# Patient Record
Sex: FEMALE | Employment: UNEMPLOYED | URBAN - METROPOLITAN AREA
[De-identification: names, ages, dates, MRNs, and addresses within clinical notes are randomized per-mention and may not be internally consistent; named-entity substitution may affect disease eponyms.]

---

## 2017-08-20 ENCOUNTER — HOSPITAL ENCOUNTER (EMERGENCY)
Facility: CLINIC | Age: 47
Discharge: HOME | End: 2017-08-20
Attending: EMERGENCY MEDICINE
Payer: MEDICARE

## 2017-08-20 VITALS
HEIGHT: 62 IN | RESPIRATION RATE: 15 BRPM | OXYGEN SATURATION: 99 % | WEIGHT: 110 LBS | SYSTOLIC BLOOD PRESSURE: 118 MMHG | BODY MASS INDEX: 20.24 KG/M2 | DIASTOLIC BLOOD PRESSURE: 77 MMHG | TEMPERATURE: 98.3 F | HEART RATE: 82 BPM

## 2017-08-20 DIAGNOSIS — G43.909 MIGRAINE WITHOUT STATUS MIGRAINOSUS, NOT INTRACTABLE, UNSPECIFIED MIGRAINE TYPE: Primary | ICD-10-CM

## 2017-08-20 LAB — GLUCOSE BLD-MCNC: 225 MG/DL (ref 65–105)

## 2017-08-20 PROCEDURE — 99283 EMERGENCY DEPT VISIT LOW MDM: CPT

## 2017-08-20 PROCEDURE — 96361 HYDRATE IV INFUSION ADD-ON: CPT

## 2017-08-20 PROCEDURE — 2580000003 HC RX 258: Performed by: EMERGENCY MEDICINE

## 2017-08-20 PROCEDURE — 6360000002 HC RX W HCPCS: Performed by: EMERGENCY MEDICINE

## 2017-08-20 PROCEDURE — 96374 THER/PROPH/DIAG INJ IV PUSH: CPT

## 2017-08-20 PROCEDURE — 82947 ASSAY GLUCOSE BLOOD QUANT: CPT

## 2017-08-20 PROCEDURE — 96375 TX/PRO/DX INJ NEW DRUG ADDON: CPT

## 2017-08-20 RX ORDER — DEXAMETHASONE SODIUM PHOSPHATE 10 MG/ML
10 INJECTION INTRAMUSCULAR; INTRAVENOUS ONCE
Status: COMPLETED | OUTPATIENT
Start: 2017-08-20 | End: 2017-08-20

## 2017-08-20 RX ORDER — 0.9 % SODIUM CHLORIDE 0.9 %
1000 INTRAVENOUS SOLUTION INTRAVENOUS ONCE
Status: COMPLETED | OUTPATIENT
Start: 2017-08-20 | End: 2017-08-20

## 2017-08-20 RX ORDER — KETOROLAC TROMETHAMINE 30 MG/ML
30 INJECTION, SOLUTION INTRAMUSCULAR; INTRAVENOUS ONCE
Status: COMPLETED | OUTPATIENT
Start: 2017-08-20 | End: 2017-08-20

## 2017-08-20 RX ORDER — DIPHENHYDRAMINE HYDROCHLORIDE 50 MG/ML
25 INJECTION INTRAMUSCULAR; INTRAVENOUS ONCE
Status: COMPLETED | OUTPATIENT
Start: 2017-08-20 | End: 2017-08-20

## 2017-08-20 RX ORDER — METOCLOPRAMIDE HYDROCHLORIDE 5 MG/ML
10 INJECTION INTRAMUSCULAR; INTRAVENOUS ONCE
Status: COMPLETED | OUTPATIENT
Start: 2017-08-20 | End: 2017-08-20

## 2017-08-20 RX ADMIN — KETOROLAC TROMETHAMINE 30 MG: 30 INJECTION, SOLUTION INTRAMUSCULAR at 03:21

## 2017-08-20 RX ADMIN — SODIUM CHLORIDE 1000 ML: 9 INJECTION, SOLUTION INTRAVENOUS at 03:18

## 2017-08-20 RX ADMIN — DEXAMETHASONE SODIUM PHOSPHATE 10 MG: 10 INJECTION INTRAMUSCULAR; INTRAVENOUS at 03:25

## 2017-08-20 RX ADMIN — METOCLOPRAMIDE 10 MG: 5 INJECTION, SOLUTION INTRAMUSCULAR; INTRAVENOUS at 03:22

## 2017-08-20 RX ADMIN — DIPHENHYDRAMINE HYDROCHLORIDE 25 MG: 50 INJECTION, SOLUTION INTRAMUSCULAR; INTRAVENOUS at 03:20

## 2017-08-20 ASSESSMENT — PAIN DESCRIPTION - LOCATION
LOCATION: HEAD

## 2017-08-20 ASSESSMENT — PAIN DESCRIPTION - DESCRIPTORS
DESCRIPTORS: ACHING
DESCRIPTORS: ACHING
DESCRIPTORS: THROBBING

## 2017-08-20 ASSESSMENT — PAIN SCALES - GENERAL
PAINLEVEL_OUTOF10: 3
PAINLEVEL_OUTOF10: 10
PAINLEVEL_OUTOF10: 9

## 2017-08-20 ASSESSMENT — ENCOUNTER SYMPTOMS
EYES NEGATIVE: 1
GASTROINTESTINAL NEGATIVE: 1
RESPIRATORY NEGATIVE: 1

## 2017-08-20 ASSESSMENT — PAIN DESCRIPTION - ONSET
ONSET: ON-GOING

## 2017-08-20 ASSESSMENT — PAIN DESCRIPTION - FREQUENCY
FREQUENCY: CONTINUOUS

## 2017-08-20 ASSESSMENT — PAIN DESCRIPTION - PAIN TYPE
TYPE: ACUTE PAIN

## 2017-08-20 ASSESSMENT — PAIN DESCRIPTION - ORIENTATION: ORIENTATION: OTHER (COMMENT)

## 2017-08-20 ASSESSMENT — PAIN DESCRIPTION - PROGRESSION: CLINICAL_PROGRESSION: RAPIDLY WORSENING

## 2017-08-24 ENCOUNTER — HOSPITAL ENCOUNTER (EMERGENCY)
Facility: CLINIC | Age: 47
Discharge: HOME | End: 2017-08-24
Attending: EMERGENCY MEDICINE
Payer: MEDICARE

## 2017-08-24 ENCOUNTER — APPOINTMENT (OUTPATIENT)
Dept: GENERAL RADIOLOGY | Facility: CLINIC | Age: 47
End: 2017-08-24
Payer: MEDICARE

## 2017-08-24 VITALS
OXYGEN SATURATION: 100 % | HEART RATE: 83 BPM | RESPIRATION RATE: 14 BRPM | DIASTOLIC BLOOD PRESSURE: 99 MMHG | WEIGHT: 113 LBS | SYSTOLIC BLOOD PRESSURE: 157 MMHG | HEIGHT: 62 IN | BODY MASS INDEX: 20.8 KG/M2 | TEMPERATURE: 98.2 F

## 2017-08-24 DIAGNOSIS — S63.502A LEFT WRIST SPRAIN, INITIAL ENCOUNTER: Primary | ICD-10-CM

## 2017-08-24 PROCEDURE — 73110 X-RAY EXAM OF WRIST: CPT

## 2017-08-24 PROCEDURE — 99284 EMERGENCY DEPT VISIT MOD MDM: CPT

## 2017-08-24 PROCEDURE — 29125 APPL SHORT ARM SPLINT STATIC: CPT

## 2017-08-24 RX ORDER — IBUPROFEN 800 MG/1
800 TABLET ORAL EVERY 8 HOURS PRN
Qty: 30 TABLET | Refills: 0 | Status: ON HOLD | OUTPATIENT
Start: 2017-08-24 | End: 2018-11-15

## 2017-08-24 ASSESSMENT — PAIN SCALES - GENERAL: PAINLEVEL_OUTOF10: 7

## 2017-08-24 ASSESSMENT — PAIN DESCRIPTION - LOCATION: LOCATION: WRIST

## 2017-08-24 ASSESSMENT — PAIN DESCRIPTION - PAIN TYPE: TYPE: ACUTE PAIN

## 2017-08-24 ASSESSMENT — PAIN DESCRIPTION - DESCRIPTORS: DESCRIPTORS: SHARP;THROBBING

## 2017-08-24 ASSESSMENT — PAIN DESCRIPTION - ORIENTATION: ORIENTATION: LEFT

## 2017-08-24 ASSESSMENT — PAIN DESCRIPTION - FREQUENCY: FREQUENCY: CONTINUOUS

## 2018-05-22 ENCOUNTER — HOSPITAL ENCOUNTER (EMERGENCY)
Facility: CLINIC | Age: 48
Discharge: HOME | End: 2018-05-23
Attending: EMERGENCY MEDICINE
Payer: MEDICARE

## 2018-05-22 VITALS
WEIGHT: 114 LBS | OXYGEN SATURATION: 100 % | HEIGHT: 62 IN | DIASTOLIC BLOOD PRESSURE: 88 MMHG | HEART RATE: 87 BPM | TEMPERATURE: 97.9 F | RESPIRATION RATE: 16 BRPM | BODY MASS INDEX: 20.98 KG/M2 | SYSTOLIC BLOOD PRESSURE: 142 MMHG

## 2018-05-22 DIAGNOSIS — R73.9 HYPERGLYCEMIA: Primary | ICD-10-CM

## 2018-05-22 LAB
-: ABNORMAL
ABSOLUTE EOS #: 0.2 K/UL (ref 0–0.4)
ABSOLUTE IMMATURE GRANULOCYTE: NORMAL K/UL (ref 0–0.3)
ABSOLUTE LYMPH #: 2.5 K/UL (ref 1–4.8)
ABSOLUTE MONO #: 0.6 K/UL (ref 0.1–1.2)
AMORPHOUS: ABNORMAL
ANION GAP SERPL CALCULATED.3IONS-SCNC: 16 MMOL/L (ref 9–17)
BACTERIA: ABNORMAL
BASOPHILS # BLD: 1 % (ref 0–2)
BASOPHILS ABSOLUTE: 0.1 K/UL (ref 0–0.2)
BETA-HYDROXYBUTYRATE: 0.27 MMOL/L (ref 0.02–0.27)
BILIRUBIN URINE: ABNORMAL
BUN BLDV-MCNC: 17 MG/DL (ref 6–20)
BUN/CREAT BLD: ABNORMAL (ref 9–20)
CALCIUM SERPL-MCNC: 9.9 MG/DL (ref 8.6–10.4)
CASTS UA: ABNORMAL /LPF (ref 0–2)
CHLORIDE BLD-SCNC: 104 MMOL/L (ref 98–107)
CO2: 23 MMOL/L (ref 20–31)
COLOR: YELLOW
COMMENT UA: ABNORMAL
CREAT SERPL-MCNC: 0.7 MG/DL (ref 0.5–0.9)
CRYSTALS, UA: ABNORMAL /HPF
DIFFERENTIAL TYPE: NORMAL
EOSINOPHILS RELATIVE PERCENT: 2 % (ref 1–4)
EPITHELIAL CELLS UA: ABNORMAL /HPF (ref 0–5)
GFR AFRICAN AMERICAN: >60 ML/MIN
GFR NON-AFRICAN AMERICAN: >60 ML/MIN
GFR SERPL CREATININE-BSD FRML MDRD: ABNORMAL ML/MIN/{1.73_M2}
GFR SERPL CREATININE-BSD FRML MDRD: ABNORMAL ML/MIN/{1.73_M2}
GLUCOSE BLD-MCNC: 241 MG/DL (ref 65–105)
GLUCOSE BLD-MCNC: 252 MG/DL (ref 70–99)
GLUCOSE URINE: ABNORMAL
HCG(URINE) PREGNANCY TEST: NEGATIVE
HCT VFR BLD CALC: 38.9 % (ref 36–46)
HEMOGLOBIN: 12.7 G/DL (ref 12–16)
IMMATURE GRANULOCYTES: NORMAL %
KETONES, URINE: ABNORMAL
LEUKOCYTE ESTERASE, URINE: NEGATIVE
LYMPHOCYTES # BLD: 27 % (ref 24–44)
MCH RBC QN AUTO: 29.5 PG (ref 26–34)
MCHC RBC AUTO-ENTMCNC: 32.6 G/DL (ref 31–37)
MCV RBC AUTO: 90.6 FL (ref 80–100)
MONOCYTES # BLD: 6 % (ref 2–11)
MUCUS: ABNORMAL
NITRITE, URINE: NEGATIVE
NRBC AUTOMATED: NORMAL PER 100 WBC
OTHER OBSERVATIONS UA: ABNORMAL
PDW BLD-RTO: 12.8 % (ref 12.5–15.4)
PH UA: 5.5 (ref 5–8)
PLATELET # BLD: 327 K/UL (ref 140–450)
PLATELET ESTIMATE: NORMAL
PMV BLD AUTO: 9.9 FL (ref 6–12)
POTASSIUM SERPL-SCNC: 4.1 MMOL/L (ref 3.7–5.3)
PROTEIN UA: ABNORMAL
RBC # BLD: 4.3 M/UL (ref 4–5.2)
RBC # BLD: NORMAL 10*6/UL
RBC UA: ABNORMAL /HPF (ref 0–2)
RENAL EPITHELIAL, UA: ABNORMAL /HPF
SEG NEUTROPHILS: 64 % (ref 36–66)
SEGMENTED NEUTROPHILS ABSOLUTE COUNT: 5.8 K/UL (ref 1.8–7.7)
SODIUM BLD-SCNC: 143 MMOL/L (ref 135–144)
SPECIFIC GRAVITY UA: 1.04 (ref 1–1.03)
TRICHOMONAS: ABNORMAL
TURBIDITY: CLEAR
URINE HGB: NEGATIVE
UROBILINOGEN, URINE: NORMAL
WBC # BLD: 9.2 K/UL (ref 3.5–11)
WBC # BLD: NORMAL 10*3/UL
WBC UA: ABNORMAL /HPF (ref 0–5)
YEAST: ABNORMAL

## 2018-05-22 PROCEDURE — 96375 TX/PRO/DX INJ NEW DRUG ADDON: CPT

## 2018-05-22 PROCEDURE — 82010 KETONE BODYS QUAN: CPT

## 2018-05-22 PROCEDURE — 81001 URINALYSIS AUTO W/SCOPE: CPT

## 2018-05-22 PROCEDURE — 6360000002 HC RX W HCPCS: Performed by: EMERGENCY MEDICINE

## 2018-05-22 PROCEDURE — 84703 CHORIONIC GONADOTROPIN ASSAY: CPT

## 2018-05-22 PROCEDURE — 99284 EMERGENCY DEPT VISIT MOD MDM: CPT

## 2018-05-22 PROCEDURE — 2580000003 HC RX 258: Performed by: EMERGENCY MEDICINE

## 2018-05-22 PROCEDURE — 82947 ASSAY GLUCOSE BLOOD QUANT: CPT

## 2018-05-22 PROCEDURE — 85025 COMPLETE CBC W/AUTO DIFF WBC: CPT

## 2018-05-22 PROCEDURE — 96374 THER/PROPH/DIAG INJ IV PUSH: CPT

## 2018-05-22 PROCEDURE — 80048 BASIC METABOLIC PNL TOTAL CA: CPT

## 2018-05-22 PROCEDURE — 36415 COLL VENOUS BLD VENIPUNCTURE: CPT

## 2018-05-22 PROCEDURE — 96361 HYDRATE IV INFUSION ADD-ON: CPT

## 2018-05-22 RX ORDER — 0.9 % SODIUM CHLORIDE 0.9 %
1000 INTRAVENOUS SOLUTION INTRAVENOUS ONCE
Status: COMPLETED | OUTPATIENT
Start: 2018-05-22 | End: 2018-05-22

## 2018-05-22 RX ORDER — KETOROLAC TROMETHAMINE 30 MG/ML
30 INJECTION, SOLUTION INTRAMUSCULAR; INTRAVENOUS ONCE
Status: COMPLETED | OUTPATIENT
Start: 2018-05-22 | End: 2018-05-22

## 2018-05-22 RX ORDER — ONDANSETRON 2 MG/ML
4 INJECTION INTRAMUSCULAR; INTRAVENOUS ONCE
Status: COMPLETED | OUTPATIENT
Start: 2018-05-22 | End: 2018-05-22

## 2018-05-22 RX ADMIN — ONDANSETRON 4 MG: 2 INJECTION INTRAMUSCULAR; INTRAVENOUS at 21:39

## 2018-05-22 RX ADMIN — SODIUM CHLORIDE 1000 ML: 9 INJECTION, SOLUTION INTRAVENOUS at 21:38

## 2018-05-22 RX ADMIN — KETOROLAC TROMETHAMINE 30 MG: 30 INJECTION, SOLUTION INTRAMUSCULAR at 21:39

## 2018-05-22 ASSESSMENT — PAIN SCALES - GENERAL
PAINLEVEL_OUTOF10: 8
PAINLEVEL_OUTOF10: 7
PAINLEVEL_OUTOF10: 8

## 2018-05-22 ASSESSMENT — PAIN DESCRIPTION - FREQUENCY: FREQUENCY: CONTINUOUS

## 2018-05-22 ASSESSMENT — PAIN DESCRIPTION - DESCRIPTORS
DESCRIPTORS: CONSTANT
DESCRIPTORS: CRAMPING

## 2018-05-22 ASSESSMENT — PAIN DESCRIPTION - PAIN TYPE: TYPE: ACUTE PAIN

## 2018-05-22 ASSESSMENT — PAIN DESCRIPTION - LOCATION: LOCATION: LEG

## 2018-05-22 ASSESSMENT — PAIN DESCRIPTION - ORIENTATION: ORIENTATION: RIGHT;LEFT

## 2018-11-14 ENCOUNTER — APPOINTMENT (OUTPATIENT)
Dept: ULTRASOUND IMAGING | Facility: CLINIC | Age: 48
DRG: 420 | End: 2018-11-14
Payer: MEDICARE

## 2018-11-14 ENCOUNTER — HOSPITAL ENCOUNTER (INPATIENT)
Age: 48
LOS: 1 days | Discharge: HOME | DRG: 420 | End: 2018-11-15
Attending: EMERGENCY MEDICINE | Admitting: INTERNAL MEDICINE
Payer: MEDICARE

## 2018-11-14 DIAGNOSIS — Z79.4 TYPE 2 DIABETES MELLITUS WITH HYPERGLYCEMIA, WITH LONG-TERM CURRENT USE OF INSULIN (HCC): ICD-10-CM

## 2018-11-14 DIAGNOSIS — E11.65 TYPE 2 DIABETES MELLITUS WITH HYPERGLYCEMIA, WITH LONG-TERM CURRENT USE OF INSULIN (HCC): ICD-10-CM

## 2018-11-14 DIAGNOSIS — E10.10 TYPE 1 DIABETES MELLITUS WITH KETOACIDOSIS, UNCONTROLLED (HCC): Primary | ICD-10-CM

## 2018-11-14 LAB
-: ABNORMAL
ABSOLUTE EOS #: 0 K/UL (ref 0–0.4)
ABSOLUTE IMMATURE GRANULOCYTE: ABNORMAL K/UL (ref 0–0.3)
ABSOLUTE LYMPH #: 0.9 K/UL (ref 1–4.8)
ABSOLUTE MONO #: 0.2 K/UL (ref 0.1–1.2)
AMORPHOUS: ABNORMAL
ANION GAP SERPL CALCULATED.3IONS-SCNC: 10 MMOL/L (ref 9–17)
ANION GAP SERPL CALCULATED.3IONS-SCNC: 13 MMOL/L (ref 9–17)
ANION GAP SERPL CALCULATED.3IONS-SCNC: 18 MMOL/L (ref 9–17)
BACTERIA: ABNORMAL
BASOPHILS # BLD: 1 % (ref 0–2)
BASOPHILS ABSOLUTE: 0 K/UL (ref 0–0.2)
BETA-HYDROXYBUTYRATE: 0.65 MMOL/L (ref 0.02–0.27)
BILIRUBIN URINE: NEGATIVE
BUN BLDV-MCNC: 12 MG/DL (ref 6–20)
BUN BLDV-MCNC: 14 MG/DL (ref 6–20)
BUN BLDV-MCNC: 16 MG/DL (ref 6–20)
BUN/CREAT BLD: 21 (ref 9–20)
BUN/CREAT BLD: ABNORMAL (ref 9–20)
BUN/CREAT BLD: ABNORMAL (ref 9–20)
CALCIUM SERPL-MCNC: 8.8 MG/DL (ref 8.6–10.4)
CALCIUM SERPL-MCNC: 9.3 MG/DL (ref 8.6–10.4)
CALCIUM SERPL-MCNC: 9.9 MG/DL (ref 8.6–10.4)
CASTS UA: ABNORMAL /LPF (ref 0–2)
CHLORIDE BLD-SCNC: 101 MMOL/L (ref 98–107)
CHLORIDE BLD-SCNC: 95 MMOL/L (ref 98–107)
CHLORIDE BLD-SCNC: 99 MMOL/L (ref 98–107)
CO2: 23 MMOL/L (ref 20–31)
CO2: 23 MMOL/L (ref 20–31)
CO2: 27 MMOL/L (ref 20–31)
COLOR: YELLOW
COMMENT UA: ABNORMAL
CREAT SERPL-MCNC: 0.57 MG/DL (ref 0.5–0.9)
CREAT SERPL-MCNC: 0.6 MG/DL (ref 0.5–0.9)
CREAT SERPL-MCNC: 0.8 MG/DL (ref 0.5–0.9)
CRYSTALS, UA: ABNORMAL /HPF
DIFFERENTIAL TYPE: ABNORMAL
EOSINOPHILS RELATIVE PERCENT: 1 % (ref 1–4)
EPITHELIAL CELLS UA: ABNORMAL /HPF (ref 0–5)
GFR AFRICAN AMERICAN: >60 ML/MIN
GFR NON-AFRICAN AMERICAN: >60 ML/MIN
GFR SERPL CREATININE-BSD FRML MDRD: ABNORMAL ML/MIN/{1.73_M2}
GLUCOSE BLD-MCNC: 115 MG/DL (ref 65–105)
GLUCOSE BLD-MCNC: 135 MG/DL (ref 70–99)
GLUCOSE BLD-MCNC: 188 MG/DL (ref 65–105)
GLUCOSE BLD-MCNC: 281 MG/DL (ref 70–99)
GLUCOSE BLD-MCNC: 399 MG/DL (ref 65–105)
GLUCOSE BLD-MCNC: 483 MG/DL (ref 70–99)
GLUCOSE URINE: ABNORMAL
HCT VFR BLD CALC: 38.7 % (ref 36–46)
HEMOGLOBIN: 12.7 G/DL (ref 12–16)
IMMATURE GRANULOCYTES: ABNORMAL %
KETONES, URINE: ABNORMAL
LACTIC ACID, WHOLE BLOOD: NORMAL MMOL/L (ref 0.7–2.1)
LACTIC ACID: 1.5 MMOL/L (ref 0.5–2.2)
LACTIC ACID: 3.7 MMOL/L (ref 0.5–2.2)
LEUKOCYTE ESTERASE, URINE: NEGATIVE
LYMPHOCYTES # BLD: 18 % (ref 24–44)
MCH RBC QN AUTO: 30.5 PG (ref 26–34)
MCHC RBC AUTO-ENTMCNC: 32.7 G/DL (ref 31–37)
MCV RBC AUTO: 93.3 FL (ref 80–100)
MONOCYTES # BLD: 3 % (ref 2–11)
MUCUS: ABNORMAL
NITRITE, URINE: NEGATIVE
NRBC AUTOMATED: ABNORMAL PER 100 WBC
OTHER OBSERVATIONS UA: ABNORMAL
PDW BLD-RTO: 12.7 % (ref 12.5–15.4)
PH UA: 5.5 (ref 5–8)
PLATELET # BLD: 293 K/UL (ref 140–450)
PLATELET ESTIMATE: ABNORMAL
PMV BLD AUTO: 10.2 FL (ref 6–12)
POTASSIUM SERPL-SCNC: 3.7 MMOL/L (ref 3.7–5.3)
POTASSIUM SERPL-SCNC: 4.1 MMOL/L (ref 3.7–5.3)
POTASSIUM SERPL-SCNC: 4.1 MMOL/L (ref 3.7–5.3)
PROTEIN UA: ABNORMAL
RBC # BLD: 4.15 M/UL (ref 4–5.2)
RBC # BLD: ABNORMAL 10*6/UL
RBC UA: ABNORMAL /HPF (ref 0–2)
RENAL EPITHELIAL, UA: ABNORMAL /HPF
SEG NEUTROPHILS: 77 % (ref 36–66)
SEGMENTED NEUTROPHILS ABSOLUTE COUNT: 4.1 K/UL (ref 1.8–7.7)
SODIUM BLD-SCNC: 136 MMOL/L (ref 135–144)
SODIUM BLD-SCNC: 136 MMOL/L (ref 135–144)
SODIUM BLD-SCNC: 137 MMOL/L (ref 135–144)
SPECIFIC GRAVITY UA: 1.01 (ref 1–1.03)
TRICHOMONAS: ABNORMAL
TURBIDITY: CLEAR
URINE HGB: NEGATIVE
UROBILINOGEN, URINE: NORMAL
WBC # BLD: 5.3 K/UL (ref 3.5–11)
WBC # BLD: ABNORMAL 10*3/UL
WBC UA: ABNORMAL /HPF (ref 0–5)
YEAST: ABNORMAL

## 2018-11-14 PROCEDURE — 6370000000 HC RX 637 (ALT 250 FOR IP): Performed by: FAMILY MEDICINE

## 2018-11-14 PROCEDURE — 2060000000 HC ICU INTERMEDIATE R&B

## 2018-11-14 PROCEDURE — 96375 TX/PRO/DX INJ NEW DRUG ADDON: CPT

## 2018-11-14 PROCEDURE — 85025 COMPLETE CBC W/AUTO DIFF WBC: CPT

## 2018-11-14 PROCEDURE — 82947 ASSAY GLUCOSE BLOOD QUANT: CPT

## 2018-11-14 PROCEDURE — 6360000002 HC RX W HCPCS: Performed by: EMERGENCY MEDICINE

## 2018-11-14 PROCEDURE — 82010 KETONE BODYS QUAN: CPT

## 2018-11-14 PROCEDURE — 6370000000 HC RX 637 (ALT 250 FOR IP): Performed by: CLINICAL NURSE SPECIALIST

## 2018-11-14 PROCEDURE — 93971 EXTREMITY STUDY: CPT

## 2018-11-14 PROCEDURE — 96366 THER/PROPH/DIAG IV INF ADDON: CPT

## 2018-11-14 PROCEDURE — 80048 BASIC METABOLIC PNL TOTAL CA: CPT

## 2018-11-14 PROCEDURE — 96361 HYDRATE IV INFUSION ADD-ON: CPT

## 2018-11-14 PROCEDURE — 82803 BLOOD GASES ANY COMBINATION: CPT

## 2018-11-14 PROCEDURE — 36415 COLL VENOUS BLD VENIPUNCTURE: CPT

## 2018-11-14 PROCEDURE — 2580000003 HC RX 258: Performed by: CLINICAL NURSE SPECIALIST

## 2018-11-14 PROCEDURE — 6370000000 HC RX 637 (ALT 250 FOR IP): Performed by: EMERGENCY MEDICINE

## 2018-11-14 PROCEDURE — 81001 URINALYSIS AUTO W/SCOPE: CPT

## 2018-11-14 PROCEDURE — 99222 1ST HOSP IP/OBS MODERATE 55: CPT | Performed by: NURSE PRACTITIONER

## 2018-11-14 PROCEDURE — 99285 EMERGENCY DEPT VISIT HI MDM: CPT

## 2018-11-14 PROCEDURE — 6360000002 HC RX W HCPCS: Performed by: CLINICAL NURSE SPECIALIST

## 2018-11-14 PROCEDURE — 2580000003 HC RX 258: Performed by: EMERGENCY MEDICINE

## 2018-11-14 PROCEDURE — 83036 HEMOGLOBIN GLYCOSYLATED A1C: CPT

## 2018-11-14 PROCEDURE — 83605 ASSAY OF LACTIC ACID: CPT

## 2018-11-14 PROCEDURE — 96365 THER/PROPH/DIAG IV INF INIT: CPT

## 2018-11-14 RX ORDER — INSULIN GLARGINE 100 [IU]/ML
40 INJECTION, SOLUTION SUBCUTANEOUS DAILY
Status: DISCONTINUED | OUTPATIENT
Start: 2018-11-14 | End: 2018-11-15 | Stop reason: HOSPADM

## 2018-11-14 RX ORDER — ACETAMINOPHEN 325 MG/1
650 TABLET ORAL EVERY 4 HOURS PRN
Status: DISCONTINUED | OUTPATIENT
Start: 2018-11-14 | End: 2018-11-15 | Stop reason: HOSPADM

## 2018-11-14 RX ORDER — SODIUM CHLORIDE 0.9 % (FLUSH) 0.9 %
10 SYRINGE (ML) INJECTION PRN
Status: DISCONTINUED | OUTPATIENT
Start: 2018-11-14 | End: 2018-11-15 | Stop reason: HOSPADM

## 2018-11-14 RX ORDER — METOPROLOL TARTRATE 5 MG/5ML
5 INJECTION INTRAVENOUS EVERY 6 HOURS PRN
Status: DISCONTINUED | OUTPATIENT
Start: 2018-11-14 | End: 2018-11-15 | Stop reason: HOSPADM

## 2018-11-14 RX ORDER — IBUPROFEN 800 MG/1
800 TABLET ORAL EVERY 8 HOURS PRN
Status: DISCONTINUED | OUTPATIENT
Start: 2018-11-14 | End: 2018-11-14 | Stop reason: SDUPTHER

## 2018-11-14 RX ORDER — ATORVASTATIN CALCIUM 40 MG/1
40 TABLET, FILM COATED ORAL DAILY
Status: DISCONTINUED | OUTPATIENT
Start: 2018-11-14 | End: 2018-11-15 | Stop reason: HOSPADM

## 2018-11-14 RX ORDER — SODIUM CHLORIDE 9 MG/ML
INJECTION, SOLUTION INTRAVENOUS CONTINUOUS
Status: DISCONTINUED | OUTPATIENT
Start: 2018-11-14 | End: 2018-11-15 | Stop reason: HOSPADM

## 2018-11-14 RX ORDER — RANITIDINE 150 MG/1
150 TABLET ORAL 2 TIMES DAILY PRN
COMMUNITY
End: 2019-06-06

## 2018-11-14 RX ORDER — POTASSIUM CHLORIDE 7.45 MG/ML
10 INJECTION INTRAVENOUS PRN
Status: DISCONTINUED | OUTPATIENT
Start: 2018-11-14 | End: 2018-11-15 | Stop reason: HOSPADM

## 2018-11-14 RX ORDER — MAGNESIUM SULFATE 1 G/100ML
1 INJECTION INTRAVENOUS PRN
Status: DISCONTINUED | OUTPATIENT
Start: 2018-11-14 | End: 2018-11-15 | Stop reason: HOSPADM

## 2018-11-14 RX ORDER — NICOTINE POLACRILEX 4 MG
15 LOZENGE BUCCAL PRN
Status: DISCONTINUED | OUTPATIENT
Start: 2018-11-14 | End: 2018-11-15 | Stop reason: HOSPADM

## 2018-11-14 RX ORDER — TRAMADOL HYDROCHLORIDE 50 MG/1
50 TABLET ORAL EVERY 6 HOURS PRN
Status: DISCONTINUED | OUTPATIENT
Start: 2018-11-14 | End: 2018-11-14

## 2018-11-14 RX ORDER — TRAMADOL HYDROCHLORIDE 50 MG/1
50 TABLET ORAL EVERY 6 HOURS PRN
Status: DISCONTINUED | OUTPATIENT
Start: 2018-11-14 | End: 2018-11-15 | Stop reason: HOSPADM

## 2018-11-14 RX ORDER — IBUPROFEN 800 MG/1
800 TABLET ORAL EVERY 6 HOURS PRN
Status: DISCONTINUED | OUTPATIENT
Start: 2018-11-14 | End: 2018-11-15

## 2018-11-14 RX ORDER — POTASSIUM CHLORIDE 20MEQ/15ML
40 LIQUID (ML) ORAL PRN
Status: DISCONTINUED | OUTPATIENT
Start: 2018-11-14 | End: 2018-11-15 | Stop reason: HOSPADM

## 2018-11-14 RX ORDER — LISINOPRIL 10 MG/1
10 TABLET ORAL DAILY
Status: DISCONTINUED | OUTPATIENT
Start: 2018-11-14 | End: 2018-11-15 | Stop reason: HOSPADM

## 2018-11-14 RX ORDER — M-VIT,TX,IRON,MINS/CALC/FOLIC 27MG-0.4MG
1 TABLET ORAL DAILY
Status: DISCONTINUED | OUTPATIENT
Start: 2018-11-14 | End: 2018-11-15 | Stop reason: HOSPADM

## 2018-11-14 RX ORDER — GABAPENTIN 600 MG/1
600 TABLET ORAL NIGHTLY
Status: ON HOLD | COMMUNITY
End: 2018-11-15

## 2018-11-14 RX ORDER — LORAZEPAM 2 MG/ML
1 INJECTION INTRAMUSCULAR ONCE
Status: DISCONTINUED | OUTPATIENT
Start: 2018-11-14 | End: 2018-11-15 | Stop reason: HOSPADM

## 2018-11-14 RX ORDER — SODIUM CHLORIDE 0.9 % (FLUSH) 0.9 %
10 SYRINGE (ML) INJECTION EVERY 12 HOURS SCHEDULED
Status: DISCONTINUED | OUTPATIENT
Start: 2018-11-14 | End: 2018-11-15 | Stop reason: HOSPADM

## 2018-11-14 RX ORDER — ONDANSETRON 4 MG/1
4 TABLET, ORALLY DISINTEGRATING ORAL EVERY 6 HOURS PRN
Status: DISCONTINUED | OUTPATIENT
Start: 2018-11-14 | End: 2018-11-15 | Stop reason: HOSPADM

## 2018-11-14 RX ORDER — LEVOTHYROXINE SODIUM 0.05 MG/1
50 TABLET ORAL DAILY
Status: DISCONTINUED | OUTPATIENT
Start: 2018-11-15 | End: 2018-11-15 | Stop reason: HOSPADM

## 2018-11-14 RX ORDER — ONDANSETRON 2 MG/ML
4 INJECTION INTRAMUSCULAR; INTRAVENOUS EVERY 6 HOURS PRN
Status: DISCONTINUED | OUTPATIENT
Start: 2018-11-14 | End: 2018-11-14 | Stop reason: CLARIF

## 2018-11-14 RX ORDER — DEXTROSE MONOHYDRATE 25 G/50ML
12.5 INJECTION, SOLUTION INTRAVENOUS PRN
Status: DISCONTINUED | OUTPATIENT
Start: 2018-11-14 | End: 2018-11-15 | Stop reason: HOSPADM

## 2018-11-14 RX ORDER — POTASSIUM CHLORIDE 20 MEQ/1
40 TABLET, EXTENDED RELEASE ORAL PRN
Status: DISCONTINUED | OUTPATIENT
Start: 2018-11-14 | End: 2018-11-15 | Stop reason: HOSPADM

## 2018-11-14 RX ORDER — ELECTROLYTES/DEXTROSE
1 SOLUTION, ORAL ORAL DAILY
COMMUNITY
End: 2020-01-02 | Stop reason: SDUPTHER

## 2018-11-14 RX ORDER — SIMVASTATIN 10 MG
10 TABLET ORAL NIGHTLY
Status: DISCONTINUED | OUTPATIENT
Start: 2018-11-14 | End: 2018-11-14

## 2018-11-14 RX ORDER — BISACODYL 10 MG
10 SUPPOSITORY, RECTAL RECTAL DAILY PRN
Status: DISCONTINUED | OUTPATIENT
Start: 2018-11-14 | End: 2018-11-15 | Stop reason: HOSPADM

## 2018-11-14 RX ORDER — INSULIN GLARGINE 100 [IU]/ML
24 INJECTION, SOLUTION SUBCUTANEOUS 2 TIMES DAILY
Status: DISCONTINUED | OUTPATIENT
Start: 2018-11-14 | End: 2018-11-14

## 2018-11-14 RX ORDER — NICOTINE 21 MG/24HR
1 PATCH, TRANSDERMAL 24 HOURS TRANSDERMAL DAILY
Status: DISCONTINUED | OUTPATIENT
Start: 2018-11-14 | End: 2018-11-15 | Stop reason: HOSPADM

## 2018-11-14 RX ORDER — ATORVASTATIN CALCIUM 40 MG/1
40 TABLET, FILM COATED ORAL DAILY
Status: ON HOLD | COMMUNITY
End: 2018-11-15

## 2018-11-14 RX ORDER — GABAPENTIN 300 MG/1
600 CAPSULE ORAL NIGHTLY
Status: DISCONTINUED | OUTPATIENT
Start: 2018-11-14 | End: 2018-11-15 | Stop reason: HOSPADM

## 2018-11-14 RX ORDER — METOPROLOL TARTRATE 5 MG/5ML
5 INJECTION INTRAVENOUS EVERY 4 HOURS PRN
Status: DISCONTINUED | OUTPATIENT
Start: 2018-11-14 | End: 2018-11-14

## 2018-11-14 RX ORDER — DEXTROSE MONOHYDRATE 50 MG/ML
100 INJECTION, SOLUTION INTRAVENOUS PRN
Status: DISCONTINUED | OUTPATIENT
Start: 2018-11-14 | End: 2018-11-15 | Stop reason: HOSPADM

## 2018-11-14 RX ORDER — ASPIRIN 81 MG/1
81 TABLET ORAL DAILY
Status: DISCONTINUED | OUTPATIENT
Start: 2018-11-14 | End: 2018-11-15 | Stop reason: HOSPADM

## 2018-11-14 RX ORDER — 0.9 % SODIUM CHLORIDE 0.9 %
1000 INTRAVENOUS SOLUTION INTRAVENOUS ONCE
Status: COMPLETED | OUTPATIENT
Start: 2018-11-14 | End: 2018-11-14

## 2018-11-14 RX ORDER — ONDANSETRON 2 MG/ML
4 INJECTION INTRAMUSCULAR; INTRAVENOUS EVERY 6 HOURS PRN
Status: DISCONTINUED | OUTPATIENT
Start: 2018-11-14 | End: 2018-11-15 | Stop reason: HOSPADM

## 2018-11-14 RX ORDER — ONDANSETRON 2 MG/ML
4 INJECTION INTRAMUSCULAR; INTRAVENOUS ONCE
Status: COMPLETED | OUTPATIENT
Start: 2018-11-14 | End: 2018-11-14

## 2018-11-14 RX ADMIN — ENOXAPARIN SODIUM 40 MG: 40 INJECTION SUBCUTANEOUS at 18:29

## 2018-11-14 RX ADMIN — TRAMADOL HYDROCHLORIDE 50 MG: 50 TABLET, FILM COATED ORAL at 20:21

## 2018-11-14 RX ADMIN — INSULIN GLARGINE 40 UNITS: 100 INJECTION, SOLUTION SUBCUTANEOUS at 20:43

## 2018-11-14 RX ADMIN — ONDANSETRON 4 MG: 2 INJECTION, SOLUTION INTRAMUSCULAR; INTRAVENOUS at 11:59

## 2018-11-14 RX ADMIN — GABAPENTIN 600 MG: 300 CAPSULE ORAL at 20:43

## 2018-11-14 RX ADMIN — INSULIN LISPRO 1 UNITS: 100 INJECTION, SOLUTION INTRAVENOUS; SUBCUTANEOUS at 20:44

## 2018-11-14 RX ADMIN — SODIUM CHLORIDE 1000 ML: 9 INJECTION, SOLUTION INTRAVENOUS at 11:59

## 2018-11-14 RX ADMIN — ACETAMINOPHEN 650 MG: 325 TABLET ORAL at 23:55

## 2018-11-14 RX ADMIN — LISINOPRIL 10 MG: 10 TABLET ORAL at 18:29

## 2018-11-14 RX ADMIN — MULTIPLE VITAMINS W/ MINERALS TAB 1 TABLET: TAB at 18:32

## 2018-11-14 RX ADMIN — ASPIRIN 81 MG: 81 TABLET, COATED ORAL at 18:29

## 2018-11-14 RX ADMIN — SODIUM CHLORIDE: 9 INJECTION, SOLUTION INTRAVENOUS at 18:30

## 2018-11-14 RX ADMIN — METFORMIN HYDROCHLORIDE 500 MG: 500 TABLET ORAL at 18:29

## 2018-11-14 RX ADMIN — ATORVASTATIN CALCIUM 40 MG: 40 TABLET, FILM COATED ORAL at 20:43

## 2018-11-14 RX ADMIN — SODIUM CHLORIDE 0.5 UNITS/HR: 9 INJECTION, SOLUTION INTRAVENOUS at 13:01

## 2018-11-14 ASSESSMENT — PAIN SCALES - GENERAL
PAINLEVEL_OUTOF10: 7
PAINLEVEL_OUTOF10: 8
PAINLEVEL_OUTOF10: 7
PAINLEVEL_OUTOF10: 9

## 2018-11-14 ASSESSMENT — PAIN DESCRIPTION - ORIENTATION
ORIENTATION: LEFT

## 2018-11-14 ASSESSMENT — PAIN DESCRIPTION - LOCATION
LOCATION: LEG

## 2018-11-14 ASSESSMENT — PAIN DESCRIPTION - FREQUENCY: FREQUENCY: CONTINUOUS

## 2018-11-14 ASSESSMENT — PAIN DESCRIPTION - PAIN TYPE
TYPE: CHRONIC PAIN

## 2018-11-14 ASSESSMENT — PAIN DESCRIPTION - DESCRIPTORS: DESCRIPTORS: CONSTANT

## 2018-11-14 NOTE — ED PROVIDER NOTES
[facility]  eMERGENCY dEPARTMENT eNCOUnter      Pt Name: Thomas Bernal  MRN: [id number]  [place] 1970  Date of evaluation: 11/14/2018      CHIEF COMPLAINT       Chief Complaint   Patient presents with    Hyperglycemia     Out Lantus          HISTORY OF PRESENT ILLNESS      The patient presents to the emergency department with high blood sugar, vomiting, and diarrhea. The patient has been out of her insulin for a month. She says she has been unable to get into the doctor and they won't refill her prescription until she is seen. The patient said the pharmacy wouldn't refill her prescription either. She has been having GI symptoms and feels pain in her left calf. She has a history of DVT but is not on blood thinners currently. The patient denies abdominal discomfort per se. She has not had a fever. She did take a little bit of insulin prior to coming in. Nothing makes her symptoms better or worse otherwise. REVIEW OF SYSTEMS       All systems reviewed and negative unless noted in HPI. The patient denies fever or constitutional symptoms. Denies vision change. Denies any sore throat or rhinorrhea. Denies any neck pain or stiffness. Denies chest pain or shortness of breath. Nausea and diarrhea. Denies any dysuria. Denies urinary frequency or hematuria. Denies musculoskeletal injury or pain. Denies any weakness, numbness or focal neurologic deficit. Denies any skin rash or edema. No recent psychiatric issues. No easy bruising or bleeding. High blood sugar; out of insulin. PAST MEDICAL HISTORY    has a past medical history of Depression; Diabetes mellitus ([address] DVT (deep vein thrombosis) in pregnancy ([facility] [address] Hyperlipidemia; Hypertension; and Thyroid disease. SURGICAL HISTORY      has a past surgical history that includes Tubal ligation.     CURRENT MEDICATIONS       Previous Medications    ASPIRIN 81 MG TABLET    Take 81 mg by mouth daily

## 2018-11-14 NOTE — PROGRESS NOTES
Transitions of Care Pharmacy Service   Medication Review    The patient's list of current home medications has been reviewed. Source(s) of information: patient, pt's family, [address]    Based on information provided by the above source(s), I have updated the patient's home med list as described below. Please review the ACTION REQUESTED BY PHYSICIAN section of this note below for any discrepancies on current hospital orders. I changed or updated the following medications on the patient's home medication list:  Discontinued · Aspirin 81mg (pt choice)  · Duplicate entry for Ibuprofen 800mg  · Tramadol 50mg (old prescription)  · Lovastatin 20mg (gets Atorvastatin 40mg instead)     Added · Metformin 500mg daily (pt states still taking, but hasn't refilled since March 2018)  · Gabapentin 600mg nightly  · Atorvastatin 40mg nightly  · Ranitidine 150mg BID prn  · OTC multivitamin tab daily  · OTC N[facility] [employer] supplement tab daily     Adjusted    Lantus insulin adjusted to Basaglar 40 units daily   Levothyroxine (no dose/directions) adjusted to 25mcg daily (note: pt states she is still taking at home, but she hasn't refilled since June 2016 per [address] [place] [address])     Other Notes · Patient states she gets all of her meds at [facility], but her refill dates indicate poor compliance.    · Lisinopril: pt states still taking, but she hasn't refilled since June 2017         [address] REQUESTED  Discrepancies on current hospital orders that need to be addressed by a physician:    Medication Action Requested   Aspirin,  Tramadol   Reordered to continue but not current home meds--please d/c orders as appropriate   Insulin glargine   Home regimen was 40 units daily--please adjust order as appropriate     Levothyroxine   Home dose was 25mcg daily per [facility] pharmacy--please adjust order as appropriate     Simvastatin   Takes Atorvastatin 40mg instead--please adjust order to match home med

## 2018-11-15 VITALS
RESPIRATION RATE: 16 BRPM | HEIGHT: 65 IN | OXYGEN SATURATION: 100 % | HEART RATE: 69 BPM | DIASTOLIC BLOOD PRESSURE: 78 MMHG | SYSTOLIC BLOOD PRESSURE: 125 MMHG | BODY MASS INDEX: 18.01 KG/M2 | WEIGHT: 108.1 LBS | TEMPERATURE: 98.6 F

## 2018-11-15 PROBLEM — F17.200 TOBACCO DEPENDENCY: Chronic | Status: ACTIVE | Noted: 2018-11-15

## 2018-11-15 PROBLEM — E83.42 HYPOMAGNESEMIA: Status: ACTIVE | Noted: 2018-11-15

## 2018-11-15 PROBLEM — E87.6 HYPOKALEMIA DUE TO LOSS OF POTASSIUM: Status: ACTIVE | Noted: 2018-11-15

## 2018-11-15 LAB
ALBUMIN SERPL-MCNC: 3.9 G/DL (ref 3.5–5.2)
ALBUMIN/GLOBULIN RATIO: ABNORMAL (ref 1–2.5)
ALP BLD-CCNC: 73 U/L (ref 35–104)
ALT SERPL-CCNC: 12 U/L (ref 5–33)
ANION GAP SERPL CALCULATED.3IONS-SCNC: 12 MMOL/L (ref 9–17)
AST SERPL-CCNC: 14 U/L
BETA-HYDROXYBUTYRATE: 0.21 MMOL/L (ref 0.02–0.27)
BILIRUB SERPL-MCNC: 0.21 MG/DL (ref 0.3–1.2)
BUN BLDV-MCNC: 14 MG/DL (ref 6–20)
BUN/CREAT BLD: 26 (ref 9–20)
CALCIUM SERPL-MCNC: 8.6 MG/DL (ref 8.6–10.4)
CHLORIDE BLD-SCNC: 103 MMOL/L (ref 98–107)
CO2: 24 MMOL/L (ref 20–31)
CREAT SERPL-MCNC: 0.54 MG/DL (ref 0.5–0.9)
ESTIMATED AVERAGE GLUCOSE: 252 MG/DL
FIO2: NORMAL
GFR AFRICAN AMERICAN: >60 ML/MIN
GFR NON-AFRICAN AMERICAN: >60 ML/MIN
GFR SERPL CREATININE-BSD FRML MDRD: ABNORMAL ML/MIN/{1.73_M2}
GFR SERPL CREATININE-BSD FRML MDRD: ABNORMAL ML/MIN/{1.73_M2}
GLUCOSE BLD-MCNC: 232 MG/DL (ref 65–105)
GLUCOSE BLD-MCNC: 72 MG/DL (ref 70–99)
GLUCOSE BLD-MCNC: 75 MG/DL (ref 65–105)
GLUCOSE BLD-MCNC: 95 MG/DL (ref 65–105)
HBA1C MFR BLD: 10.4 % (ref 4–6)
HCO3 VENOUS: 24.1 MMOL/L (ref 24–30)
HCT VFR BLD CALC: 31.8 % (ref 36–46)
HEMOGLOBIN: 10.7 G/DL (ref 12–16)
INR BLD: 1
MAGNESIUM: 1.5 MG/DL (ref 1.6–2.6)
MCH RBC QN AUTO: 31.3 PG (ref 26–34)
MCHC RBC AUTO-ENTMCNC: 33.7 G/DL (ref 31–37)
MCV RBC AUTO: 92.8 FL (ref 80–100)
NEGATIVE BASE EXCESS, VEN: 1 (ref 0–2)
NRBC AUTOMATED: ABNORMAL PER 100 WBC
O2 DEVICE/FLOW/%: NORMAL
O2 SAT, VEN: 61 %
PATIENT TEMP: NORMAL
PCO2, VEN: 41 MM HG (ref 39–55)
PDW BLD-RTO: 13.2 % (ref 11.5–14.5)
PH VENOUS: 7.38 (ref 7.32–7.42)
PLATELET # BLD: 249 K/UL (ref 130–400)
PMV BLD AUTO: 9.6 FL (ref 6–12)
PO2, VEN: 32 MM HG (ref 30–50)
POC PCO2 TEMP: NORMAL MM HG
POC PH TEMP: NORMAL
POC PO2 TEMP: NORMAL MM HG
POSITIVE BASE EXCESS, VEN: NORMAL (ref 0–2)
POTASSIUM SERPL-SCNC: 3.3 MMOL/L (ref 3.7–5.3)
PROTHROMBIN TIME: 10.8 SEC (ref 9.7–11.6)
RBC # BLD: 3.42 M/UL (ref 4–5.2)
SODIUM BLD-SCNC: 139 MMOL/L (ref 135–144)
TOTAL CO2, VENOUS: 25 MMOL/L (ref 25–31)
TOTAL PROTEIN: 6.3 G/DL (ref 6.4–8.3)
TSH SERPL DL<=0.05 MIU/L-ACNC: 2.32 MIU/L (ref 0.3–5)
WBC # BLD: 6.8 K/UL (ref 3.5–11)

## 2018-11-15 PROCEDURE — 83735 ASSAY OF MAGNESIUM: CPT

## 2018-11-15 PROCEDURE — 85610 PROTHROMBIN TIME: CPT

## 2018-11-15 PROCEDURE — 36415 COLL VENOUS BLD VENIPUNCTURE: CPT

## 2018-11-15 PROCEDURE — 80053 COMPREHEN METABOLIC PANEL: CPT

## 2018-11-15 PROCEDURE — 82947 ASSAY GLUCOSE BLOOD QUANT: CPT

## 2018-11-15 PROCEDURE — 2580000003 HC RX 258: Performed by: NURSE PRACTITIONER

## 2018-11-15 PROCEDURE — 85027 COMPLETE CBC AUTOMATED: CPT

## 2018-11-15 PROCEDURE — 99239 HOSP IP/OBS DSCHRG MGMT >30: CPT | Performed by: FAMILY MEDICINE

## 2018-11-15 PROCEDURE — 6360000002 HC RX W HCPCS: Performed by: CLINICAL NURSE SPECIALIST

## 2018-11-15 PROCEDURE — 6370000000 HC RX 637 (ALT 250 FOR IP): Performed by: CLINICAL NURSE SPECIALIST

## 2018-11-15 PROCEDURE — 6370000000 HC RX 637 (ALT 250 FOR IP): Performed by: FAMILY MEDICINE

## 2018-11-15 PROCEDURE — 6370000000 HC RX 637 (ALT 250 FOR IP): Performed by: NURSE PRACTITIONER

## 2018-11-15 PROCEDURE — 82010 KETONE BODYS QUAN: CPT

## 2018-11-15 PROCEDURE — 84443 ASSAY THYROID STIM HORMONE: CPT

## 2018-11-15 RX ORDER — IBUPROFEN 800 MG/1
400 TABLET ORAL EVERY 8 HOURS PRN
Qty: 30 TABLET | Refills: 0 | Status: SHIPPED | OUTPATIENT
Start: 2018-11-15 | End: 2019-06-06

## 2018-11-15 RX ORDER — GABAPENTIN 600 MG/1
600 TABLET ORAL 2 TIMES DAILY
Qty: 60 TABLET | Refills: 3 | Status: SHIPPED | OUTPATIENT
Start: 2018-11-15 | End: 2019-06-06

## 2018-11-15 RX ORDER — LEVOTHYROXINE SODIUM 0.03 MG/1
25 TABLET ORAL DAILY
Qty: 30 TABLET | Refills: 0 | Status: SHIPPED | OUTPATIENT
Start: 2018-11-15 | End: 2018-11-20 | Stop reason: SDUPTHER

## 2018-11-15 RX ORDER — IBUPROFEN 800 MG/1
800 TABLET ORAL EVERY 6 HOURS PRN
Status: DISCONTINUED | OUTPATIENT
Start: 2018-11-15 | End: 2018-11-15 | Stop reason: HOSPADM

## 2018-11-15 RX ORDER — ATORVASTATIN CALCIUM 40 MG/1
40 TABLET, FILM COATED ORAL DAILY
Qty: 30 TABLET | Refills: 3 | Status: SHIPPED | OUTPATIENT
Start: 2018-11-15 | End: 2019-06-06

## 2018-11-15 RX ORDER — LISINOPRIL 20 MG/1
20 TABLET ORAL DAILY
Qty: 30 TABLET | Refills: 1 | Status: SHIPPED | OUTPATIENT
Start: 2018-11-15 | End: 2019-06-06

## 2018-11-15 RX ORDER — ASPIRIN 81 MG/1
81 TABLET ORAL DAILY
Qty: 30 TABLET | Refills: 3 | Status: SHIPPED | OUTPATIENT
Start: 2018-11-16 | End: 2019-06-06

## 2018-11-15 RX ADMIN — LEVOTHYROXINE SODIUM 50 MCG: 50 TABLET ORAL at 07:51

## 2018-11-15 RX ADMIN — ENOXAPARIN SODIUM 40 MG: 40 INJECTION SUBCUTANEOUS at 08:59

## 2018-11-15 RX ADMIN — ASPIRIN 81 MG: 81 TABLET, COATED ORAL at 08:58

## 2018-11-15 RX ADMIN — TRAMADOL HYDROCHLORIDE 50 MG: 50 TABLET, FILM COATED ORAL at 11:38

## 2018-11-15 RX ADMIN — SODIUM CHLORIDE: 9 INJECTION, SOLUTION INTRAVENOUS at 07:53

## 2018-11-15 RX ADMIN — LISINOPRIL 10 MG: 10 TABLET ORAL at 08:58

## 2018-11-15 RX ADMIN — MULTIPLE VITAMINS W/ MINERALS TAB 1 TABLET: TAB at 09:11

## 2018-11-15 RX ADMIN — MAGNESIUM SULFATE HEPTAHYDRATE 1 G: 1 INJECTION, SOLUTION INTRAVENOUS at 11:04

## 2018-11-15 RX ADMIN — MAGNESIUM SULFATE HEPTAHYDRATE 1 G: 1 INJECTION, SOLUTION INTRAVENOUS at 08:50

## 2018-11-15 RX ADMIN — METFORMIN HYDROCHLORIDE 500 MG: 500 TABLET ORAL at 08:59

## 2018-11-15 RX ADMIN — INSULIN LISPRO 4 UNITS: 100 INJECTION, SOLUTION INTRAVENOUS; SUBCUTANEOUS at 13:01

## 2018-11-15 RX ADMIN — POTASSIUM CHLORIDE 40 MEQ: 20 TABLET, EXTENDED RELEASE ORAL at 08:58

## 2018-11-15 ASSESSMENT — PAIN SCALES - GENERAL
PAINLEVEL_OUTOF10: 7
PAINLEVEL_OUTOF10: 5

## 2018-11-15 NOTE — DISCHARGE SUMMARY
Discharge Medications:      Medication List      START taking these medications    aspirin 81 MG EC tablet  Take 1 tablet by mouth daily  Start taking on:  11/16/2018  Replaces:  aspirin 81 MG tablet        CHANGE how you take these medications    gabapentin 600 MG tablet  Commonly known as:  NEURONTIN  Take 1 tablet by mouth 2 times daily for 30 days. .  What changed:  when to take this     ibuprofen 800 MG tablet  Commonly known as:  ADVIL;MOTRIN  Take 0.5 tablets by mouth every 8 hours as needed for Pain  What changed:  how much to take     insulin aspart 100 UNIT/ML injection vial  Commonly known as:  NOVOLOG  Inject 5 Units into the skin 3 times daily (before meals) Per carb ratio/sliding scale  What changed:  how much to take     insulin glargine 100 UNIT/ML injection pen  Commonly known as:  BASAGLAR KWIKPEN  Inject 40 Units into the skin nightly  What changed:  · when to take this  · Another medication with the same name was removed. Continue taking this medication, and follow the directions you see here. lisinopril 20 MG tablet  Commonly known as:  PRINIVIL;ZESTRIL  Take 1 tablet by mouth daily  What changed:  · medication strength  · how much to take        CONTINUE taking these medications    atorvastatin 40 MG tablet  Commonly known as:  LIPITOR  Take 1 tablet by mouth daily     levothyroxine 25 MCG tablet  Commonly known as:  SYNTHROID  Take 1 tablet by mouth daily     metFORMIN 500 MG tablet  Commonly known as:  GLUCOPHAGE  Take 1 tablet by mouth daily     MULTIVITAMIN ADULT Tabs     ZANTAC 150 MG tablet  Generic drug:  ranitidine        STOP taking these medications    aspirin 81 MG tablet  Replaced by:  aspirin 81 MG EC tablet     lovastatin 20 MG tablet  Commonly known as:  MEVACOR     NONFORMULARY           Where to Get Your Medications      [facility] [address] [facility] - P [phone] - F [phone]  [address] [facility]

## 2018-11-15 NOTE — CARE COORDINATION
Case Management Initial Discharge Plan  Alfred Samuelari,         Readmission Risk              Risk of Unplanned Readmission:        8             Met with:patient to discuss discharge plans. Information verified: address, contacts, phone number, , insurance Yes  PCP: No primary care provider on file. Date of last visit: na    Insurance Provider: [employer]     Discharge Planning  Current Residence:  Private home   Living Arrangements:  Family Members (with daughter and son)       Home has 1  stories/4 stairs to climb  Support Systems:  Children, son , daughter and her fiance      Current Services PTA:  None  Agency: none      Patient able to perform ADL's:Independent  DME in home:  Glucometer and cane   DME used to aid ambulation prior to admission: none  DME used during admission:  None     Potential Assistance Needed:  Home Care (interested in home pt/ot)    Pharmacy: [facility]    Potential Assistance Purchasing Medications:  No  Does patient want to participate in local refill/ meds to beds program?  No    Patient agreeable to home care: No  [facility] of choice provided:  n/a      Type of Home Care Services:  None  Patient expects to be discharged to:  Home    Prior SNF/Rehab Placement and Facility: none   Agreeable to SNF/Rehab: No  [facility] of choice provided: no   Evaluation: n/a    Expected Discharge date:  18  Follow Up Appointment: Best Day/ Time:  afternoon    Transportation provider: per daughter   Transportation arrangements needed for discharge: No    Discharge Plan:   Patient lives at home with her 25 year dgt and her fiance along with her son. She does not drive but children do. She has no PCP currently. She did have an endocrinologist thru [facility] but no longer sees. She was given the PCP list and would like to go to [facility] family physicians in [place].  Call to them at [phone] and new appt set up with DR MORENO [facility] for  at 320 pm and placed in her

## 2018-11-15 NOTE — PROGRESS NOTES
lisinopril  10 mg Oral Daily    sodium chloride flush  10 mL Intravenous 2 times per day    insulin lispro  0-12 Units Subcutaneous TID WC    insulin lispro  0-6 Units Subcutaneous Nightly    enoxaparin  40 mg Subcutaneous Daily    nicotine  1 patch Transdermal Daily    atorvastatin  40 mg Oral Daily    gabapentin  600 mg Oral Nightly    insulin glargine  40 Units Subcutaneous Daily    metFORMIN  500 mg Oral Daily    therapeutic multivitamin-minerals  1 tablet Oral Daily    LORazepam  1 mg Intravenous Once     Continuous Infusions:    dextrose      sodium chloride 100 mL/hr at 11/15/18 0753     PRN Meds: ibuprofen, sodium chloride flush, potassium chloride **OR** potassium chloride **OR** potassium chloride, magnesium sulfate, magnesium hydroxide, bisacodyl, acetaminophen, glucose, dextrose, glucagon (rDNA), dextrose, ondansetron **OR** ondansetron, metoprolol, traMADol    Data:     Past Medical History:   has a past medical history of Anxiety; Depression; Diabetes mellitus ([facility] [address] DVT (deep vein thrombosis) in pregnancy ([facility] [address] Hyperlipidemia; Hypertension; and Thyroid disease. Social History:   reports that she has been smoking Cigarettes. She has been smoking about 0.00 packs per day. She has never used smokeless tobacco. She reports that she does not drink alcohol or use drugs. Family History: History reviewed. No pertinent family history. Vitals:  /78   Pulse 69   Temp 98.6 °F (37 °C) (Oral)   Resp 16   Ht 5' 5\" (1.651 m)   Wt 108 lb 1.6 oz (49 kg)   SpO2 100%   BMI 17.99 kg/m²   Temp (24hrs), Av °F (36.7 °C), Min:97.1 °F (36.2 °C), Max:99 °F (37.2 °C)    Recent Labs      18   1815  18   2031  11/15/18   0754  11/15/18   1236   POCGLU  115*  188*  95  232*       I/O (24Hr):     Intake/Output Summary (Last 24 hours) at 11/15/18 1307  Last data filed at 11/15/18 0753   Gross per 24 hour   Intake             2096 ml   Output                0 ml   Net 2096 ml       Labs:    Hematology:  Recent Labs      11/14/18   1140  11/15/18   0534   WBC  5.3  6.8   RBC  4.15  3.42*   HGB  12.7  10.7*   HCT  38.7  31.8*   MCV  93.3  92.8   MCH  30.5  31.3   MCHC  32.7  33.7   RDW  12.7  13.2   PLT  293  249   MPV  10.2  9.6   INR   --   1.0     Chemistry:  Recent Labs      11/14/18   1408  11/14/18   1801  11/15/18   0534   NA  137  136  139   K  4.1  3.7  3.3*   CL  101  99  103   CO2  23  27  24   GLUCOSE  281*  135*  72   BUN  14  12  14   CREATININE  0.60  0.57  0.54   MG   --    --   1.5*   ANIONGAP  13  10  12   LABGLOM  >60  >60  >60   GFRAA  >60  >60  >60   CALCIUM  9.3  8.8  8.6   LACTACIDWB   --   NOT REPORTED   --      Recent Labs      11/14/18   1302  11/14/18   1801  11/14/18   1815  11/14/18   2031  11/15/18   0534  11/15/18   0754  11/15/18   1236   PROT   --    --    --    --   6.3*   --    --    LABALBU   --    --    --    --   3.9   --    --    LABA1C   --   10.4*   --    --    --    --    --    TSH   --    --    --    --   2.32   --    --    AST   --    --    --    --   14   --    --    ALT   --    --    --    --   12   --    --    ALKPHOS   --    --    --    --   73   --    --    BILITOT   --    --    --    --   0.21*   --    --    POCGLU  399*   --   115*  188*   --   95  232*         Lab Results   Component Value Date/Time    SPECIAL NOT REPORTED 04/01/2012 10:30 AM    SPECIAL NOT REPORTED 04/01/2012 10:30 AM    SPECIAL NOT REPORTED 04/01/2012 10:30 AM     Lab Results   Component Value Date/Time    CULTURE NEGATIVE FOR SHIGATOXIN (A) 04/01/2012 10:30 AM    CULTURE NO ESCHERICHIA COLI O157:H7 ISOLATED 04/01/2012 10:30 AM    CULTURE (A) 04/01/2012 10:30 AM     NO SALMONELLA, SHIGELLA, YERSINIA OR CAMPYLOBACTER ISOLATED    CULTURE  04/01/2012 10:30 AM     [employer] [address] [facility] [phone]       Lab Results   Component Value Date    FIO2 21.0 03/29/2012       Radiology:        Physical Examination:        General appearance:

## 2018-11-15 NOTE — H&P
hot or cold intolerance  MUSCULOSKELETAL:  Chronic left lower leg pain  NEUROLOGICAL:  negative for headaches, dizziness, lightheadedness, numbness, pain, tingling extremities  BEHAVIOR/PSYCH:  negative for depression, anxiety    Physical Exam:   /84   Pulse 83   Temp 99 °F (37.2 °C) (Oral)   Resp 20   Ht 5' 5\" (1.651 m)   Wt 108 lb 1.6 oz (49 kg)   SpO2 100%   BMI 17.99 kg/m²   Temp (24hrs), Av.2 °F (36.8 °C), Min:97.7 °F (36.5 °C), Max:99 °F (37.2 °C)    Recent Labs      18   1302  18   1815  18   2031   POCGLU  399*  115*  188*       Intake/Output Summary (Last 24 hours) at 11/15/18 0317  Last data filed at 11/15/18 0308   Gross per 24 hour   Intake             1630 ml   Output                0 ml   Net             1630 ml       General Appearance:  alert, well appearing, and in no acute distress. She is tearful about her current situation. Mental status: oriented to person, place, and time with normal affect  Head:  normocephalic, atraumatic. Eye: no icterus, redness, pupils equal and reactive, extraocular eye movements intact, conjunctiva clear  Ear: normal external ear, no discharge, hearing intact  Nose:  no drainage noted  Mouth: mucous membranes moist  Lungs: Bilateral equal air entry, clear to auscultation, no wheezing, rales or rhonchi, normal effort  Cardiovascular: normal rate, regular rhythm, no murmur, gallop, rub.   Abdomen: Soft, nontender, nondistended, normal bowel sounds, no hepatomegaly or splenomegaly  Neurologic: There are no new focal motor or sensory deficits, normal muscle tone and bulk, no abnormal sensation, normal speech, cranial nerves II through XII grossly intact  Skin: No gross lesions, rashes, bruising or bleeding on exposed skin area  Extremities:  peripheral pulses palpable, no pedal edema or calf pain with palpation  Psych: normal affect     Investigations:      Laboratory Testing:  Recent Results (from the past 24 hour(s))   CBC Auto

## 2018-11-20 ENCOUNTER — OFFICE VISIT (OUTPATIENT)
Dept: FAMILY MEDICINE CLINIC | Age: 48
End: 2018-11-20
Payer: MEDICARE

## 2018-11-20 VITALS
WEIGHT: 120.6 LBS | HEART RATE: 82 BPM | BODY MASS INDEX: 20.09 KG/M2 | HEIGHT: 65 IN | TEMPERATURE: 97.8 F | SYSTOLIC BLOOD PRESSURE: 105 MMHG | DIASTOLIC BLOOD PRESSURE: 62 MMHG | RESPIRATION RATE: 16 BRPM

## 2018-11-20 DIAGNOSIS — E03.9 ACQUIRED HYPOTHYROIDISM: ICD-10-CM

## 2018-11-20 DIAGNOSIS — Z79.4 TYPE 2 DIABETES MELLITUS WITH HYPERGLYCEMIA, WITH LONG-TERM CURRENT USE OF INSULIN (HCC): Primary | ICD-10-CM

## 2018-11-20 DIAGNOSIS — Z11.4 SCREENING FOR HIV WITHOUT PRESENCE OF RISK FACTORS: ICD-10-CM

## 2018-11-20 DIAGNOSIS — E11.65 TYPE 2 DIABETES MELLITUS WITH HYPERGLYCEMIA, WITH LONG-TERM CURRENT USE OF INSULIN (HCC): Primary | ICD-10-CM

## 2018-11-20 DIAGNOSIS — I10 ESSENTIAL HYPERTENSION: ICD-10-CM

## 2018-11-20 DIAGNOSIS — Z23 NEED FOR INFLUENZA VACCINATION: ICD-10-CM

## 2018-11-20 DIAGNOSIS — Z23 NEED FOR PROPHYLACTIC VACCINATION AGAINST STREPTOCOCCUS PNEUMONIAE (PNEUMOCOCCUS): ICD-10-CM

## 2018-11-20 DIAGNOSIS — E78.1 PURE HYPERGLYCERIDEMIA: ICD-10-CM

## 2018-11-20 PROBLEM — E83.42 HYPOMAGNESEMIA: Status: RESOLVED | Noted: 2018-11-15 | Resolved: 2018-11-20

## 2018-11-20 PROBLEM — E87.6 HYPOKALEMIA DUE TO LOSS OF POTASSIUM: Status: RESOLVED | Noted: 2018-11-15 | Resolved: 2018-11-20

## 2018-11-20 PROCEDURE — 3046F HEMOGLOBIN A1C LEVEL >9.0%: CPT | Performed by: INTERNAL MEDICINE

## 2018-11-20 PROCEDURE — 96160 PT-FOCUSED HLTH RISK ASSMT: CPT | Performed by: INTERNAL MEDICINE

## 2018-11-20 PROCEDURE — G8420 CALC BMI NORM PARAMETERS: HCPCS | Performed by: INTERNAL MEDICINE

## 2018-11-20 PROCEDURE — G8427 DOCREV CUR MEDS BY ELIG CLIN: HCPCS | Performed by: INTERNAL MEDICINE

## 2018-11-20 PROCEDURE — G8484 FLU IMMUNIZE NO ADMIN: HCPCS | Performed by: INTERNAL MEDICINE

## 2018-11-20 PROCEDURE — 99204 OFFICE O/P NEW MOD 45 MIN: CPT | Performed by: INTERNAL MEDICINE

## 2018-11-20 PROCEDURE — 2022F DILAT RTA XM EVC RTNOPTHY: CPT | Performed by: INTERNAL MEDICINE

## 2018-11-20 PROCEDURE — 4004F PT TOBACCO SCREEN RCVD TLK: CPT | Performed by: INTERNAL MEDICINE

## 2018-11-20 PROCEDURE — 1111F DSCHRG MED/CURRENT MED MERGE: CPT | Performed by: INTERNAL MEDICINE

## 2018-11-20 RX ORDER — LEVOTHYROXINE SODIUM 0.03 MG/1
25 TABLET ORAL DAILY
Qty: 30 TABLET | Refills: 2 | Status: SHIPPED | OUTPATIENT
Start: 2018-11-20 | End: 2019-06-06

## 2018-11-20 ASSESSMENT — PATIENT HEALTH QUESTIONNAIRE - PHQ9
5. POOR APPETITE OR OVEREATING: 2
9. THOUGHTS THAT YOU WOULD BE BETTER OFF DEAD, OR OF HURTING YOURSELF: 0
SUM OF ALL RESPONSES TO PHQ QUESTIONS 1-9: 12
4. FEELING TIRED OR HAVING LITTLE ENERGY: 1
1. LITTLE INTEREST OR PLEASURE IN DOING THINGS: 2
7. TROUBLE CONCENTRATING ON THINGS, SUCH AS READING THE NEWSPAPER OR WATCHING TELEVISION: 0
SUM OF ALL RESPONSES TO PHQ9 QUESTIONS 1 & 2: 4
SUM OF ALL RESPONSES TO PHQ QUESTIONS 1-9: 12
2. FEELING DOWN, DEPRESSED OR HOPELESS: 2
10. IF YOU CHECKED OFF ANY PROBLEMS, HOW DIFFICULT HAVE THESE PROBLEMS MADE IT FOR YOU TO DO YOUR WORK, TAKE CARE OF THINGS AT HOME, OR GET ALONG WITH OTHER PEOPLE: 0
6. FEELING BAD ABOUT YOURSELF - OR THAT YOU ARE A FAILURE OR HAVE LET YOURSELF OR YOUR FAMILY DOWN: 3
8. MOVING OR SPEAKING SO SLOWLY THAT OTHER PEOPLE COULD HAVE NOTICED. OR THE OPPOSITE, BEING SO FIGETY OR RESTLESS THAT YOU HAVE BEEN MOVING AROUND A LOT MORE THAN USUAL: 0
3. TROUBLE FALLING OR STAYING ASLEEP: 2

## 2018-11-20 NOTE — PROGRESS NOTES
Prescriptions   Medication Sig Dispense Refill    levothyroxine (SYNTHROID) 25 MCG tablet Take 1 tablet by mouth daily 30 tablet 2    aspirin 81 MG EC tablet Take 1 tablet by mouth daily 30 tablet 3    atorvastatin (LIPITOR) 40 MG tablet Take 1 tablet by mouth daily 30 tablet 3    gabapentin (NEURONTIN) 600 MG tablet Take 1 tablet by mouth 2 times daily for 30 days. . 60 tablet 3    ibuprofen (ADVIL;MOTRIN) 800 MG tablet Take 0.5 tablets by mouth every 8 hours as needed for Pain 30 tablet 0    insulin aspart (NOVOLOG) 100 UNIT/ML injection vial Inject 5 Units into the skin 3 times daily (before meals) Per carb ratio/sliding scale 1 vial 3    insulin glargine (BASAGLAR KWIKPEN) 100 UNIT/ML injection pen Inject 40 Units into the skin nightly 5 pen 3    lisinopril (PRINIVIL;ZESTRIL) 20 MG tablet Take 1 tablet by mouth daily 30 tablet 1    metFORMIN (GLUCOPHAGE) 500 MG tablet Take 1 tablet by mouth daily 60 tablet 1    ranitidine (ZANTAC) 150 MG tablet Take 150 mg by mouth 2 times daily as needed for Heartburn      Multiple Vitamins-Minerals (MULTIVITAMIN ADULT) TABS Take 1 tablet by mouth daily       No current facility-administered medications for this visit. Social History   Substance Use Topics    Smoking status: Current Some Day Smoker     Packs/day: 0.00     Types: Cigarettes    Smokeless tobacco: Never Used      Comment: pt states 2 cigarettes per day    Alcohol use No       No family history on file. REVIEW OF SYSTEMS:  Review of Systems   Constitutional: Negative for fatigue and fever. HENT: Negative for congestion and sore throat. Eyes: Negative for photophobia, pain, discharge and visual disturbance. Respiratory: Negative for cough, shortness of breath and wheezing. Cardiovascular: Negative for chest pain, palpitations and leg swelling. Gastrointestinal: Negative for abdominal pain, blood in stool, constipation, diarrhea, nausea and vomiting.    Genitourinary: Negative for dysuria, frequency and urgency. Musculoskeletal: Negative for arthralgias and myalgias. Skin: Negative for color change and rash. Neurological: Negative for dizziness, tremors, seizures, syncope, weakness, numbness and headaches. Psychiatric/Behavioral: Negative for agitation, behavioral problems, confusion, sleep disturbance and suicidal ideas. PHYSICAL EXAM:  Vitals:    11/20/18 1528   BP: 105/62   Site: Left Upper Arm   Position: Sitting   Cuff Size: Small Adult   Pulse: 82   Resp: 16   Temp: 97.8 °F (36.6 °C)   TempSrc: Oral   Weight: 120 lb 9.6 oz (54.7 kg)   Height: 5' 5\" (1.651 m)     BP Readings from Last 3 Encounters:   11/20/18 105/62   11/15/18 125/78   05/22/18 (!) 142/88        Physical Exam   Constitutional: She is oriented to person, place, and time. No distress. HENT:   Head: Normocephalic and atraumatic. Right Ear: External ear normal.   Left Ear: External ear normal.   Nose: Nose normal.   Mouth/Throat: No oropharyngeal exudate. Eyes: Pupils are equal, round, and reactive to light. EOM are normal.   Neck: Normal range of motion. Neck supple. No thyromegaly present. Cardiovascular: Normal rate, regular rhythm, normal heart sounds and intact distal pulses. Pulmonary/Chest: Effort normal and breath sounds normal. No respiratory distress. She has no wheezes. Abdominal: Soft. Bowel sounds are normal. She exhibits no distension and no mass. There is no tenderness. Musculoskeletal: Normal range of motion. She exhibits no edema or tenderness. Neurological: She is alert and oriented to person, place, and time. No cranial nerve deficit. Skin: Skin is warm. No rash noted. She is not diaphoretic. No erythema.    Psychiatric: Judgment normal.         DIAGNOSTIC FINDINGS:  CBC:  Lab Results   Component Value Date    WBC 6.8 11/15/2018    HGB 10.7 11/15/2018     11/15/2018     04/04/2012       BMP:    Lab Results   Component Value Date     11/15/2018    K 3.3 voiced understanding. · Patient given educational materials - see patient instructions    Hoang Hook MD, [place], [address] [place] [address] Internal Medicine Associate  11/20/2018, 4:23 PM    This note is created with the assistance of a speech-recognition program. While intending to generate a document that actually reflects the content of thevisit, the document can still have some mistakes which may not have been identified and corrected by editing.

## 2018-11-21 ASSESSMENT — ENCOUNTER SYMPTOMS
PHOTOPHOBIA: 0
EYE DISCHARGE: 0
SORE THROAT: 0
EYE PAIN: 0
SHORTNESS OF BREATH: 0
COUGH: 0
COLOR CHANGE: 0
CONSTIPATION: 0
NAUSEA: 0
DIARRHEA: 0
WHEEZING: 0
ABDOMINAL PAIN: 0
VOMITING: 0
BLOOD IN STOOL: 0

## 2018-11-30 ENCOUNTER — TELEPHONE (OUTPATIENT)
Dept: FAMILY MEDICINE CLINIC | Age: 48
End: 2018-11-30

## 2019-04-04 ENCOUNTER — HOSPITAL ENCOUNTER (EMERGENCY)
Facility: CLINIC | Age: 49
Discharge: HOME | End: 2019-04-04
Attending: EMERGENCY MEDICINE
Payer: MEDICARE

## 2019-04-04 VITALS
HEIGHT: 62 IN | OXYGEN SATURATION: 100 % | DIASTOLIC BLOOD PRESSURE: 95 MMHG | HEART RATE: 62 BPM | RESPIRATION RATE: 15 BRPM | BODY MASS INDEX: 22.06 KG/M2 | TEMPERATURE: 98.1 F | SYSTOLIC BLOOD PRESSURE: 147 MMHG

## 2019-04-04 DIAGNOSIS — L03.032 CELLULITIS OF TOE OF LEFT FOOT: Primary | ICD-10-CM

## 2019-04-04 PROCEDURE — 99283 EMERGENCY DEPT VISIT LOW MDM: CPT

## 2019-04-04 RX ORDER — SULFAMETHOXAZOLE AND TRIMETHOPRIM 800; 160 MG/1; MG/1
1 TABLET ORAL 2 TIMES DAILY
Qty: 20 TABLET | Refills: 0 | Status: SHIPPED | OUTPATIENT
Start: 2019-04-04 | End: 2019-04-14

## 2019-04-04 RX ORDER — CEPHALEXIN 500 MG/1
500 CAPSULE ORAL 4 TIMES DAILY
Qty: 40 CAPSULE | Refills: 0 | Status: SHIPPED | OUTPATIENT
Start: 2019-04-04 | End: 2019-04-14

## 2019-04-04 ASSESSMENT — PAIN DESCRIPTION - PAIN TYPE: TYPE: ACUTE PAIN

## 2019-04-04 ASSESSMENT — PAIN DESCRIPTION - LOCATION: LOCATION: TOE (COMMENT WHICH ONE)

## 2019-04-04 ASSESSMENT — PAIN SCALES - GENERAL: PAINLEVEL_OUTOF10: 9

## 2019-04-04 ASSESSMENT — PAIN DESCRIPTION - ORIENTATION: ORIENTATION: LEFT

## 2019-04-04 NOTE — ED PROVIDER NOTES
[facility]  [address] [address]  Phone: [phone]      Pt Name: Carly Ortiz  PUS:[id number]  [place] 1970  Date of evaluation: 4/4/2019      CHIEF COMPLAINT       Chief Complaint   Patient presents with    Foot Pain       HISTORY OF PRESENT ILLNESS    51-year-old female presents to the emergency department today complaining of left great toe pain. She wore new boots yesterday while at work in which to come off she noticed that her toe was red. She reports still some bloody clear discharge from this morning when she pressed it. Pain on skills are tends an 9. It is worse with palpation and better without. No constitutional symptoms. No other injury or trauma. No other evaluation or management. REVIEWOF SYSTEMS     Review of Systems   All other systems reviewed and are negative. PAST MEDICAL HISTORY    has a past medical history of Anxiety, Depression, Diabetes mellitus ([address] DVT (deep vein thrombosis) in pregnancy ([facility] [address] Hyperlipidemia, Hypertension, and Thyroid disease. SURGICAL HISTORY      has a past surgical history that includes Tubal ligation. CURRENTMEDICATIONS       Previous Medications    ASPIRIN 81 MG EC TABLET    Take 1 tablet by mouth daily    ATORVASTATIN (LIPITOR) 40 MG TABLET    Take 1 tablet by mouth daily    GABAPENTIN (NEURONTIN) 600 MG TABLET    Take 1 tablet by mouth 2 times daily for 30 days. .    IBUPROFEN (ADVIL;MOTRIN) 800 MG TABLET    Take 0.5 tablets by mouth every 8 hours as needed for Pain    INSULIN ASPART (NOVOLOG) 100 UNIT/ML INJECTION VIAL    Inject 10 Units into the skin 3 times daily (before meals) Per carb ratio/sliding scale    INSULIN GLARGINE (BASAGLAR KWIKPEN) 100 UNIT/ML INJECTION PEN    Inject 40 Units into the skin nightly    LEVOTHYROXINE (SYNTHROID) 25 MCG TABLET    Take 1 tablet by mouth daily    LISINOPRIL (PRINIVIL;ZESTRIL) 20 MG TABLET    Take 1 tablet by mouth daily    METFORMIN (GLUCOPHAGE) 500 MG TABLET Take 1 tablet by mouth daily    MULTIPLE VITAMINS-MINERALS (MULTIVITAMIN ADULT) TABS    Take 1 tablet by mouth daily    RANITIDINE (ZANTAC) 150 MG TABLET    Take 150 mg by mouth 2 times daily as needed for Heartburn       ALLERGIES     has No Known Allergies. FAMILY HISTORY     has no family status information on file. family history is not on file. SOCIAL HISTORY      reports that she has quit smoking. Her smoking use included cigarettes. She smoked 0.00 packs per day. She has never used smokeless tobacco. She reports that she does not drink alcohol or use drugs. PHYSICAL EXAM     INITIAL VITALS:  height is 5' 2\" (1.575 m). Her oral temperature is 98.1 °F (36.7 °C). Her blood pressure is 147/95 (abnormal) and her pulse is 62. Her respiration is 15 and oxygen saturation is 100%. Physical Exam   Constitutional: She is oriented to person, place, and time. She appears well-developed and well-nourished. No distress. HENT:   Head: Normocephalic and atraumatic. Mouth/Throat: Oropharynx is clear and moist.   Eyes: Pupils are equal, round, and reactive to light. Conjunctivae and EOM are normal.   Neck: Normal range of motion. Neck supple. No tracheal deviation present. Cardiovascular: Normal rate, regular rhythm and intact distal pulses. Pulmonary/Chest: Breath sounds normal. No respiratory distress. Musculoskeletal: Normal range of motion. She exhibits no edema or tenderness. Neurological: She is alert and oriented to person, place, and time. Skin: Skin is warm and dry. Left great toe is erythematous. Nail is thickened consistent with fungal infection. It is slightly loose. Psychiatric: She has a normal mood and affect. Her behavior is normal. Judgment and thought content normal.   Vitals reviewed. MDM:   There is no fluctuance to the area in question. It does appear to be cellulitic. Most likely it is draining as this patient is describing it. I will treated with antibiotics. She has been placed in an open toed fracture shoe as well. The patient was given the following medications:  Orders Placed This Encounter   Medications    cephALEXin (KEFLEX) 500 MG capsule     Sig: Take 1 capsule by mouth 4 times daily for 10 days     Dispense:  40 capsule     Refill:  0    sulfamethoxazole-trimethoprim (BACTRIM DS) 800-160 MG per tablet     Sig: Take 1 tablet by mouth 2 times daily for 10 days     Dispense:  20 tablet     Refill:  0          FINAL IMPRESSION      1.  Cellulitis of toe of left foot          DISPOSITION/PLAN   DISPOSITION Decision To Discharge 04/04/2019 01:52:52 PM      Condition on Disposition  Good    PATIENT REFERRED TO:  Hoang Diaz MD  [address] [place] [id number] [phone]    Schedule an appointment as soon as possible for a visit in 2 days  For wound re-check      DISCHARGE MEDICATIONS:  New Prescriptions    CEPHALEXIN (KEFLEX) 500 MG CAPSULE    Take 1 capsule by mouth 4 times daily for 10 days    SULFAMETHOXAZOLE-TRIMETHOPRIM (BACTRIM DS) 800-160 MG PER TABLET    Take 1 tablet by mouth 2 times daily for 10 days       (Please note that portions of this note werecompleted with a voice recognition program.  Efforts were made to edit the dictations but occasionally words are mis-transcribed.)    Zainab Forrest MD, F.A.C.E.P, F.A.A.E.M  Emergency Physician Attending          Zainab Forrest MD  04/04/19 3797

## 2019-04-26 DIAGNOSIS — E11.9 DM (DIABETES MELLITUS) (HCC): ICD-10-CM

## 2019-06-06 ENCOUNTER — OFFICE VISIT (OUTPATIENT)
Dept: FAMILY MEDICINE CLINIC | Age: 49
End: 2019-06-06
Payer: MEDICARE

## 2019-06-06 VITALS
TEMPERATURE: 97.1 F | WEIGHT: 106.2 LBS | BODY MASS INDEX: 19.54 KG/M2 | SYSTOLIC BLOOD PRESSURE: 103 MMHG | HEIGHT: 62 IN | OXYGEN SATURATION: 97 % | DIASTOLIC BLOOD PRESSURE: 68 MMHG | HEART RATE: 87 BPM

## 2019-06-06 DIAGNOSIS — I10 ESSENTIAL HYPERTENSION: ICD-10-CM

## 2019-06-06 DIAGNOSIS — E11.42 DIABETIC POLYNEUROPATHY ASSOCIATED WITH TYPE 2 DIABETES MELLITUS (HCC): ICD-10-CM

## 2019-06-06 DIAGNOSIS — E11.65 TYPE 2 DIABETES MELLITUS WITH HYPERGLYCEMIA, WITH LONG-TERM CURRENT USE OF INSULIN (HCC): Primary | ICD-10-CM

## 2019-06-06 DIAGNOSIS — E03.9 ACQUIRED HYPOTHYROIDISM: ICD-10-CM

## 2019-06-06 DIAGNOSIS — E78.1 PURE HYPERGLYCERIDEMIA: ICD-10-CM

## 2019-06-06 DIAGNOSIS — Z79.4 TYPE 2 DIABETES MELLITUS WITH HYPERGLYCEMIA, WITH LONG-TERM CURRENT USE OF INSULIN (HCC): Primary | ICD-10-CM

## 2019-06-06 DIAGNOSIS — F33.1 MODERATE EPISODE OF RECURRENT MAJOR DEPRESSIVE DISORDER (HCC): ICD-10-CM

## 2019-06-06 LAB — HBA1C MFR BLD: 11.2 %

## 2019-06-06 PROCEDURE — G8420 CALC BMI NORM PARAMETERS: HCPCS | Performed by: INTERNAL MEDICINE

## 2019-06-06 PROCEDURE — G8428 CUR MEDS NOT DOCUMENT: HCPCS | Performed by: INTERNAL MEDICINE

## 2019-06-06 PROCEDURE — 83036 HEMOGLOBIN GLYCOSYLATED A1C: CPT | Performed by: INTERNAL MEDICINE

## 2019-06-06 PROCEDURE — 3046F HEMOGLOBIN A1C LEVEL >9.0%: CPT | Performed by: INTERNAL MEDICINE

## 2019-06-06 PROCEDURE — 2022F DILAT RTA XM EVC RTNOPTHY: CPT | Performed by: INTERNAL MEDICINE

## 2019-06-06 PROCEDURE — 99214 OFFICE O/P EST MOD 30 MIN: CPT | Performed by: INTERNAL MEDICINE

## 2019-06-06 PROCEDURE — 1036F TOBACCO NON-USER: CPT | Performed by: INTERNAL MEDICINE

## 2019-06-06 PROCEDURE — 96160 PT-FOCUSED HLTH RISK ASSMT: CPT | Performed by: INTERNAL MEDICINE

## 2019-06-06 RX ORDER — ATORVASTATIN CALCIUM 40 MG/1
40 TABLET, FILM COATED ORAL DAILY
Qty: 30 TABLET | Refills: 5 | Status: SHIPPED | OUTPATIENT
Start: 2019-06-06 | End: 2020-01-02 | Stop reason: SDUPTHER

## 2019-06-06 RX ORDER — GLIPIZIDE 10 MG/1
10 TABLET, FILM COATED, EXTENDED RELEASE ORAL DAILY
Qty: 30 TABLET | Refills: 5 | Status: SHIPPED | OUTPATIENT
Start: 2019-06-06 | End: 2020-01-02 | Stop reason: SDUPTHER

## 2019-06-06 RX ORDER — METFORMIN HYDROCHLORIDE 500 MG/1
500 TABLET, EXTENDED RELEASE ORAL
Qty: 30 TABLET | Refills: 5 | Status: SHIPPED | OUTPATIENT
Start: 2019-06-06 | End: 2020-01-02 | Stop reason: SDUPTHER

## 2019-06-06 RX ORDER — LEVOTHYROXINE SODIUM 0.03 MG/1
25 TABLET ORAL DAILY
Qty: 30 TABLET | Refills: 5 | Status: SHIPPED | OUTPATIENT
Start: 2019-06-06 | End: 2020-01-02 | Stop reason: SDUPTHER

## 2019-06-06 RX ORDER — LISINOPRIL 20 MG/1
20 TABLET ORAL DAILY
Qty: 30 TABLET | Refills: 5 | Status: SHIPPED | OUTPATIENT
Start: 2019-06-06 | End: 2020-01-02 | Stop reason: SDUPTHER

## 2019-06-06 RX ORDER — ASPIRIN 81 MG/1
81 TABLET ORAL DAILY
Qty: 30 TABLET | Refills: 5 | Status: SHIPPED | OUTPATIENT
Start: 2019-06-06 | End: 2020-01-02 | Stop reason: SDUPTHER

## 2019-06-06 RX ORDER — MIRTAZAPINE 15 MG/1
15 TABLET, FILM COATED ORAL NIGHTLY
Qty: 30 TABLET | Refills: 3 | Status: SHIPPED | OUTPATIENT
Start: 2019-06-06 | End: 2020-01-02 | Stop reason: SDUPTHER

## 2019-06-06 RX ORDER — GABAPENTIN 600 MG/1
600 TABLET ORAL DAILY
Qty: 30 TABLET | Refills: 5 | Status: SHIPPED | OUTPATIENT
Start: 2019-06-06 | End: 2020-01-02 | Stop reason: SDUPTHER

## 2019-06-06 ASSESSMENT — ENCOUNTER SYMPTOMS
CONSTIPATION: 0
SHORTNESS OF BREATH: 0
DIARRHEA: 0
BLOOD IN STOOL: 0
COLOR CHANGE: 0
ABDOMINAL PAIN: 0
EYE DISCHARGE: 0
SORE THROAT: 0
PHOTOPHOBIA: 0
COUGH: 0
NAUSEA: 0
EYE PAIN: 0
WHEEZING: 0
VOMITING: 0

## 2019-06-06 ASSESSMENT — PATIENT HEALTH QUESTIONNAIRE - PHQ9
SUM OF ALL RESPONSES TO PHQ QUESTIONS 1-9: 16
4. FEELING TIRED OR HAVING LITTLE ENERGY: 2
8. MOVING OR SPEAKING SO SLOWLY THAT OTHER PEOPLE COULD HAVE NOTICED. OR THE OPPOSITE, BEING SO FIGETY OR RESTLESS THAT YOU HAVE BEEN MOVING AROUND A LOT MORE THAN USUAL: 0
10. IF YOU CHECKED OFF ANY PROBLEMS, HOW DIFFICULT HAVE THESE PROBLEMS MADE IT FOR YOU TO DO YOUR WORK, TAKE CARE OF THINGS AT HOME, OR GET ALONG WITH OTHER PEOPLE: 1
7. TROUBLE CONCENTRATING ON THINGS, SUCH AS READING THE NEWSPAPER OR WATCHING TELEVISION: 0
6. FEELING BAD ABOUT YOURSELF - OR THAT YOU ARE A FAILURE OR HAVE LET YOURSELF OR YOUR FAMILY DOWN: 3
5. POOR APPETITE OR OVEREATING: 2
9. THOUGHTS THAT YOU WOULD BE BETTER OFF DEAD, OR OF HURTING YOURSELF: 1
SUM OF ALL RESPONSES TO PHQ QUESTIONS 1-9: 16
SUM OF ALL RESPONSES TO PHQ9 QUESTIONS 1 & 2: 6
3. TROUBLE FALLING OR STAYING ASLEEP: 2
1. LITTLE INTEREST OR PLEASURE IN DOING THINGS: 3
2. FEELING DOWN, DEPRESSED OR HOPELESS: 3

## 2019-06-06 NOTE — PROGRESS NOTES
[address] [place] [address]  [address] [facility] [place] [address]  Dept: [phone]  Dept Fax: [phone]    Office Progress/Follow Up Note  Date ofpatient's visit: 6/6/2019  Patient's Name:  Tricia Blevins YOB: 1970            Patient Care Team:  Hoang Lynn MD as PCP - General (Internal Medicine)  Hoang Lynn MD as PCP - [facility]  ================================================================    REASON FOR VISIT/CHIEF COMPLAINT:  Diabetes and Depression    HISTORY OF PRESENTING ILLNESS:  History was obtained from: patient. Irais puentes 50 y.o. is here for a follow-up visit for reevaluation of depression and other chronic medical conditions. Patient reported that her depression has gotten worse. She doesn't have any suicidal ideations but reported that she has isolated herself from family and easily get irritated. She used to be on antidepressant medication couple of years ago but cannot remember the name. She also report that her depression is causing her to lose weight because of lack of appetite. Other issues include type 2 diabetes. Patient had A1c of 10 6 months ago. Today A1c is 11. Patient was on insulin but did not take it on a daily basis. She doesn't like needles. Is on metformin which she takes about once a day. She would like to stay with oral hypoglycemics right and insulin. She also has history of hypothyroidism which is controlled with Synthroid 25 µg daily. She is due for medication refills.   Problem list, medications and blood work reviewed       Patient Active Problem List   Diagnosis    Essential hypertension    Pure hyperglyceridemia    Type 2 diabetes mellitus with hyperglycemia, with long-term current use of insulin ([address]    Anxiety    DVT (deep vein thrombosis) in pregnancy ([address]    Tobacco dependency    Acquired hypothyroidism       Health Maintenance Due   Topic Date Due    Diabetic retinal exam  09/25/1980    HIV screen  09/25/1985    Hepatitis B Vaccine (1 of 3 - Risk 3-dose series) 09/25/1989    Cervical cancer screen  09/25/1991    Diabetic microalbuminuria test  03/31/2013    Lipid screen  03/31/2013    A1C test (Diabetic or Prediabetic)  02/14/2019       No Known Allergies      Current Outpatient Medications   Medication Sig Dispense Refill    lisinopril (PRINIVIL;ZESTRIL) 20 MG tablet Take 1 tablet by mouth daily 30 tablet 5    atorvastatin (LIPITOR) 40 MG tablet Take 1 tablet by mouth daily 30 tablet 5    levothyroxine (SYNTHROID) 25 MCG tablet Take 1 tablet by mouth daily 30 tablet 5    aspirin 81 MG EC tablet Take 1 tablet by mouth daily 30 tablet 5    gabapentin (NEURONTIN) 600 MG tablet Take 1 tablet by mouth daily for 30 days. 30 tablet 5    metFORMIN (GLUCOPHAGE-XR) 500 MG extended release tablet Take 1 tablet by mouth daily (with breakfast) 30 tablet 5    glipiZIDE (GLUCOTROL XL) 10 MG extended release tablet Take 1 tablet by mouth daily 30 tablet 5    SITagliptin (JANUVIA) 50 MG tablet Take 1 tablet by mouth daily 30 tablet 5    mirtazapine (REMERON) 15 MG tablet Take 1 tablet by mouth nightly 30 tablet 3    Multiple Vitamins-Minerals (MULTIVITAMIN ADULT) TABS Take 1 tablet by mouth daily       No current facility-administered medications for this visit. Social History     Tobacco Use    Smoking status: Former Smoker     Packs/day: 0.00     Types: Cigarettes    Smokeless tobacco: Never Used    Tobacco comment: pt states 2 cigarettes per day   Substance Use Topics    Alcohol use: No    Drug use: No       No family history on file. REVIEW OF SYSTEMS:  Review of Systems   Constitutional: Negative for fatigue and fever. HENT: Negative for congestion and sore throat. Eyes: Negative for photophobia, pain, discharge and visual disturbance. Respiratory: Negative for cough, shortness of breath and wheezing.     Cardiovascular: Negative for chest pain, palpitations and leg swelling. Gastrointestinal: Negative for abdominal pain, blood in stool, constipation, diarrhea, nausea and vomiting. Genitourinary: Negative for dysuria, frequency and urgency. Musculoskeletal: Negative for arthralgias and myalgias. Skin: Negative for color change and rash. Neurological: Negative for dizziness, tremors, seizures, syncope, weakness, numbness and headaches. Psychiatric/Behavioral: Negative for agitation, behavioral problems, confusion, sleep disturbance and suicidal ideas. PHYSICAL EXAM:  Vitals:    06/06/19 0958   BP: 103/68   Pulse: 87   Temp: 97.1 °F (36.2 °C)   TempSrc: Oral   SpO2: 97%   Weight: 106 lb 3.2 oz (48.2 kg)   Height: 5' 2\" (1.575 m)     BP Readings from Last 3 Encounters:   06/06/19 103/68   04/04/19 (!) 147/95   11/20/18 105/62        Physical Exam   Constitutional: She is oriented to person, place, and time. No distress. HENT:   Head: Normocephalic and atraumatic. Right Ear: External ear normal.   Left Ear: External ear normal.   Nose: Nose normal.   Mouth/Throat: No oropharyngeal exudate. Eyes: Pupils are equal, round, and reactive to light. EOM are normal.   Neck: Normal range of motion. Neck supple. No thyromegaly present. Cardiovascular: Normal rate, regular rhythm, normal heart sounds and intact distal pulses. Pulmonary/Chest: Effort normal and breath sounds normal. No respiratory distress. She has no wheezes. Abdominal: Soft. Bowel sounds are normal. She exhibits no distension and no mass. There is no tenderness. Musculoskeletal: Normal range of motion. She exhibits no edema or tenderness. Neurological: She is alert and oriented to person, place, and time. No cranial nerve deficit. Skin: Skin is warm. No rash noted. She is not diaphoretic. No erythema.    Psychiatric: Judgment normal.         DIAGNOSTIC FINDINGS:  CBC:  Lab Results   Component Value Date    WBC 6.8 11/15/2018    HGB 10.7 11/15/2018     11/15/2018     04/04/2012       BMP:    Lab Results   Component Value Date     11/15/2018    K 3.3 11/15/2018     11/15/2018    CO2 24 11/15/2018    BUN 14 11/15/2018    CREATININE 0.54 11/15/2018    GLUCOSE 72 11/15/2018    GLUCOSE 127 04/04/2012       HEMOGLOBIN A1C:   Lab Results   Component Value Date    LABA1C 11.2 06/06/2019       FASTING LIPID PANEL:  Lab Results   Component Value Date    CHOL 147 03/31/2012    HDL 46 03/31/2012    TRIG 92 03/31/2012       ASSESSMENT AND PLAN:  Lis Maddox was seen today for diabetes and depression. Diagnoses and all orders for this visit:    Type 2 diabetes mellitus with hyperglycemia, with long-term current use of insulin ([facility])  -     POCT glycosylated hemoglobin (Hb A1C)  -     metFORMIN (GLUCOPHAGE-XR) 500 MG extended release tablet; Take 1 tablet by mouth daily (with breakfast)  -     glipiZIDE (GLUCOTROL XL) 10 MG extended release tablet; Take 1 tablet by mouth daily  -     SITagliptin (JANUVIA) 50 MG tablet; Take 1 tablet by mouth daily  -     Microalbumin, Ur; Future  -     C-Peptide; Future  -     NICOLE-65 Autoantibody; Future  -     Insulin Antibody; Future  -     Insulin, Total; Future    Acquired hypothyroidism  -     levothyroxine (SYNTHROID) 25 MCG tablet; Take 1 tablet by mouth daily    Essential hypertension  -     lisinopril (PRINIVIL;ZESTRIL) 20 MG tablet; Take 1 tablet by mouth daily  -     aspirin 81 MG EC tablet; Take 1 tablet by mouth daily  -     Basic Metabolic Panel; Future    Pure hyperglyceridemia  -     atorvastatin (LIPITOR) 40 MG tablet; Take 1 tablet by mouth daily  -     Lipid Panel; Future    Diabetic polyneuropathy associated with type 2 diabetes mellitus (HCC)  -     gabapentin (NEURONTIN) 600 MG tablet; Take 1 tablet by mouth daily for 30 days. Moderate episode of recurrent major depressive disorder (HCC)  -     mirtazapine (REMERON) 15 MG tablet;  Take 1 tablet by mouth nightly      FOLLOW UP AND INSTRUCTIONS:  · Return in about 3 months (around 9/6/2019) for HTN, DM-2. · Kimber Daughters received counseling on the following healthy behaviors: nutrition and exercise    · Discussed use, benefit, and side effects of prescribed medications. Barriers to medication compliance addressed. All patient questions answered. Pt voiced understanding. · Patient given educational materials - see patient instructions    Hoang Fung M.D., F.A.C.P. Internal Medicine  6/6/2019, 12:49 PM    This note is created with the assistance of a speech-recognition program. While intending to generate a document that actually reflects the content of thevisit, the document can still have some mistakes which may not have been identified and corrected by editing.

## 2019-11-04 ENCOUNTER — HOSPITAL ENCOUNTER (INPATIENT)
Age: 49
LOS: 3 days | Discharge: HOME | DRG: 420 | End: 2019-11-07
Attending: EMERGENCY MEDICINE | Admitting: INTERNAL MEDICINE
Payer: MEDICAID

## 2019-11-04 ENCOUNTER — APPOINTMENT (OUTPATIENT)
Dept: GENERAL RADIOLOGY | Age: 49
DRG: 420 | End: 2019-11-04
Payer: MEDICAID

## 2019-11-04 DIAGNOSIS — R73.9 HYPERGLYCEMIA: Primary | ICD-10-CM

## 2019-11-04 LAB
ABSOLUTE EOS #: 0.18 K/UL (ref 0–0.44)
ABSOLUTE IMMATURE GRANULOCYTE: <0.03 K/UL (ref 0–0.3)
ABSOLUTE LYMPH #: 2.55 K/UL (ref 1.1–3.7)
ABSOLUTE MONO #: 0.44 K/UL (ref 0.1–1.2)
ALBUMIN SERPL-MCNC: 4.6 G/DL (ref 3.5–5.2)
ALBUMIN/GLOBULIN RATIO: 1.3 (ref 1–2.5)
ALLEN TEST: ABNORMAL
ALP BLD-CCNC: 105 U/L (ref 35–104)
ALT SERPL-CCNC: <5 U/L (ref 5–33)
ANION GAP SERPL CALCULATED.3IONS-SCNC: 16 MMOL/L (ref 9–17)
ANION GAP: 9 MMOL/L (ref 7–16)
AST SERPL-CCNC: 18 U/L
BASOPHILS # BLD: 1 % (ref 0–2)
BASOPHILS ABSOLUTE: 0.06 K/UL (ref 0–0.2)
BETA-HYDROXYBUTYRATE: 1.43 MMOL/L (ref 0.02–0.27)
BILIRUB SERPL-MCNC: 0.41 MG/DL (ref 0.3–1.2)
BUN BLDV-MCNC: 15 MG/DL (ref 6–20)
BUN/CREAT BLD: ABNORMAL (ref 9–20)
CALCIUM SERPL-MCNC: 10.5 MG/DL (ref 8.6–10.4)
CHLORIDE BLD-SCNC: 100 MMOL/L (ref 98–107)
CHP ED QC CHECK: NORMAL
CHP ED QC CHECK: NORMAL
CO2: 19 MMOL/L (ref 20–31)
CREAT SERPL-MCNC: 0.89 MG/DL (ref 0.5–0.9)
DIFFERENTIAL TYPE: NORMAL
EOSINOPHILS RELATIVE PERCENT: 3 % (ref 1–4)
FIO2: ABNORMAL
GFR AFRICAN AMERICAN: >60 ML/MIN
GFR NON-AFRICAN AMERICAN: >60 ML/MIN
GFR NON-AFRICAN AMERICAN: >60 ML/MIN
GFR SERPL CREATININE-BSD FRML MDRD: >60 ML/MIN
GFR SERPL CREATININE-BSD FRML MDRD: ABNORMAL ML/MIN/{1.73_M2}
GFR SERPL CREATININE-BSD FRML MDRD: ABNORMAL ML/MIN/{1.73_M2}
GFR SERPL CREATININE-BSD FRML MDRD: NORMAL ML/MIN/{1.73_M2}
GLUCOSE BLD-MCNC: 256 MG/DL
GLUCOSE BLD-MCNC: 256 MG/DL (ref 65–105)
GLUCOSE BLD-MCNC: 371 MG/DL (ref 74–100)
GLUCOSE BLD-MCNC: 377 MG/DL
GLUCOSE BLD-MCNC: 377 MG/DL (ref 65–105)
GLUCOSE BLD-MCNC: 394 MG/DL (ref 70–99)
HCG QUALITATIVE: NEGATIVE
HCO3 VENOUS: 20.4 MMOL/L (ref 22–29)
HCT VFR BLD CALC: 41.1 % (ref 36.3–47.1)
HEMOGLOBIN: 13.6 G/DL (ref 11.9–15.1)
IMMATURE GRANULOCYTES: 0 %
LIPASE: 10 U/L (ref 13–60)
LYMPHOCYTES # BLD: 40 % (ref 24–43)
MCH RBC QN AUTO: 30.2 PG (ref 25.2–33.5)
MCHC RBC AUTO-ENTMCNC: 33.1 G/DL (ref 28.4–34.8)
MCV RBC AUTO: 91.3 FL (ref 82.6–102.9)
MODE: ABNORMAL
MONOCYTES # BLD: 7 % (ref 3–12)
NEGATIVE BASE EXCESS, VEN: 6 (ref 0–2)
NRBC AUTOMATED: 0 PER 100 WBC
O2 DEVICE/FLOW/%: ABNORMAL
O2 SAT, VEN: 54 % (ref 60–85)
PATIENT TEMP: ABNORMAL
PCO2, VEN: 41.7 MM HG (ref 41–51)
PDW BLD-RTO: 12.6 % (ref 11.8–14.4)
PH VENOUS: 7.3 (ref 7.32–7.43)
PLATELET # BLD: 295 K/UL (ref 138–453)
PLATELET ESTIMATE: NORMAL
PMV BLD AUTO: 11.9 FL (ref 8.1–13.5)
PO2, VEN: 31.6 MM HG (ref 30–50)
POC CHLORIDE: 110 MMOL/L (ref 98–107)
POC CREATININE: 0.76 MG/DL (ref 0.51–1.19)
POC HEMATOCRIT: 40 % (ref 36–46)
POC HEMOGLOBIN: 13.6 G/DL (ref 12–16)
POC IONIZED CALCIUM: 1.3 MMOL/L (ref 1.15–1.33)
POC LACTIC ACID: 1.51 MMOL/L (ref 0.56–1.39)
POC PCO2 TEMP: ABNORMAL MM HG
POC PH TEMP: ABNORMAL
POC PO2 TEMP: ABNORMAL MM HG
POC POTASSIUM: 3.8 MMOL/L (ref 3.5–4.5)
POC SODIUM: 139 MMOL/L (ref 138–146)
POSITIVE BASE EXCESS, VEN: ABNORMAL (ref 0–3)
POTASSIUM SERPL-SCNC: 4.8 MMOL/L (ref 3.7–5.3)
RBC # BLD: 4.5 M/UL (ref 3.95–5.11)
RBC # BLD: NORMAL 10*6/UL
SAMPLE SITE: ABNORMAL
SEG NEUTROPHILS: 49 % (ref 36–65)
SEGMENTED NEUTROPHILS ABSOLUTE COUNT: 3.14 K/UL (ref 1.5–8.1)
SODIUM BLD-SCNC: 135 MMOL/L (ref 135–144)
TOTAL CO2, VENOUS: 22 MMOL/L (ref 23–30)
TOTAL PROTEIN: 8.2 G/DL (ref 6.4–8.3)
TROPONIN INTERP: NORMAL
TROPONIN T: NORMAL NG/ML
TROPONIN, HIGH SENSITIVITY: <6 NG/L (ref 0–14)
WBC # BLD: 6.4 K/UL (ref 3.5–11.3)
WBC # BLD: NORMAL 10*3/UL

## 2019-11-04 PROCEDURE — 83605 ASSAY OF LACTIC ACID: CPT

## 2019-11-04 PROCEDURE — 82010 KETONE BODYS QUAN: CPT

## 2019-11-04 PROCEDURE — 85014 HEMATOCRIT: CPT

## 2019-11-04 PROCEDURE — 80053 COMPREHEN METABOLIC PANEL: CPT

## 2019-11-04 PROCEDURE — 96376 TX/PRO/DX INJ SAME DRUG ADON: CPT

## 2019-11-04 PROCEDURE — 93005 ELECTROCARDIOGRAM TRACING: CPT | Performed by: PHYSICIAN ASSISTANT

## 2019-11-04 PROCEDURE — 82330 ASSAY OF CALCIUM: CPT

## 2019-11-04 PROCEDURE — 6360000002 HC RX W HCPCS: Performed by: PHYSICIAN ASSISTANT

## 2019-11-04 PROCEDURE — 2060000000 HC ICU INTERMEDIATE R&B

## 2019-11-04 PROCEDURE — 84484 ASSAY OF TROPONIN QUANT: CPT

## 2019-11-04 PROCEDURE — 96374 THER/PROPH/DIAG INJ IV PUSH: CPT

## 2019-11-04 PROCEDURE — 71046 X-RAY EXAM CHEST 2 VIEWS: CPT

## 2019-11-04 PROCEDURE — 2580000003 HC RX 258: Performed by: PHYSICIAN ASSISTANT

## 2019-11-04 PROCEDURE — 82435 ASSAY OF BLOOD CHLORIDE: CPT

## 2019-11-04 PROCEDURE — 83690 ASSAY OF LIPASE: CPT

## 2019-11-04 PROCEDURE — 99285 EMERGENCY DEPT VISIT HI MDM: CPT

## 2019-11-04 PROCEDURE — 82803 BLOOD GASES ANY COMBINATION: CPT

## 2019-11-04 PROCEDURE — 87086 URINE CULTURE/COLONY COUNT: CPT

## 2019-11-04 PROCEDURE — 85025 COMPLETE CBC W/AUTO DIFF WBC: CPT

## 2019-11-04 PROCEDURE — 84132 ASSAY OF SERUM POTASSIUM: CPT

## 2019-11-04 PROCEDURE — 82947 ASSAY GLUCOSE BLOOD QUANT: CPT

## 2019-11-04 PROCEDURE — 81001 URINALYSIS AUTO W/SCOPE: CPT

## 2019-11-04 PROCEDURE — 84703 CHORIONIC GONADOTROPIN ASSAY: CPT

## 2019-11-04 PROCEDURE — 82565 ASSAY OF CREATININE: CPT

## 2019-11-04 PROCEDURE — 84295 ASSAY OF SERUM SODIUM: CPT

## 2019-11-04 RX ORDER — GLIPIZIDE 10 MG/1
10 TABLET ORAL
Status: DISCONTINUED | OUTPATIENT
Start: 2019-11-05 | End: 2019-11-07 | Stop reason: HOSPADM

## 2019-11-04 RX ORDER — SODIUM CHLORIDE 9 MG/ML
INJECTION, SOLUTION INTRAVENOUS CONTINUOUS
Status: DISCONTINUED | OUTPATIENT
Start: 2019-11-05 | End: 2019-11-07 | Stop reason: HOSPADM

## 2019-11-04 RX ORDER — DEXTROSE MONOHYDRATE 50 MG/ML
100 INJECTION, SOLUTION INTRAVENOUS PRN
Status: DISCONTINUED | OUTPATIENT
Start: 2019-11-04 | End: 2019-11-07 | Stop reason: HOSPADM

## 2019-11-04 RX ORDER — POTASSIUM CHLORIDE 7.45 MG/ML
10 INJECTION INTRAVENOUS PRN
Status: DISCONTINUED | OUTPATIENT
Start: 2019-11-04 | End: 2019-11-07 | Stop reason: HOSPADM

## 2019-11-04 RX ORDER — 0.9 % SODIUM CHLORIDE 0.9 %
1000 INTRAVENOUS SOLUTION INTRAVENOUS ONCE
Status: COMPLETED | OUTPATIENT
Start: 2019-11-04 | End: 2019-11-04

## 2019-11-04 RX ORDER — MORPHINE SULFATE 4 MG/ML
4 INJECTION, SOLUTION INTRAMUSCULAR; INTRAVENOUS ONCE
Status: COMPLETED | OUTPATIENT
Start: 2019-11-04 | End: 2019-11-04

## 2019-11-04 RX ORDER — SODIUM CHLORIDE 0.9 % (FLUSH) 0.9 %
10 SYRINGE (ML) INJECTION EVERY 12 HOURS SCHEDULED
Status: DISCONTINUED | OUTPATIENT
Start: 2019-11-05 | End: 2019-11-07 | Stop reason: HOSPADM

## 2019-11-04 RX ORDER — NICOTINE POLACRILEX 4 MG
15 LOZENGE BUCCAL PRN
Status: DISCONTINUED | OUTPATIENT
Start: 2019-11-04 | End: 2019-11-07 | Stop reason: HOSPADM

## 2019-11-04 RX ORDER — ATORVASTATIN CALCIUM 40 MG/1
40 TABLET, FILM COATED ORAL DAILY
Status: DISCONTINUED | OUTPATIENT
Start: 2019-11-05 | End: 2019-11-07 | Stop reason: HOSPADM

## 2019-11-04 RX ORDER — ACETAMINOPHEN 325 MG/1
650 TABLET ORAL EVERY 4 HOURS PRN
Status: DISCONTINUED | OUTPATIENT
Start: 2019-11-04 | End: 2019-11-07 | Stop reason: HOSPADM

## 2019-11-04 RX ORDER — LEVOTHYROXINE SODIUM 0.03 MG/1
25 TABLET ORAL DAILY
Status: DISCONTINUED | OUTPATIENT
Start: 2019-11-05 | End: 2019-11-07 | Stop reason: HOSPADM

## 2019-11-04 RX ORDER — M-VIT,TX,IRON,MINS/CALC/FOLIC 27MG-0.4MG
1 TABLET ORAL DAILY
Status: DISCONTINUED | OUTPATIENT
Start: 2019-11-05 | End: 2019-11-07 | Stop reason: HOSPADM

## 2019-11-04 RX ORDER — METFORMIN HYDROCHLORIDE 500 MG/1
500 TABLET, EXTENDED RELEASE ORAL
Status: DISCONTINUED | OUTPATIENT
Start: 2019-11-05 | End: 2019-11-05

## 2019-11-04 RX ORDER — MAGNESIUM SULFATE 1 G/100ML
1 INJECTION INTRAVENOUS PRN
Status: DISCONTINUED | OUTPATIENT
Start: 2019-11-04 | End: 2019-11-07 | Stop reason: HOSPADM

## 2019-11-04 RX ORDER — SODIUM CHLORIDE 0.9 % (FLUSH) 0.9 %
10 SYRINGE (ML) INJECTION PRN
Status: DISCONTINUED | OUTPATIENT
Start: 2019-11-04 | End: 2019-11-07 | Stop reason: HOSPADM

## 2019-11-04 RX ORDER — 0.9 % SODIUM CHLORIDE 0.9 %
1000 INTRAVENOUS SOLUTION INTRAVENOUS ONCE
Status: COMPLETED | OUTPATIENT
Start: 2019-11-04 | End: 2019-11-05

## 2019-11-04 RX ORDER — ONDANSETRON 2 MG/ML
4 INJECTION INTRAMUSCULAR; INTRAVENOUS ONCE
Status: COMPLETED | OUTPATIENT
Start: 2019-11-04 | End: 2019-11-04

## 2019-11-04 RX ORDER — MIRTAZAPINE 15 MG/1
15 TABLET, FILM COATED ORAL NIGHTLY
Status: DISCONTINUED | OUTPATIENT
Start: 2019-11-05 | End: 2019-11-07 | Stop reason: HOSPADM

## 2019-11-04 RX ORDER — ONDANSETRON 2 MG/ML
4 INJECTION INTRAMUSCULAR; INTRAVENOUS EVERY 6 HOURS PRN
Status: DISCONTINUED | OUTPATIENT
Start: 2019-11-04 | End: 2019-11-07 | Stop reason: HOSPADM

## 2019-11-04 RX ORDER — POTASSIUM CHLORIDE 20 MEQ/1
40 TABLET, EXTENDED RELEASE ORAL PRN
Status: DISCONTINUED | OUTPATIENT
Start: 2019-11-04 | End: 2019-11-07 | Stop reason: HOSPADM

## 2019-11-04 RX ORDER — NICOTINE 21 MG/24HR
1 PATCH, TRANSDERMAL 24 HOURS TRANSDERMAL DAILY PRN
Status: DISCONTINUED | OUTPATIENT
Start: 2019-11-04 | End: 2019-11-07 | Stop reason: HOSPADM

## 2019-11-04 RX ORDER — DEXTROSE MONOHYDRATE 25 G/50ML
12.5 INJECTION, SOLUTION INTRAVENOUS PRN
Status: DISCONTINUED | OUTPATIENT
Start: 2019-11-04 | End: 2019-11-07 | Stop reason: HOSPADM

## 2019-11-04 RX ORDER — LISINOPRIL 20 MG/1
20 TABLET ORAL DAILY
Status: DISCONTINUED | OUTPATIENT
Start: 2019-11-05 | End: 2019-11-07 | Stop reason: HOSPADM

## 2019-11-04 RX ORDER — ASPIRIN 81 MG/1
81 TABLET ORAL DAILY
Status: DISCONTINUED | OUTPATIENT
Start: 2019-11-05 | End: 2019-11-07 | Stop reason: HOSPADM

## 2019-11-04 RX ADMIN — SODIUM CHLORIDE 1000 ML: 9 INJECTION, SOLUTION INTRAVENOUS at 21:14

## 2019-11-04 RX ADMIN — MORPHINE SULFATE 4 MG: 4 INJECTION INTRAVENOUS at 21:15

## 2019-11-04 RX ADMIN — SODIUM CHLORIDE 1000 ML: 9 INJECTION, SOLUTION INTRAVENOUS at 22:19

## 2019-11-04 RX ADMIN — ONDANSETRON 4 MG: 2 INJECTION INTRAMUSCULAR; INTRAVENOUS at 21:15

## 2019-11-04 RX ADMIN — MORPHINE SULFATE 4 MG: 4 INJECTION INTRAVENOUS at 22:17

## 2019-11-04 ASSESSMENT — PAIN SCALES - GENERAL
PAINLEVEL_OUTOF10: 9
PAINLEVEL_OUTOF10: 8
PAINLEVEL_OUTOF10: 4
PAINLEVEL_OUTOF10: 6

## 2019-11-04 ASSESSMENT — ENCOUNTER SYMPTOMS
ABDOMINAL PAIN: 1
COUGH: 0
BACK PAIN: 0
SHORTNESS OF BREATH: 0
DIARRHEA: 0
VOMITING: 1
NAUSEA: 1
BLOOD IN STOOL: 0
COLOR CHANGE: 0
SORE THROAT: 0
WHEEZING: 0

## 2019-11-04 ASSESSMENT — PAIN DESCRIPTION - PAIN TYPE
TYPE: ACUTE PAIN
TYPE: ACUTE PAIN

## 2019-11-04 ASSESSMENT — PAIN DESCRIPTION - LOCATION
LOCATION: CHEST
LOCATION: CHEST

## 2019-11-05 PROBLEM — E11.10 DIABETIC KETOACIDOSIS WITHOUT COMA ASSOCIATED WITH TYPE 2 DIABETES MELLITUS (HCC): Status: ACTIVE | Noted: 2019-11-05

## 2019-11-05 PROBLEM — E11.42 DIABETIC PERIPHERAL NEUROPATHY (HCC): Status: ACTIVE | Noted: 2019-11-05

## 2019-11-05 LAB
ANION GAP SERPL CALCULATED.3IONS-SCNC: 11 MMOL/L (ref 9–17)
ANION GAP SERPL CALCULATED.3IONS-SCNC: 12 MMOL/L (ref 9–17)
BUN BLDV-MCNC: 12 MG/DL (ref 6–20)
BUN BLDV-MCNC: 14 MG/DL (ref 6–20)
BUN/CREAT BLD: ABNORMAL (ref 9–20)
BUN/CREAT BLD: ABNORMAL (ref 9–20)
CALCIUM IONIZED: 1.13 MMOL/L (ref 1.13–1.33)
CALCIUM SERPL-MCNC: 8.5 MG/DL (ref 8.6–10.4)
CALCIUM SERPL-MCNC: 8.8 MG/DL (ref 8.6–10.4)
CHLORIDE BLD-SCNC: 102 MMOL/L (ref 98–107)
CHLORIDE BLD-SCNC: 109 MMOL/L (ref 98–107)
CO2: 18 MMOL/L (ref 20–31)
CO2: 20 MMOL/L (ref 20–31)
CREAT SERPL-MCNC: 0.68 MG/DL (ref 0.5–0.9)
CREAT SERPL-MCNC: 0.72 MG/DL (ref 0.5–0.9)
CULTURE: NORMAL
EKG ATRIAL RATE: 86 BPM
EKG P AXIS: 61 DEGREES
EKG P-R INTERVAL: 132 MS
EKG Q-T INTERVAL: 338 MS
EKG QRS DURATION: 80 MS
EKG QTC CALCULATION (BAZETT): 404 MS
EKG R AXIS: 66 DEGREES
EKG T AXIS: 32 DEGREES
EKG VENTRICULAR RATE: 86 BPM
GFR AFRICAN AMERICAN: >60 ML/MIN
GFR AFRICAN AMERICAN: >60 ML/MIN
GFR NON-AFRICAN AMERICAN: >60 ML/MIN
GFR NON-AFRICAN AMERICAN: >60 ML/MIN
GFR SERPL CREATININE-BSD FRML MDRD: ABNORMAL ML/MIN/{1.73_M2}
GLUCOSE BLD-MCNC: 145 MG/DL (ref 65–105)
GLUCOSE BLD-MCNC: 162 MG/DL (ref 65–105)
GLUCOSE BLD-MCNC: 246 MG/DL (ref 70–99)
GLUCOSE BLD-MCNC: 251 MG/DL (ref 65–105)
GLUCOSE BLD-MCNC: 257 MG/DL (ref 65–105)
GLUCOSE BLD-MCNC: 287 MG/DL (ref 65–105)
GLUCOSE BLD-MCNC: 290 MG/DL (ref 70–99)
GLUCOSE BLD-MCNC: 313 MG/DL (ref 65–105)
GLUCOSE BLD-MCNC: 368 MG/DL (ref 65–105)
HCT VFR BLD CALC: 33.2 % (ref 36.3–47.1)
HEMOGLOBIN: 10.9 G/DL (ref 11.9–15.1)
INR BLD: 1
Lab: NORMAL
MAGNESIUM: 1.3 MG/DL (ref 1.6–2.6)
MCH RBC QN AUTO: 30.9 PG (ref 25.2–33.5)
MCHC RBC AUTO-ENTMCNC: 32.8 G/DL (ref 28.4–34.8)
MCV RBC AUTO: 94.1 FL (ref 82.6–102.9)
NRBC AUTOMATED: 0 PER 100 WBC
PDW BLD-RTO: 12.8 % (ref 11.8–14.4)
PHOSPHORUS: 1.9 MG/DL (ref 2.6–4.5)
PHOSPHORUS: 2.4 MG/DL (ref 2.6–4.5)
PHOSPHORUS: 2.5 MG/DL (ref 2.6–4.5)
PHOSPHORUS: 2.7 MG/DL (ref 2.6–4.5)
PHOSPHORUS: 2.9 MG/DL (ref 2.6–4.5)
PHOSPHORUS: 3.3 MG/DL (ref 2.6–4.5)
PLATELET # BLD: 236 K/UL (ref 138–453)
PMV BLD AUTO: 11.6 FL (ref 8.1–13.5)
POTASSIUM SERPL-SCNC: 3.9 MMOL/L (ref 3.7–5.3)
POTASSIUM SERPL-SCNC: 4.2 MMOL/L (ref 3.7–5.3)
PROTHROMBIN TIME: 10.8 SEC (ref 9–12)
RBC # BLD: 3.53 M/UL (ref 3.95–5.11)
SODIUM BLD-SCNC: 134 MMOL/L (ref 135–144)
SODIUM BLD-SCNC: 138 MMOL/L (ref 135–144)
SPECIMEN DESCRIPTION: NORMAL
TROPONIN INTERP: NORMAL
TROPONIN T: NORMAL NG/ML
TROPONIN, HIGH SENSITIVITY: <6 NG/L (ref 0–14)
WBC # BLD: 6.6 K/UL (ref 3.5–11.3)

## 2019-11-05 PROCEDURE — 6370000000 HC RX 637 (ALT 250 FOR IP): Performed by: NURSE PRACTITIONER

## 2019-11-05 PROCEDURE — 2060000000 HC ICU INTERMEDIATE R&B

## 2019-11-05 PROCEDURE — 6360000002 HC RX W HCPCS: Performed by: INTERNAL MEDICINE

## 2019-11-05 PROCEDURE — 99223 1ST HOSP IP/OBS HIGH 75: CPT | Performed by: INTERNAL MEDICINE

## 2019-11-05 PROCEDURE — 85027 COMPLETE CBC AUTOMATED: CPT

## 2019-11-05 PROCEDURE — 2580000003 HC RX 258: Performed by: NURSE PRACTITIONER

## 2019-11-05 PROCEDURE — 82330 ASSAY OF CALCIUM: CPT

## 2019-11-05 PROCEDURE — 97165 OT EVAL LOW COMPLEX 30 MIN: CPT

## 2019-11-05 PROCEDURE — 85610 PROTHROMBIN TIME: CPT

## 2019-11-05 PROCEDURE — 97535 SELF CARE MNGMENT TRAINING: CPT

## 2019-11-05 PROCEDURE — 97161 PT EVAL LOW COMPLEX 20 MIN: CPT

## 2019-11-05 PROCEDURE — 82947 ASSAY GLUCOSE BLOOD QUANT: CPT

## 2019-11-05 PROCEDURE — 83735 ASSAY OF MAGNESIUM: CPT

## 2019-11-05 PROCEDURE — G0108 DIAB MANAGE TRN  PER INDIV: HCPCS

## 2019-11-05 PROCEDURE — 80048 BASIC METABOLIC PNL TOTAL CA: CPT

## 2019-11-05 PROCEDURE — 84100 ASSAY OF PHOSPHORUS: CPT

## 2019-11-05 PROCEDURE — 6360000002 HC RX W HCPCS: Performed by: NURSE PRACTITIONER

## 2019-11-05 PROCEDURE — G0008 ADMIN INFLUENZA VIRUS VAC: HCPCS | Performed by: INTERNAL MEDICINE

## 2019-11-05 PROCEDURE — 6370000000 HC RX 637 (ALT 250 FOR IP): Performed by: INTERNAL MEDICINE

## 2019-11-05 PROCEDURE — 90686 IIV4 VACC NO PRSV 0.5 ML IM: CPT | Performed by: INTERNAL MEDICINE

## 2019-11-05 PROCEDURE — 36415 COLL VENOUS BLD VENIPUNCTURE: CPT

## 2019-11-05 RX ORDER — INSULIN GLARGINE 100 [IU]/ML
10 INJECTION, SOLUTION SUBCUTANEOUS NIGHTLY
Status: DISCONTINUED | OUTPATIENT
Start: 2019-11-05 | End: 2019-11-06

## 2019-11-05 RX ADMIN — ACETAMINOPHEN 650 MG: 325 TABLET ORAL at 15:21

## 2019-11-05 RX ADMIN — INSULIN LISPRO 3 UNITS: 100 INJECTION, SOLUTION INTRAVENOUS; SUBCUTANEOUS at 00:26

## 2019-11-05 RX ADMIN — METFORMIN HYDROCHLORIDE 500 MG: 500 TABLET, EXTENDED RELEASE ORAL at 08:10

## 2019-11-05 RX ADMIN — SODIUM CHLORIDE: 9 INJECTION, SOLUTION INTRAVENOUS at 13:28

## 2019-11-05 RX ADMIN — ACETAMINOPHEN 650 MG: 325 TABLET ORAL at 21:21

## 2019-11-05 RX ADMIN — INSULIN LISPRO 4 UNITS: 100 INJECTION, SOLUTION INTRAVENOUS; SUBCUTANEOUS at 16:15

## 2019-11-05 RX ADMIN — INSULIN LISPRO 5 UNITS: 100 INJECTION, SOLUTION INTRAVENOUS; SUBCUTANEOUS at 21:24

## 2019-11-05 RX ADMIN — INSULIN LISPRO 1 UNITS: 100 INJECTION, SOLUTION INTRAVENOUS; SUBCUTANEOUS at 08:10

## 2019-11-05 RX ADMIN — LISINOPRIL 20 MG: 20 TABLET ORAL at 08:10

## 2019-11-05 RX ADMIN — MIRTAZAPINE 15 MG: 15 TABLET, FILM COATED ORAL at 21:21

## 2019-11-05 RX ADMIN — Medication 10 ML: at 08:11

## 2019-11-05 RX ADMIN — GLIPIZIDE 10 MG: 10 TABLET ORAL at 06:44

## 2019-11-05 RX ADMIN — Medication 81 MG: at 08:10

## 2019-11-05 RX ADMIN — LEVOTHYROXINE SODIUM 25 MCG: 25 TABLET ORAL at 08:10

## 2019-11-05 RX ADMIN — INSULIN LISPRO 1 UNITS: 100 INJECTION, SOLUTION INTRAVENOUS; SUBCUTANEOUS at 04:35

## 2019-11-05 RX ADMIN — DESMOPRESSIN ACETATE 40 MG: 0.2 TABLET ORAL at 08:10

## 2019-11-05 RX ADMIN — LINAGLIPTIN 5 MG: 5 TABLET, FILM COATED ORAL at 08:10

## 2019-11-05 RX ADMIN — INSULIN GLARGINE 10 UNITS: 100 INJECTION, SOLUTION SUBCUTANEOUS at 21:25

## 2019-11-05 RX ADMIN — INSULIN LISPRO 3 UNITS: 100 INJECTION, SOLUTION INTRAVENOUS; SUBCUTANEOUS at 12:24

## 2019-11-05 RX ADMIN — ENOXAPARIN SODIUM 40 MG: 40 INJECTION, SOLUTION INTRAVENOUS; SUBCUTANEOUS at 08:10

## 2019-11-05 RX ADMIN — MULTIPLE VITAMINS W/ MINERALS TAB 1 TABLET: TAB at 08:10

## 2019-11-05 RX ADMIN — SODIUM CHLORIDE: 9 INJECTION, SOLUTION INTRAVENOUS at 00:19

## 2019-11-05 RX ADMIN — INFLUENZA A VIRUS A/BRISBANE/02/2018 IVR-190 (H1N1) ANTIGEN (PROPIOLACTONE INACTIVATED), INFLUENZA A VIRUS A/KANSAS/14/2017 X-327 (H3N2) ANTIGEN (PROPIOLACTONE INACTIVATED), INFLUENZA B VIRUS B/MARYLAND/15/2016 ANTIGEN (PROPIOLACTONE INACTIVATED), INFLUENZA B VIRUS B/PHUKET/3073/2013 BVR-1B ANTIGEN (PROPIOLACTONE INACTIVATED) 0.5 ML: 15; 15; 15; 15 INJECTION, SUSPENSION INTRAMUSCULAR at 08:09

## 2019-11-05 ASSESSMENT — ENCOUNTER SYMPTOMS
VOMITING: 1
WHEEZING: 0
ABDOMINAL DISTENTION: 0
SHORTNESS OF BREATH: 0
NAUSEA: 1
PHOTOPHOBIA: 0
COUGH: 0
BLOOD IN STOOL: 0

## 2019-11-05 ASSESSMENT — PAIN DESCRIPTION - ONSET: ONSET: ON-GOING

## 2019-11-05 ASSESSMENT — PAIN DESCRIPTION - PAIN TYPE
TYPE: CHRONIC PAIN;NEUROPATHIC PAIN
TYPE: ACUTE PAIN

## 2019-11-05 ASSESSMENT — PAIN SCALES - GENERAL
PAINLEVEL_OUTOF10: 4
PAINLEVEL_OUTOF10: 2
PAINLEVEL_OUTOF10: 0
PAINLEVEL_OUTOF10: 6
PAINLEVEL_OUTOF10: 8

## 2019-11-05 ASSESSMENT — PAIN DESCRIPTION - ORIENTATION: ORIENTATION: RIGHT;LEFT

## 2019-11-05 ASSESSMENT — PAIN DESCRIPTION - FREQUENCY
FREQUENCY: CONTINUOUS
FREQUENCY: CONTINUOUS

## 2019-11-05 ASSESSMENT — PAIN DESCRIPTION - LOCATION
LOCATION: GENERALIZED
LOCATION: LEG

## 2019-11-05 ASSESSMENT — PAIN DESCRIPTION - DESCRIPTORS
DESCRIPTORS: ACHING;SORE
DESCRIPTORS: CONSTANT;PINS AND NEEDLES

## 2019-11-05 ASSESSMENT — PAIN - FUNCTIONAL ASSESSMENT: PAIN_FUNCTIONAL_ASSESSMENT: PREVENTS OR INTERFERES SOME ACTIVE ACTIVITIES AND ADLS

## 2019-11-05 ASSESSMENT — PAIN DESCRIPTION - PROGRESSION: CLINICAL_PROGRESSION: NOT CHANGED

## 2019-11-06 ENCOUNTER — TELEPHONE (OUTPATIENT)
Dept: DIABETES SERVICES | Age: 49
End: 2019-11-06

## 2019-11-06 PROBLEM — D64.9 ANEMIA, NORMOCYTIC NORMOCHROMIC: Status: ACTIVE | Noted: 2019-11-06

## 2019-11-06 PROBLEM — E87.6 HYPOKALEMIA: Status: ACTIVE | Noted: 2019-11-06

## 2019-11-06 PROBLEM — E83.39 HYPOPHOSPHATEMIA: Status: ACTIVE | Noted: 2019-11-06

## 2019-11-06 PROBLEM — E83.51 HYPOCALCEMIA: Status: ACTIVE | Noted: 2019-11-06

## 2019-11-06 LAB
ANION GAP SERPL CALCULATED.3IONS-SCNC: 10 MMOL/L (ref 9–17)
ANION GAP SERPL CALCULATED.3IONS-SCNC: 10 MMOL/L (ref 9–17)
ANION GAP SERPL CALCULATED.3IONS-SCNC: 11 MMOL/L (ref 9–17)
ANION GAP SERPL CALCULATED.3IONS-SCNC: 12 MMOL/L (ref 9–17)
ANION GAP SERPL CALCULATED.3IONS-SCNC: 9 MMOL/L (ref 9–17)
BUN BLDV-MCNC: 10 MG/DL (ref 6–20)
BUN BLDV-MCNC: 10 MG/DL (ref 6–20)
BUN BLDV-MCNC: 11 MG/DL (ref 6–20)
BUN BLDV-MCNC: 12 MG/DL (ref 6–20)
BUN BLDV-MCNC: 13 MG/DL (ref 6–20)
BUN/CREAT BLD: ABNORMAL (ref 9–20)
CALCIUM IONIZED: 1.15 MMOL/L (ref 1.13–1.33)
CALCIUM IONIZED: 1.16 MMOL/L (ref 1.13–1.33)
CALCIUM IONIZED: 1.17 MMOL/L (ref 1.13–1.33)
CALCIUM IONIZED: 1.29 MMOL/L (ref 1.13–1.33)
CALCIUM IONIZED: 1.3 MMOL/L (ref 1.13–1.33)
CALCIUM SERPL-MCNC: 8 MG/DL (ref 8.6–10.4)
CALCIUM SERPL-MCNC: 8 MG/DL (ref 8.6–10.4)
CALCIUM SERPL-MCNC: 8.3 MG/DL (ref 8.6–10.4)
CALCIUM SERPL-MCNC: 8.4 MG/DL (ref 8.6–10.4)
CALCIUM SERPL-MCNC: 8.6 MG/DL (ref 8.6–10.4)
CHLORIDE BLD-SCNC: 103 MMOL/L (ref 98–107)
CHLORIDE BLD-SCNC: 103 MMOL/L (ref 98–107)
CHLORIDE BLD-SCNC: 107 MMOL/L (ref 98–107)
CHLORIDE BLD-SCNC: 108 MMOL/L (ref 98–107)
CHLORIDE BLD-SCNC: 110 MMOL/L (ref 98–107)
CO2: 16 MMOL/L (ref 20–31)
CO2: 20 MMOL/L (ref 20–31)
CO2: 20 MMOL/L (ref 20–31)
CO2: 21 MMOL/L (ref 20–31)
CO2: 22 MMOL/L (ref 20–31)
CREAT SERPL-MCNC: 0.55 MG/DL (ref 0.5–0.9)
CREAT SERPL-MCNC: 0.61 MG/DL (ref 0.5–0.9)
CREAT SERPL-MCNC: 0.64 MG/DL (ref 0.5–0.9)
CREAT SERPL-MCNC: 0.66 MG/DL (ref 0.5–0.9)
CREAT SERPL-MCNC: 0.69 MG/DL (ref 0.5–0.9)
GFR AFRICAN AMERICAN: >60 ML/MIN
GFR NON-AFRICAN AMERICAN: >60 ML/MIN
GFR SERPL CREATININE-BSD FRML MDRD: ABNORMAL ML/MIN/{1.73_M2}
GLUCOSE BLD-MCNC: 213 MG/DL (ref 70–99)
GLUCOSE BLD-MCNC: 226 MG/DL (ref 70–99)
GLUCOSE BLD-MCNC: 230 MG/DL (ref 65–105)
GLUCOSE BLD-MCNC: 268 MG/DL (ref 65–105)
GLUCOSE BLD-MCNC: 295 MG/DL (ref 70–99)
GLUCOSE BLD-MCNC: 315 MG/DL (ref 65–105)
GLUCOSE BLD-MCNC: 318 MG/DL (ref 65–105)
GLUCOSE BLD-MCNC: 349 MG/DL (ref 70–99)
GLUCOSE BLD-MCNC: 395 MG/DL (ref 65–105)
GLUCOSE BLD-MCNC: 430 MG/DL (ref 65–105)
GLUCOSE BLD-MCNC: 471 MG/DL (ref 70–99)
MAGNESIUM: 1.2 MG/DL (ref 1.6–2.6)
MAGNESIUM: 1.6 MG/DL (ref 1.6–2.6)
MAGNESIUM: 1.7 MG/DL (ref 1.6–2.6)
MAGNESIUM: 2.2 MG/DL (ref 1.6–2.6)
MAGNESIUM: 2.2 MG/DL (ref 1.6–2.6)
PHOSPHORUS: 1.6 MG/DL (ref 2.6–4.5)
PHOSPHORUS: 2 MG/DL (ref 2.6–4.5)
PHOSPHORUS: 2.8 MG/DL (ref 2.6–4.5)
PHOSPHORUS: 2.8 MG/DL (ref 2.6–4.5)
PHOSPHORUS: 2.9 MG/DL (ref 2.6–4.5)
POTASSIUM SERPL-SCNC: 3.5 MMOL/L (ref 3.7–5.3)
POTASSIUM SERPL-SCNC: 3.9 MMOL/L (ref 3.7–5.3)
POTASSIUM SERPL-SCNC: 4.1 MMOL/L (ref 3.7–5.3)
SODIUM BLD-SCNC: 133 MMOL/L (ref 135–144)
SODIUM BLD-SCNC: 135 MMOL/L (ref 135–144)
SODIUM BLD-SCNC: 136 MMOL/L (ref 135–144)
SODIUM BLD-SCNC: 138 MMOL/L (ref 135–144)
SODIUM BLD-SCNC: 140 MMOL/L (ref 135–144)

## 2019-11-06 PROCEDURE — 83735 ASSAY OF MAGNESIUM: CPT

## 2019-11-06 PROCEDURE — 6370000000 HC RX 637 (ALT 250 FOR IP): Performed by: NURSE PRACTITIONER

## 2019-11-06 PROCEDURE — 36415 COLL VENOUS BLD VENIPUNCTURE: CPT

## 2019-11-06 PROCEDURE — 80048 BASIC METABOLIC PNL TOTAL CA: CPT

## 2019-11-06 PROCEDURE — G0108 DIAB MANAGE TRN  PER INDIV: HCPCS

## 2019-11-06 PROCEDURE — 84100 ASSAY OF PHOSPHORUS: CPT

## 2019-11-06 PROCEDURE — 6360000002 HC RX W HCPCS: Performed by: NURSE PRACTITIONER

## 2019-11-06 PROCEDURE — 2060000000 HC ICU INTERMEDIATE R&B

## 2019-11-06 PROCEDURE — 82330 ASSAY OF CALCIUM: CPT

## 2019-11-06 PROCEDURE — 6370000000 HC RX 637 (ALT 250 FOR IP): Performed by: INTERNAL MEDICINE

## 2019-11-06 PROCEDURE — 82947 ASSAY GLUCOSE BLOOD QUANT: CPT

## 2019-11-06 PROCEDURE — 99232 SBSQ HOSP IP/OBS MODERATE 35: CPT | Performed by: INTERNAL MEDICINE

## 2019-11-06 PROCEDURE — 2580000003 HC RX 258: Performed by: INTERNAL MEDICINE

## 2019-11-06 RX ORDER — CALCIUM CARBONATE 200(500)MG
1000 TABLET,CHEWABLE ORAL ONCE
Status: COMPLETED | OUTPATIENT
Start: 2019-11-06 | End: 2019-11-06

## 2019-11-06 RX ORDER — INSULIN GLARGINE 100 [IU]/ML
15 INJECTION, SOLUTION SUBCUTANEOUS NIGHTLY
Qty: 1 VIAL | Refills: 3 | Status: SHIPPED | OUTPATIENT
Start: 2019-11-06 | End: 2019-11-06 | Stop reason: HOSPADM

## 2019-11-06 RX ORDER — INSULIN GLARGINE 100 [IU]/ML
20 INJECTION, SOLUTION SUBCUTANEOUS DAILY
Qty: 1 VIAL | Refills: 3 | Status: SHIPPED | OUTPATIENT
Start: 2019-11-06 | End: 2019-11-07 | Stop reason: HOSPADM

## 2019-11-06 RX ORDER — INSULIN GLARGINE 100 [IU]/ML
20 INJECTION, SOLUTION SUBCUTANEOUS DAILY
Status: DISCONTINUED | OUTPATIENT
Start: 2019-11-06 | End: 2019-11-07

## 2019-11-06 RX ORDER — INSULIN GLARGINE 100 [IU]/ML
15 INJECTION, SOLUTION SUBCUTANEOUS NIGHTLY
Qty: 1 VIAL | Refills: 3 | Status: SHIPPED | OUTPATIENT
Start: 2019-11-06 | End: 2019-11-06

## 2019-11-06 RX ORDER — INSULIN GLARGINE 100 [IU]/ML
20 INJECTION, SOLUTION SUBCUTANEOUS DAILY
Qty: 1 VIAL | Refills: 3 | Status: SHIPPED | OUTPATIENT
Start: 2019-11-06 | End: 2019-11-06

## 2019-11-06 RX ADMIN — ANTACID TABLETS 1000 MG: 500 TABLET, CHEWABLE ORAL at 17:23

## 2019-11-06 RX ADMIN — MIRTAZAPINE 15 MG: 15 TABLET, FILM COATED ORAL at 20:01

## 2019-11-06 RX ADMIN — INSULIN LISPRO 5 UNITS: 100 INJECTION, SOLUTION INTRAVENOUS; SUBCUTANEOUS at 21:05

## 2019-11-06 RX ADMIN — SODIUM CHLORIDE: 9 INJECTION, SOLUTION INTRAVENOUS at 00:42

## 2019-11-06 RX ADMIN — MULTIPLE VITAMINS W/ MINERALS TAB 1 TABLET: TAB at 10:31

## 2019-11-06 RX ADMIN — INSULIN LISPRO 4 UNITS: 100 INJECTION, SOLUTION INTRAVENOUS; SUBCUTANEOUS at 00:42

## 2019-11-06 RX ADMIN — MAGNESIUM SULFATE HEPTAHYDRATE 1 G: 1 INJECTION, SOLUTION INTRAVENOUS at 19:58

## 2019-11-06 RX ADMIN — GLIPIZIDE 10 MG: 10 TABLET ORAL at 10:31

## 2019-11-06 RX ADMIN — Medication 81 MG: at 10:31

## 2019-11-06 RX ADMIN — INSULIN GLARGINE 20 UNITS: 100 INJECTION, SOLUTION SUBCUTANEOUS at 15:44

## 2019-11-06 RX ADMIN — MAGNESIUM SULFATE HEPTAHYDRATE 1 G: 1 INJECTION, SOLUTION INTRAVENOUS at 01:09

## 2019-11-06 RX ADMIN — MAGNESIUM SULFATE HEPTAHYDRATE 1 G: 1 INJECTION, SOLUTION INTRAVENOUS at 03:21

## 2019-11-06 RX ADMIN — POTASSIUM CHLORIDE 40 MEQ: 1500 TABLET, EXTENDED RELEASE ORAL at 05:20

## 2019-11-06 RX ADMIN — DESMOPRESSIN ACETATE 40 MG: 0.2 TABLET ORAL at 10:31

## 2019-11-06 RX ADMIN — SODIUM CHLORIDE: 9 INJECTION, SOLUTION INTRAVENOUS at 21:06

## 2019-11-06 RX ADMIN — INSULIN LISPRO 4 UNITS: 100 INJECTION, SOLUTION INTRAVENOUS; SUBCUTANEOUS at 05:23

## 2019-11-06 RX ADMIN — LEVOTHYROXINE SODIUM 25 MCG: 25 TABLET ORAL at 10:31

## 2019-11-06 RX ADMIN — LINAGLIPTIN 5 MG: 5 TABLET, FILM COATED ORAL at 10:31

## 2019-11-06 RX ADMIN — INSULIN LISPRO 18 UNITS: 100 INJECTION, SOLUTION INTRAVENOUS; SUBCUTANEOUS at 15:43

## 2019-11-06 RX ADMIN — INSULIN LISPRO 5 UNITS: 100 INJECTION, SOLUTION INTRAVENOUS; SUBCUTANEOUS at 11:50

## 2019-11-06 RX ADMIN — LISINOPRIL 20 MG: 20 TABLET ORAL at 10:31

## 2019-11-06 RX ADMIN — ENOXAPARIN SODIUM 40 MG: 40 INJECTION, SOLUTION INTRAVENOUS; SUBCUTANEOUS at 10:34

## 2019-11-06 RX ADMIN — MAGNESIUM SULFATE HEPTAHYDRATE 1 G: 1 INJECTION, SOLUTION INTRAVENOUS at 02:17

## 2019-11-06 RX ADMIN — MAGNESIUM SULFATE HEPTAHYDRATE 1 G: 1 INJECTION, SOLUTION INTRAVENOUS at 04:23

## 2019-11-06 RX ADMIN — SODIUM CHLORIDE: 9 INJECTION, SOLUTION INTRAVENOUS at 11:30

## 2019-11-06 RX ADMIN — MAGNESIUM SULFATE HEPTAHYDRATE 1 G: 1 INJECTION, SOLUTION INTRAVENOUS at 21:06

## 2019-11-06 ASSESSMENT — PAIN - FUNCTIONAL ASSESSMENT
PAIN_FUNCTIONAL_ASSESSMENT: PREVENTS OR INTERFERES SOME ACTIVE ACTIVITIES AND ADLS
PAIN_FUNCTIONAL_ASSESSMENT: PREVENTS OR INTERFERES SOME ACTIVE ACTIVITIES AND ADLS

## 2019-11-06 ASSESSMENT — PAIN DESCRIPTION - DESCRIPTORS
DESCRIPTORS: CONSTANT;PINS AND NEEDLES
DESCRIPTORS: CONSTANT;PINS AND NEEDLES

## 2019-11-06 ASSESSMENT — PAIN DESCRIPTION - ONSET
ONSET: ON-GOING
ONSET: ON-GOING

## 2019-11-06 ASSESSMENT — PAIN DESCRIPTION - ORIENTATION
ORIENTATION: RIGHT;LEFT
ORIENTATION: RIGHT;LEFT

## 2019-11-06 ASSESSMENT — PAIN DESCRIPTION - PROGRESSION
CLINICAL_PROGRESSION: NOT CHANGED
CLINICAL_PROGRESSION: NOT CHANGED

## 2019-11-06 ASSESSMENT — PAIN DESCRIPTION - PAIN TYPE
TYPE: CHRONIC PAIN
TYPE: CHRONIC PAIN

## 2019-11-06 ASSESSMENT — PAIN DESCRIPTION - FREQUENCY
FREQUENCY: CONTINUOUS
FREQUENCY: CONTINUOUS

## 2019-11-06 ASSESSMENT — PAIN DESCRIPTION - LOCATION
LOCATION: LEG
LOCATION: LEG

## 2019-11-06 ASSESSMENT — PAIN SCALES - GENERAL
PAINLEVEL_OUTOF10: 0
PAINLEVEL_OUTOF10: 8
PAINLEVEL_OUTOF10: 5

## 2019-11-06 NOTE — TELEPHONE ENCOUNTER
Outpatient Diabetes Education Program     11/6/19    RE: Imelda Badillo  1970  [address]        Dear Hoang Hurtado MD    As a follow up to a [facility] admission for diabetes related illness or inpatient diabetes education referral, please consider ordering an outpatient diabetes education / MNT to address ongoing diabetes self management needs. Please place order via [employer]/ [employer] system:   [id number] - Ambulatory referral to Diabetic Education -  [facility]     Diabetes Education Services  staff will contact the patient to set up assessment and education sessions. Thank you.

## 2019-11-07 VITALS
HEIGHT: 62 IN | TEMPERATURE: 98.9 F | WEIGHT: 115.08 LBS | SYSTOLIC BLOOD PRESSURE: 131 MMHG | DIASTOLIC BLOOD PRESSURE: 79 MMHG | HEART RATE: 79 BPM | OXYGEN SATURATION: 99 % | BODY MASS INDEX: 21.18 KG/M2 | RESPIRATION RATE: 20 BRPM

## 2019-11-07 LAB
ANION GAP SERPL CALCULATED.3IONS-SCNC: 10 MMOL/L (ref 9–17)
ANION GAP SERPL CALCULATED.3IONS-SCNC: 11 MMOL/L (ref 9–17)
ANION GAP SERPL CALCULATED.3IONS-SCNC: 14 MMOL/L (ref 9–17)
BUN BLDV-MCNC: 10 MG/DL (ref 6–20)
BUN BLDV-MCNC: 10 MG/DL (ref 6–20)
BUN BLDV-MCNC: 9 MG/DL (ref 6–20)
BUN/CREAT BLD: ABNORMAL (ref 9–20)
CALCIUM IONIZED: 1.22 MMOL/L (ref 1.13–1.33)
CALCIUM IONIZED: 1.24 MMOL/L (ref 1.13–1.33)
CALCIUM IONIZED: 1.26 MMOL/L (ref 1.13–1.33)
CALCIUM SERPL-MCNC: 8.4 MG/DL (ref 8.6–10.4)
CALCIUM SERPL-MCNC: 8.6 MG/DL (ref 8.6–10.4)
CALCIUM SERPL-MCNC: 8.8 MG/DL (ref 8.6–10.4)
CHLORIDE BLD-SCNC: 107 MMOL/L (ref 98–107)
CHLORIDE BLD-SCNC: 108 MMOL/L (ref 98–107)
CHLORIDE BLD-SCNC: 109 MMOL/L (ref 98–107)
CO2: 18 MMOL/L (ref 20–31)
CO2: 22 MMOL/L (ref 20–31)
CO2: 23 MMOL/L (ref 20–31)
CREAT SERPL-MCNC: 0.55 MG/DL (ref 0.5–0.9)
CREAT SERPL-MCNC: 0.56 MG/DL (ref 0.5–0.9)
CREAT SERPL-MCNC: 0.58 MG/DL (ref 0.5–0.9)
GFR AFRICAN AMERICAN: >60 ML/MIN
GFR NON-AFRICAN AMERICAN: >60 ML/MIN
GFR SERPL CREATININE-BSD FRML MDRD: ABNORMAL ML/MIN/{1.73_M2}
GLUCOSE BLD-MCNC: 217 MG/DL (ref 65–105)
GLUCOSE BLD-MCNC: 269 MG/DL (ref 65–105)
GLUCOSE BLD-MCNC: 270 MG/DL (ref 70–99)
GLUCOSE BLD-MCNC: 298 MG/DL (ref 70–99)
GLUCOSE BLD-MCNC: 300 MG/DL (ref 70–99)
GLUCOSE BLD-MCNC: 304 MG/DL (ref 65–105)
GLUCOSE BLD-MCNC: 329 MG/DL (ref 65–105)
MAGNESIUM: 1.6 MG/DL (ref 1.6–2.6)
MAGNESIUM: 1.7 MG/DL (ref 1.6–2.6)
MAGNESIUM: 1.8 MG/DL (ref 1.6–2.6)
PHOSPHORUS: 2.1 MG/DL (ref 2.6–4.5)
PHOSPHORUS: 2.4 MG/DL (ref 2.6–4.5)
PHOSPHORUS: 2.4 MG/DL (ref 2.6–4.5)
POTASSIUM SERPL-SCNC: 3.8 MMOL/L (ref 3.7–5.3)
POTASSIUM SERPL-SCNC: 3.9 MMOL/L (ref 3.7–5.3)
POTASSIUM SERPL-SCNC: 3.9 MMOL/L (ref 3.7–5.3)
SODIUM BLD-SCNC: 139 MMOL/L (ref 135–144)
SODIUM BLD-SCNC: 141 MMOL/L (ref 135–144)
SODIUM BLD-SCNC: 142 MMOL/L (ref 135–144)

## 2019-11-07 PROCEDURE — 82330 ASSAY OF CALCIUM: CPT

## 2019-11-07 PROCEDURE — 6370000000 HC RX 637 (ALT 250 FOR IP): Performed by: NURSE PRACTITIONER

## 2019-11-07 PROCEDURE — 83735 ASSAY OF MAGNESIUM: CPT

## 2019-11-07 PROCEDURE — 82947 ASSAY GLUCOSE BLOOD QUANT: CPT

## 2019-11-07 PROCEDURE — 84100 ASSAY OF PHOSPHORUS: CPT

## 2019-11-07 PROCEDURE — 99232 SBSQ HOSP IP/OBS MODERATE 35: CPT | Performed by: INTERNAL MEDICINE

## 2019-11-07 PROCEDURE — 80048 BASIC METABOLIC PNL TOTAL CA: CPT

## 2019-11-07 PROCEDURE — 6370000000 HC RX 637 (ALT 250 FOR IP): Performed by: INTERNAL MEDICINE

## 2019-11-07 PROCEDURE — 6360000002 HC RX W HCPCS: Performed by: NURSE PRACTITIONER

## 2019-11-07 PROCEDURE — 36415 COLL VENOUS BLD VENIPUNCTURE: CPT

## 2019-11-07 PROCEDURE — 2580000003 HC RX 258: Performed by: NURSE PRACTITIONER

## 2019-11-07 RX ORDER — INSULIN GLARGINE 100 [IU]/ML
40 INJECTION, SOLUTION SUBCUTANEOUS DAILY
Status: DISCONTINUED | OUTPATIENT
Start: 2019-11-08 | End: 2019-11-07 | Stop reason: HOSPADM

## 2019-11-07 RX ORDER — INSULIN GLARGINE 100 [IU]/ML
30 INJECTION, SOLUTION SUBCUTANEOUS DAILY
Qty: 1 VIAL | Refills: 3 | Status: SHIPPED | OUTPATIENT
Start: 2019-11-07 | End: 2019-11-07 | Stop reason: HOSPADM

## 2019-11-07 RX ORDER — INSULIN GLARGINE 100 [IU]/ML
30 INJECTION, SOLUTION SUBCUTANEOUS DAILY
Status: DISCONTINUED | OUTPATIENT
Start: 2019-11-07 | End: 2019-11-07

## 2019-11-07 RX ORDER — INSULIN GLARGINE 100 [IU]/ML
40 INJECTION, SOLUTION SUBCUTANEOUS DAILY
Qty: 1 VIAL | Refills: 3 | Status: SHIPPED | OUTPATIENT
Start: 2019-11-07 | End: 2020-01-02

## 2019-11-07 RX ADMIN — LINAGLIPTIN 5 MG: 5 TABLET, FILM COATED ORAL at 08:52

## 2019-11-07 RX ADMIN — INSULIN LISPRO 12 UNITS: 100 INJECTION, SOLUTION INTRAVENOUS; SUBCUTANEOUS at 11:47

## 2019-11-07 RX ADMIN — DESMOPRESSIN ACETATE 40 MG: 0.2 TABLET ORAL at 08:52

## 2019-11-07 RX ADMIN — INSULIN LISPRO 9 UNITS: 100 INJECTION, SOLUTION INTRAVENOUS; SUBCUTANEOUS at 07:29

## 2019-11-07 RX ADMIN — GLIPIZIDE 10 MG: 10 TABLET ORAL at 08:52

## 2019-11-07 RX ADMIN — ENOXAPARIN SODIUM 40 MG: 40 INJECTION, SOLUTION INTRAVENOUS; SUBCUTANEOUS at 08:52

## 2019-11-07 RX ADMIN — LEVOTHYROXINE SODIUM 25 MCG: 25 TABLET ORAL at 08:52

## 2019-11-07 RX ADMIN — Medication 10 ML: at 08:00

## 2019-11-07 RX ADMIN — INSULIN GLARGINE 30 UNITS: 100 INJECTION, SOLUTION SUBCUTANEOUS at 09:39

## 2019-11-07 RX ADMIN — MULTIPLE VITAMINS W/ MINERALS TAB 1 TABLET: TAB at 08:52

## 2019-11-07 RX ADMIN — LISINOPRIL 20 MG: 20 TABLET ORAL at 08:52

## 2019-11-07 RX ADMIN — Medication 81 MG: at 08:52

## 2019-11-07 ASSESSMENT — ENCOUNTER SYMPTOMS
NAUSEA: 0
ABDOMINAL PAIN: 0
SHORTNESS OF BREATH: 0
VOMITING: 0
COUGH: 0

## 2019-11-07 ASSESSMENT — PAIN DESCRIPTION - FREQUENCY: FREQUENCY: CONTINUOUS

## 2019-11-07 ASSESSMENT — PAIN DESCRIPTION - ORIENTATION: ORIENTATION: RIGHT;LEFT

## 2019-11-07 ASSESSMENT — PAIN DESCRIPTION - LOCATION: LOCATION: LEG

## 2019-11-07 ASSESSMENT — PAIN DESCRIPTION - PROGRESSION: CLINICAL_PROGRESSION: NOT CHANGED

## 2019-11-07 ASSESSMENT — PAIN DESCRIPTION - PAIN TYPE: TYPE: CHRONIC PAIN;NEUROPATHIC PAIN

## 2019-11-07 ASSESSMENT — PAIN DESCRIPTION - ONSET: ONSET: ON-GOING

## 2019-11-07 ASSESSMENT — PAIN DESCRIPTION - DESCRIPTORS: DESCRIPTORS: PINS AND NEEDLES;CONSTANT

## 2019-11-07 ASSESSMENT — PAIN SCALES - GENERAL: PAINLEVEL_OUTOF10: 9

## 2019-11-08 ENCOUNTER — TELEPHONE (OUTPATIENT)
Dept: INTERNAL MEDICINE | Age: 49
End: 2019-11-08

## 2020-01-02 ENCOUNTER — OFFICE VISIT (OUTPATIENT)
Dept: FAMILY MEDICINE CLINIC | Age: 50
End: 2020-01-02
Payer: MEDICAID

## 2020-01-02 VITALS
OXYGEN SATURATION: 100 % | SYSTOLIC BLOOD PRESSURE: 116 MMHG | WEIGHT: 114.4 LBS | HEART RATE: 73 BPM | DIASTOLIC BLOOD PRESSURE: 72 MMHG | BODY MASS INDEX: 20.92 KG/M2 | TEMPERATURE: 97.3 F

## 2020-01-02 LAB — HBA1C MFR BLD: 11.8 %

## 2020-01-02 PROCEDURE — 90746 HEPB VACCINE 3 DOSE ADULT IM: CPT | Performed by: FAMILY MEDICINE

## 2020-01-02 PROCEDURE — 99214 OFFICE O/P EST MOD 30 MIN: CPT | Performed by: FAMILY MEDICINE

## 2020-01-02 PROCEDURE — 90732 PPSV23 VACC 2 YRS+ SUBQ/IM: CPT | Performed by: FAMILY MEDICINE

## 2020-01-02 PROCEDURE — 3046F HEMOGLOBIN A1C LEVEL >9.0%: CPT | Performed by: FAMILY MEDICINE

## 2020-01-02 PROCEDURE — 4004F PT TOBACCO SCREEN RCVD TLK: CPT | Performed by: FAMILY MEDICINE

## 2020-01-02 PROCEDURE — 83036 HEMOGLOBIN GLYCOSYLATED A1C: CPT | Performed by: FAMILY MEDICINE

## 2020-01-02 PROCEDURE — G8427 DOCREV CUR MEDS BY ELIG CLIN: HCPCS | Performed by: FAMILY MEDICINE

## 2020-01-02 PROCEDURE — G8482 FLU IMMUNIZE ORDER/ADMIN: HCPCS | Performed by: FAMILY MEDICINE

## 2020-01-02 PROCEDURE — 99211 OFF/OP EST MAY X REQ PHY/QHP: CPT | Performed by: FAMILY MEDICINE

## 2020-01-02 PROCEDURE — 2022F DILAT RTA XM EVC RTNOPTHY: CPT | Performed by: FAMILY MEDICINE

## 2020-01-02 PROCEDURE — G8420 CALC BMI NORM PARAMETERS: HCPCS | Performed by: FAMILY MEDICINE

## 2020-01-02 RX ORDER — ATORVASTATIN CALCIUM 40 MG/1
40 TABLET, FILM COATED ORAL DAILY
Qty: 30 TABLET | Refills: 5 | Status: SHIPPED | OUTPATIENT
Start: 2020-01-02 | End: 2020-07-10 | Stop reason: SDUPTHER

## 2020-01-02 RX ORDER — ELECTROLYTES/DEXTROSE
1 SOLUTION, ORAL ORAL DAILY
Qty: 30 TABLET | Refills: 5 | Status: SHIPPED | OUTPATIENT
Start: 2020-01-02 | End: 2020-07-10 | Stop reason: SDUPTHER

## 2020-01-02 RX ORDER — LEVOTHYROXINE SODIUM 0.03 MG/1
25 TABLET ORAL DAILY
Qty: 30 TABLET | Refills: 5 | Status: SHIPPED | OUTPATIENT
Start: 2020-01-02 | End: 2020-07-10 | Stop reason: SDUPTHER

## 2020-01-02 RX ORDER — GABAPENTIN 600 MG/1
600 TABLET ORAL DAILY
Qty: 30 TABLET | Refills: 5 | Status: SHIPPED | OUTPATIENT
Start: 2020-01-02 | End: 2020-07-10 | Stop reason: SDUPTHER

## 2020-01-02 RX ORDER — METFORMIN HYDROCHLORIDE 500 MG/1
500 TABLET, EXTENDED RELEASE ORAL
Qty: 30 TABLET | Refills: 5 | Status: SHIPPED | OUTPATIENT
Start: 2020-01-02 | End: 2020-07-10 | Stop reason: SDUPTHER

## 2020-01-02 RX ORDER — INSULIN GLARGINE 100 [IU]/ML
40 INJECTION, SOLUTION SUBCUTANEOUS DAILY
Qty: 1 VIAL | Refills: 3 | Status: CANCELLED | OUTPATIENT
Start: 2020-01-02

## 2020-01-02 RX ORDER — LISINOPRIL 20 MG/1
20 TABLET ORAL DAILY
Qty: 30 TABLET | Refills: 5 | Status: SHIPPED | OUTPATIENT
Start: 2020-01-02 | End: 2020-07-10 | Stop reason: SDUPTHER

## 2020-01-02 RX ORDER — ASPIRIN 81 MG/1
81 TABLET ORAL DAILY
Qty: 30 TABLET | Refills: 5 | Status: SHIPPED | OUTPATIENT
Start: 2020-01-02 | End: 2020-07-10 | Stop reason: SDUPTHER

## 2020-01-02 RX ORDER — MIRTAZAPINE 15 MG/1
15 TABLET, FILM COATED ORAL NIGHTLY
Qty: 30 TABLET | Refills: 3 | Status: SHIPPED | OUTPATIENT
Start: 2020-01-02 | End: 2020-05-06

## 2020-01-02 RX ORDER — GLUCOSAMINE HCL/CHONDROITIN SU 500-400 MG
CAPSULE ORAL
Qty: 200 STRIP | Refills: 5 | Status: SHIPPED | OUTPATIENT
Start: 2020-01-02 | End: 2020-02-20

## 2020-01-02 RX ORDER — GLIPIZIDE 10 MG/1
10 TABLET, FILM COATED, EXTENDED RELEASE ORAL DAILY
Qty: 30 TABLET | Refills: 5 | Status: SHIPPED | OUTPATIENT
Start: 2020-01-02 | End: 2020-07-10 | Stop reason: SDUPTHER

## 2020-01-02 ASSESSMENT — PATIENT HEALTH QUESTIONNAIRE - PHQ9
1. LITTLE INTEREST OR PLEASURE IN DOING THINGS: 2
SUM OF ALL RESPONSES TO PHQ9 QUESTIONS 1 & 2: 4
SUM OF ALL RESPONSES TO PHQ QUESTIONS 1-9: 4
SUM OF ALL RESPONSES TO PHQ QUESTIONS 1-9: 4
2. FEELING DOWN, DEPRESSED OR HOPELESS: 2

## 2020-01-02 NOTE — PROGRESS NOTES
Visit Information    Have you changed or started any medications since your last visit including any over-the-counter medicines, vitamins, or herbal medicines? no   Have you stopped taking any of your medications? Is so, why? -  no  Are you having any side effects from any of your medications? - no    Have you seen any other physician or provider since your last visit? NEW Patient  Have you had any other diagnostic tests since your last visit?  no   Have you been seen in the emergency room and/or had an admission in a hospital since we last saw you?  yes - 12/31/2019-01/01/2020   Have you had your routine dental cleaning in the past 6 months?  no     Do you have an active MyChart account? If no, what is the barrier?   Yes    Patient Care Team:  Genoveva Petersen DO as PCP - General (Family Medicine)  Hoang Carrasco MD as PCP - [facility]    Medical History Review  Past Medical, Family, and Social History reviewed and does contribute to the patient presenting condition    Health Maintenance   Topic Date Due    Diabetic retinal exam  09/25/1980    HIV screen  09/25/1985    Hepatitis B vaccine (1 of 3 - Risk 3-dose series) 09/25/1989    Cervical cancer screen  09/25/1991    Diabetic microalbuminuria test  03/31/2013    Lipid screen  03/31/2013    A1C test (Diabetic or Prediabetic)  09/06/2019    TSH testing  11/15/2019    Diabetic foot exam  11/20/2019    Potassium monitoring  11/07/2020    Creatinine monitoring  11/07/2020    DTaP/Tdap/Td vaccine (2 - Td) 12/15/2025    Flu vaccine  Completed    Pneumococcal 0-64 years Vaccine  Completed

## 2020-01-02 NOTE — PATIENT INSTRUCTIONS
Thank you for letting us take care of you today. We hope all your questions were addressed. If a question was overlooked or something else comes to mind after you return home, please contact a member of your Care Team listed below. Please make sure you have a routine office visit set up to follow-up on [address]. Your Care Team at [place] [address] is Team #1  Sophie Lombardo MD (Faculty)  Philly Tate MD (Faculty)  Mila Tellez MD (Resident)  Vernon Sanders MD (Resident)  Jd Gates MD (Resident)  Mary Jo Gardner MD (Resident)  Shirley Benavidez., A  Paco Chan., MARCO Euceda., [facility] office)  [facility] office)  [facility])  TORI Coughlin MARCO ([id number] [place] [address])  Franck Shah, Ph.D., (Behavioral Services)  King Jules, [address] [place] [address] (Clinical Pharmacist)     Office phone number: [phone]    If you need to get in right away due to illness, please be advised we have \"Same Day\" appointments available Monday-Friday. Please call us at [phone] option #3 to schedule your \"Same Day\" appointment. Patient Education        hepatitis B adult vaccine  Pronunciation:  HEP a MALORIE tis B a DULT VAX een  Brand:  Engerix-B, Heplisav-B, Recombivax HB Adult, Recombivax HB Dialysis Formulation  What is the most important information I should know about this vaccine? You should not receive hepatitis B vaccine if you are allergic to baker's yeast.  This vaccine will not protect against hepatitis B if you are already infected with the virus, even if you do not yet show symptoms. What is hepatitis B vaccine? Hepatitis is a serious disease caused by a virus. Hepatitis causes inflammation of the liver, vomiting, and jaundice (yellowing of the skin or eyes). Hepatitis can lead to liver cancer, cirrhosis, or death.   Hepatitis B is a disease of the liver that is spread through blood or bodily fluids, sexual contact or sharing IV drug needles affecting the brain (or if this was a reaction to a previous vaccine). You can still receive a vaccine if you have a minor cold. In the case of a more severe illness with a fever or any type of infection, wait until you get better before receiving this vaccine. It is not known whether this vaccine will harm an unborn baby. However, if you are at a high risk for infection with hepatitis B during pregnancy, your doctor should determine whether you need this vaccine. It may not be safe to breast-feed while receiving this medicine. Ask your doctor about any risk. How is this vaccine given? This vaccine is given as an injection (shot) into a muscle. A healthcare provider will give you this injection. The hepatitis B vaccine is given in a series of shots. The booster shots are sometimes given 1 month and 6 months after the first shot. If you have a high risk of hepatitis B infection, you may be given an additional booster 1 to 2 months after the third shot. Your individual booster schedule may be different from these guidelines. Follow your doctor's instructions or the schedule recommended by the health department of the state you live in. What happens if I miss a dose? Contact your doctor if you will miss a booster dose or if you get behind schedule. The next dose should be given as soon as possible. There is no need to start over. Be sure to receive all recommended doses of this vaccine or you may not be fully protected against disease. What happens if I overdose? An overdose of this vaccine is unlikely to occur. What should I avoid before or after receiving this vaccine? Follow your doctor's instructions about any restrictions on food, beverages, or activity. What are the possible side effects of this vaccine?   Get emergency medical help if you have signs of an allergic reaction (hives, difficult breathing, swelling in your face or throat) or a severe skin reaction (fever, sore throat, burning eyes, skin pain, red or purple skin rash with blistering and peeling). You should not receive a booster vaccine if you had a life-threatening allergic reaction after the first shot. Keep track of any and all side effects you have after receiving this vaccine. When you receive a booster dose, you will need to tell the doctor if the previous shot caused any side effects. Becoming infected with hepatitis B is much more dangerous to your health than receiving this vaccine. However, like any medicine, this vaccine can cause side effects but the risk of serious side effects is extremely low. Call your doctor at once if you have:  · high fever, sore throat, and headache with a severe blistering, peeling, and red skin rash;  · changes in behavior;  · bruising or bleeding; or  · fever, swollen glands, itching, joint pain, or not feeling well. Common side effects include:  · redness, pain, swelling, or a lump where the shot was given;  · diarrhea, loss of appetite;  · headache, dizziness, weakness;  · low fever;  · feeling tired or irritable; or  · runny nose. This is not a complete list of side effects and others may occur. Call your doctor for medical advice about side effects. You may report vaccine side effects to the [address] [address] and Human Services at [phone]. What other drugs will affect hepatitis B vaccine? Other drugs may affect this vaccine, including prescription and over-the-counter medicines, vitamins, and herbal products. Tell your doctor about all your current medicines and any medicine you start or stop using. Where can I get more information? Your doctor or pharmacist can provide more information about this vaccine. Additional information is available from your local health department or the Centers for Disease Control and Prevention.   Remember, keep this and all other medicines out of the reach of children, never share your medicines with others, and use this medication only for the vaccinated? Vaccination can protect older adults (and some children and younger adults) from pneumococcal disease. Pneumococcal disease is caused by bacteria that can spread from person to person through close contact. It can cause ear infections, and it can also lead to more serious infections of the:  · Lungs (pneumonia),  · Blood (bacteremia), and  · Covering of the brain and spinal cord (meningitis). Meningitis can cause deafness and brain damage, and it can be fatal.  Anyone can get pneumococcal disease, but children under 3years of age, people with certain medical conditions, adults over 72years of age, and cigarette smokers are at the highest risk. About 18,000 older adults die each year from pneumococcal disease in the [place]. Treatment of pneumococcal infections with penicillin and other drugs used to be more effective. But some strains of the disease have become resistant to these drugs. This makes prevention of the disease, through vaccination, even more important. Pneumococcal polysaccharide vaccine (PPSV23)  Pneumococcal polysaccharide vaccine (PPSV23) protects against 23 types of pneumococcal bacteria. It will not prevent all pneumococcal disease. PPSV23 is recommended for:  · All adults 72years of age and older,  · Anyone 2 through 59years of age with certain long-term health problems,  · Anyone 2 through 59years of age with a weakened immune system,  · Adults 23 through 59years of age who smoke cigarettes or have asthma. Most people need only one dose of PPSV. A second dose is recommended for certain high-risk groups. People 72 and older should get a dose even if they have gotten one or more doses of the vaccine before they turned 65. Your healthcare provider can give you more information about these recommendations. Most healthy adults develop protection within 2 to 3 weeks of getting the shot.   Some people should not get this vaccine  · Anyone who has had a life-threatening allergic reaction to PPSV should not get another dose. · Anyone who has a severe allergy to any component of PPSV should not receive it. Tell your provider if you have any severe allergies. · Anyone who is moderately or severely ill when the shot is scheduled may be asked to wait until they recover before getting the vaccine. Someone with a mild illness can usually be vaccinated. · Children less than 3years of age should not receive this vaccine. · There is no evidence that PPSV is harmful to either a pregnant woman or to her fetus. However, as a precaution, women who need the vaccine should be vaccinated before becoming pregnant, if possible. Risks of a vaccine reaction  With any medicine, including vaccines, there is a chance of side effects. These are usually mild and go away on their own, but serious reactions are also possible. About half of people who get PPSV have mild side effects, such as redness or pain where the shot is given, which go away within about two days. Less than 1 out of 100 people develop a fever, muscle aches, or more severe local reactions. Problems that could happen after any vaccine:  · People sometimes faint after a medical procedure, including vaccination. Sitting or lying down for about 15 minutes can help prevent fainting, and injuries caused by a fall. Tell your doctor if you feel dizzy, or have vision changes or ringing in the ears. · Some people get severe pain in the shoulder and have difficulty moving the arm where a shot was given. This happens very rarely. · Any medication can cause a severe allergic reaction. Such reactions from a vaccine are very rare, estimated at about 1 in a million doses, and would happen within a few minutes to a few hours after the vaccination. As with any medicine, there is a very remote chance of a vaccine causing a serious injury or death. The safety of vaccines is always being monitored.  For more information, visit: www.cdc.gov/vaccinesafety/  What if there is a serious reaction? What should I look for? Look for anything that concerns you, such as signs of a severe allergic reaction, very high fever, or unusual behavior. Signs of a severe allergic reaction can include hives, swelling of the face and throat, difficulty breathing, a fast heartbeat, dizziness, and weakness. These would usually start a few minutes to a few hours after the vaccination. What should I do? If you think it is a severe allergic reaction or other emergency that can't wait, call 9-1-1 or get to the nearest hospital. Otherwise, call your doctor. Afterward, the reaction should be reported to the Vaccine Adverse Event Reporting System (VAERS). Your doctor might file this report, or you can do it yourself through the VAERS web site at www.vaers. [facility].gov, or by calling 1-302.410.2967. VAERS does not give medical advice. How can I learn more? · Ask your doctor. He or she can give you the vaccine package insert or suggest other sources of information. · Call your local or state health department. · Contact the Centers for Disease Control and Prevention (CDC):  ? Call 9-123.893.1479 (1-800-CDC-INFO) or  ? Visit CDC's website at www.cdc.gov/vaccines  Vaccine Information Statement  PPSV Vaccine  (04/24/2015)  Department of Health and Human Services  Centers for Disease Control and Prevention  Many Vaccine Information Statements are available in [place] and other languages. See www.immunize.org/vis. Hojas de información Sobre Vacunas están disponibles en español y en muchos otros idiomas. Visite Antwan.si. Care instructions adapted under license by [facility] ([facility]). If you have questions about a medical condition or this instruction, always ask your healthcare professional. [address] any warranty or liability for your use of this information.

## 2020-01-02 NOTE — PROGRESS NOTES
2020     Miguel Brown (:  1970) is a 52 y.o. female, here for evaluation of the following medical concerns:  Chief Complaint   Patient presents with    Diabetes     follow up, was in DKA 19-19 [facility]      HPI   Patient was in [facility] for DKA. She ran out of her insulin and could not afford to buy it. She states her insurance has been straightened out and she is able to get her medications. She is tearful and states she is stressed, daughter trying to get her to go to counseling. Refuses currently, wants to wait. Review of Systems   Respiratory: Negative for cough and shortness of breath. Cardiovascular: Negative for chest pain and palpitations. Psychiatric/Behavioral: Positive for dysphoric mood (no suicidal tendencies). Prior to Visit Medications    Medication Sig Taking? Authorizing Provider   SITagliptin (JANUVIA) 50 MG tablet Take 1 tablet by mouth daily Yes Jamaal Polanco DO   mirtazapine (REMERON) 15 MG tablet Take 1 tablet by mouth nightly Yes Jamaal Polanco DO   metFORMIN (GLUCOPHAGE-XR) 500 MG extended release tablet Take 1 tablet by mouth daily (with breakfast) Yes Jamaal Polanco DO   lisinopril (PRINIVIL;ZESTRIL) 20 MG tablet Take 1 tablet by mouth daily Yes Jamaal Polanco DO   levothyroxine (SYNTHROID) 25 MCG tablet Take 1 tablet by mouth daily Yes Jamaal Polanco DO   glipiZIDE (GLUCOTROL XL) 10 MG extended release tablet Take 1 tablet by mouth daily Yes Jamaal Polanco DO   gabapentin (NEURONTIN) 600 MG tablet Take 1 tablet by mouth daily for 30 days.  Yes Jamaal Polanco DO   atorvastatin (LIPITOR) 40 MG tablet Take 1 tablet by mouth daily Yes Jamaal Polanco DO   aspirin 81 MG EC tablet Take 1 tablet by mouth daily Yes Jamaal Polanco DO   Multiple Vitamins-Minerals (MULTIVITAMIN ADULT) TABS Take 1 tablet by mouth daily Yes Jamaal Polanco DO   insulin glargine (LANTUS SOLOSTAR) 100 UNIT/ML injection pen Inject 40 Units into the skin nightly Yes Arian Melendez, DO   blood glucose monitor strips Test 4 times a day & as needed for symptoms of irregular blood glucose. Yes Arian Melendez, DO   Insulin Pen Needle ([employer] PEN NEEDLES) 31G X 6 MM MISC 1 box by Does not apply route 4 times daily Yes Arian Melendez, DO      No Known Allergies  Social History     Tobacco Use    Smoking status: Current Some Day Smoker     Packs/day: 0.00     Types: Cigarettes    Smokeless tobacco: Never Used    Tobacco comment: pt states 2 cigarettes per day   Substance Use Topics    Alcohol use: No        Vitals:    01/02/20 0936   BP: 116/72   Site: Right Upper Arm   Position: Sitting   Cuff Size: Medium Adult   Pulse: 73   Temp: 97.3 °F (36.3 °C)   TempSrc: Oral   SpO2: 100%   Weight: 114 lb 6.4 oz (51.9 kg)     Estimated body mass index is 20.92 kg/m² as calculated from the following:    Height as of 11/4/19: 5' 2\" (1.575 m). Weight as of this encounter: 114 lb 6.4 oz (51.9 kg). Physical Exam  Constitutional:       Appearance: Normal appearance. Cardiovascular:      Rate and Rhythm: Normal rate and regular rhythm. Pulmonary:      Effort: Pulmonary effort is normal.      Breath sounds: Normal breath sounds. Neurological:      Mental Status: She is alert and oriented to person, place, and time. Psychiatric:      Comments: tearful         ASSESSMENT/PLAN:     Diagnosis Orders   1. Type 2 diabetes mellitus with hyperglycemia, with long-term current use of insulin (HCC)  POCT glycosylated hemoglobin (Hb A1C)    Microalbumin, Ur    SITagliptin (JANUVIA) 50 MG tablet    metFORMIN (GLUCOPHAGE-XR) 500 MG extended release tablet    glipiZIDE (GLUCOTROL XL) 10 MG extended release tablet    blood glucose monitor strips    Insulin Pen Needle ([employer] PEN NEEDLES) 31G X 6 MM MISC   2. Screening for hyperlipidemia  Lipid Panel   3.  Need for prophylactic vaccination against Streptococcus pneumoniae (pneumococcus)  Pneumococcal polysaccharide vaccine

## 2020-01-19 ASSESSMENT — ENCOUNTER SYMPTOMS
SHORTNESS OF BREATH: 0
COUGH: 0

## 2020-01-31 ENCOUNTER — OFFICE VISIT (OUTPATIENT)
Dept: FAMILY MEDICINE CLINIC | Age: 50
End: 2020-01-31
Payer: MEDICAID

## 2020-01-31 ENCOUNTER — HOSPITAL ENCOUNTER (OUTPATIENT)
Age: 50
Setting detail: SPECIMEN
Discharge: HOME | End: 2020-01-31
Payer: MEDICAID

## 2020-01-31 VITALS
HEIGHT: 62 IN | TEMPERATURE: 97.1 F | SYSTOLIC BLOOD PRESSURE: 111 MMHG | WEIGHT: 122.8 LBS | HEART RATE: 75 BPM | DIASTOLIC BLOOD PRESSURE: 78 MMHG | BODY MASS INDEX: 22.6 KG/M2

## 2020-01-31 LAB
CHOLESTEROL/HDL RATIO: 1.5
CHOLESTEROL: 128 MG/DL
CREATININE URINE: 68.3 MG/DL (ref 28–217)
HDLC SERPL-MCNC: 87 MG/DL
LDL CHOLESTEROL: 31 MG/DL (ref 0–130)
MICROALBUMIN/CREAT 24H UR: <12 MG/L
MICROALBUMIN/CREAT UR-RTO: NORMAL MCG/MG CREAT
TRIGL SERPL-MCNC: 48 MG/DL
VLDLC SERPL CALC-MCNC: NORMAL MG/DL (ref 1–30)

## 2020-01-31 PROCEDURE — G8427 DOCREV CUR MEDS BY ELIG CLIN: HCPCS | Performed by: FAMILY MEDICINE

## 2020-01-31 PROCEDURE — 3046F HEMOGLOBIN A1C LEVEL >9.0%: CPT | Performed by: FAMILY MEDICINE

## 2020-01-31 PROCEDURE — 99213 OFFICE O/P EST LOW 20 MIN: CPT | Performed by: FAMILY MEDICINE

## 2020-01-31 PROCEDURE — G8420 CALC BMI NORM PARAMETERS: HCPCS | Performed by: FAMILY MEDICINE

## 2020-01-31 PROCEDURE — G8482 FLU IMMUNIZE ORDER/ADMIN: HCPCS | Performed by: FAMILY MEDICINE

## 2020-01-31 PROCEDURE — 2022F DILAT RTA XM EVC RTNOPTHY: CPT | Performed by: FAMILY MEDICINE

## 2020-01-31 PROCEDURE — 4004F PT TOBACCO SCREEN RCVD TLK: CPT | Performed by: FAMILY MEDICINE

## 2020-01-31 ASSESSMENT — ENCOUNTER SYMPTOMS: SHORTNESS OF BREATH: 0

## 2020-01-31 NOTE — PATIENT INSTRUCTIONS
Thank you for letting us take care of you today. We hope all your questions were addressed. If a question was overlooked or something else comes to mind after you return home, please contact a member of your Care Team listed below. Please make sure you have a routine office visit set up to follow-up on [address]. Your Care Team at [place] [address] is Team #1  Laurance Pallas, MD (Faculty)  Prince Massey DO (Faculty)  Kia Fatima (Resident)  Denver Lasso (Resident)  Abdiel Kenny MD (Resident)  Vicente Braun MD (Resident)  Siobhan Garcia., MARCO Payne., [facility] office)  [facility] office)  [facility] office)  TORI Dempsey, [address] [facility] ([address] [facility])  Ceci Hoffman, [facility] (Clinical Pharmacist)     Office phone number: [phone]    If you need to get in right away due to illness, please be advised we have \"Same Day\" appointments available Monday-Friday. Please call us at [phone] option #[phone] to schedule your \"Same Day\" appointment.

## 2020-01-31 NOTE — PROGRESS NOTES
2020     Santiago Carey (:  1970) is a 52 y.o. female, here for evaluation of the following medical concerns:  Chief Complaint   Patient presents with    Hypertension     1 month follow up    Medication Refill     Needs refills on all meds    Other     c/o loss of appetite     HPI   Doing well on medications, mood has improved somewhat. Appetite is down, thinks situational, son left for basic training. Otherwise no acute complaints. Review of Systems   Constitutional: Positive for appetite change. Negative for fatigue and fever. Respiratory: Negative for shortness of breath. Cardiovascular: Negative for chest pain. Psychiatric/Behavioral: Positive for dysphoric mood. Prior to Visit Medications    Medication Sig Taking? Authorizing Provider   SITagliptin (JANUVIA) 50 MG tablet Take 1 tablet by mouth daily Yes Anjelica Denny DO   mirtazapine (REMERON) 15 MG tablet Take 1 tablet by mouth nightly Yes Anjelica Denny DO   metFORMIN (GLUCOPHAGE-XR) 500 MG extended release tablet Take 1 tablet by mouth daily (with breakfast) Yes Anjelica Denny DO   lisinopril (PRINIVIL;ZESTRIL) 20 MG tablet Take 1 tablet by mouth daily Yes Anjelica Denny DO   levothyroxine (SYNTHROID) 25 MCG tablet Take 1 tablet by mouth daily Yes Anjelica Denny DO   glipiZIDE (GLUCOTROL XL) 10 MG extended release tablet Take 1 tablet by mouth daily Yes Anjelica Denny DO   gabapentin (NEURONTIN) 600 MG tablet Take 1 tablet by mouth daily for 30 days.  Yes Anjelica Denny DO   atorvastatin (LIPITOR) 40 MG tablet Take 1 tablet by mouth daily Yes Anjelica Denny DO   aspirin 81 MG EC tablet Take 1 tablet by mouth daily Yes Anjelica Denny DO   Multiple Vitamins-Minerals (MULTIVITAMIN ADULT) TABS Take 1 tablet by mouth daily Yes Anjelica Denny DO   insulin glargine (LANTUS SOLOSTAR) 100 UNIT/ML injection pen Inject 40 Units into the skin nightly Yes Anjelica Denny DO   blood glucose monitor strips Test 4 times a day & as needed for symptoms of irregular blood glucose. Yes Burgess Oscar DO   Insulin Pen Needle ([employer] PEN NEEDLES) 31G X 6 MM MISC 1 box by Does not apply route 4 times daily Yes Burgess Oscar DO      No Known Allergies  Social History     Tobacco Use    Smoking status: Current Some Day Smoker     Packs/day: 0.00     Years: 35.00     Pack years: 0.00     Types: Cigarettes    Smokeless tobacco: Never Used    Tobacco comment: pt states 1 cigarettes per day   Substance Use Topics    Alcohol use: No        Vitals:    01/31/20 0833   BP: 111/78  Comment: machine   Site: Right Upper Arm   Position: Sitting   Cuff Size: Medium Adult   Pulse: 75   Temp: 97.1 °F (36.2 °C)   TempSrc: Oral   Weight: 122 lb 12.8 oz (55.7 kg)   Height: 5' 2.01\" (1.575 m)     Estimated body mass index is 22.45 kg/m² as calculated from the following:    Height as of this encounter: 5' 2.01\" (1.575 m). Weight as of this encounter: 122 lb 12.8 oz (55.7 kg). Physical Exam  Constitutional:       Appearance: Normal appearance. Neurological:      Mental Status: She is alert and oriented to person, place, and time. Psychiatric:         Mood and Affect: Mood normal.         Behavior: Behavior normal.         ASSESSMENT/PLAN:     Diagnosis Orders   1. Type 2 diabetes mellitus with hyperglycemia, with long-term current use of insulin ([address]     2. Essential hypertension     3. Anxiety       Return in about 3 months (around 4/30/2020) for dm follow up. refuses  counseling currently, has 5 months of refills  Requested Prescriptions      No prescriptions requested or ordered in this encounter     An electronic signature was used to authenticate this note.     --Burgess Oscar DO on1/31/2020 at 9:09 AM

## 2020-01-31 NOTE — PROGRESS NOTES
HYPERTENSION visit     BP Readings from Last 3 Encounters:   01/31/20 111/78   01/02/20 116/72   11/07/19 131/79       LDL Cholesterol (mg/dL)   Date Value   03/31/2012 83     HDL (mg/dL)   Date Value   03/31/2012 46     BUN (mg/dL)   Date Value   11/07/2019 9     CREATININE (mg/dL)   Date Value   11/07/2019 0.55     Glucose (mg/dL)   Date Value   11/07/2019 298 (H)   04/04/2012 127 (H)              Have you changed or started any medications since your last visit including any over-the-counter medicines, vitamins, or herbal medicines? no   Have you stopped taking any of your medications? Is so, why? -  no  Are you having any side effects from any of your medications? - no  How often do you miss doses of your medication? rare      Have you seen any other physician or provider since your last visit?  no   Have you had any other diagnostic tests since your last visit?  no   Have you been seen in the emergency room and/or had an admission in a hospital since we last saw you?  no   Have you had your routine dental cleaning in the past 6 months?  no     Do you have an active MyChart account? If no, what is the barrier?   Yes    Patient Care Team:  Burgess Oscar DO as PCP - General (Family Medicine)  Burgess Oscar DO as PCP - [facility]    Medical History Review  Past Medical, Family, and Social History reviewed and does contribute to the patient presenting condition    Health Maintenance   Topic Date Due    Diabetic retinal exam  09/25/1980    HIV screen  09/25/1985    Cervical cancer screen  09/25/1991    Diabetic microalbuminuria test  03/31/2013    Lipid screen  03/31/2013    TSH testing  11/15/2019    Diabetic foot exam  11/20/2019    Hepatitis B vaccine (2 of 3 - Risk 3-dose series) 01/30/2020    A1C test (Diabetic or Prediabetic)  04/02/2020    Potassium monitoring  11/07/2020    Creatinine monitoring  11/07/2020    DTaP/Tdap/Td vaccine (2 - Td) 12/15/2025    Flu vaccine  Completed

## 2020-02-20 RX ORDER — GLUCOSAMINE HCL/CHONDROITIN SU 500-400 MG
CAPSULE ORAL
Qty: 200 STRIP | Refills: 5 | Status: SHIPPED | OUTPATIENT
Start: 2020-02-20 | End: 2020-07-10 | Stop reason: SDUPTHER

## 2020-02-20 RX ORDER — BLOOD-GLUCOSE METER
1 EACH MISCELLANEOUS DAILY
Qty: 1 KIT | Refills: 0 | Status: SHIPPED | OUTPATIENT
Start: 2020-02-20

## 2020-02-20 RX ORDER — ALOGLIPTIN 25 MG/1
25 TABLET, FILM COATED ORAL DAILY
Qty: 90 TABLET | Refills: 1 | Status: SHIPPED | OUTPATIENT
Start: 2020-02-20 | End: 2020-07-10 | Stop reason: SDUPTHER

## 2020-05-06 RX ORDER — MIRTAZAPINE 15 MG/1
15 TABLET, FILM COATED ORAL NIGHTLY
Qty: 30 TABLET | Refills: 3 | Status: SHIPPED | OUTPATIENT
Start: 2020-05-06 | End: 2020-07-10 | Stop reason: SDUPTHER

## 2020-05-06 NOTE — TELEPHONE ENCOUNTER
E-scribe request for pending medications. Please review and e-scribe if applicable. Last Visit Date:  1-31-20  Next Visit Date:  Visit date not found    Hemoglobin A1C (%)   Date Value   01/02/2020 11.8   06/06/2019 11.2   11/14/2018 10.4 (H)             ( goal A1C is < 7)   Microalb/Crt.  Ratio (mcg/mg creat)   Date Value   01/31/2020 CANNOT BE CALCULATED     LDL Cholesterol (mg/dL)   Date Value   01/31/2020 31       (goal LDL is <100)   AST (U/L)   Date Value   11/04/2019 18     ALT (U/L)   Date Value   11/04/2019 <5 (L)     BUN (mg/dL)   Date Value   11/07/2019 9     BP Readings from Last 3 Encounters:   01/31/20 111/78   01/02/20 116/72   11/07/19 131/79          (goal 120/80)        Patient Active Problem List:     Essential hypertension     Pure hyperglyceridemia     Type 2 diabetes mellitus with hyperglycemia, with long-term current use of insulin (HCC)     Anxiety     DVT (deep vein thrombosis) in pregnancy     Tobacco dependency     Acquired hypothyroidism     Hyperglycemia     Diabetic ketoacidosis without coma associated with type 2 diabetes mellitus ([address]     Diabetic peripheral neuropathy (HCC)     Hypocalcemia     Hypokalemia     Anemia, normocytic normochromic     Hypophosphatemia      ----Yaw Drake

## 2020-05-20 ENCOUNTER — TELEPHONE (OUTPATIENT)
Dept: FAMILY MEDICINE CLINIC | Age: 50
End: 2020-05-20

## 2020-05-20 NOTE — TELEPHONE ENCOUNTER
Call pt wanted to talk about her A1C, but did not get patient, left message to call us back on if she want to come in office to see a nurse or lab order to get A1C done.

## 2020-06-18 ENCOUNTER — TELEPHONE (OUTPATIENT)
Dept: FAMILY MEDICINE CLINIC | Age: 50
End: 2020-06-18

## 2020-07-10 ENCOUNTER — OFFICE VISIT (OUTPATIENT)
Dept: FAMILY MEDICINE CLINIC | Age: 50
End: 2020-07-10
Payer: MEDICARE

## 2020-07-10 VITALS
WEIGHT: 119 LBS | TEMPERATURE: 97 F | BODY MASS INDEX: 21.76 KG/M2 | OXYGEN SATURATION: 98 % | DIASTOLIC BLOOD PRESSURE: 71 MMHG | HEART RATE: 123 BPM | SYSTOLIC BLOOD PRESSURE: 92 MMHG

## 2020-07-10 PROBLEM — E11.618 DIABETIC FROZEN SHOULDER ASSOCIATED WITH TYPE 2 DIABETES MELLITUS (HCC): Status: ACTIVE | Noted: 2020-07-10

## 2020-07-10 PROBLEM — M75.00 DIABETIC FROZEN SHOULDER ASSOCIATED WITH TYPE 2 DIABETES MELLITUS (HCC): Status: ACTIVE | Noted: 2020-07-10

## 2020-07-10 LAB — HBA1C MFR BLD: 12.4 %

## 2020-07-10 PROCEDURE — 2022F DILAT RTA XM EVC RTNOPTHY: CPT | Performed by: FAMILY MEDICINE

## 2020-07-10 PROCEDURE — 83036 HEMOGLOBIN GLYCOSYLATED A1C: CPT | Performed by: FAMILY MEDICINE

## 2020-07-10 PROCEDURE — 99214 OFFICE O/P EST MOD 30 MIN: CPT | Performed by: FAMILY MEDICINE

## 2020-07-10 PROCEDURE — G8420 CALC BMI NORM PARAMETERS: HCPCS | Performed by: FAMILY MEDICINE

## 2020-07-10 PROCEDURE — G8427 DOCREV CUR MEDS BY ELIG CLIN: HCPCS | Performed by: FAMILY MEDICINE

## 2020-07-10 PROCEDURE — 3046F HEMOGLOBIN A1C LEVEL >9.0%: CPT | Performed by: FAMILY MEDICINE

## 2020-07-10 PROCEDURE — 4004F PT TOBACCO SCREEN RCVD TLK: CPT | Performed by: FAMILY MEDICINE

## 2020-07-10 RX ORDER — INSULIN GLARGINE 100 [IU]/ML
40 INJECTION, SOLUTION SUBCUTANEOUS NIGHTLY
Qty: 5 PEN | Refills: 5 | Status: SHIPPED | OUTPATIENT
Start: 2020-07-10 | End: 2021-01-11 | Stop reason: SDUPTHER

## 2020-07-10 RX ORDER — LEVOTHYROXINE SODIUM 0.03 MG/1
25 TABLET ORAL DAILY
Qty: 30 TABLET | Refills: 5 | Status: SHIPPED | OUTPATIENT
Start: 2020-07-10 | End: 2021-01-11 | Stop reason: SDUPTHER

## 2020-07-10 RX ORDER — GLIPIZIDE 10 MG/1
10 TABLET, FILM COATED, EXTENDED RELEASE ORAL DAILY
Qty: 30 TABLET | Refills: 5 | Status: SHIPPED | OUTPATIENT
Start: 2020-07-10 | End: 2021-01-11 | Stop reason: SDUPTHER

## 2020-07-10 RX ORDER — ALOGLIPTIN 25 MG/1
25 TABLET, FILM COATED ORAL DAILY
Qty: 90 TABLET | Refills: 1 | Status: SHIPPED | OUTPATIENT
Start: 2020-07-10 | End: 2020-11-24 | Stop reason: ALTCHOICE

## 2020-07-10 RX ORDER — ELECTROLYTES/DEXTROSE
1 SOLUTION, ORAL ORAL DAILY
Qty: 30 TABLET | Refills: 5 | Status: SHIPPED | OUTPATIENT
Start: 2020-07-10 | End: 2021-01-11 | Stop reason: SDUPTHER

## 2020-07-10 RX ORDER — LISINOPRIL 20 MG/1
20 TABLET ORAL DAILY
Qty: 30 TABLET | Refills: 5 | Status: SHIPPED | OUTPATIENT
Start: 2020-07-10 | End: 2021-01-11 | Stop reason: SDUPTHER

## 2020-07-10 RX ORDER — GLUCOSAMINE HCL/CHONDROITIN SU 500-400 MG
CAPSULE ORAL
Qty: 200 STRIP | Refills: 5 | Status: SHIPPED | OUTPATIENT
Start: 2020-07-10 | End: 2021-01-11 | Stop reason: SDUPTHER

## 2020-07-10 RX ORDER — ASPIRIN 81 MG/1
81 TABLET ORAL DAILY
Qty: 30 TABLET | Refills: 5 | Status: SHIPPED | OUTPATIENT
Start: 2020-07-10 | End: 2021-01-11 | Stop reason: SDUPTHER

## 2020-07-10 RX ORDER — ATORVASTATIN CALCIUM 40 MG/1
40 TABLET, FILM COATED ORAL DAILY
Qty: 30 TABLET | Refills: 5 | Status: SHIPPED | OUTPATIENT
Start: 2020-07-10 | End: 2021-01-11 | Stop reason: SDUPTHER

## 2020-07-10 RX ORDER — GABAPENTIN 600 MG/1
600 TABLET ORAL DAILY
Qty: 30 TABLET | Refills: 5 | Status: SHIPPED | OUTPATIENT
Start: 2020-07-10 | End: 2021-01-11 | Stop reason: SDUPTHER

## 2020-07-10 RX ORDER — MIRTAZAPINE 15 MG/1
15 TABLET, FILM COATED ORAL NIGHTLY
Qty: 30 TABLET | Refills: 3 | Status: SHIPPED | OUTPATIENT
Start: 2020-07-10 | End: 2020-10-30

## 2020-07-10 RX ORDER — METFORMIN HYDROCHLORIDE 500 MG/1
500 TABLET, EXTENDED RELEASE ORAL
Qty: 30 TABLET | Refills: 5 | Status: SHIPPED
Start: 2020-07-10 | End: 2020-07-23 | Stop reason: SDUPTHER

## 2020-07-10 RX ORDER — PEN NEEDLE, DIABETIC 31 G X1/4"
1 NEEDLE, DISPOSABLE MISCELLANEOUS 4 TIMES DAILY
Qty: 200 EACH | Refills: 5 | Status: SHIPPED | OUTPATIENT
Start: 2020-07-10 | End: 2021-01-11 | Stop reason: ALTCHOICE

## 2020-07-10 NOTE — PROGRESS NOTES
(NEURONTIN) 600 MG tablet Take 1 tablet by mouth daily for 30 days. Liz Hutchinson, DO      No Known Allergies  Social History     Tobacco Use    Smoking status: Current Some Day Smoker     Packs/day: 0.00     Years: 35.00     Pack years: 0.00     Types: Cigarettes    Smokeless tobacco: Never Used    Tobacco comment: pt states 1 cigarettes per day   Substance Use Topics    Alcohol use: No        Vitals:    07/10/20 1103 07/10/20 1106   BP: (!) 137/111 92/71   Site: Right Upper Arm    Position: Sitting    Cuff Size: Medium Adult    Pulse: 123    Temp: 97 °F (36.1 °C)    SpO2: 98%    Weight: 119 lb (54 kg)      Estimated body mass index is 21.76 kg/m² as calculated from the following:    Height as of 1/31/20: 5' 2.01\" (1.575 m). Weight as of this encounter: 119 lb (54 kg). Physical Exam    ASSESSMENT/PLAN:     Diagnosis Orders   1. Type 2 diabetes mellitus with hyperglycemia, with long-term current use of insulin (HCC)  alogliptin (NESINA) 25 MG TABS tablet    blood glucose monitor strips    SITagliptin (JANUVIA) 50 MG tablet    metFORMIN (GLUCOPHAGE-XR) 500 MG extended release tablet    glipiZIDE (GLUCOTROL XL) 10 MG extended release tablet    Insulin Pen Needle ([employer] PEN NEEDLES) 31G X 6 MM MISC   2. Moderate episode of recurrent major depressive disorder (HCC)  mirtazapine (REMERON) 15 MG tablet   3. Essential hypertension  lisinopril (PRINIVIL;ZESTRIL) 20 MG tablet    aspirin 81 MG EC tablet   4. Acquired hypothyroidism  levothyroxine (SYNTHROID) 25 MCG tablet   5. Pure hyperglyceridemia  atorvastatin (LIPITOR) 40 MG tablet   6. Diabetic polyneuropathy associated with type 2 diabetes mellitus (HCC)  gabapentin (NEURONTIN) 600 MG tablet     No follow-ups on file.   Requested Prescriptions     Pending Prescriptions Disp Refills    alogliptin (NESINA) 25 MG TABS tablet 90 tablet 1     Sig: Take 1 tablet by mouth daily    mirtazapine (REMERON) 15 MG tablet 30 tablet 3     Sig: Take 1 tablet by mouth nightly    blood glucose monitor strips 200 strip 5     Sig: Test 4 times a day & as needed for symptoms of irregular blood glucose.  SITagliptin (JANUVIA) 50 MG tablet 30 tablet 5     Sig: Take 1 tablet by mouth daily    metFORMIN (GLUCOPHAGE-XR) 500 MG extended release tablet 30 tablet 5     Sig: Take 1 tablet by mouth daily (with breakfast)    lisinopril (PRINIVIL;ZESTRIL) 20 MG tablet 30 tablet 5     Sig: Take 1 tablet by mouth daily    levothyroxine (SYNTHROID) 25 MCG tablet 30 tablet 5     Sig: Take 1 tablet by mouth daily    glipiZIDE (GLUCOTROL XL) 10 MG extended release tablet 30 tablet 5     Sig: Take 1 tablet by mouth daily    atorvastatin (LIPITOR) 40 MG tablet 30 tablet 5     Sig: Take 1 tablet by mouth daily    aspirin 81 MG EC tablet 30 tablet 5     Sig: Take 1 tablet by mouth daily    Multiple Vitamins-Minerals (MULTIVITAMIN ADULT) TABS 30 tablet 5     Sig: Take 1 tablet by mouth daily    insulin glargine (LANTUS SOLOSTAR) 100 UNIT/ML injection pen 5 pen 5     Sig: Inject 40 Units into the skin nightly    Insulin Pen Needle ([employer] PEN NEEDLES) 31G X 6 MM MISC 200 each 5     Si box by Does not apply route 4 times daily    gabapentin (NEURONTIN) 600 MG tablet 30 tablet 5     Sig: Take 1 tablet by mouth daily for 30 days. An electronic signature was used to authenticate this note.     --Nic Conti DO on7/10/2020 at 11:25 AM

## 2020-07-10 NOTE — PROGRESS NOTES
7/10/2020     Sumit Jacobo (:  1970) is a 52 y.o. female, here for evaluation of the following medical concerns:  Chief Complaint   Patient presents with    6 Month Follow-Up     here for follow up, needs A1C    Diabetes     due for eye/foot exam, needs med refills    Health Maintenance     due for pap and hep b     HPI  Patient states her left shoulder and upper arm are painful with movement. She is unable to lift her left arm secondary to pain.  is good. No numbness or tingling. Mood is improved. Review of Systems   Constitutional: Negative for fatigue and fever. Respiratory: Negative for cough and shortness of breath. Musculoskeletal: Positive for arthralgias. Neurological: Positive for weakness (left arm). Prior to Visit Medications    Medication Sig Taking? Authorizing Provider   alogliptin (NESINA) 25 MG TABS tablet Take 1 tablet by mouth daily Yes Mani Vizcarra, DO   mirtazapine (REMERON) 15 MG tablet Take 1 tablet by mouth nightly Yes Mani Vizcarra, DO   blood glucose monitor strips Test 4 times a day & as needed for symptoms of irregular blood glucose.  Yes Mani Vizcarra, DO   SITagliptin (JANUVIA) 50 MG tablet Take 1 tablet by mouth daily Yes Mani Vizcarra, DO   lisinopril (PRINIVIL;ZESTRIL) 20 MG tablet Take 1 tablet by mouth daily Yes Mani Vizcarra, DO   levothyroxine (SYNTHROID) 25 MCG tablet Take 1 tablet by mouth daily Yes Mani Vizcarra, DO   glipiZIDE (GLUCOTROL XL) 10 MG extended release tablet Take 1 tablet by mouth daily Yes Mani Vizcarra, DO   atorvastatin (LIPITOR) 40 MG tablet Take 1 tablet by mouth daily Yes Mani Vizcarra, DO   aspirin 81 MG EC tablet Take 1 tablet by mouth daily Yes Mani Vizcarra, DO   Multiple Vitamins-Minerals (MULTIVITAMIN ADULT) TABS Take 1 tablet by mouth daily Yes Mani Vizcarra, DO   insulin glargine (LANTUS SOLOSTAR) 100 UNIT/ML injection pen Inject 40 Units into the skin nightly Yes Mani Vizcarra, DO   Insulin Pen Needle ([employer] PEN NEEDLES) 31G X 6 MM MISC 1 box by Does not apply route 4 times daily Yes Jeanine Cazares DO   gabapentin (NEURONTIN) 600 MG tablet Take 1 tablet by mouth daily for 30 days. Yes Jeanine Cazares DO   metFORMIN (GLUCOPHAGE) 500 MG tablet Take 2 tablets by mouth 2 times daily (with meals)  Patient taking differently: Take 1,000 mg by mouth 2 times daily (with meals) Taking 1 tablets twice daily Yes Jeanine Cazares DO   Blood Glucose Monitoring Suppl (ONE TOUCH ULTRA 2) w/Device KIT 1 kit by Does not apply route daily Yes Jeanine Cazares DO   meloxicam (MOBIC) 15 MG tablet Take 1 tablet by mouth daily  Helena Trivedi PA-C   insulin lispro, 1 Unit Dial, (HUMALOG KWIKPEN) 100 UNIT/ML SOPN Inject 10 Units into the skin 3 times daily (before meals)  Jeanine Cazares DO   insulin aspart (NOVOLOG FLEXPEN) 100 UNIT/ML injection pen Inject 10 Units into the skin 3 times daily (before meals)  Jeanine Cazares DO      No Known Allergies  Social History     Tobacco Use    Smoking status: Current Some Day Smoker     Packs/day: 0.00     Years: 35.00     Pack years: 0.00     Types: Cigarettes    Smokeless tobacco: Never Used    Tobacco comment: pt states 1 cigarettes per day   Substance Use Topics    Alcohol use: No        Vitals:    07/10/20 1103 07/10/20 1106   BP: (!) 137/111 92/71   Site: Right Upper Arm    Position: Sitting    Cuff Size: Medium Adult    Pulse: 123    Temp: 97 °F (36.1 °C)    SpO2: 98%    Weight: 119 lb (54 kg)      Estimated body mass index is 21.76 kg/m² as calculated from the following:    Height as of 1/31/20: 5' 2.01\" (1.575 m). Weight as of this encounter: 119 lb (54 kg). Physical Exam  Constitutional:       Appearance: Normal appearance. Cardiovascular:      Rate and Rhythm: Normal rate and regular rhythm. Pulmonary:      Effort: Pulmonary effort is normal.      Breath sounds: Normal breath sounds.    Musculoskeletal:      Left shoulder: She exhibits decreased range of motion, pain and decreased strength. She exhibits no bony tenderness and no swelling. Comments: Extension left arm approx 45 degrees, abduction 45 degrees, limited internal and external rotation   Neurological:      Mental Status: She is alert. ASSESSMENT/PLAN:     Diagnosis Orders   1. Type 2 diabetes mellitus with hyperglycemia, with long-term current use of insulin ([facility])   DIABETES FOOT EXAM    POCT glycosylated hemoglobin (Hb A1C)    alogliptin (NESINA) 25 MG TABS tablet    blood glucose monitor strips    SITagliptin (JANUVIA) 50 MG tablet    glipiZIDE (GLUCOTROL XL) 10 MG extended release tablet    Insulin Pen Needle ([employer] PEN NEEDLES) 31G X 6 MM MISC    DISCONTINUED: metFORMIN (GLUCOPHAGE-XR) 500 MG extended release tablet   2. Moderate episode of recurrent major depressive disorder (HCC)  mirtazapine (REMERON) 15 MG tablet   3. Essential hypertension  lisinopril (PRINIVIL;ZESTRIL) 20 MG tablet    aspirin 81 MG EC tablet   4. Acquired hypothyroidism  TSH without Reflex    levothyroxine (SYNTHROID) 25 MCG tablet   5. Pure hyperglyceridemia  atorvastatin (LIPITOR) 40 MG tablet   6. Diabetic polyneuropathy associated with type 2 diabetes mellitus (HCC)  gabapentin (NEURONTIN) 600 MG tablet   7. 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 Requested Prescriptions     Signed Prescriptions Disp Refills    alogliptin (NESINA) 25 MG TABS tablet 90 tablet 1     Sig: Take 1 tablet by mouth daily    mirtazapine (REMERON) 15 MG tablet 30 tablet 3     Sig: Take 1 tablet by mouth nightly    blood glucose monitor strips 200 strip 5     Sig: Test 4 times a day & as needed for symptoms of irregular blood glucose.     SITagliptin (JANUVIA) 50 MG tablet 30 tablet 5     Sig: Take 1 tablet by mouth daily    lisinopril (PRINIVIL;ZESTRIL) 20 MG tablet 30 tablet 5     Sig: Take 1 tablet by mouth daily    levothyroxine (SYNTHROID) 25 MCG tablet 30 tablet 5     Sig: Take 1 tablet by mouth daily    glipiZIDE (GLUCOTROL XL) 10 MG extended release tablet 30 tablet 5     Sig: Take 1 tablet by mouth daily    atorvastatin (LIPITOR) 40 MG tablet 30 tablet 5     Sig: Take 1 tablet by mouth daily    aspirin 81 MG EC tablet 30 tablet 5     Sig: Take 1 tablet by mouth daily    Multiple Vitamins-Minerals (MULTIVITAMIN ADULT) TABS 30 tablet 5     Sig: Take 1 tablet by mouth daily    insulin glargine (LANTUS SOLOSTAR) 100 UNIT/ML injection pen 5 pen 5     Sig: Inject 40 Units into the skin nightly    Insulin Pen Needle ([employer]OG[employer] PEN NEEDLES) 31G X 6 MM MISC 200 each 5     Si box by Does not apply route 4 times daily    gabapentin (NEURONTIN) 600 MG tablet 30 tablet 5     Sig: Take 1 tablet by mouth daily for 30 days.  metFORMIN (GLUCOPHAGE) 500 MG tablet 360 tablet 1     Sig: Take 2 tablets by mouth 2 times daily (with meals)     Patient taking differently: Take 1,000 mg by mouth 2 times daily (with meals) Taking 1 tablets twice daily     An electronic signature was used to authenticate this note.     --Danika Bertrand DO  at 10:10 AM

## 2020-07-10 NOTE — PROGRESS NOTES
Visit Information    Have you changed or started any medications since your last visit including any over-the-counter medicines, vitamins, or herbal medicines? no   Have you stopped taking any of your medications? Is so, why? -  no  Are you having any side effects from any of your medications? - no    Have you seen any other physician or provider since your last visit?  no   Have you had any other diagnostic tests since your last visit?  no   Have you been seen in the emergency room and/or had an admission in a hospital since we last saw you?  no   Have you had your routine dental cleaning in the past 6 months?  no     Do you have an active MyChart account? If no, what is the barrier?   Yes    Patient Care Team:  Jackelyn Fernandez DO as PCP - General (Family Medicine)  Jackelyn Fernandez DO as PCP - [facility]    Medical History Review  Past Medical, Family, and Social History reviewed and does contribute to the patient presenting condition    Health Maintenance   Topic Date Due    Diabetic retinal exam  09/25/1980    HIV screen  09/25/1985    Hepatitis B vaccine (2 of 3 - CpG risk 3-dose series) 09/25/1989    Cervical cancer screen  09/25/1991    TSH testing  11/15/2019    Diabetic foot exam  11/20/2019    A1C test (Diabetic or Prediabetic)  04/02/2020    Flu vaccine (1) 09/01/2020    Potassium monitoring  11/07/2020    Creatinine monitoring  11/07/2020    Diabetic microalbuminuria test  01/31/2021    Lipid screen  01/31/2021    DTaP/Tdap/Td vaccine (2 - Td) 12/15/2025    Pneumococcal 0-64 years Vaccine  Completed    Hepatitis A vaccine  Aged Out    Hib vaccine  Aged Out    Meningococcal (ACWY) vaccine  Aged Out

## 2020-07-13 ENCOUNTER — HOSPITAL ENCOUNTER (OUTPATIENT)
Age: 50
Discharge: HOME | End: 2020-07-13
Payer: MEDICARE

## 2020-07-13 ENCOUNTER — HOSPITAL ENCOUNTER (OUTPATIENT)
Dept: GENERAL RADIOLOGY | Age: 50
Discharge: HOME | End: 2020-07-15
Payer: MEDICARE

## 2020-07-13 ENCOUNTER — HOSPITAL ENCOUNTER (OUTPATIENT)
Age: 50
Discharge: HOME | End: 2020-07-15
Payer: MEDICARE

## 2020-07-13 LAB — TSH SERPL DL<=0.05 MIU/L-ACNC: 2.96 MIU/L (ref 0.3–5)

## 2020-07-13 PROCEDURE — 84443 ASSAY THYROID STIM HORMONE: CPT

## 2020-07-13 PROCEDURE — 73030 X-RAY EXAM OF SHOULDER: CPT

## 2020-07-13 PROCEDURE — 36415 COLL VENOUS BLD VENIPUNCTURE: CPT

## 2020-07-17 ENCOUNTER — HOSPITAL ENCOUNTER (OUTPATIENT)
Dept: MRI IMAGING | Facility: CLINIC | Age: 50
Discharge: HOME | End: 2020-07-19
Payer: MEDICARE

## 2020-07-17 PROCEDURE — 73221 MRI JOINT UPR EXTREM W/O DYE: CPT

## 2020-07-20 ASSESSMENT — ENCOUNTER SYMPTOMS
SHORTNESS OF BREATH: 0
COUGH: 0

## 2020-07-22 ENCOUNTER — TELEPHONE (OUTPATIENT)
Dept: FAMILY MEDICINE CLINIC | Age: 50
End: 2020-07-22

## 2020-07-22 RX ORDER — INSULIN ASPART 100 [IU]/ML
10 INJECTION, SOLUTION INTRAVENOUS; SUBCUTANEOUS
Qty: 5 PEN | Refills: 3 | Status: SHIPPED | OUTPATIENT
Start: 2020-07-22 | End: 2021-01-11 | Stop reason: SDUPTHER

## 2020-07-23 ENCOUNTER — OFFICE VISIT (OUTPATIENT)
Dept: FAMILY MEDICINE CLINIC | Age: 50
End: 2020-07-23
Payer: MEDICARE

## 2020-07-23 VITALS
HEART RATE: 82 BPM | BODY MASS INDEX: 21.75 KG/M2 | WEIGHT: 118.2 LBS | DIASTOLIC BLOOD PRESSURE: 82 MMHG | SYSTOLIC BLOOD PRESSURE: 122 MMHG | HEIGHT: 62 IN

## 2020-07-23 PROBLEM — M75.102 NONTRAUMATIC TEAR OF LEFT SUPRASPINATUS TENDON: Status: ACTIVE | Noted: 2020-07-23

## 2020-07-23 PROCEDURE — 99213 OFFICE O/P EST LOW 20 MIN: CPT | Performed by: FAMILY MEDICINE

## 2020-07-23 PROCEDURE — 4004F PT TOBACCO SCREEN RCVD TLK: CPT | Performed by: FAMILY MEDICINE

## 2020-07-23 PROCEDURE — G8420 CALC BMI NORM PARAMETERS: HCPCS | Performed by: FAMILY MEDICINE

## 2020-07-23 PROCEDURE — 2022F DILAT RTA XM EVC RTNOPTHY: CPT | Performed by: FAMILY MEDICINE

## 2020-07-23 PROCEDURE — G8427 DOCREV CUR MEDS BY ELIG CLIN: HCPCS | Performed by: FAMILY MEDICINE

## 2020-07-23 PROCEDURE — 3046F HEMOGLOBIN A1C LEVEL >9.0%: CPT | Performed by: FAMILY MEDICINE

## 2020-07-23 PROCEDURE — 99211 OFF/OP EST MAY X REQ PHY/QHP: CPT | Performed by: FAMILY MEDICINE

## 2020-07-23 ASSESSMENT — ENCOUNTER SYMPTOMS
COUGH: 0
SHORTNESS OF BREATH: 0

## 2020-07-23 NOTE — LETTER
[address]  [address] [facility] [address]  Phone: [phone]  Fax: 2382 [facility], DO        2020      Sanjay Zamora,  70 has diabetes and is able to drive.       Sincerely,        Danika Bertrand, DO

## 2020-07-23 NOTE — PROGRESS NOTES
Visit Information    Have you changed or started any medications since your last visit including any over-the-counter medicines, vitamins, or herbal medicines? no   Have you stopped taking any of your medications? Is so, why? -  no  Are you having any side effects from any of your medications? - no    Have you seen any other physician or provider since your last visit?  no   Have you had any other diagnostic tests since your last visit? yes - labs, XR, MRI    Have you been seen in the emergency room and/or had an admission in a hospital since we last saw you?  no   Have you had your routine dental cleaning in the past 6 months?  no     Do you have an active MyChart account? If no, what is the barrier?   Yes    Patient Care Team:  Ramonita Muller DO as PCP - General (Family Medicine)  Ramonita Muller DO as PCP - [facility] Provider    Medical History Review  Past Medical, Family, and Social History reviewed and does contribute to the patient presenting condition    Health Maintenance   Topic Date Due    Diabetic retinal exam  09/25/1980    HIV screen  09/25/1985    Cervical cancer screen  09/25/1991    Hepatitis B vaccine (2 of 3 - Risk 3-dose series) 01/30/2020    Flu vaccine (1) 09/01/2020    A1C test (Diabetic or Prediabetic)  10/10/2020    Potassium monitoring  11/07/2020    Creatinine monitoring  11/07/2020    Diabetic microalbuminuria test  01/31/2021    Lipid screen  01/31/2021    Diabetic foot exam  07/10/2021    TSH testing  07/13/2021    DTaP/Tdap/Td vaccine (2 - Td) 12/15/2025    Pneumococcal 0-64 years Vaccine  Completed    Hepatitis A vaccine  Aged Out    Hib vaccine  Aged Out    Meningococcal (ACWY) vaccine  Aged Out
Return if symptoms worsen or fail to improve, for also needs pap. recheck approx 3 weeks for pap and follow up after orthopedic evaluation. Requested Prescriptions      No prescriptions requested or ordered in this encounter     An electronic signature was used to authenticate this note.     --Kylah Polk DO on7/23/2020 at 10:18 AM

## 2020-07-24 ENCOUNTER — TELEPHONE (OUTPATIENT)
Dept: FAMILY MEDICINE CLINIC | Age: 50
End: 2020-07-24

## 2020-07-24 NOTE — TELEPHONE ENCOUNTER
PA request for Novolog.     PA processed and submitted to pt insurance, waiting for response in regards to coverage

## 2020-08-02 RX ORDER — INSULIN LISPRO 100 [IU]/ML
10 INJECTION, SOLUTION INTRAVENOUS; SUBCUTANEOUS
Qty: 5 PEN | Refills: 3 | Status: SHIPPED | OUTPATIENT
Start: 2020-08-02 | End: 2021-01-11 | Stop reason: SDUPTHER

## 2020-08-03 ENCOUNTER — OFFICE VISIT (OUTPATIENT)
Dept: ORTHOPEDIC SURGERY | Age: 50
End: 2020-08-03
Payer: MEDICARE

## 2020-08-03 VITALS — HEIGHT: 62 IN | BODY MASS INDEX: 21.71 KG/M2 | WEIGHT: 118 LBS

## 2020-08-03 PROCEDURE — 99243 OFF/OP CNSLTJ NEW/EST LOW 30: CPT | Performed by: PHYSICIAN ASSISTANT

## 2020-08-03 PROCEDURE — 2022F DILAT RTA XM EVC RTNOPTHY: CPT | Performed by: PHYSICIAN ASSISTANT

## 2020-08-03 PROCEDURE — 20610 DRAIN/INJ JOINT/BURSA W/O US: CPT | Performed by: PHYSICIAN ASSISTANT

## 2020-08-03 PROCEDURE — G8420 CALC BMI NORM PARAMETERS: HCPCS | Performed by: PHYSICIAN ASSISTANT

## 2020-08-03 PROCEDURE — G8427 DOCREV CUR MEDS BY ELIG CLIN: HCPCS | Performed by: PHYSICIAN ASSISTANT

## 2020-08-03 RX ORDER — METHYLPREDNISOLONE ACETATE 80 MG/ML
80 INJECTION, SUSPENSION INTRA-ARTICULAR; INTRALESIONAL; INTRAMUSCULAR; SOFT TISSUE ONCE
Status: COMPLETED | OUTPATIENT
Start: 2020-08-03 | End: 2020-08-04

## 2020-08-03 RX ORDER — MELOXICAM 15 MG/1
15 TABLET ORAL DAILY
Qty: 30 TABLET | Refills: 3 | Status: SHIPPED | OUTPATIENT
Start: 2020-08-03 | End: 2021-04-13

## 2020-08-03 RX ORDER — BUPIVACAINE HYDROCHLORIDE 2.5 MG/ML
2 INJECTION, SOLUTION INFILTRATION; PERINEURAL ONCE
Status: COMPLETED | OUTPATIENT
Start: 2020-08-03 | End: 2020-08-04

## 2020-08-03 ASSESSMENT — ENCOUNTER SYMPTOMS
COLOR CHANGE: 0
VOMITING: 0
SHORTNESS OF BREATH: 0
COUGH: 0

## 2020-08-03 NOTE — PROGRESS NOTES
History:    Past Surgical History:   Procedure Laterality Date    TUBAL LIGATION         Current Medications:   Current Outpatient Medications   Medication Sig Dispense Refill    meloxicam (MOBIC) 15 MG tablet Take 1 tablet by mouth daily 30 tablet 3    insulin lispro, 1 Unit Dial, (HUMALOG KWIKPEN) 100 UNIT/ML SOPN Inject 10 Units into the skin 3 times daily (before meals) 5 pen 3    insulin aspart (NOVOLOG FLEXPEN) 100 UNIT/ML injection pen Inject 10 Units into the skin 3 times daily (before meals) 5 pen 3    alogliptin (NESINA) 25 MG TABS tablet Take 1 tablet by mouth daily 90 tablet 1    mirtazapine (REMERON) 15 MG tablet Take 1 tablet by mouth nightly 30 tablet 3    blood glucose monitor strips Test 4 times a day & as needed for symptoms of irregular blood glucose. 200 strip 5    SITagliptin (JANUVIA) 50 MG tablet Take 1 tablet by mouth daily 30 tablet 5    lisinopril (PRINIVIL;ZESTRIL) 20 MG tablet Take 1 tablet by mouth daily 30 tablet 5    levothyroxine (SYNTHROID) 25 MCG tablet Take 1 tablet by mouth daily 30 tablet 5    glipiZIDE (GLUCOTROL XL) 10 MG extended release tablet Take 1 tablet by mouth daily 30 tablet 5    atorvastatin (LIPITOR) 40 MG tablet Take 1 tablet by mouth daily 30 tablet 5    aspirin 81 MG EC tablet Take 1 tablet by mouth daily 30 tablet 5    Multiple Vitamins-Minerals (MULTIVITAMIN ADULT) TABS Take 1 tablet by mouth daily 30 tablet 5    insulin glargine (LANTUS SOLOSTAR) 100 UNIT/ML injection pen Inject 40 Units into the skin nightly 5 pen 5    Insulin Pen Needle ([employer] PEN NEEDLES) 31G X 6 MM MISC 1 box by Does not apply route 4 times daily 200 each 5    gabapentin (NEURONTIN) 600 MG tablet Take 1 tablet by mouth daily for 30 days.  30 tablet 5    metFORMIN (GLUCOPHAGE) 500 MG tablet Take 2 tablets by mouth 2 times daily (with meals) (Patient taking differently: Take 1,000 mg by mouth 2 times daily (with meals) Taking 1 tablets twice daily) 360 tablet 1    Blood Narrative    Not on file       Family History:  No family history on file. REVIEW OF SYSTEMS:  Review of Systems   Constitutional: Negative for activity change and fever. HENT: Negative for sneezing. Respiratory: Negative for cough and shortness of breath. Cardiovascular: Negative for chest pain. Gastrointestinal: Negative for vomiting. Musculoskeletal: Positive for arthralgias (left shoulder). Negative for joint swelling and myalgias. Skin: Negative for color change. Neurological: Negative for weakness and numbness. Psychiatric/Behavioral: Negative for sleep disturbance. PHYSICAL EXAM:  Ht 5' 2\" (1.575 m)   Wt 118 lb (53.5 kg)   BMI 21.58 kg/m²  Body mass index is 21.58 kg/m². Physical Exam  Gen: alert and oriented  Psych:  Appropriate affect; Appropriate knowledge base; Appropriate mood; No hallucinations; Head: normocephalic, atraumatic   Chest: symmetric chest excursion  Pelvis: stable with ambulation  Ortho Exam  Extremity: Evaluation of the Left shoulder reveals no significant shoulder girdle muscle atrophy, erythema, warmth, skin lesions, signs of infection. Tenderness to the anterolateral aspect of the shoulder is appreciated. No palpable deformity is noted. Active flexion is 60 degrees. Active functional reach is  Left greater trochanter  . Active abduction is 60 degrees. Rotator cuff muscle strength testing shows weakness with internal and external rotation strength testing. Patient is very still with internal and external rotation testing passively. Sensation to C5, C6, C7, C8, T1 dermatomes appears to be intact and symmetric bilaterally. Patient has full range of motion of the cervical spine and elbow without discomfort noted. Radiology:     Xr Shoulder Left (min 2 Views)    Result Date: 7/13/2020  EXAMINATION: THREE XRAY VIEWS OF THE LEFT SHOULDER 7/13/2020 11:56 am COMPARISON: None.  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 Degenerative changes of the acromioclavicular joint. No acute osseous abnormality. Mri Shoulder Left Wo Contrast    Result Date: 7/17/2020  EXAMINATION: MRI OF THE LEFT SHOULDER WITHOUT CONTRAST   7/17/2020 8:17 am TECHNIQUE: Multiplanar multisequence MRI of the left shoulder was performed without the administration of intravenous contrast. COMPARISON: Left shoulder plain radiographs from 07/13/2020. 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 FINDINGS: ROTATOR CUFF: Small amount of fluid in the subacromial subdeltoid bursa. Less than 50% partial-thickness articular surface and interstitial tearing of mid supraspinatus along the footplate in between critical zone and footplate on image 21-22, series 5. Shallow partial-thickness articular-surface tearing of footplate at anterior supraspinatus on image 14, series 7. Moderate underlying supraspinatus tendinopathy. Mild underlying infraspinatus tendinopathy. Mild subscapularis tendinopathy. Teres minor muscle/tendon appears grossly intact without evidence of tearing. No significant atrophy or fatty degeneration of the visualized rotator cuff musculature. BICEPS TENDON: Long head of the biceps tendon properly located within the bicipital groove and seen extending to the biceps labral anchor.  LABRUM: Degenerative tearing of the superior labrum. Mild underlying labral degeneration. GLENOHUMERAL JOINT: Glenohumeral articular cartilage appears intact. Mild T1 thickening of the inferior glenohumeral ligament. No sizable glenohumeral joint effusion. AC JOINT AND ACROMIOCLAVICULAR ARCH: Left AC and glenohumeral joints grossly unremarkable. Type 2 acromion. BONE MARROW: Bone marrow signal intensity within the visualized osseous structures is within normal limits. No acute fracture or dislocation involving the osseous components of the shoulder. OUTLET SPACES: Suprascapular notch and quadrilateral space grossly unremarkable in appearance. No left axillary lymphadenopathy. 1. Less than 50% partial-thickness articular-surface and interstitial tearing of mid supraspinatus along the footplate and between critical zone and footplate respectively. Shallow partial-thickness articular surface tearing along the footplate of anterior supraspinatus. Moderate underlying supraspinatus tendinopathy. 2. Mild infraspinatus and subscapularis tendinopathy. 3. Degenerative tearing of the superior labrum. Mild underlying labral degeneration. 4. Intermediate T1 signal thickening of the inferior glenohumeral ligament which can be seen in the clinical setting of adhesive capsulitis. Procedure:     SHOULDER INJECTION PROCEDURE NOTE:  The patient was identified. The left shoulder was confirmed with the patient. After a sterile prep with Betadine the shoulder  was injected using a posterior approach to the subacromial space with a mixture of 2 mL of 0.25% Marcaine and 80 mg of Depo-Medrol. Patient tolerated the procedure well without post injection complications. I instructed the patient to call our office immediately if they have any swelling or increased pain at the injection site. ASSESSMENT:     1. 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 Rotator cuff tendonitis, left    3.  Nontraumatic tear of left supraspinatus tendon         PLAN:     Today in office we discussed etiology and natural history of left shoulder adhesive capsulitis. The treatment options may include activity modification, oral anti-inflammatories, injections, advanced imaging, physical therapy and/or surgical intervention. I personally discussed the risk vs benefit with the patient when it comes to a corticosteroid injection for the left shoulder. I made her aware that the injection can lead to an increase in blood sugar levels for a few days after the injection and that she would need to closely monitor her sugar levels after the injection. The patient would like to proceed with left shoulder corticosteroid injection, physical therapy for left shoulder, activity modification, and Mobic 15 mg daily. The patient and her daughter were instructed to closely monitor her blood glucose levels over the next few days and adequately adjust her medications as needed to combat the probable high blood sugar levels. Patient and her daughter voiced their understanding and stated that they will closely monitor her blood sugar levels. The patient will follow up in 7 weeks. We discussed that the patient should call us with any concerns or questions. Return in about 7 weeks (around 9/21/2020).     Orders Placed This Encounter   Medications    meloxicam (MOBIC) 15 MG tablet     Sig: Take 1 tablet by mouth daily     Dispense:  30 tablet     Refill:  3    methylPREDNISolone acetate (DEPO-MEDROL) injection 80 mg    bupivacaine (MARCAINE) 0.25 % injection 5 mg       Orders Placed This Encounter   Procedures   [address] [facility] Physical [facility]     Referral Priority:   Routine     Referral Type:   Eval and Treat     Referral Reason:   Specialty Services Required     Requested Specialty:   Physical Therapy     Number of Visits Requested:   1    NC ARTHROCENTESIS ASPIR&/INJ MAJOR JT/BURSA W/O US       This note is created with the assistance of dhaval speech recognition program.  While intending to generate a document that actually reflects the content of the visit, the document can still have some errors including those of syntax and sound a like substitutions which may escape proof reading.  In such instances, actual meaning can be extrapolated by contextual diversion      Electronically signed by Abhijit Martin PA-C 8/3/2020 at 1:32 PM

## 2020-08-04 RX ADMIN — METHYLPREDNISOLONE ACETATE 80 MG: 80 INJECTION, SUSPENSION INTRA-ARTICULAR; INTRALESIONAL; INTRAMUSCULAR; SOFT TISSUE at 08:09

## 2020-08-04 RX ADMIN — BUPIVACAINE HYDROCHLORIDE 5 MG: 2.5 INJECTION, SOLUTION INFILTRATION; PERINEURAL at 08:09

## 2020-08-10 ENCOUNTER — OFFICE VISIT (OUTPATIENT)
Dept: FAMILY MEDICINE CLINIC | Age: 50
End: 2020-08-10
Payer: MEDICARE

## 2020-08-10 ENCOUNTER — HOSPITAL ENCOUNTER (OUTPATIENT)
Age: 50
Setting detail: SPECIMEN
Discharge: HOME | End: 2020-08-10
Payer: MEDICARE

## 2020-08-10 VITALS
DIASTOLIC BLOOD PRESSURE: 88 MMHG | SYSTOLIC BLOOD PRESSURE: 139 MMHG | HEART RATE: 77 BPM | WEIGHT: 124.8 LBS | HEIGHT: 62 IN | BODY MASS INDEX: 22.97 KG/M2 | TEMPERATURE: 97.2 F

## 2020-08-10 PROCEDURE — 4004F PT TOBACCO SCREEN RCVD TLK: CPT | Performed by: FAMILY MEDICINE

## 2020-08-10 PROCEDURE — 2022F DILAT RTA XM EVC RTNOPTHY: CPT | Performed by: FAMILY MEDICINE

## 2020-08-10 PROCEDURE — G8420 CALC BMI NORM PARAMETERS: HCPCS | Performed by: FAMILY MEDICINE

## 2020-08-10 PROCEDURE — 99213 OFFICE O/P EST LOW 20 MIN: CPT | Performed by: FAMILY MEDICINE

## 2020-08-10 PROCEDURE — 3046F HEMOGLOBIN A1C LEVEL >9.0%: CPT | Performed by: FAMILY MEDICINE

## 2020-08-10 PROCEDURE — G8428 CUR MEDS NOT DOCUMENT: HCPCS | Performed by: FAMILY MEDICINE

## 2020-08-10 ASSESSMENT — ENCOUNTER SYMPTOMS
SHORTNESS OF BREATH: 0
COUGH: 0
ABDOMINAL PAIN: 0

## 2020-08-10 NOTE — PROGRESS NOTES
Diabetic visit information    BP Readings from Last 3 Encounters:   07/23/20 122/82   07/10/20 92/71   01/31/20 111/78       Hemoglobin A1C (%)   Date Value   07/10/2020 12.4   01/02/2020 11.8   06/06/2019 11.2     Microalb/Crt. Ratio (mcg/mg creat)   Date Value   01/31/2020 CANNOT BE CALCULATED     LDL Cholesterol (mg/dL)   Date Value   01/31/2020 31               Have you changed or started any medications since your last visit including any over-the-counter medicines, vitamins, or herbal medicines? no   Have you stopped taking any of your medications? Is so, why? -  no  Are you having any side effects from any of your medications? - no    Have you seen any other physician or provider since your last visit?  no   Have you had any other diagnostic tests since your last visit?  no   Have you been seen in the emergency room and/or had an admission in a hospital since we last saw you?  no     Have you had your annual diabetic retinal (eye) exam? No   (ensure copy of exam is in the chart)    Have you had your routine dental cleaning in the past 6 months? no    Do you have an active [employer] account? If not, what are your barriers? Yes    Patient Care Team:  Najma Sprague DO as PCP - General (Family Medicine)  Najma Sprague DO as PCP - [facility] Provider    Medical history Review  Past Medical, Family, and Social History reviewed and does contribute to the patient presenting condition.     Health Maintenance   Topic Date Due    Diabetic retinal exam  09/25/1980    HIV screen  09/25/1985    Cervical cancer screen  09/25/1991    Hepatitis B vaccine (2 of 3 - Risk 3-dose series) 01/30/2020    Flu vaccine (1) 09/01/2020    A1C test (Diabetic or Prediabetic)  10/10/2020    Potassium monitoring  11/07/2020    Creatinine monitoring  11/07/2020    Diabetic microalbuminuria test  01/31/2021    Lipid screen  01/31/2021    Diabetic foot exam  07/10/2021    TSH testing  07/13/2021    DTaP/Tdap/Td vaccine (2 - Td) 12/15/2025    Pneumococcal 0-64 years Vaccine  Completed    Hepatitis A vaccine  Aged Out    Hib vaccine  Aged Out    Meningococcal (ACWY) vaccine  Aged Out

## 2020-08-10 NOTE — PROGRESS NOTES
8/10/2020     Joanne Bell (:  1970) is a 52 y.o. female, here for evaluation of the following medical concerns:  Chief Complaint   Patient presents with    Gynecologic Exam     HPI   Here for pap, no concerns, no discharge, not dsure when her last pap was. Due for mammogram, denies any problems. She is starting PT for her shoulder, ordered by orthopedics. Review of Systems   Constitutional: Negative for fatigue and unexpected weight change. Respiratory: Negative for cough and shortness of breath. Cardiovascular: Negative for chest pain. Gastrointestinal: Negative for abdominal pain. Genitourinary: Negative for dysuria, menstrual problem and pelvic pain. Prior to Visit Medications    Medication Sig Taking? Authorizing Provider   meloxicam (MOBIC) 15 MG tablet Take 1 tablet by mouth daily Yes Helena Trivedi PA-C   insulin lispro, 1 Unit Dial, (HUMALOG KWIKPEN) 100 UNIT/ML SOPN Inject 10 Units into the skin 3 times daily (before meals) Yes Alia Lopez DO   insulin aspart (NOVOLOG FLEXPEN) 100 UNIT/ML injection pen Inject 10 Units into the skin 3 times daily (before meals) Yes Alia Lopez DO   alogliptin (NESINA) 25 MG TABS tablet Take 1 tablet by mouth daily Yes Alia Lopez DO   mirtazapine (REMERON) 15 MG tablet Take 1 tablet by mouth nightly Yes Alia Lopez DO   blood glucose monitor strips Test 4 times a day & as needed for symptoms of irregular blood glucose.  Yes Alia Lopez DO   SITagliptin (JANUVIA) 50 MG tablet Take 1 tablet by mouth daily Yes Alia Lopez DO   lisinopril (PRINIVIL;ZESTRIL) 20 MG tablet Take 1 tablet by mouth daily Yes Alia Lopez DO   levothyroxine (SYNTHROID) 25 MCG tablet Take 1 tablet by mouth daily Yes Alia Lopez DO   glipiZIDE (GLUCOTROL XL) 10 MG extended release tablet Take 1 tablet by mouth daily Yes Alia Lopez DO   atorvastatin (LIPITOR) 40 MG tablet Take 1 tablet by mouth daily Yes Alia Lopez DO aspirin 81 MG EC tablet Take 1 tablet by mouth daily Yes Jerral Cough, DO   Multiple Vitamins-Minerals (MULTIVITAMIN ADULT) TABS Take 1 tablet by mouth daily Yes Jerral Cough, DO   insulin glargine (LANTUS SOLOSTAR) 100 UNIT/ML injection pen Inject 40 Units into the skin nightly Yes Jerral Cough, DO   Insulin Pen Needle ([employer] PEN NEEDLES) 31G X 6 MM MISC 1 box by Does not apply route 4 times daily Yes Jerral Cough, DO   metFORMIN (GLUCOPHAGE) 500 MG tablet Take 2 tablets by mouth 2 times daily (with meals)  Patient taking differently: Take 1,000 mg by mouth 2 times daily (with meals) Taking 1 tablets twice daily Yes Jerral Cough, DO   Blood Glucose Monitoring Suppl (ONE TOUCH ULTRA 2) w/Device KIT 1 kit by Does not apply route daily Yes Jerral Cough, DO   gabapentin (NEURONTIN) 600 MG tablet Take 1 tablet by mouth daily for 30 days. Jerral Cough, DO      No Known Allergies  Social History     Tobacco Use    Smoking status: Current Some Day Smoker     Packs/day: 0.00     Years: 35.00     Pack years: 0.00     Types: Cigarettes    Smokeless tobacco: Never Used    Tobacco comment: pt states 1 cigarettes per day   Substance Use Topics    Alcohol use: No        Vitals:    08/10/20 0931   BP: 139/88   Site: Right Upper Arm   Position: Sitting   Cuff Size: Medium Adult   Pulse: 77   Temp: 97.2 °F (36.2 °C)   Weight: 124 lb 12.8 oz (56.6 kg)   Height: 5' 2\" (1.575 m)     Estimated body mass index is 22.83 kg/m² as calculated from the following:    Height as of this encounter: 5' 2\" (1.575 m). Weight as of this encounter: 124 lb 12.8 oz (56.6 kg). Physical Exam  Genitourinary:     General: Normal vulva. Vagina: No vaginal discharge, bleeding or lesions. Cervix: No discharge, lesion or cervical bleeding. ASSESSMENT/PLAN:     Diagnosis Orders   1. Pap smear for cervical cancer screening  PAP Smear   2.  Screening mammogram, encounter for  [facility] DIGITAL SCREEN W OR WO CAD BILATERAL   3. 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 Requested Prescriptions      No prescriptions requested or ordered in this encounter     An electronic signature was used to authenticate this note.     --Reg Aly DO on8/10/2020 at 11:06 AM

## 2020-08-11 ENCOUNTER — HOSPITAL ENCOUNTER (OUTPATIENT)
Dept: PHYSICAL THERAPY | Age: 50
Setting detail: THERAPIES SERIES
Discharge: HOME | End: 2020-08-11
Payer: MEDICARE

## 2020-08-11 PROCEDURE — 97110 THERAPEUTIC EXERCISES: CPT

## 2020-08-11 PROCEDURE — 97161 PT EVAL LOW COMPLEX 20 MIN: CPT

## 2020-08-11 NOTE — CONSULTS
[x] [facility] &  Therapy  [address]  [phone]  F: [phone] [] [address]  [facility]   [address]  P: [phone]  F: [phone] [] [address] &  Therapy  [address] [facility]  P: [phone]  F: [phone] [] [address]  [facility]   [address]   [place]: [phone]  F: [phone]      Physical Therapy Upper Extremity Evaluation    Date:  2020  Patient: Mil Granda  : 1970  MRN: [id number]  Physician: Pritesh Hough PA-C  Insurance: [place] [employer]  Medical Diagnosis: Rotator cuff tendonitis, left (M75.82 [ICD-10-CM]); 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 States that she initially addressed it with her PCP and was given a rub to try for pain relief. States she recently had an MRI and got the results which showed a tear. Pt also recently got an injection in the left shoulder which she reports minimal change in symptoms. PMHx: [] Unremarkable [x] Diabetes [] HTN  [] Pacemaker   [] MI/Heart Problems [] Cancer [] Arthritis [] Other:              [x] Refer to full medical chart  In EPIC     Tests: [] X-Ray: [x] MRI:   2020  1.  Less than 50% partial-thickness articular-surface and interstitial tearing    of mid supraspinatus along the footplate and between critical zone and    footplate respectively. Henok Peterson partial-thickness articular surface tearing    along the footplate of anterior supraspinatus.  Moderate underlying    supraspinatus tendinopathy. 2. Mild infraspinatus and subscapularis tendinopathy. 3. Degenerative tearing of the superior labrum.  Mild underlying labral    degeneration. 4. Intermediate T1 signal thickening of the inferior glenohumeral ligament    which can be seen in the clinical setting of adhesive capsulitis. 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       Pain:  [x] Yes  [] No Location: Left shoulder Pain Rating: (0-10 scale) 8/10  Pain altered Tx:  [x] Yes  [] No  Action: to pt's tolerance    Symptoms:  [] Improving [x] Worsening [] Same  Better:  [] AM    [] PM    [] Sit    [] Rise/Sit    []Stand    [] Walk    [] Lying    [x] Other: not moving it  Worse: [] AM    [] PM    [] Sit    [] Rise/Sit    []Stand    [] Walk    [] Lying    [] Bend                      [] Valsalva    [x] Other: movement, hot, cold  Sleep: [] OK    [x] Disturbed    Objective:     ROM  °A/P END FEEL STRENGTH TESTS (+/-) Left Right Not Tested    Left Right  Left Right Drop Arm   []   Sit Shld Flex 60   3-  Sulcus Sign   []   Sit Shld Abd 60   3-  Apprehension   []   Sit Shld IR      Yergasons   []   Shoulder Flex 70     Speeds   []   Ext      Neer   []   ABD 60     Hamilton    []   ER @ 0 45 90 20   3-  Painful Arc   []   IR 40   3-  Tinel   []   Supraspinatus            Infraspinatus            Serratus Ant            Pectoralis            Lats            Mid Trap            Lower Trap            Elbow Flex. Elbow Ext. correct form without cues. LTG: (to be met in 12 treatments)  1. Improve UEFI to 60% impairment  2. Pt will be able to reach to shoulder height without difficulty  3. Pt will be able to push/pull with the left UE with little difficulty                     Patient goals: To be able to get back to normal.      Rehab Potential:  [] Good  [x] Fair  [] Poor   Suggested Professional Referral:  [x] No  [] Yes:  Barriers to Goal Achievement:  [] No  [x] Yes: pt's low tolerance to exercises  Domestic Concerns:  [x] No  [] Yes:    Pt. Education:  [x] Plans/Goals, Risks/Benefits discussed  [x] Home exercise program---PT POC, exercises listed below given as HEP on a handout  Method of Education: [x] Verbal  [x] Demo  [x] Written  Comprehension of Education:  [x] Verbalizes understanding. [x] Demonstrates understanding. [] Needs Review. [] Demonstrates/verbalizes understanding of HEP/Ed previously given. Treatment Plan:  [x] Therapeutic Exercise   52759  [] Iontophoresis: 4 mg/mL Dexamethasone Sodium Phosphate  mAmin  78106   [x] Therapeutic Activity  52125 [x] Vasopneumatic cold with compression  39633    [] Gait Training   36995 [] Ultrasound   74286   [] Neuromuscular Re-education  68562 [x] Electrical Stimulation Unattended  64661   [x] Manual Therapy  09975 [] Electrical Stimulation Attended  85434   [x] Instruction in HEP  [] Lumbar/Cervical Traction  45778   [] Aquatic Therapy   19479 [x] Cold/hotpack    [x] Massage   64007      [] Dry Needling, 1 or 2 muscles  61135   [] Biofeedback, first 15 minutes   51382  [] Biofeedback, additional 15 minutes   52486 [] Dry Needling, 3 or more muscles  19914     []  Medication allergies reviewed for use of    Dexamethasone Sodium Phosphate 4mg/ml     with iontophoresis treatments. Pt is not allergic.        Frequency: 2 x/week for 12 visits (pt would benefit from 3x/week, however, due to high pain level and low tolerance to any movement of the left UE will start with

## 2020-08-12 LAB
HPV SAMPLE: NORMAL
HPV, GENOTYPE 16: NOT DETECTED
HPV, GENOTYPE 18: NOT DETECTED
HPV, HIGH RISK OTHER: NOT DETECTED
HPV, INTERPRETATION: NORMAL
SPECIMEN DESCRIPTION: NORMAL

## 2020-08-13 LAB — CYTOLOGY REPORT: NORMAL

## 2020-08-14 ENCOUNTER — HOSPITAL ENCOUNTER (OUTPATIENT)
Dept: PHYSICAL THERAPY | Age: 50
Setting detail: THERAPIES SERIES
Discharge: HOME | End: 2020-08-14
Payer: MEDICARE

## 2020-08-14 PROCEDURE — G0283 ELEC STIM OTHER THAN WOUND: HCPCS

## 2020-08-14 PROCEDURE — 97110 THERAPEUTIC EXERCISES: CPT

## 2020-08-14 NOTE — FLOWSHEET NOTE
[x] [facility]) [facility] &  Therapy  [address]  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 States that the pain is okay this morning. Reports having good days and bad days. Pt also reports compliance with her exercises.       Objective:  Modalities: 1 cervical HP, UES to left shoulder x 15 minutes--Estim remains on for remainder of exercises  Precautions:  Exercises:  Exercise Reps/ Time Weight/ Level Comments   Mat:      Seated flexion slides 2x10  On sliding board, uses towel roll   Seated scaption slides 10x  On sliding board, uses towel roll         Seated cane IR/ER 10xea 3\"          Seated inferior glide 5x 10\" [place] left hand under chair   Cervical AROM 5xea 5\" All motions   Retro shoulder rolls 10x           Tumbleform 2min  Standing at mat, rocks side to side         PROM 10min  All motions, distraction with oscillations          Other:      Treatment Charges: Mins Units   [x]  Modalities: HP/UES 15/15 0/1   [x]  Ther Exercise 40 3   []  Manual Therapy     []  Ther Activities     []  Aquatics     []  Vasocompression     []  Other     Total Treatment time 55 4       Assessment: [x] Progressing toward goals. Pt's symptoms very irritable. Very limited ROM all motions. Pt is very motivated with the exercises and completes them even if appears to be in pain. [] No change. [] Other:  [x] Patient would continue to benefit from skilled physical therapy services in order to: address the impairments including very limited active and passive ROM of the left shoulder, weakness of the left shoulder, tightness and limited mobility of the cervical spine, and significant functional limitations including reaching overhead, lifting, pushing and pulling with the left UE.      STG: (to be met in 8 treatments)  1. ? Pain: Decrease left shoulder pain to 6/10 at rest  2. ? ROM: Increase left shoulder AROM flexion to 75°, abd to 75°, IR to left PSIS; Increase left PROM flexion to 90°, abd 90°, IR to 50°, ER to 35°   3. ? Strength: Increase flexion and abduction to 3/5  4. ? Function: Improve UEFI to 75% impairment  5. Patient to be independent with home exercise program as demonstrated by performance with correct form without cues. LTG: (to be met in 12 treatments)  1. Improve UEFI to 60% impairment  2. Pt will be able to reach to shoulder height without difficulty  3. Pt will be able to push/pull with the left UE with little difficulty     Pt. Education:  [x] Yes  [] No  [x] Reviewed Prior HEP/Ed  Method of Education: [x] Verbal  [x] Demo  [] Written  Comprehension of Education:  [x] Verbalizes understanding. [x] Demonstrates understanding. [] Needs review.   [] Demonstrates/verbalizes HEP/Ed previously given. Plan: [x] Continue current frequency toward long and short term goals. [x] Specific Instructions for subsequent treatments: Continue to progress as tolerates.         Time In: 8:55am                  Time Out: 9:50am    Electronically signed by:  Sean Maier PT

## 2020-08-19 ENCOUNTER — HOSPITAL ENCOUNTER (OUTPATIENT)
Dept: PHYSICAL THERAPY | Age: 50
Setting detail: THERAPIES SERIES
Discharge: HOME | End: 2020-08-19
Payer: MEDICARE

## 2020-08-19 PROCEDURE — 97110 THERAPEUTIC EXERCISES: CPT

## 2020-08-19 PROCEDURE — G0283 ELEC STIM OTHER THAN WOUND: HCPCS

## 2020-08-19 NOTE — FLOWSHEET NOTE
[x] [address] [facility] CENTER &  Therapy  [address]  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 States she tolerated the last session well.         Objective:  Modalities: 1 cervical HP, UES to left shoulder x 15 minutes--Estim remains on for remainder of exercises  Precautions:  Exercises:  Exercise Reps/ Time Weight/ Level Comments   Mat:      Seated flexion slides 2x10  On sliding board, uses towel roll   Seated scaption slides 10x  On sliding board, uses towel roll   Stool stretches-flexion 5x 10\" Hand stays in place on mat, slides on stool into flexion         Seated cane IR/ER 10xea 3\" Elbows bent at 90 and tucked at sides         Seated inferior glide 5x 10\" [place] left hand under chair   Cervical AROM 5xea 5\" All motions   Retro shoulder rolls 10x Tumbleform 2min  Standing at mat, rocks side to side         PROM 10min  All motions, distraction with oscillations          Codman's 20xea  M-L, CW, CCW               Other:      Treatment Charges: Mins Units   [x]  Modalities: HP/UES 15/15 0/1   [x]  Ther Exercise 40 3   []  Manual Therapy     []  Ther Activities     []  Aquatics     []  Vasocompression     []  Other     Total Treatment time 55 4       Assessment: [x] Progressing toward goals. Added stool stretches into flexion, pt's motion very limited but able to tolerate the exercise without significant increase in pain. No visual signs of discomfort. Continued with focus on improving ROM this date to the pt's tolerance. [] No change. [] Other:  [x] Patient would continue to benefit from skilled physical therapy services in order to: address the impairments including very limited active and passive ROM of the left shoulder, weakness of the left shoulder, tightness and limited mobility of the cervical spine, and significant functional limitations including reaching overhead, lifting, pushing and pulling with the left UE.      STG: (to be met in 8 treatments)  1. ? Pain: Decrease left shoulder pain to 6/10 at rest  2. ? ROM: Increase left shoulder AROM flexion to 75°, abd to 75°, IR to left PSIS; Increase left PROM flexion to 90°, abd 90°, IR to 50°, ER to 35°   3. ? Strength: Increase flexion and abduction to 3/5  4. ? Function: Improve UEFI to 75% impairment  5. Patient to be independent with home exercise program as demonstrated by performance with correct form without cues. LTG: (to be met in 12 treatments)  1. Improve UEFI to 60% impairment  2. Pt will be able to reach to shoulder height without difficulty  3. Pt will be able to push/pull with the left UE with little difficulty     Pt.  Education:  [x] Yes  [] No  [x] Reviewed Prior HEP/Ed  Method of Education: [x] Verbal  [x] Demo  [] Written  Comprehension of Education:  [x] [employer] understanding. [x] Demonstrates understanding. [] Needs review. [] Demonstrates/verbalizes HEP/Ed previously given. Plan: [x] Continue current frequency toward long and short term goals. [x] Specific Instructions for subsequent treatments: Continue to progress as tolerates.         Time In: 8:58am                  Time Out: 9:58 am    Electronically signed by:  Jose Merchant PT

## 2020-08-21 ENCOUNTER — HOSPITAL ENCOUNTER (OUTPATIENT)
Dept: PHYSICAL THERAPY | Age: 50
Setting detail: THERAPIES SERIES
Discharge: HOME | End: 2020-08-21
Payer: MEDICARE

## 2020-08-21 PROCEDURE — 97110 THERAPEUTIC EXERCISES: CPT

## 2020-08-21 PROCEDURE — G0283 ELEC STIM OTHER THAN WOUND: HCPCS

## 2020-08-21 NOTE — FLOWSHEET NOTE
All motions   Retro shoulder rolls 10x           Tumbleform 2min  Standing at mat, rocks side to side         PROM 10min  All motions, distraction with oscillations          Codman's 20xea  M-L, CW, CCW         Pulleys ADD     Other:      Treatment Charges: Mins Units   [x]  Modalities: HP/UES 15/15 0/1   [x]  Ther Exercise 40 3   []  Manual Therapy     []  Ther Activities     []  Aquatics     []  Vasocompression     []  Other     Total Treatment time 55 4       Assessment: [x] Progressing toward goals. Pt continues to have significant pain at end range, however, pt with improvement in all motions this date during passive ROM. Passive flexion and abduction both above 90° this date, ER to 35°. [] No change. [] Other:  [x] Patient would continue to benefit from skilled physical therapy services in order to: address the impairments including very limited active and passive ROM of the left shoulder, weakness of the left shoulder, tightness and limited mobility of the cervical spine, and significant functional limitations including reaching overhead, lifting, pushing and pulling with the left UE.      STG: (to be met in 8 treatments)  1. ? Pain: Decrease left shoulder pain to 6/10 at rest  2. ? ROM: Increase left shoulder AROM flexion to 75°, abd to 75°, IR to left PSIS; Increase left PROM flexion to 90°, abd 90°, IR to 50°, ER to 35°   3. ? Strength: Increase flexion and abduction to 3/5  4. ? Function: Improve UEFI to 75% impairment  5. Patient to be independent with home exercise program as demonstrated by performance with correct form without cues. LTG: (to be met in 12 treatments)  1. Improve UEFI to 60% impairment  2. Pt will be able to reach to shoulder height without difficulty  3. Pt will be able to push/pull with the left UE with little difficulty     Pt.  Education:  [x] Yes  [] No  [x] Reviewed Prior HEP/Ed  Method of Education: [x] Verbal  [x] Demo  [] Written  Comprehension of Education:  [x] Verbalizes understanding. [x] Demonstrates understanding. [] Needs review. [] Demonstrates/verbalizes HEP/Ed previously given. Plan: [x] Continue current frequency toward long and short term goals. [x] Specific Instructions for subsequent treatments: Continue to progress as tolerates.         Time In: 9:22am                  Time Out: 10:20 am    Electronically signed by:  Dayna Ulloa PT

## 2020-08-26 ENCOUNTER — HOSPITAL ENCOUNTER (OUTPATIENT)
Dept: PHYSICAL THERAPY | Age: 50
Setting detail: THERAPIES SERIES
Discharge: HOME | End: 2020-08-26
Payer: MEDICARE

## 2020-08-26 PROCEDURE — 97110 THERAPEUTIC EXERCISES: CPT

## 2020-08-26 PROCEDURE — G0283 ELEC STIM OTHER THAN WOUND: HCPCS

## 2020-08-26 NOTE — FLOWSHEET NOTE
rolls 10x           Tumbleform 2min  Standing at mat, rocks side to side         PROM 10min  All motions, distraction with oscillations    Supine therabar:      -chest press 10x cane    -ER 10x cane    -Flexion 10x cane          Codman's 20xea  M-L, CW, CCW         Pulleys ADD     Other:      Treatment Charges: Mins Units   [x]  Modalities: HP/UES 15/15 0/1   [x]  Ther Exercise 40 3   []  Manual Therapy     []  Ther Activities     []  Aquatics     []  Vasocompression     []  Other     Total Treatment time 55 4       Assessment: [x] Progressing toward goals. Added supine therabar exercises this date in order to improve active mobility of the left shoulder. [] No change. [] Other:  [x] Patient would continue to benefit from skilled physical therapy services in order to: address the impairments including very limited active and passive ROM of the left shoulder, weakness of the left shoulder, tightness and limited mobility of the cervical spine, and significant functional limitations including reaching overhead, lifting, pushing and pulling with the left UE.      STG: (to be met in 8 treatments)  1. ? Pain: Decrease left shoulder pain to 6/10 at rest  2. ? ROM: Increase left shoulder AROM flexion to 75°, abd to 75°, IR to left PSIS; Increase left PROM flexion to 90°, abd 90°, IR to 50°, ER to 35°   3. ? Strength: Increase flexion and abduction to 3/5  4. ? Function: Improve UEFI to 75% impairment  5. Patient to be independent with home exercise program as demonstrated by performance with correct form without cues. LTG: (to be met in 12 treatments)  1. Improve UEFI to 60% impairment  2. Pt will be able to reach to shoulder height without difficulty  3. Pt will be able to push/pull with the left UE with little difficulty     Pt. Education:  [x] Yes  [] No  [x] Reviewed Prior HEP/Ed  Method of Education: [x] Verbal  [x] Demo  [] Written  Comprehension of Education:  [x] Verbalizes understanding.   [x] Demonstrates understanding. [] Needs review. [] Demonstrates/verbalizes HEP/Ed previously given. Plan: [x] Continue current frequency toward long and short term goals. [x] Specific Instructions for subsequent treatments: Continue to progress as tolerates.         Time In: 10:09 am                  Time Out: 11:08 am    Electronically signed by:  Pedro Strong PT

## 2020-08-28 ENCOUNTER — HOSPITAL ENCOUNTER (OUTPATIENT)
Dept: PHYSICAL THERAPY | Age: 50
Setting detail: THERAPIES SERIES
Discharge: HOME | End: 2020-08-28
Payer: MEDICARE

## 2020-08-28 PROCEDURE — 97110 THERAPEUTIC EXERCISES: CPT

## 2020-08-28 PROCEDURE — G0283 ELEC STIM OTHER THAN WOUND: HCPCS

## 2020-08-28 NOTE — FLOWSHEET NOTE
[x] [facility]) [facility] CENTER &  Therapy  [address]  [phone]  F: [phone] [] [address]  [place] [address]   [address]  P: [phone]  F: [phone] [] [address]  Therapy  [address]  P: [phone]  F: [phone] [] [address]  [facility]   [address]   [place]: [phone]  F: [phone]      Physical Therapy Daily Treatment Note    Date:  2020  Patient Name:  Sumit Jacobo    :  1970  MRN: [id number]  Physician: Rodrigo Soares PA-C                     Insurance: [place] [employer]  Medical Diagnosis: Rotator cuff tendonitis, left (M75.82 [ICD-10-CM]); 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       Objective:  Modalities: 1 cervical HP, UES to left shoulder x 10-15 minutes--Estim remains on for remainder of exercises  Precautions:  Exercises:  Exercise Reps/ Time Weight/ Level Comments   Mat:      Seated flexion slides on red wedge 15x  On sliding board, uses towel roll   Seated scaption slides on red wedge 15x  On sliding board, uses towel roll   Stool stretches-flexion 5x 10\" Hand stays in place on mat, slides on stool into flexion         Seated cane IR/ER 10xea 3\" Elbows bent at 90 and tucked at sides         Seated inferior glide 5x 10\" [place] left hand under chair   Cervical AROM HEP 5\" All motions   Retro shoulder rolls 10x Tumbleform 2min  Standing at mat, rocks side to side         PROM 10min  All motions, distraction with oscillations    Supine therabar:      -chest press 10x cane    -ER 10x cane    -Flexion 10x cane          Codman's 20xea  M-L, CW, CCW         *Done last, once remove estim:      Pulleys 2min ea  Flex, Scaption   Other:      Treatment Charges: Mins Units   [x]  Modalities: HP/UES 10/10 0/1   [x]  Ther Exercise 45 3   []  Manual Therapy     []  Ther Activities     []  Aquatics     []  Vasocompression     []  Other     Total Treatment time 55 4       Assessment: [x] Progressing toward goals. Progressed pt to flexion and scaption slides on an incline this date. Also added pulleys for flexion and scaption. Passively, pt continues to be very limited all motions, flexion and ER limited the most though. [] No change. [] Other:  [x] Patient would continue to benefit from skilled physical therapy services in order to: address the impairments including very limited active and passive ROM of the left shoulder, weakness of the left shoulder, tightness and limited mobility of the cervical spine, and significant functional limitations including reaching overhead, lifting, pushing and pulling with the left UE.      STG: (to be met in 8 treatments)  1. ? Pain: Decrease left shoulder pain to 6/10 at rest  2. ? ROM: Increase left shoulder AROM flexion to 75°, abd to 75°, IR to left PSIS; Increase left PROM flexion to 90°, abd 90°, IR to 50°, ER to 35°   3. ? Strength: Increase flexion and abduction to 3/5  4. ? Function: Improve UEFI to 75% impairment  5. Patient to be independent with home exercise program as demonstrated by performance with correct form without cues. LTG: (to be met in 12 treatments)  1. Improve UEFI to 60% impairment  2. Pt will be able to reach to shoulder height without difficulty  3. Pt will be able to push/pull with the left UE with little difficulty     Pt.  Education:  [x] Yes  [] No  [x] Reviewed Prior HEP/Ed  Method of Education: [x] Verbal  [x] Demo  [] Written  Comprehension of Education:  [x] Verbalizes understanding. [x] Demonstrates understanding. [] Needs review. [] Demonstrates/verbalizes HEP/Ed previously given. Plan: [x] Continue current frequency toward long and short term goals. [x] Specific Instructions for subsequent treatments: Continue to progress as tolerates.         Time In: 10:02 am                  Time Out: 10:58 am    Electronically signed by:  Antonia Bustamante PT

## 2020-09-02 ENCOUNTER — HOSPITAL ENCOUNTER (OUTPATIENT)
Dept: PHYSICAL THERAPY | Age: 50
Setting detail: THERAPIES SERIES
Discharge: HOME | End: 2020-09-02
Payer: MEDICARE

## 2020-09-02 PROCEDURE — G0283 ELEC STIM OTHER THAN WOUND: HCPCS

## 2020-09-02 PROCEDURE — 97110 THERAPEUTIC EXERCISES: CPT

## 2020-09-02 NOTE — FLOWSHEET NOTE
[x] [facility]) [facility] &  Therapy  [address]  [phone]  F: [phone] [] [address]  [place] [address]   [address]  P: [phone]  F: [phone] [] [address]  Therapy  [address]  P: [phone]  F: [phone] [] [address]  [facility]   [address]   [place]: [phone]  F: [phone]      Physical Therapy Daily Treatment Note    Date:  2020  Patient Name:  Marilou Lr    :  1970  MRN: [id number]  Physician: Arie Borges PA-C                     Insurance: [place] [employer]  Medical Diagnosis: Rotator cuff tendonitis, left (M75.82 [ICD-10-CM]); 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        Objective:  Modalities: 1 cervical HP, UES to left shoulder x 10-15 minutes--Estim remains on for remainder of exercises  Precautions:  Exercises:  Exercise Reps/ Time Weight/ Level Comments   Mat:      Seated flexion slides on red wedge 15x  On sliding board, uses towel roll   Seated scaption slides on red wedge 15x  On sliding board, uses towel roll   Stool stretches-flexion 5x 10\" Hand stays in place on mat, slides on stool into flexion         Seated cane IR/ER 10xea 3\" Elbows bent at 90 and tucked at sides         Seated inferior glide 5x 10\" [place] left hand under chair   Cervical AROM HEP 5\" All motions   Retro shoulder rolls HEP Tumbleform   Standing at mat, rocks side to side         PROM 10min  All motions, distraction with oscillations    Supine therabar:      -chest press 10x cane    -ER 10x cane    -Flexion 10x cane          Codman's 20xea  M-L, CW, CCW         *Done last, once remove estim:      Pulleys 2min ea  Flex, Scaption   Swcp-lk-ljbm Flexion 5x  Small yellow ball   Other:      Treatment Charges: Mins Units   [x]  Modalities: HP/UES 15/15 0/1   [x]  Ther Exercise 45 3   []  Manual Therapy     []  Ther Activities     []  Aquatics     []  Vasocompression     []  Other     Total Treatment time 60 4       Assessment: [x] Progressing toward goals. Added ball on wall flexion this date in order to progress the pt to an anti-gravity movement. Pt limited to 100° flexion due to pain and tightness, however, at initial evaluation the pt was only able to elevate the left shoulder to 60° so she is making progress. [] No change. [x] Other: Pt continues to be the most restricted into shoulder flexion and shoulder ER. [x] Patient would continue to benefit from skilled physical therapy services in order to: address the impairments including very limited active and passive ROM of the left shoulder, weakness of the left shoulder, tightness and limited mobility of the cervical spine, and significant functional limitations including reaching overhead, lifting, pushing and pulling with the left UE.      STG: (to be met in 8 treatments)  1. ? Pain: Decrease left shoulder pain to 6/10 at rest  2. ? ROM: Increase left shoulder AROM flexion to 75°, abd to 75°, IR to left PSIS; Increase left PROM flexion to 90°, abd 90°, IR to 50°, ER to 35°   3. ? Strength: Increase flexion and abduction to 3/5  4. ? Function: Improve UEFI to 75% impairment  5. Patient to be independent with home exercise program as demonstrated by performance with correct form without cues. LTG: (to be met in 12 treatments)  1. Improve UEFI to 60% impairment  2.  Pt will be able to reach to shoulder height without difficulty  3. Pt will be able to push/pull with the left UE with little difficulty     Pt. Education:  [x] Yes  [] No  [x] Reviewed Prior HEP/Ed  Method of Education: [x] Verbal  [x] Demo  [] Written  Comprehension of Education:  [x] Verbalizes understanding. [x] Demonstrates understanding. [] Needs review. [] Demonstrates/verbalizes HEP/Ed previously given. Plan: [x] Continue current frequency toward long and short term goals. [x] Specific Instructions for subsequent treatments: Continue to progress as tolerates.         Time In: 9:00 am                  Time Out: 10:00 am    Electronically signed by:  Twan Goodwin PT

## 2020-09-04 ENCOUNTER — HOSPITAL ENCOUNTER (OUTPATIENT)
Dept: PHYSICAL THERAPY | Age: 50
Setting detail: THERAPIES SERIES
Discharge: HOME | End: 2020-09-04
Payer: MEDICARE

## 2020-09-04 PROCEDURE — 97110 THERAPEUTIC EXERCISES: CPT

## 2020-09-04 PROCEDURE — G0283 ELEC STIM OTHER THAN WOUND: HCPCS

## 2020-09-04 NOTE — FLOWSHEET NOTE
[x] [address] [facility]  [facility]  [address]  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        Objective:  Modalities: 1 cervical HP, UES to left shoulder x 10-15 minutes--Estim remains on for remainder of exercises  Precautions:  Exercises:  Exercise Reps/ Time Weight/ Level Comments   Mat:      Seated flexion slides on red wedge 15x  On sliding board, uses towel roll   Seated scaption slides on red wedge 15x  On sliding board, uses towel roll   Stool stretches-flexion 10x 10\" Hand stays in place on mat, slides on stool into flexion   Stool stretch- ER 10x 10\"    Seated cane IR/ER 10xea 3\" Elbows bent at 90 and tucked at sides         Seated inferior glide 5x 10\" [place] left hand under chair   Cervical AROM HEP 5\" All motions   Retro demonstrated by performance with correct form without cues. --MET  LTG: (to be met in 12 treatments)  1. Improve UEFI to 60% impairment  2. Pt will be able to reach to shoulder height without difficulty  3. Pt will be able to push/pull with the left UE with little difficulty     Pt. Education:  [x] Yes  [] No  [x] Reviewed Prior HEP/Ed  Method of Education: [x] Verbal  [x] Demo  [] Written  Comprehension of Education:  [x] Verbalizes understanding. [x] Demonstrates understanding. [] Needs review. [] Demonstrates/verbalizes HEP/Ed previously given. Plan: [x] Continue current frequency toward long and short term goals. [x] Specific Instructions for subsequent treatments: Continue to progress as tolerates.         Time In: 8:52 am                  Time Out: 10:00 am    Electronically signed by:  Irma Shin, PT

## 2020-09-09 ENCOUNTER — HOSPITAL ENCOUNTER (OUTPATIENT)
Dept: PHYSICAL THERAPY | Age: 50
Setting detail: THERAPIES SERIES
Discharge: HOME | End: 2020-09-09
Payer: MEDICARE

## 2020-09-09 PROCEDURE — 97110 THERAPEUTIC EXERCISES: CPT

## 2020-09-09 NOTE — FLOWSHEET NOTE
[x] [facility]) [facility] &  Therapy  [address]  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 States that even the heating pad is not helping to reduce her pain right now.         Objective:  Modalities: HELD- 1 cervical HP, UES to left shoulder x 10-15 minutes--Estim remains on for remainder of exercises  Precautions:  Exercises:  Exercise Reps/ Time Weight/ Level Comments   Mat:      Seated flexion slides on red wedge 15x  On sliding board, uses towel roll   Seated scaption slides on red wedge 15x  On sliding board, uses towel roll   Stool stretches-flexion 10x 10\" Hand stays in place on mat, slides on stool into flexion   Stool stretch- ER 10x 10\"    Seated cane IR/ER  3\" Elbows bent at 90 and tucked at sides Seated inferior glide HEP 10\" [place] left hand under chair   Cervical AROM HEP 5\" All motions   Retro shoulder rolls HEP           Tumbleform   Standing at mat, rocks side to side         PROM 6 min  All motions, distraction with oscillations    Supine therabar:      -chest press 10x cane    -ER 10x cane    -Flexion 10x cane    -Horizontal abd/adduction 10x cane          Codman's HEP  M-L, CW, CCW         *Done last, once remove estim:      Pulleys 2min ea  Flex, Scaption   Geis-um-ytto Flexion 10x  Small yellow ball         UBE 2'/2' L1.0 Half forward, half backward         Cane shd ext 10x  Standing   Cane shd IR 10x  Standing         Other:  9/4/2020- Left shoulder PROM: Flexion 100° (+30), Abd 100° (+40), ER 40° (+20), IR 65 (+25); Left shoulder AROM: Flex 100 (+40), Abd 88° (+28°)    Treatment Charges: Mins Units   []  Modalities: HP/UES     [x]  Ther Exercise 45 3   []  Manual Therapy     []  Ther Activities     []  Aquatics     []  Vasocompression     []  Other     Total Treatment time 45 3       Assessment: [x] Progressing toward goals. Therapist had planned on trying the session without the heat and estim, pt agreed to try this even with her increase in pain this date. Therefore, all exercises done without the estim on. Added UBE as well as standing cane shoulder extension and IR. Pt visibly in pain with all exercises this date but able to complete them all within her tolerance level. [] No change.      [] Other:    [x] Patient would continue to benefit from skilled physical therapy services in order to: address the impairments including very limited active and passive ROM of the left shoulder, weakness of the left shoulder, tightness and limited mobility of the cervical spine, and significant functional limitations including reaching overhead, lifting, pushing and pulling with the left UE.      STG: (to be met in 8 treatments)  1. ? Pain: Decrease left shoulder pain to 6/10 at rest--MET  2. ? ROM: Increase left shoulder AROM flexion to 75°, abd to 75°, IR to left PSIS; Increase left PROM flexion to 90°, abd 90°, IR to 50°, ER to 35° --MET  3. ? Strength: Increase flexion and abduction to 3/5--Progress made  4. ? Function: Improve UEFI to 75% impairment  5. Patient to be independent with home exercise program as demonstrated by performance with correct form without cues. --MET  LTG: (to be met in 12 treatments)  1. Improve UEFI to 60% impairment  2. Pt will be able to reach to shoulder height without difficulty  3. Pt will be able to push/pull with the left UE with little difficulty     Pt. Education:  [x] Yes  [] No  [x] Reviewed Prior HEP/Ed  Method of Education: [x] Verbal  [x] Demo  [] Written  Comprehension of Education:  [x] Verbalizes understanding. [x] Demonstrates understanding. [] Needs review. [] Demonstrates/verbalizes HEP/Ed previously given. Plan: [x] Continue current frequency toward long and short term goals. [x] Specific Instructions for subsequent treatments: Continue to progress as tolerates.         Time In: 8:59 am                  Time Out: 9:50 am    Electronically signed by:  Liam Florez PT

## 2020-09-11 ENCOUNTER — HOSPITAL ENCOUNTER (OUTPATIENT)
Dept: PHYSICAL THERAPY | Age: 50
Setting detail: THERAPIES SERIES
Discharge: HOME | End: 2020-09-11
Payer: MEDICARE

## 2020-09-11 PROCEDURE — 97110 THERAPEUTIC EXERCISES: CPT

## 2020-09-11 NOTE — FLOWSHEET NOTE
[x] [facility]) [facility] &  Therapy  [address]  [phone]  F: [phone] [] [address]  [facility]   [address]  P: [phone]  F: [phone] [] [address]  [address] [facility]  P: [phone]  F: [phone] [] [address]  [facility]   [address]   [place]: [phone]  F: [phone]      Physical Therapy Daily Treatment Note    Date:  2020  Patient Name:  Kenisha Delgado    :  1970  MRN: [id number]  Physician: Cm Whitaker PA-C                     Insurance: [place] [employer]  Medical Diagnosis: Rotator cuff tendonitis, left (M75.82 [ICD-10-CM]); 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 States that even the heating pad is not helping to reduce her pain right now.         Objective:  Modalities: HELD- 1 cervical HP, UES to left shoulder x 10-15 minutes--Estim remains on for remainder of exercises  Precautions:  Exercises:  Exercise Reps/ Time Weight/ Level Comments         UBE 4min L1.0 1/2 forward, 1/2 backwards         Mat:      Seated flexion slides on red wedge 15x  On sliding board, uses towel roll   Seated scaption slides on red wedge 15x  On sliding board, uses towel roll   Stool stretches-flexion 10x 10\" Hand stays in place on mat, slides on stool into flexion   Stool stretch- ER 10x 10\"    Seated cane IR/ER  3\" Elbows bent at 90 and tucked at sides         Seated inferior glide HEP 10\" [place] left hand under chair   Cervical AROM HEP 5\" All motions   Retro shoulder rolls HEP           Tumbleform   Standing at mat, rocks side to side         PROM 6 min  All motions, distraction with oscillations    Supine therabar:      -chest press 10x cane    -ER 10x cane    -Flexion 10x cane    -Horizontal abd/adduction 10x cane          Codman's HEP  M-L, CW, CCW         *Done last, once remove estim:      Pulleys 2min ea  Flex, Scaption   Hyst-jw-tetl Flexion 10x  Small yellow ball         UBE 2'/2' L1.0 Half forward, half backward         Cane shd ext 10x  Standing   Cane shd IR 10x  Standing         Other:  9/4/2020- Left shoulder PROM: Flexion 100° (+30), Abd 100° (+40), ER 40° (+20), IR 65 (+25); Left shoulder AROM: Flex 100 (+40), Abd 88° (+28°)    Treatment Charges: Mins Units   []  Modalities: HP/UES     [x]  Ther Exercise 45 3   []  Manual Therapy     []  Ther Activities     []  Aquatics     []  Vasocompression     []  Other     Total Treatment time 45 3       Assessment: [x] Progressing toward goals. Added UBE this date for warm-up and to work on strengthening of the left shoulder. Pt with decreased tolerance to the exercises this date but able to complete them all. Declined heat and estim. [] No change. [] Other:    [x] Patient would continue to benefit from skilled physical therapy services in order to: address the impairments including very limited active and passive ROM of the left shoulder, weakness of the left shoulder, tightness and limited mobility of the cervical spine, and significant functional limitations including reaching overhead, lifting, pushing and pulling with the left UE.      STG: (to be met in 8 treatments)  1. ? Pain: Decrease left shoulder pain to 6/10 at rest--MET  2. ? ROM: Increase left shoulder AROM flexion to 75°, abd to 75°, IR to left PSIS;  Increase left PROM flexion to 90°, abd 90°, IR to 50°, ER to 35° --MET  3. ? Strength: Increase flexion and abduction to 3/5--Progress made  4. ? Function: Improve UEFI to 75% impairment  5. Patient to be independent with home exercise program as demonstrated by performance with correct form without cues. --MET  LTG: (to be met in 12 treatments)  1. Improve UEFI to 60% impairment  2. Pt will be able to reach to shoulder height without difficulty  3. Pt will be able to push/pull with the left UE with little difficulty     Pt. Education:  [x] Yes  [] No  [x] Reviewed Prior HEP/Ed  Method of Education: [x] Verbal  [x] Demo  [] Written  Comprehension of Education:  [x] Verbalizes understanding. [x] Demonstrates understanding. [] Needs review. [] Demonstrates/verbalizes HEP/Ed previously given. Plan: [x] Continue current frequency toward long and short term goals. Pt would benefit from continuation of PT beyond the initial 12 visits. Will send a progress note requesting more. [x] Specific Instructions for subsequent treatments: Continue to progress as tolerates.         Time In: 10:31 am                  Time Out: 11:24 am    Electronically signed by:  Demetris Gonzales PT

## 2020-09-11 NOTE — PROGRESS NOTES
[x] [facility]) [facility] CENTER &  Therapy  [address]  [phone]  F: [phone] [] [address]  [facility]   [address]  P: [phone]  F: [phone] [] [place]  [address] [facility]  P: [phone]  F: [phone] [] [address]  [facility]   [address]   [place]: [phone]  F: [phone]      Physical Therapy Progress Note    Date: 2020      Patient: Sanjay Zamora  : 1970  MRN: [id number]    Physician: Helena Trivedi PA-C                     Insurance: [place] [employer]  Medical Diagnosis: Rotator cuff tendonitis, left (M75.82 [ICD-10-CM]); 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 Yes  []? No   Location: Left shoulder           Pain Rating: (0-10 scale) 6-7/10  Pain altered Tx:  [x]? No  []? Yes  Action:  Comments:   Shoulder is bothering her more this morning, likely due to the weather. States that even the heating pad is not helping to reduce her pain right now. Objective:  Test Measurements: Left shoulder PROM: Flexion 100° (+30), Abd 100° (+40), ER 40° (+20), IR 65 (+25); Left shoulder AROM: Flex 100 (+40), Abd 88° (+28°)    Assessment:  STG: (to be met in 8 treatments)  1. ? Pain: Decrease left shoulder pain to 6/10 at rest--MET  2. ? ROM: Increase left shoulder AROM flexion to 75°, abd to 75°, IR to left PSIS; Increase left PROM flexion to 90°, abd 90°, IR to 50°, ER to 35° --MET  3. ? Strength:  Increase flexion and

## 2020-09-14 ENCOUNTER — OFFICE VISIT (OUTPATIENT)
Dept: ORTHOPEDIC SURGERY | Age: 50
End: 2020-09-14
Payer: MEDICARE

## 2020-09-14 VITALS — HEIGHT: 62 IN | BODY MASS INDEX: 22.82 KG/M2 | WEIGHT: 124 LBS

## 2020-09-14 PROCEDURE — G8420 CALC BMI NORM PARAMETERS: HCPCS | Performed by: PHYSICIAN ASSISTANT

## 2020-09-14 PROCEDURE — 2022F DILAT RTA XM EVC RTNOPTHY: CPT | Performed by: PHYSICIAN ASSISTANT

## 2020-09-14 PROCEDURE — 4004F PT TOBACCO SCREEN RCVD TLK: CPT | Performed by: PHYSICIAN ASSISTANT

## 2020-09-14 PROCEDURE — G8428 CUR MEDS NOT DOCUMENT: HCPCS | Performed by: PHYSICIAN ASSISTANT

## 2020-09-14 PROCEDURE — 99213 OFFICE O/P EST LOW 20 MIN: CPT | Performed by: PHYSICIAN ASSISTANT

## 2020-09-14 PROCEDURE — 3046F HEMOGLOBIN A1C LEVEL >9.0%: CPT | Performed by: PHYSICIAN ASSISTANT

## 2020-09-14 ASSESSMENT — ENCOUNTER SYMPTOMS
COUGH: 0
SHORTNESS OF BREATH: 0
COLOR CHANGE: 0
VOMITING: 0

## 2020-09-14 NOTE — PROGRESS NOTES
[facility]  [facility]TH[facility] SPECIALISTS  [address] [facility] [address]  Dept: [phone]  Dept Fax: [phone]        Ambulatory Follow Up      Subjective:   Maria Esther Albert is a 52y.o. year old female who presents to our office today for routine followup regarding her   1. 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 Rotator cuff tendonitis, left    3. Nontraumatic tear of left supraspinatus tendon        Chief Complaint   Patient presents with    Shoulder Pain     left         HPI Maria Esther Albert  is a 52 y.o. Right hand dominant  female who presents today in follow for left shoulder Diabetic Frozen shoulder that began ~ 1 year ago. The patient was last seen on 8/3/2020 and underwent treatment in the form of Left shoulder corticosteroid injection and Outpatient Physical Therapy. The patient notes 50% improvement with the previous treatment. Patient states that she had approximately 50% improvement in her left shoulder discomfort with the left shoulder corticosteroid injection. At this time the patient states that she is going to outpatient physical therapy 2 days/week working on range of motion, strengthening and the left upper extremity functional tasks. Patient states that she has no longer working at the [facility] due to her left shoulder discomfort. The patient states that overall her diabetes is better controlled with her average morning fasting numbers at 175-200. Patient states that her normal highs are approximately 400-500 day-to-day. Patient has not had an episode of DKA since her last appointment on 8/3/2020. Patient states that she has a primary care appointment next week, and would like to discuss change in her diabetes medication regimen. Most recent HgB A1C is 12.4 on 7/10/2020. Patient states that she has not been taking over-the-counter Tylenol or Ibuprofen due to the fact that it has not worked for her and she feels that she is immune. intact  Mouth: Oral mucosa moist. No perioral lesions  Pulm: Respirations unlabored and regular. Symmetric chest excursion without outward deformity is noted. Skin: warm, well perfused  Psych:   Patient has good fund of knowledge and displays understanging of exam, diagnosis, and plan. Radiology:   Xr Shoulder Left (min 2 Views)     Result Date: 7/13/2020  EXAMINATION: THREE XRAY VIEWS OF THE LEFT SHOULDER 7/13/2020 11:56 am COMPARISON: None. 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 Degenerative changes of the acromioclavicular joint.      No acute osseous abnormality.      Mri Shoulder Left Wo Contrast     Result Date: 7/17/2020  EXAMINATION: MRI OF THE LEFT SHOULDER WITHOUT CONTRAST   7/17/2020 8:17 am TECHNIQUE: Multiplanar multisequence MRI of the left shoulder was performed without the administration of intravenous contrast. COMPARISON: Left shoulder plain radiographs from 07/13/2020. 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 FINDINGS: ROTATOR CUFF: Small amount of fluid in the subacromial subdeltoid bursa. Less than 50% partial-thickness articular surface and interstitial tearing of mid supraspinatus along the footplate in between critical zone and footplate on image 40-89, series 5.   Shallow partial-thickness articular-surface tearing of footplate at anterior supraspinatus on image 14, series 7. Moderate underlying supraspinatus tendinopathy. Mild underlying infraspinatus tendinopathy. Mild subscapularis tendinopathy. Teres minor muscle/tendon appears grossly intact without evidence of tearing. No significant atrophy or fatty degeneration of the visualized rotator cuff musculature. BICEPS TENDON: Long head of the biceps tendon properly located within the bicipital groove and seen extending to the biceps labral anchor. LABRUM: Degenerative tearing of the superior labrum. Mild underlying labral degeneration. GLENOHUMERAL JOINT: Glenohumeral articular cartilage appears intact. Mild T1 thickening of the inferior glenohumeral ligament. No sizable glenohumeral joint effusion. AC JOINT AND ACROMIOCLAVICULAR ARCH: Left AC and glenohumeral joints grossly unremarkable. Type 2 acromion. BONE MARROW: Bone marrow signal intensity within the visualized osseous structures is within normal limits. No acute fracture or dislocation involving the osseous components of the shoulder. OUTLET SPACES: Suprascapular notch and quadrilateral space grossly unremarkable in appearance. No left axillary lymphadenopathy.      1. Less than 50% partial-thickness articular-surface and interstitial tearing of mid supraspinatus along the footplate and between critical zone and footplate respectively. Shallow partial-thickness articular surface tearing along the footplate of anterior supraspinatus. Moderate underlying supraspinatus tendinopathy. 2. Mild infraspinatus and subscapularis tendinopathy. 3. Degenerative tearing of the superior labrum. Mild underlying labral degeneration. 4. Intermediate T1 signal thickening of the inferior glenohumeral ligament which can be seen in the clinical setting of adhesive capsulitis. Assessment:      1. 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 Rotator cuff tendonitis, left    3.  Nontraumatic tear of left supraspinatus tendon       Plan: Today in office we discussed etiology and natural history of left dibatic frozen shoulder with rotator cuff tendonitis and partial thickness tear of the supraspinatus on the left shoulder. At this time I would like the patient to continue outpatient physical therapy working on range of motion, strengthening and modalities as seen fit for proper pain management the patient's left shoulder discomfort. I would like the patient to begin taking extra strength Tylenol 1-2 times per day for her left shoulder discomfort. The patient will follow up in 6 weeks. We discussed that the patient should call us with any questions or concerns. Follow up:Return in about 6 weeks (around 10/26/2020) for re-evaluation. No orders of the defined types were placed in this encounter. No orders of the defined types were placed in this encounter.     This note is created with the assistance of a speech recognition program.  While intending to generate a document that actually reflects the content of the visit, the document can still have some errors including those of syntax and sound a like substitutions which may escape proof reading.  In such instances, actual meaning can be extrapolated by contextual diversion      Electronically signed by Pola Payan PA-C on 9/14/2020 at 3:34 PM

## 2020-09-16 ENCOUNTER — HOSPITAL ENCOUNTER (OUTPATIENT)
Dept: PHYSICAL THERAPY | Age: 50
Setting detail: THERAPIES SERIES
Discharge: HOME | End: 2020-09-16
Payer: MEDICARE

## 2020-09-16 PROCEDURE — 97110 THERAPEUTIC EXERCISES: CPT

## 2020-09-16 PROCEDURE — 97140 MANUAL THERAPY 1/> REGIONS: CPT

## 2020-09-16 NOTE — FLOWSHEET NOTE
[x] [facility]) [facility] &  Therapy  [address]  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 Shoulder is feeling better today. Saw the physician assistant in ortho clinic yesterday and is to continue with formal physical therapy.       Objective:  Modalities: HELD- 1 cervical HP, UES to left shoulder x 10-15 minutes--Estim remains on for remainder of exercises  Precautions:  Exercises:  Exercise Reps/ Time Weight/ Level Comments         UBE 6min L1.0 1/2 forward, 1/2 backwards         Mat:      Seated flexion slides on red wedge   On sliding board, uses towel roll   Seated scaption slides on red wedge   On sliding board, uses towel roll   Stool stretches-flexion 10x 10\" Hand stays in place on mat, slides on stool into flexion   Stool stretch- ER 10x 10\"    Seated cane IR/ER  3\" Elbows bent at 90 and tucked at sides         Seated inferior glide HEP 10\" [place] left hand under chair   Cervical AROM HEP 5\" All motions   Retro shoulder rolls HEP           Tumbleform   Standing at mat, rocks side to side         PROM 6 min  All motions, distraction with oscillations    Shoulder mobs 5min  Posterior mobs, inferior mobs, distraction         Supine therabar:      -chest press 10x cane    -ER 10x cane    -Flexion 10x cane    -Horizontal abd/adduction 10x cane          Codman's HEP  M-L, CW, CCW               Pulleys 2min ea  Flex, Scaption   [id number] Flexion   Small yellow ball         Finger ladder 6x  Flexion         Theraband:      Lats 10x Lime    Rows 10x Lime    IR 10x Lime    ER 10x Lime    Tricep 10x Lime          Cane shd ext 10x  Standing   Cane shd IR 10x  Standing   Wall push-ups 10x     Other:  9/4/2020- Left shoulder PROM: Flexion 100° (+30), Abd 100° (+40), ER 40° (+20), IR 65 (+25); Left shoulder AROM: Flex 100 (+40), Abd 88° (+28°)    Treatment Charges: Mins Units   []  Modalities: HP/UES     [x]  Ther Exercise 45 3   [x]  Manual Therapy 8 1   []  Ther Activities     []  Aquatics     []  Vasocompression     []  Other     Total Treatment time 53 3       Assessment: [x] Progressing toward goals. Advanced to gentle strengthening this date including theraband exercises, verbal cues and demonstration for proper technique. Also added finder ladder this date and wall push-ups. Joint mobs performed prior to PROM, pt with significant increase in flexion and abduction PROM afterwards. [] No change.      [] Other:    [x] Patient would continue to benefit from skilled physical therapy services in order to: address the impairments including very limited active and passive ROM of the left shoulder, weakness of the left shoulder, tightness and limited mobility of the cervical spine, and significant functional limitations including reaching overhead, lifting, pushing and pulling with the left UE.      STG: (to be met in 8 treatments)  1. ? Pain: Decrease left shoulder pain to 6/10 at rest--MET  2. ? ROM: Increase left shoulder AROM flexion to 75°, abd to 75°, IR to left PSIS; Increase left PROM flexion to 90°, abd 90°, IR to 50°, ER to 35° --MET  3. ? Strength: Increase flexion and abduction to 3/5--Progress made  4. ? Function: Improve UEFI to 75% impairment  5. Patient to be independent with home exercise program as demonstrated by performance with correct form without cues. --MET  LTG: (to be met in 12 treatments)  1. Improve UEFI to 60% impairment  2. Pt will be able to reach to shoulder height without difficulty  3. Pt will be able to push/pull with the left UE with little difficulty     Pt. Education:  [x] Yes  [] No  [x] Reviewed Prior HEP/Ed  Method of Education: [x] Verbal  [x] Demo  [] Written  Comprehension of Education:  [x] Verbalizes understanding. [x] Demonstrates understanding. [] Needs review. [] Demonstrates/verbalizes HEP/Ed previously given. Plan: [x] Continue current frequency toward long and short term goals. Pt would benefit from continuation of PT beyond the initial 12 visits. Will send a progress note requesting more. [x] Specific Instructions for subsequent treatments: Continue to progress as tolerates.         Time In: 10:02 am                  Time Out: 11:04 am    Electronically signed by:  Sean Maier PT

## 2020-09-18 ENCOUNTER — HOSPITAL ENCOUNTER (OUTPATIENT)
Dept: PHYSICAL THERAPY | Age: 50
Setting detail: THERAPIES SERIES
Discharge: HOME | End: 2020-09-18
Payer: MEDICARE

## 2020-09-18 NOTE — FLOWSHEET NOTE
[x] [facility]) [facility] &  Therapy  [address]    [phone]  F: [phone]   [] [address] [facility] [address]   [address]  P: [phone]  F: [phone]  [] [address]  [address] [facility]  P: [phone]  F: [phone]  [] [address]  [facility]   [address]   [place]: [phone]  F: [phone]   [] [place] [address] [facility] [address]  [place]: [phone]   F: [phone]     Physical Therapy Cancel/No Show note    Date: 2020  Patient: Mirta Alfaro  : 1970  MRN: [id number]    Cancels/No Shows to date:     For today's appointment patient:    [x]  Cancelled    [] Rescheduled appointment    [] No-show     Reason given by patient:    [x]  Patient ill    []  Conflicting appointment    [] No transportation      [] Conflict with work    [] No reason given    [] Weather related    [] [id number]    [] Other:      Comments:        [x] Next appointment was confirmed    Electronically signed by: Agueda Flores PT

## 2020-09-23 ENCOUNTER — HOSPITAL ENCOUNTER (OUTPATIENT)
Dept: PHYSICAL THERAPY | Age: 50
Setting detail: THERAPIES SERIES
Discharge: HOME | End: 2020-09-23
Payer: MEDICARE

## 2020-09-23 PROCEDURE — 97110 THERAPEUTIC EXERCISES: CPT

## 2020-09-23 NOTE — FLOWSHEET NOTE
[x] [facility]) [facility] &  Therapy  [address]  [phone]  F: [phone] [] [address]  [address]   [address]  P: [phone]  F: [phone] [] [place]  [address] [facility]  P: [phone]  F: [phone] [] [address]  [facility]   [address]   [place]: [phone]  F: [phone]      Physical Therapy Daily Treatment Note    Date:  2020  Patient Name:  Liseth Morales    :  1970  MRN: [id number]  Physician: Sherri Cardona PA-C                     Insurance: [place] [employer]age  Medical Diagnosis: Rotator cuff tendonitis, left (M75.82 [ICD-10-CM]); 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        Objective:  Modalities: HELD- 1 cervical HP, UES to left shoulder x 10-15 minutes--Estim remains on for remainder of exercises  Precautions:  Exercises:  Exercise Reps/ Time Weight/ Level Comments         UBE 6min L1.0 1/2 forward, 1/2 backwards         Mat:      Seated flexion slides on red wedge   On sliding board, uses towel roll   Seated scaption slides on red wedge   On sliding board, uses towel roll   Stool stretches-flexion 10x 10\" Hand stays in place on mat, slides on stool into flexion   Stool stretches- abduction 10x 10\"    Stool stretch- ER 10x 10\"    Seated cane IR/ER  3\" Elbows bent at 90 and tucked at sides         Seated inferior glide HEP 10\" [place] left hand under chair   Cervical AROM HEP 5\" All motions   Retro shoulder rolls HEP           Tumbleform   Standing at mat, rocks side to side         PROM 6 min  All motions, distraction with oscillations    Shoulder mobs 5min  Posterior mobs, inferior mobs, distraction         Supine therabar:      -chest press 10x cane    -ER 10x cane    -Flexion 10x cane    -Horizontal abd/adduction 10x cane          Codman's HEP  M-L, CW, CCW               Pulleys 2min ea  Flex, Scaption   [id number] Flexion   Small yellow ball         Finger ladder 6x  Flexion         Theraband:      Lats 10x Lime    Rows 10x Lime    IR 10x Lime    ER 10x Lime    Tricep 10x Lime          Cane shd ext 10x  Standing   Cane shd IR 10x  Standing   Wall push-ups 10x     Other:  9/4/2020- Left shoulder PROM: Flexion 100° (+30), Abd 100° (+40), ER 40° (+20), IR 65 (+25); Left shoulder AROM: Flex 100 (+40), Abd 88° (+28°)    Treatment Charges: Mins Units   []  Modalities: HP/UES     [x]  Ther Exercise 45 3   []  Manual Therapy     []  Ther Activities     []  Aquatics     []  Vasocompression     []  Other     Total Treatment time 45        Assessment: [x] Progressing toward goals. Added in shoulder abduction stool stretch this date. Pt in significantly more pain with all exercises this date but able to complete them all. Pt needed to leave at 11:00, therefore, unable to get to manual therapy. [] No change.      [] Other:    [x] Patient would continue to benefit from skilled physical therapy services in order to: address the impairments including very limited active and passive ROM of the left shoulder, weakness of the left shoulder, tightness and limited mobility of the cervical spine, and significant functional limitations including reaching overhead, lifting, pushing and pulling with the left UE.      STG: (to be met in 8 treatments)  1. ? Pain: Decrease left shoulder pain to 6/10 at rest--MET  2. ? ROM: Increase left shoulder AROM flexion to 75°, abd to 75°, IR to left PSIS; Increase left PROM flexion to 90°, abd 90°, IR to 50°, ER to 35° --MET  3. ? Strength: Increase flexion and abduction to 3/5--Progress made  4. ? Function: Improve UEFI to 75% impairment  5. Patient to be independent with home exercise program as demonstrated by performance with correct form without cues. --MET  LTG: (to be met in 12 treatments)  1. Improve UEFI to 60% impairment  2. Pt will be able to reach to shoulder height without difficulty  3. Pt will be able to push/pull with the left UE with little difficulty     Pt. Education:  [x] Yes  [] No  [x] Reviewed Prior HEP/Ed  Method of Education: [x] Verbal  [x] Demo  [] Written  Comprehension of Education:  [x] Verbalizes understanding. [x] Demonstrates understanding. [] Needs review. [] Demonstrates/verbalizes HEP/Ed previously given. Plan: [x] Continue current frequency toward long and short term goals. Pt would benefit from continuation of PT beyond the initial 12 visits. Will send a progress note requesting more. [x] Specific Instructions for subsequent treatments: Continue to progress as tolerates.         Time In: 10:00 am                  Time Out: 10:58 am    Electronically signed by:  Beatris Henao, PT

## 2020-09-25 ENCOUNTER — HOSPITAL ENCOUNTER (OUTPATIENT)
Dept: PHYSICAL THERAPY | Age: 50
Setting detail: THERAPIES SERIES
Discharge: HOME | End: 2020-09-25
Payer: MEDICARE

## 2020-09-25 PROCEDURE — 97140 MANUAL THERAPY 1/> REGIONS: CPT

## 2020-09-25 PROCEDURE — 97110 THERAPEUTIC EXERCISES: CPT

## 2020-09-25 NOTE — FLOWSHEET NOTE
[x] [facility]) [facility] &  Therapy  [address]  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 States she can tell she has more mobility.       Objective:  Modalities: HELD- 1 cervical HP, UES to left shoulder x 10-15 minutes--Estim remains on for remainder of exercises  Precautions:  Exercises:  Exercise Reps/ Time Weight/ Level Comments         UBE 6min L2.0 1/2 forward, 1/2 backwards         Mat:      Seated flexion slides on red wedge   On sliding board, uses towel roll   Seated scaption slides on red wedge   On sliding board, uses towel roll   Stool stretches-flexion 10x 10\" Hand stays in place on mat, slides on stool into flexion   Stool stretches- abduction 10x 10\"    Stool stretch- ER 10x 10\"          Seated cane flex 10x Seated cane abd 10x                 Seated inferior glide HEP 10\" [place] left hand under chair   Cervical AROM HEP 5\" All motions   Retro shoulder rolls HEP           Tumbleform   Standing at mat, rocks side to side         PROM 6 min  All motions, distraction with oscillations    Shoulder mobs 5min  Posterior mobs, inferior mobs, distraction         Supine therabar:      -chest press 10x cane    -ER 10x cane    -Flexion 10x cane    -Horizontal abd/adduction 10x cane          Codman's HEP  M-L, CW, CCW               Pulleys 2min ea  Flex, Scaption   [id number] Flexion   Small yellow ball         Finger ladder 6x  Flexion         Theraband:      Lats 10x Lime    Rows 10x Lime    IR 10x Lime    ER 10x Lime    Tricep 10x Lime          Cane shd ext 10x  Standing   Cane shd IR 10x  Standing   Wall push-ups 10x     Other:  9/25/20--PROM- Flexion 130° (+30), Abduction 130° (+30), ER 50° (+10), IR 65° (no change)      Treatment Charges: Mins Units   []  Modalities: HP/UES     [x]  Ther Exercise 45 3   [x]  Manual Therapy 8 1   []  Ther Activities     []  Aquatics     []  Vasocompression     []  Other     Total Treatment time 53        Assessment: [x] Progressing toward goals. Pt with improved PROM mobility this date, measurements listed above. [] No change. [] Other:    [x] Patient would continue to benefit from skilled physical therapy services in order to: address the impairments including very limited active and passive ROM of the left shoulder, weakness of the left shoulder, tightness and limited mobility of the cervical spine, and significant functional limitations including reaching overhead, lifting, pushing and pulling with the left UE.      STG: (to be met in 8 treatments)  1. ? Pain: Decrease left shoulder pain to 6/10 at rest--MET  2. ? ROM: Increase left shoulder AROM flexion to 75°, abd to 75°, IR to left PSIS;  Increase left PROM flexion to 90°, abd 90°, IR to 50°, ER to 35° --MET  3. ?

## 2020-09-30 ENCOUNTER — HOSPITAL ENCOUNTER (OUTPATIENT)
Dept: PHYSICAL THERAPY | Age: 50
Setting detail: THERAPIES SERIES
Discharge: HOME | End: 2020-09-30
Payer: MEDICARE

## 2020-09-30 PROCEDURE — 97110 THERAPEUTIC EXERCISES: CPT

## 2020-09-30 NOTE — FLOWSHEET NOTE
[x] [facility]) [facility] &  Therapy  [address]  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          Objective:  Modalities: HELD- 1 cervical HP, UES to left shoulder x 10-15 minutes--Estim remains on for remainder of exercises  Precautions:  Exercises:  Exercise Reps/ Time Weight/ Level Comments         UBE 6min L2.0 1/2 forward, 1/2 backwards         Mat:      Seated flexion slides on red wedge   On sliding board, uses towel roll   Seated scaption slides on red wedge   On sliding board, uses towel roll   Stool stretches-flexion 10x 10\" Hand stays in place on mat, slides on stool into flexion   Stool stretches- abduction 10x 10\"    Stool stretch- ER 10x 10\"          Seated cane flex 10x Seated cane abd 10x                 Seated inferior glide HEP 10\" [place] left hand under chair   Cervical AROM HEP 5\" All motions   Retro shoulder rolls HEP           Tumbleform   Standing at mat, rocks side to side         PROM 6 min  All motions, distraction with oscillations    Shoulder mobs 5min  Posterior mobs, inferior mobs, distraction         Supine therabar:      -chest press 10x cane    -ER 10x cane    -Flexion 10x cane    -Horizontal abd/adduction 10x cane          Codman's HEP  M-L, CW, CCW               Pulleys 2min ea  Flex, Scaption   Cvha-dn-bjry Flexion   Small yellow ball         Finger ladder 6x  Flexion         Theraband:      Lats 10x Lime    Rows 10x Lime    IR 10x Lime    ER 10x Lime    Tricep 10x Lime          Cane shd ext 10x  Standing   Cane shd IR 10x  Standing   Wall push-ups 10x     Other:  9/25/20--PROM- Flexion 130° (+30), Abduction 130° (+30), ER 50° (+10), IR 65° (no change)      Treatment Charges: Mins Units   []  Modalities: HP/UES     [x]  Ther Exercise 44 3   []  Manual Therapy     []  Ther Activities     []  Aquatics     []  Vasocompression     []  Other     Total Treatment time 44        Assessment: [] Progressing toward goals. [x] No change. Pt had significant pain with each exercise this date. Significant muscle guarding during PROM this date. [] Other:    [x] Patient would continue to benefit from skilled physical therapy services in order to: address the impairments including very limited active and passive ROM of the left shoulder, weakness of the left shoulder, tightness and limited mobility of the cervical spine, and significant functional limitations including reaching overhead, lifting, pushing and pulling with the left UE.      STG: (to be met in 8 treatments)  1. ? Pain: Decrease left shoulder pain to 6/10 at rest--MET  2. ? ROM: Increase left shoulder AROM flexion to 75°, abd to 75°, IR to left PSIS;  Increase left PROM flexion to 90°, abd 90°, IR to 50°, ER to 35° --MET  3. ? Strength: Increase flexion and abduction to 3/5--Progress made  4. ? Function: Improve UEFI to 75% impairment  5. Patient to be independent with home exercise program as demonstrated by performance with correct form without cues. --MET  LTG: (to be met in 12 treatments)  1. Improve UEFI to 60% impairment  2. Pt will be able to reach to shoulder height without difficulty  3. Pt will be able to push/pull with the left UE with little difficulty     Pt. Education:  [x] Yes  [] No  [x] Reviewed Prior HEP/Ed  Method of Education: [x] Verbal  [x] Demo  [] Written  Comprehension of Education:  [x] Verbalizes understanding. [x] Demonstrates understanding. [] Needs review. [] Demonstrates/verbalizes HEP/Ed previously given. Plan: [x] Continue current frequency toward long and short term goals. Pt would benefit from continuation of PT beyond the initial 12 visits. Will send a progress note requesting more. [x] Specific Instructions for subsequent treatments: Continue to progress as tolerates.         Time In: 10:00 am                  Time Out: 10:50 am    Electronically signed by:  Rhea Bell, PT

## 2020-10-02 ENCOUNTER — APPOINTMENT (OUTPATIENT)
Dept: PHYSICAL THERAPY | Age: 50
End: 2020-10-02
Payer: MEDICARE

## 2020-10-07 ENCOUNTER — HOSPITAL ENCOUNTER (OUTPATIENT)
Age: 50
Discharge: HOME | End: 2020-10-07
Payer: MEDICARE

## 2020-10-07 ENCOUNTER — HOSPITAL ENCOUNTER (OUTPATIENT)
Dept: PHYSICAL THERAPY | Age: 50
Setting detail: THERAPIES SERIES
Discharge: HOME | End: 2020-10-07
Payer: MEDICARE

## 2020-10-07 LAB
ESTIMATED AVERAGE GLUCOSE: 312 MG/DL
HBA1C MFR BLD: 12.5 % (ref 4–6)

## 2020-10-07 PROCEDURE — 36415 COLL VENOUS BLD VENIPUNCTURE: CPT

## 2020-10-07 PROCEDURE — 83036 HEMOGLOBIN GLYCOSYLATED A1C: CPT

## 2020-10-07 PROCEDURE — 97110 THERAPEUTIC EXERCISES: CPT

## 2020-10-07 NOTE — FLOWSHEET NOTE
[x] [facility]  [facility]patient [facility] &  Therapy  [address]  [phone]  F: [phone] [] [address] [facility] [address]   [address]  P: [phone]  F: [phone] [] [address]  Therapy  [address] [facility]  P: [phone]  F: [phone] [] [address]  [facility]   [address]   [place]: [phone]  F: [phone]      Physical Therapy Daily Treatment Note    Date:  10/7/2020  Patient Name:  Gian Guerra    :  1970  MRN: [id number]  Physician: Charles Sanders PA-C                     Insurance: [place] [employer]  Medical Diagnosis: Rotator cuff tendonitis, left (M75.82 [ICD-10-CM]); 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 States that she does a lot of reaching and lifting as well.         Objective:  Modalities: HELD- 1 cervical HP, UES to left shoulder x 10-15 minutes--Estim remains on for remainder of exercises  Precautions:  Exercises:  Exercise Reps/ Time Weight/ Level Comments         UBE 6min L2.0 1/2 forward, 1/2 backwards         Mat:      Seated flexion slides on red wedge   On sliding board, uses towel roll   Seated scaption slides on red wedge   On sliding board, uses towel roll   Stool stretches-flexion 10x 10\" Hand stays in place on mat, slides on stool into flexion   Stool stretches- abduction 10x 10\"    Stool stretch- ER 10x 10\"          Seated cane flex 10x 1lb    Seated cane abd 10x 1lb                Seated inferior glide HEP 10\" [place] left hand under chair   Cervical AROM HEP 5\" All motions   Retro shoulder rolls HEP           Tumbleform   Standing at mat, rocks side to side         PROM 6 min  All motions, distraction with oscillations    Shoulder mobs 5min  Posterior mobs, inferior mobs, distraction         Supine therabar:      -chest press 10x cane    -ER 10x cane    -Flexion 10x cane    -Horizontal abd/adduction 10x cane          Codman's HEP  M-L, CW, CCW               Pulleys 2min ea  Flex, Scaption   Ymyo-bc-oqcl Flexion   Small yellow ball         Finger ladder 6x  Flexion         Theraband:      Lats 15x Lime    Rows 15x Lime    IR 15x Lime    ER 15x Lime    Tricep 15x Lime          Cane shd ext 10x  Standing   Cane shd IR 10x  Standing   Wall push-ups 10x     Other:  9/25/20--PROM- Flexion 130° (+30), Abduction 130° (+30), ER 50° (+10), IR 65° (no change)      Treatment Charges: Mins Units   []  Modalities: HP/UES     [x]  Ther Exercise 50 4   [x]  Manual Therapy 4 0   []  Ther Activities     []  Aquatics     []  Vasocompression     []  Other     Total Treatment time 54        Assessment: [x] Progressing toward goals. Pt able to resume all exercises this date. Increased reps on theraband exercises without difficulty. Pt has pain at end range with all exercises but able to complete all reps. [] No change.       [] Other:    [x] Patient would continue to benefit from skilled physical therapy services in order to: address the impairments including very limited active and passive ROM of the left shoulder, weakness of the left shoulder, tightness and limited mobility of the cervical spine, and significant functional limitations including reaching overhead, lifting, pushing and pulling with the left UE.      STG: (to be met in 8 treatments)  1. ? Pain: Decrease left shoulder pain to 6/10 at rest--MET  2. ? ROM: Increase left shoulder AROM flexion to 75°, abd to 75°, IR to left PSIS; Increase left PROM flexion to 90°, abd 90°, IR to 50°, ER to 35° --MET  3. ? Strength: Increase flexion and abduction to 3/5--Progress made  4. ? Function: Improve UEFI to 75% impairment  5. Patient to be independent with home exercise program as demonstrated by performance with correct form without cues. --MET  LTG: (to be met in 12 treatments)  1. Improve UEFI to 60% impairment  2. Pt will be able to reach to shoulder height without difficulty  3. Pt will be able to push/pull with the left UE with little difficulty     Pt. Education:  [x] Yes  [] No  [x] Reviewed Prior HEP/Ed  Method of Education: [x] Verbal  [x] Demo  [] Written  Comprehension of Education:  [x] Verbalizes understanding. [x] Demonstrates understanding. [] Needs review. [] Demonstrates/verbalizes HEP/Ed previously given. Plan: [x] Continue current frequency toward long and short term goals. Pt would benefit from continuation of PT beyond the initial 12 visits. Will send a progress note requesting more. [x] Specific Instructions for subsequent treatments: Continue to progress as tolerates.         Time In: 10:00 am                  Time Out: 11:00 am    Electronically signed by:  Andrew Henry, PT

## 2020-10-09 ENCOUNTER — HOSPITAL ENCOUNTER (OUTPATIENT)
Dept: PHYSICAL THERAPY | Age: 50
Setting detail: THERAPIES SERIES
Discharge: HOME | End: 2020-10-09
Payer: MEDICARE

## 2020-10-09 NOTE — FLOWSHEET NOTE
[] [facility]) - [facility] &  Therapy  [address]    [phone]  F: [phone]   [] [address]   [address]  P: [phone]  F: [phone]  [] [address] &  Therapy  [address]  P: [phone]  F: [phone] [] [address]  P: [phone]  F: [phone]  [] [address]  [address] [facility] [address]   [address]   [place]: [phone]  F: [phone]   [] [facility]  [address] [facility] [address]  [place]: [phone]   F: [phone]     Physical Therapy Cancel/No Show note    Date: 10/9/2020  Patient: Michelle Orr  : 1970  MRN: [id number]        For today's appointment patient:    [x]  Cancelled    [] Rescheduled appointment    [] No-show     Reason given by patient:    []  Patient ill    []  Conflicting appointment    [] No transportation      [x] Conflict with work    [] No reason given    [] Weather related    [] COVID-19    [x] Other:      Comments: Pt cancelled this appt at her last appt on Wednesday.  Will not be held against the pt.        [] Next appointment was confirmed    Electronically signed by: Varinder Dutta PT

## 2020-10-14 ENCOUNTER — HOSPITAL ENCOUNTER (OUTPATIENT)
Dept: PHYSICAL THERAPY | Age: 50
Setting detail: THERAPIES SERIES
Discharge: HOME | End: 2020-10-14
Payer: MEDICARE

## 2020-10-14 PROCEDURE — 97110 THERAPEUTIC EXERCISES: CPT

## 2020-10-14 NOTE — FLOWSHEET NOTE
[x] [facility]) [facility] &  Therapy  [address]  [phone]  F: [phone] [] [address]  [place] [address]   [address]  P: [phone]  F: [phone] [] [address]  Therapy  [address]  P: [phone]  F: [phone] [] [address]  [facility]   [address]   [place]: [phone]  F: [phone]      Physical Therapy Daily Treatment Note    Date:  10/14/2020  Patient Name:  Iwona Hoang    :  1970  MRN: [id number]  Physician: Michelle Jacobs PA-C                     Insurance: [place] [employer]  Medical Diagnosis: Rotator cuff tendonitis, left (M75.82 [ICD-10-CM]); 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           Objective:  Modalities: HELD- 1 cervical HP, UES to left shoulder x 10-15 minutes--Estim remains on for remainder of exercises  Precautions:  Exercises:  Exercise Reps/ Time Weight/ Level Comments         UBE 6min L2.0 1/2 forward, 1/2 backwards         Mat:      Seated flexion slides on red wedge   On sliding board, uses towel roll   Seated scaption slides on red wedge   On sliding board, uses towel roll   Stool stretches-flexion 10x 10\" Hand stays in place on mat, slides on stool into flexion   Stool stretches- abduction 10x 10\"    Stool stretch- ER 10x 10\"          Seated cane flex 10x 1lb    Seated cane abd 10x 1lb                Seated inferior glide HEP 10\" [place] left hand under chair   Cervical AROM HEP 5\" All motions   Retro shoulder rolls HEP           Tumbleform   Standing at mat, rocks side to side         PROM 6 min  All motions, distraction with oscillations    Shoulder mobs 5min  Posterior mobs, inferior mobs, distraction         Supine therabar:      -chest press 10x cane    -ER 10x cane    -Flexion 10x cane    -Horizontal abd/adduction 10x cane          SL ER 2x10           Prone PRE\"s ADD                 Codman's HEP  M-L, CW, CCW               Pulleys 2min ea  Flex, Scaption   Tmih-vb-[id number] Flexion   Small yellow ball         Finger ladder 6x  Flexion         Theraband:      Lats 15x Lime    Rows 15x Lime    IR 15x Lime    ER 15x Lime    Tricep 15x Lime          Cane shd ext 10x  Standing   Cane shd IR 10x  Standing   Wall push-ups 10x           DB Scaption ADD           Other:  9/25/20--PROM- Flexion 130° (+30), Abduction 130° (+30), ER 50° (+10), IR 65° (no change)      Treatment Charges: Mins Units   []  Modalities: HP/UES     [x]  Ther Exercise 50 4   [x]  Manual Therapy 4 0   []  Ther Activities     []  Aquatics     []  Vasocompression     []  Other     Total Treatment time 54        Assessment: [x] Progressing toward goals. Added sidelying ER this date. [] No change. [x] Other: Pt continues to be very tight and limited with PROM all motions.       [x] Patient would continue to benefit from skilled physical therapy services in order to: address the impairments including very limited active and passive ROM of the left shoulder, weakness of the left shoulder, tightness and limited mobility of the cervical spine, and significant functional limitations including reaching overhead, lifting, pushing and pulling with the left UE.      STG: (to be met in 8 treatments)  1. ? Pain: Decrease left shoulder pain to 6/10 at rest--MET  2. ? ROM: Increase left shoulder AROM flexion to 75°, abd to 75°, IR to left PSIS; Increase left PROM flexion to 90°, abd 90°, IR to 50°, ER to 35° --MET  3. ? Strength: Increase flexion and abduction to 3/5--Progress made  4. ? Function: Improve UEFI to 75% impairment  5. Patient to be independent with home exercise program as demonstrated by performance with correct form without cues. --MET  LTG: (to be met in 12 treatments)  1. Improve UEFI to 60% impairment  2. Pt will be able to reach to shoulder height without difficulty  3. Pt will be able to push/pull with the left UE with little difficulty     Pt. Education:  [x] Yes  [] No  [x] Reviewed Prior HEP/Ed  Method of Education: [x] Verbal  [x] Demo  [] Written  Comprehension of Education:  [x] Verbalizes understanding. [x] Demonstrates understanding. [] Needs review. [] Demonstrates/verbalizes HEP/Ed previously given. Plan: [x] Continue current frequency toward long and short term goals. Pt would benefit from continuation of PT beyond the initial 12 visits. Will send a progress note requesting more. [x] Specific Instructions for subsequent treatments: Continue to progress as tolerates. Add prone PRE's and dumbbell scaption.       Time In: 10:00 am                  Time Out: 11:00 am    Electronically signed by:  Renae Grove PT

## 2020-10-16 ENCOUNTER — HOSPITAL ENCOUNTER (OUTPATIENT)
Dept: MAMMOGRAPHY | Age: 50
Discharge: HOME | End: 2020-10-18
Payer: MEDICARE

## 2020-10-16 PROCEDURE — 77063 BREAST TOMOSYNTHESIS BI: CPT

## 2020-10-21 ENCOUNTER — HOSPITAL ENCOUNTER (OUTPATIENT)
Dept: PHYSICAL THERAPY | Age: 50
Setting detail: THERAPIES SERIES
Discharge: HOME | End: 2020-10-21
Payer: MEDICARE

## 2020-10-21 PROCEDURE — 97110 THERAPEUTIC EXERCISES: CPT

## 2020-10-21 NOTE — FLOWSHEET NOTE
[x] [facility]) [facility] &  Therapy  [address]  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 States it is not under control. Blood sugar is high, took her insulin. States that she feels it is high due to stress from her new job. States she will be okay for the exercises. Pt did not give a pain raiting upon arrival today due to focusing on her blood sugar.        Objective:  Modalities: HELD- 1 cervical HP, UES to left shoulder x 10-15 minutes--Estim remains on for remainder of exercises  Precautions:  Exercises:  Exercise Reps/ Time Weight/ Level Comments         UBE 6min L2.0 1/2 forward, 1/2 backwards         Mat:      Seated flexion slides on red wedge   On sliding board, uses towel roll would continue to benefit from skilled physical therapy services in order to: address the impairments including very limited active and passive ROM of the left shoulder, weakness of the left shoulder, tightness and limited mobility of the cervical spine, and significant functional limitations including reaching overhead, lifting, pushing and pulling with the left UE.      STG: (to be met in 8 treatments)  1. ? Pain: Decrease left shoulder pain to 6/10 at rest--MET  2. ? ROM: Increase left shoulder AROM flexion to 75°, abd to 75°, IR to left PSIS; Increase left PROM flexion to 90°, abd 90°, IR to 50°, ER to 35° --MET  3. ? Strength: Increase flexion and abduction to 3/5--Progress made  4. ? Function: Improve UEFI to 75% impairment  5. Patient to be independent with home exercise program as demonstrated by performance with correct form without cues. --MET  LTG: (to be met in 12 treatments)  1. Improve UEFI to 60% impairment  2. Pt will be able to reach to shoulder height without difficulty  3. Pt will be able to push/pull with the left UE with little difficulty     Pt. Education:  [x] Yes  [] No  [x] Reviewed Prior HEP/Ed  Method of Education: [x] Verbal  [x] Demo  [] Written  Comprehension of Education:  [x] Verbalizes understanding. [x] Demonstrates understanding. [] Needs review. [] Demonstrates/verbalizes HEP/Ed previously given. Plan: [x] Continue current frequency toward long and short term goals. Pt would benefit from continuation of PT beyond the initial 12 visits. Will send a progress note requesting more. [x] Specific Instructions for subsequent treatments: Continue to progress as tolerates. Add dumbbell scaption.       Time In: 10:03 am                  Time Out:  10:58  am    Electronically signed by:  Rhea Bell PT

## 2020-10-26 ENCOUNTER — HOSPITAL ENCOUNTER (OUTPATIENT)
Dept: PHYSICAL THERAPY | Age: 50
Setting detail: THERAPIES SERIES
Discharge: HOME | End: 2020-10-26
Payer: MEDICARE

## 2020-10-26 ENCOUNTER — OFFICE VISIT (OUTPATIENT)
Dept: ORTHOPEDIC SURGERY | Age: 50
End: 2020-10-26
Payer: MEDICARE

## 2020-10-26 PROCEDURE — G8427 DOCREV CUR MEDS BY ELIG CLIN: HCPCS | Performed by: PHYSICIAN ASSISTANT

## 2020-10-26 PROCEDURE — 97110 THERAPEUTIC EXERCISES: CPT

## 2020-10-26 PROCEDURE — 3046F HEMOGLOBIN A1C LEVEL >9.0%: CPT | Performed by: PHYSICIAN ASSISTANT

## 2020-10-26 PROCEDURE — 99213 OFFICE O/P EST LOW 20 MIN: CPT | Performed by: PHYSICIAN ASSISTANT

## 2020-10-26 PROCEDURE — 3017F COLORECTAL CA SCREEN DOC REV: CPT | Performed by: PHYSICIAN ASSISTANT

## 2020-10-26 PROCEDURE — 4004F PT TOBACCO SCREEN RCVD TLK: CPT | Performed by: PHYSICIAN ASSISTANT

## 2020-10-26 PROCEDURE — G8484 FLU IMMUNIZE NO ADMIN: HCPCS | Performed by: PHYSICIAN ASSISTANT

## 2020-10-26 PROCEDURE — G8420 CALC BMI NORM PARAMETERS: HCPCS | Performed by: PHYSICIAN ASSISTANT

## 2020-10-26 PROCEDURE — 2022F DILAT RTA XM EVC RTNOPTHY: CPT | Performed by: PHYSICIAN ASSISTANT

## 2020-10-26 ASSESSMENT — ENCOUNTER SYMPTOMS
COUGH: 0
COLOR CHANGE: 0
VOMITING: 0
SHORTNESS OF BREATH: 0

## 2020-10-26 NOTE — PROGRESS NOTES
in 8 treatments)  1. ? Pain: Decrease left shoulder pain to 6/10 at rest--MET  2. ? ROM: Increase left shoulder AROM flexion to 75°, abd to 75°, IR to left PSIS; Increase left PROM flexion to 90°, abd 90°, IR to 50°, ER to 35° --MET  3. ? Strength: Increase flexion and abduction to 3/5--Progress made  4. ? Function: Improve UEFI to 75% impairment  5. Patient to be independent with home exercise program as demonstrated by performance with correct form without cues. --MET  LTG: (to be met in 12 treatments)  1. Improve UEFI to 60% impairment  2. Pt will be able to reach to shoulder height without difficulty--NOT MET  3. Pt will be able to push/pull with the left UE with little difficulty --NOT MET    Treatment Plan:  [x] Therapeutic Exercise   47573  [] Iontophoresis: 4 mg/mL Dexamethasone Sodium Phosphate  mAmin  74805   [] Therapeutic Activity  14983 [] Vasopneumatic cold with compression  45402    [] Gait Training   57571 [] Ultrasound   70087   [] Neuromuscular Re-education  72067 [x] Electrical Stimulation Unattended  23005   [x] Manual Therapy  65002 [] Electrical Stimulation Attended  86038   [x] Instruction in HEP  [] Lumbar/Cervical Traction  40421   [] Aquatic Therapy   09643 [x] Cold/hotpack    [] Massage   16896      [] Dry Needling, 1 or 2 muscles  93739   [] Biofeedback, first 15 minutes   29761  [] Biofeedback, additional 15 minutes   16460 [] Dry Needling, 3 or more muscles  88029       Patient Status:     [x] Continue per initial plan of care. [x] Additional visits necessary. Pt would benefit from continuation of formal physical therapy as she continues to have significantly limited ROM both passively and actively, continued high pain levels, and continued weakness noted in the left shoulder that is significantly impacting her level of function. [] Other:     Requested Frequency/Duration: 1 times per week for 8 treatments (possible 2x/week as work schedule allows).         Electronically signed by Marielena Hinds PT on 10/26/2020 at 10:56 AM      If you have any questions or concerns, please don't hesitate to call. Thank you for your referral.    Physician Signature:________________________________Date:__________________  By signing above or cosigning this note, I have reviewed this plan of care and certify a need for medically necessary rehabilitation services.      *PLEASE SIGN ABOVE AND FAX BACK ALL PAGES*

## 2020-10-26 NOTE — FLOWSHEET NOTE
[x] [facility]  Outpatient Rehabilitation &  Therapy  [address]  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 ([facility] (E11.618,M75.00 [ICD-10-CM])                           Rehab Codes: M25.512, M25.612, M62.81  Onset Date: 2019                    Next 's appt:     Visit# / total visits:  (added 6 visits )   Cancels/No Shows: 0/0    Subjective:    Pain:  [x] Yes  [] No Location: Left shoulder Pain Rating: (0-10 scale) 4/10  Pain altered Tx:  [x] No  [] Yes  Action:  Comments:     Objective:  Modalities: HELD- 1 cervical HP, UES to left shoulder x 10-15 minutes--Estim remains on for remainder of exercises  Precautions:  Exercises:  Exercise Reps/ Time Weight/ Level Comments         UBE 6min L2.0 1/2 forward, 1/2 backwards         Mat:      Seated flexion slides on red wedge   On sliding board, uses towel roll   Seated scaption slides on red wedge   On sliding board, uses towel roll   Stool stretches-flexion  10\" Hand stays in place on mat, slides on stool into flexion   Stool stretches- abduction  10\"    Stool stretch- ER  10\"          Seated cane flex 10x 1lb    Seated cane abd 10x 1lb                Seated inferior glide HEP 10\" [place] left hand under 75°, abd to 75°, IR to left PSIS; Increase left PROM flexion to 90°, abd 90°, IR to 50°, ER to 35° --MET  3. ? Strength: Increase flexion and abduction to 3/5--Progress made  4. ? Function: Improve UEFI to 75% impairment  5. Patient to be independent with home exercise program as demonstrated by performance with correct form without cues. --MET  LTG: (to be met in 12 treatments)  1. Improve UEFI to 60% impairment  2. Pt will be able to reach to shoulder height without difficulty  3. Pt will be able to push/pull with the left UE with little difficulty     Pt. Education:  [x] Yes  [] No  [x] Reviewed Prior HEP/Ed  Method of Education: [x] Verbal  [x] Demo  [] Written  Comprehension of Education:  [x] Verbalizes understanding. [x] Demonstrates understanding. [] Needs review. [] Demonstrates/verbalizes HEP/Ed previously given. Plan: [x] Continue current frequency toward long and short term goals. Pt states that the PA she saw this morning in the ortho clinic wants her to continue PT at least one time a week for now as she continues to be limited with her ROM. Pt wants to avoid surgery. Will send a progress note officially requesting more visits for approval.  [x] Specific Instructions for subsequent treatments: Continue to progress as tolerates. Add dumbbell scaption.       Time In: 9:50am                Time Out:  10:49  am    Electronically signed by:  Santa Calderon PT

## 2020-10-26 NOTE — PROGRESS NOTES
[facility]  [address] [facility] [address]  Dept: [phone]  Dept Fax: [phone]        Ambulatory Follow Up      Subjective:   Teena Le is a 48y.o. year old female who presents to our office today for routine followup regarding her   1. 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 Rotator cuff tendonitis, left    3. Nontraumatic tear of left supraspinatus tendon        Chief Complaint   Patient presents with    Shoulder Pain     left       HPI Teena Le  is a 48 y.o. Right hand dominant  female who presents today in follow for left shoulder pain, rotator cuff tendonitis, frozen shoulder and non-traumatic supraspinatus tear. The patient was last seen on 9/14/2020 and underwent treatment in the form of continue outpatient physical therapy, OTC analgesic medications as needed. The patient notes 70% improvement with the previous treatment and states that she is noting significant improvement in her left shoulder range of motion and strength. The patient is still having significant discomfort within the left shoulder with specific movements and has to rest or rub the left shoulder for short periods of time then she can get back to left shoulder motion activities. The patient has been going to outpatient physical therapy at 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 The patient states that she works 6 to 7 days/week and the only thing she cannot do at work is heavy lifting. Otherwise she is able to complete all tasks without discomfort. Patient states that due to her current work schedule she has transitioned from onsite physical therapy 3 times per week to 1 time a week at outpatient therapy and home exercises 2-3 times per week. The patient admits to intermittent tingling within the left hand but otherwise denies numbness.     Patient's last documented hemoglobin A1c was 12.5 on 10/7/2020. Prior to that it was 12.4 on 7/10/2020. Patient states that she is taking insulin, Januvia, and Metformin daily. Patient states that she has asked her primary care provider in the past if she can transition to an insulin pump so that her numbers can be better regulated. Review of Systems   Constitutional: Negative for activity change and fever. HENT: Negative for sneezing. Respiratory: Negative for cough and shortness of breath. Cardiovascular: Negative for chest pain. Gastrointestinal: Negative for vomiting. Musculoskeletal: Positive for arthralgias (Left shoulder). Negative for joint swelling and myalgias. Skin: Negative for color change. Neurological: Negative for weakness and numbness. Psychiatric/Behavioral: Negative for sleep disturbance. Objective : There were no vitals taken for this visit. There is no height or weight on file to calculate BMI. General: Marci Lombard is a 48 y.o. female who is alert and oriented and sitting comfortably in our office. Ortho Exam  MS: Evaluation of the left shoulder reveals no obvious deformity, no significant shoulder girdle muscle atrophy, erythema, ecchymosis, skin lesions or signs of infection present. Patient is mildly tender to palpation along the medial and lateral borders of the scapula and along the anterior lateral aspect of the left shoulder. Active range of motion left shoulder F= 100, AB = 110, Functional reach = LS junction. Internal and external rotation muscle strength testing is equal when compared bilaterally. Passive range of motion in external and external rotation is significantly improved since last visit. Sensation is intact to light touch to the left upper extremity without focal deficits present. Patient notes mild tingling in the distal aspect of the first second and third fingers. Patient has full range of motion of the cervical spine and left elbow without discomfort noted.   Neuro: alert and note is created with the assistance of a speech recognition program.  While intending to generate a document that actually reflects the content of the visit, the document can still have some errors including those of syntax and sound a like substitutions which may escape proof reading.  In such instances, actual meaning can be extrapolated by contextual diversion      Electronically signed by Miya Blunt PA-C on 10/26/2020 at 9:30 AM

## 2020-10-28 ENCOUNTER — HOSPITAL ENCOUNTER (OUTPATIENT)
Dept: PHYSICAL THERAPY | Age: 50
Setting detail: THERAPIES SERIES
Discharge: HOME | End: 2020-10-28
Payer: MEDICARE

## 2020-10-28 PROCEDURE — G0283 ELEC STIM OTHER THAN WOUND: HCPCS

## 2020-10-28 NOTE — FLOWSHEET NOTE
[x] [facility] &  Therapy  [address]  [phone]  F: [phone] [] [address]  [facility]   [address]  P: [phone]  F: [phone] [] [address]  [address] [facility]  P: [phone]  F: [phone] [] [address]  [facility]   [address]   [place]: [phone]  F: [phone]      Physical Therapy Daily Treatment Note    Date:  10/28/2020  Patient Name:  Domingo Prado    :  1970  MRN: [id number]  Physician: Edgardo Santos PA-C                     Insurance: [place] [employer]age  Medical Diagnosis: Rotator cuff tendonitis, left (M75.82 [ICD-10-CM]); 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       Objective:  Modalities: 1 cervical HP, UES to left shoulder x 20 minutes  Precautions:  Exercises: Held all exercises on 10/28 due to pt's high pain level and pt request  Exercise Reps/ Time Weight/ Level Comments         UBE 6min L2.0 1/2 forward, 1/2 backwards         Mat:      Seated flexion slides on red wedge   On sliding board, uses towel roll   Seated scaption slides on red wedge   On sliding board, uses towel roll   Stool stretches-flexion  10\" Hand stays in place on mat, slides on stool into flexion   Stool stretches- abduction  10\"    Stool stretch- ER  10\" Seated cane flex 10x 1lb    Seated cane abd 10x 1lb                Seated inferior glide HEP 10\" [place] left hand under chair   Cervical AROM HEP 5\" All motions   Retro shoulder rolls HEP           Tumbleform   Standing at mat, rocks side to side         PROM 6 min  All motions, distraction with oscillations    Shoulder mobs   Posterior mobs, inferior mobs, distraction         Supine therabar:      -chest press 10x cane    -ER 10x cane    -Flexion 10x cane    -Horizontal abd/adduction 10x cane          SL ER 2x10           Prone PRE's:      Rows 10x     Lats 10x           Codman's HEP  M-L, CW, CCW               Pulleys 2min ea  Flex, Scaption   [id number] Flexion   Small yellow ball         Finger ladder 6x  Flexion         Theraband:      Lats 15x Lime    Rows 15x Lime    IR 15x Lime    ER 15x Lime    Tricep 15x Lime          Cane shd ext 10x  Standing   Cane shd IR 10x  Standing   Wall push-ups 10x           DB Scaption ADD           Other:  9/25/20--PROM- Flexion 130° (+30), Abduction 130° (+30), ER 50° (+10), IR 65° (no change)      Treatment Charges: Mins Units   [x]  Modalities: HP/UES 20/20 0/1   []  Ther Exercise     []  Manual Therapy     []  Ther Activities     []  Aquatics     []  Vasocompression     []  Other     Total Treatment time 20        Assessment: [] Progressing toward goals. [x] No change. Pt unable to tolerate exercises this date, modalities done only. Pt rocking back and forth and rubbing her shoulder due to pain upon therapists return to take the pt off of modalities. Pt also in tears. Pt requested to not do exercises. [x] Other: Pt continues to be very tight and limited with PROM all motions.       [x] Patient would continue to benefit from skilled physical therapy services in order to: address the impairments including very limited active and passive ROM of the left shoulder, weakness of the left shoulder, tightness and limited mobility of the cervical spine, and significant functional limitations including reaching overhead, lifting, pushing and pulling with the left UE.      STG: (to be met in 8 treatments)  1. ? Pain: Decrease left shoulder pain to 6/10 at rest--MET  2. ? ROM: Increase left shoulder AROM flexion to 75°, abd to 75°, IR to left PSIS; Increase left PROM flexion to 90°, abd 90°, IR to 50°, ER to 35° --MET  3. ? Strength: Increase flexion and abduction to 3/5--Progress made  4. ? Function: Improve UEFI to 75% impairment  5. Patient to be independent with home exercise program as demonstrated by performance with correct form without cues. --MET  LTG: (to be met in 26 treatments)  1. Improve UEFI to 60% impairment  2. Pt will be able to reach to shoulder height without difficulty--NOT MET  3. Pt will be able to push/pull with the left UE with little difficulty --NOT MET    Pt. Education:  [x] Yes  [] No  [x] Reviewed Prior HEP/Ed  Method of Education: [x] Verbal  [x] Demo  [] Written  Comprehension of Education:  [x] Verbalizes understanding. [x] Demonstrates understanding. [] Needs review. [] Demonstrates/verbalizes HEP/Ed previously given. Plan: [x] Continue current frequency toward long and short term goals. Pt states that the PA she saw this morning in the ortho clinic wants her to continue PT at least one time a week for now as she continues to be limited with her ROM. Pt wants to avoid surgery. Will send a progress note officially requesting more visits for approval.  [x] Specific Instructions for subsequent treatments: Continue to progress as tolerates. Add dumbbell scaption.       Time In: 10:00 am                Time Out:  10:28  am    Electronically signed by:  Aminah Desai PT

## 2020-10-30 RX ORDER — MIRTAZAPINE 15 MG/1
15 TABLET, FILM COATED ORAL NIGHTLY
Qty: 30 TABLET | Refills: 3 | Status: SHIPPED | OUTPATIENT
Start: 2020-10-30 | End: 2021-01-11 | Stop reason: ALTCHOICE

## 2020-11-04 ENCOUNTER — APPOINTMENT (OUTPATIENT)
Dept: PHYSICAL THERAPY | Age: 50
End: 2020-11-04
Payer: MEDICARE

## 2020-11-11 ENCOUNTER — HOSPITAL ENCOUNTER (OUTPATIENT)
Dept: PHYSICAL THERAPY | Age: 50
Setting detail: THERAPIES SERIES
Discharge: HOME | End: 2020-11-11
Payer: MEDICARE

## 2020-11-11 ENCOUNTER — OFFICE VISIT (OUTPATIENT)
Dept: ORTHOPEDIC SURGERY | Age: 50
End: 2020-11-11
Payer: MEDICARE

## 2020-11-11 VITALS — BODY MASS INDEX: 22.82 KG/M2 | HEIGHT: 62 IN | WEIGHT: 124 LBS

## 2020-11-11 PROCEDURE — G8484 FLU IMMUNIZE NO ADMIN: HCPCS | Performed by: PHYSICIAN ASSISTANT

## 2020-11-11 PROCEDURE — 99213 OFFICE O/P EST LOW 20 MIN: CPT | Performed by: PHYSICIAN ASSISTANT

## 2020-11-11 PROCEDURE — G8427 DOCREV CUR MEDS BY ELIG CLIN: HCPCS | Performed by: PHYSICIAN ASSISTANT

## 2020-11-11 PROCEDURE — 3046F HEMOGLOBIN A1C LEVEL >9.0%: CPT | Performed by: PHYSICIAN ASSISTANT

## 2020-11-11 PROCEDURE — G8420 CALC BMI NORM PARAMETERS: HCPCS | Performed by: PHYSICIAN ASSISTANT

## 2020-11-11 PROCEDURE — 97110 THERAPEUTIC EXERCISES: CPT

## 2020-11-11 PROCEDURE — 3017F COLORECTAL CA SCREEN DOC REV: CPT | Performed by: PHYSICIAN ASSISTANT

## 2020-11-11 PROCEDURE — 4004F PT TOBACCO SCREEN RCVD TLK: CPT | Performed by: PHYSICIAN ASSISTANT

## 2020-11-11 PROCEDURE — 2022F DILAT RTA XM EVC RTNOPTHY: CPT | Performed by: PHYSICIAN ASSISTANT

## 2020-11-11 ASSESSMENT — ENCOUNTER SYMPTOMS
COLOR CHANGE: 0
COUGH: 0
VOMITING: 0
SHORTNESS OF BREATH: 0

## 2020-11-11 NOTE — PROGRESS NOTES
[x] [facility] &  Therapy  [address]  [phone]  F: [phone] [] [address]  [facility] 36   [address]  P: [phone]  F: [phone] [] [address] &  Therapy  [address] [facility] [address]  P: [phone]  F: [phone] [] [address]  P: [phone]  F: [phone] [] [address]  [facility]   [address]   [place]: [phone]  F: [phone]      Physical Therapy Progress/Discharge Note    Date: 2020      Patient: Reg Woodall  : 1970  MRN: [id number]    Physician: Helena Trivedi PA-C                     Insurance: [place] [employer]vantage  Medical Diagnosis: Rotator cuff tendonitis, left (M75.82 [ICD-10-CM]); 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 Yes  []? No   Location: Left shoulder           Pain Rating: (0-10 scale) 810  Pain altered Tx:  [x]? No  []? Yes  Action:  Comments: Pt continues to be in severe pain. States that she went to [place] [address] ER for her diabetes and her shoulder pain and got an injection. States that her shoulder is not getting any better. Declined exercises this date, therapist recommended taking measurements today and making it her last.  Pt to schedule follow up soon with ortho doctor to discuss other treatment options.       Objective:  Test Measurements: PROM- Flexion 80° (-50), Abduction 70° (-60), ER 30° (-20), IR 40° (-20); Seated AROM: Flexion 60°, Abd 58° **Pt very guarded with passive ROM  Function: Very limited with all functional activities involving the left UE    Assessment:  STG: (to be met in 8 treatments)  1. ? Pain: Decrease left shoulder pain to 6/10 at rest--NOT MET  2. ? ROM: Increase left shoulder AROM flexion to 75°, abd to 75°, IR to left PSIS; Increase left PROM flexion to 90°, abd 90°, IR to 50°, ER to 35° --NOT MET  3. ? Strength: Increase flexion and abduction to 3/5--NOT MET  4. ? Function: Improve UEFI to 75% impairment--NOT MET  5. Patient to be independent with home exercise program as demonstrated by performance with correct form without cues. --MET  LTG: (to be met in 26 treatments)  1. Improve UEFI to 60% impairment--NOT MET  2. Pt will be able to reach to shoulder height without difficulty--NOT MET  3. Pt will be able to push/pull with the left UE with little difficulty --NOT MET    Treatment Plan:  [x] Therapeutic Exercise   42675  [] Iontophoresis: 4 mg/mL Dexamethasone Sodium Phosphate  mAmin  01310   [] Therapeutic Activity  62372 [] Vasopneumatic cold with compression  07111    [] Gait Training   86989 [] Ultrasound   24252   [] Neuromuscular Re-education  13125 [x] Electrical Stimulation Unattended  49527   [x] Manual Therapy  67024 [] Electrical Stimulation Attended  08085   [x] Instruction in HEP  [] Lumbar/Cervical Traction  09857   [] Aquatic Therapy   42192 [x] Cold/hotpack    [] Massage   57693      [] Dry Needling, 1 or 2 muscles  73646   [] Biofeedback, first 15 minutes   12139  [] Biofeedback, additional 15 minutes   74612 [] Dry Needling, 3 or more muscles  95233       Patient Status:     [] Continue per initial plan of care. [] Additional visits necessary. [x] Other: Plan to discharge at this time due to pt not making any progress. Pt has actually regressed significantly these last few weeks. Pt to follow up with ortho clinic for further recommendations.          Electronically

## 2020-11-11 NOTE — FLOWSHEET NOTE
[x] [facility] &  Therapy  [address]  [phone]  F: [phone] [] [address]  [place] [address]   [address]  P: [phone]  F: [phone] [] [place]  [address] [facility] [address]  P: [phone]  F: [phone] [] [address]  [facility]   [address]   [place]: [phone]  F: [phone]      Physical Therapy Daily Treatment Note    Date:  2020  Patient Name:  Deon Mendenhall    :  1970  MRN: [id number]  Physician: Rosaura Simons PA-C                     Insurance: [place] [employer]age  Medical Diagnosis: Rotator cuff tendonitis, left (M75.82 [ICD-10-CM]); 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 States that she went to [place] [address] ER for her diabetes and her shoulder pain and got an injection. States that her shoulder is not getting any better. Declined exercises this date, therapist recommended taking measurements today and making it her last.  Pt to schedule follow up soon with ortho doctor to discuss other treatment options.       Objective:  Modalities: 1 cervical HP, UES to left shoulder x 20 minutes  Precautions:  Exercises: Held all exercises on 10/28 due to pt's high pain level and pt request  Exercise Reps/ Time Weight/ Level Comments         UBE 6min L2.0 1/2 forward, 1/2 backwards         Mat:      Seated flexion slides on red wedge   On sliding board, uses towel roll   Seated scaption slides on red wedge   On sliding board, uses towel roll   Stool stretches-flexion  10\" Hand stays in place on mat, slides on stool into flexion   Stool stretches- abduction  10\"    Stool stretch- ER  10\"          Seated cane flex 10x 1lb    Seated cane abd 10x 1lb                Seated inferior glide HEP 10\" [place] left hand under chair   Cervical AROM HEP 5\" All motions   Retro shoulder rolls HEP           Tumbleform   Standing at mat, rocks side to side         PROM 6 min  All motions, distraction with oscillations    Shoulder mobs   Posterior mobs, inferior mobs, distraction         Supine therabar:      -chest press 10x cane    -ER 10x cane    -Flexion 10x cane    -Horizontal abd/adduction 10x cane          SL ER 2x10           Prone PRE's:      Rows 10x     Lats 10x           Codman's HEP  M-L, CW, CCW               Pulleys 2min ea  Flex, Scaption   [id number] Flexion   Small yellow ball         Finger ladder 6x  Flexion         Theraband:      Lats 15x Lime    Rows 15x Lime    IR 15x Lime    ER 15x Lime    Tricep 15x Lime          Cane shd ext 10x  Standing   Cane shd IR 10x  Standing   Wall push-ups 10x           DB Scaption ADD           Other:  11/11/20--PROM- Flexion 80° (-50), Abduction 70° (-60), ER 30° (-20), IR 40° (-20); Seated AROM: Flexion 60°, Abd 58°      Treatment Charges: Mins Units     Modalities: HP/UES     [x]  Ther Exercise 15 1   []  Manual Therapy     []  Ther Activities     []  Aquatics     []  Vasocompression     []  Other     Total Treatment time 15        Assessment: [] Progressing toward goals. [x] No change. See progress note/discharge note. [x] Other: Pt continues to be very tight and limited with PROM all motions.       [x] Patient would continue to benefit from skilled physical therapy services in order to: address the impairments including very limited active and passive ROM of the left shoulder, weakness of the left shoulder, tightness and limited mobility of the cervical spine, and significant functional limitations including reaching overhead, lifting, pushing and pulling with the left UE.      STG: (to be met in 8 treatments)  1. ? Pain: Decrease left shoulder pain to 6/10 at rest--NOT MET  2. ? ROM: Increase left shoulder AROM flexion to 75°, abd to 75°, IR to left PSIS; Increase left PROM flexion to 90°, abd 90°, IR to 50°, ER to 35° --NOT MET  3. ? Strength: Increase flexion and abduction to 3/5--NOT MET  4. ? Function: Improve UEFI to 75% impairment--NOT MET  5. Patient to be independent with home exercise program as demonstrated by performance with correct form without cues. --MET  LTG: (to be met in 26 treatments)  1. Improve UEFI to 60% impairment--NOT MET  2. Pt will be able to reach to shoulder height without difficulty--NOT MET  3. Pt will be able to push/pull with the left UE with little difficulty --NOT MET    Pt. Education:  [x] Yes  [] No  [x] Reviewed Prior HEP/Ed  Method of Education: [x] Verbal  [x] Demo  [] Written  Comprehension of Education:  [x] Verbalizes understanding. [x] Demonstrates understanding. [] Needs review. [] Demonstrates/verbalizes HEP/Ed previously given. Plan: [x] Continue current frequency toward long and short term goals. Pt states that the PA she saw this morning in the ortho clinic wants her to continue PT at least one time a week for now as she continues to be limited with her ROM. Pt wants to avoid surgery. Will send a progress note officially requesting more visits for approval.  [x] Specific Instructions for subsequent treatments: Continue to progress as tolerates. Add dumbbell scaption.       Time In: 10:00 am                Time Out:  10:15  am    Electronically signed by:  Renae Grove PT

## 2020-11-11 NOTE — PROGRESS NOTES
injury to the left shoulder. The patient was recommended to follow-up in our clinic today for further evaluation and treatment options. Review of Systems   Constitutional: Negative for activity change and fever. HENT: Negative for sneezing. Respiratory: Negative for cough and shortness of breath. Cardiovascular: Negative for chest pain. Gastrointestinal: Negative for vomiting. Musculoskeletal: Positive for arthralgias (Left shoulder). Negative for joint swelling and myalgias. Skin: Negative for color change. Neurological: Positive for weakness (Left shoulder). Negative for numbness. Psychiatric/Behavioral: Negative for sleep disturbance. Objective :   Ht 5' 2\" (1.575 m)   Wt 124 lb (56.2 kg)   BMI 22.68 kg/m²  Body mass index is 22.68 kg/m². General: Iwona Hoang is a 48 y.o. female who is alert and oriented and sitting comfortably in our office. Ortho Exam  MS:  Evaluation of the left shoulder reveals no obvious deformity, no significant shoulder girdle muscle atrophy, erythema, ecchymosis, skin lesions or signs of infection present. Patient is mildly tender to palpation along the medial and lateral borders of the scapula and along the anterior lateral aspect of the left shoulder. Active range of motion left shoulder F= 60 , AB = 55 , Functional reach = LS junction. Internal and external rotation muscle strength testing is weak on the left when compared bilaterally. Passive range of motion in external and external rotation is limited due to pain and a firm endpoint. Sensation is intact to light touch to the left upper extremity without focal deficits present. Patient notes mild tingling in the distal aspect of the first second and third fingers. Patient has full range of motion of the cervical spine and left elbow without discomfort noted. Neuro: alert and oriented to person and place.    Eyes: Extra-ocular muscles intact  Mouth: Oral mucosa moist. No perioral lesions  Pulm: Respirations unlabored and regular. Symmetric chest excursion without outward deformity is noted. Skin: warm, well perfused  Psych:   Patient has good fund of knowledge and displays understanging of exam, diagnosis, and plan. Radiology:     MRI SHOULDER LEFT - 7/13/2020    Impression:  1. Less than 50% partial-thickness articular-surface and interstitial tearing    of mid supraspinatus along the footplate and between critical zone and    footplate respectively.  Shallow partial-thickness articular surface tearing    along the footplate of anterior supraspinatus.  Moderate underlying    supraspinatus tendinopathy. 2. Mild infraspinatus and subscapularis tendinopathy. 3. Degenerative tearing of the superior labrum.  Mild underlying labral    degeneration. 4. Intermediate T1 signal thickening of the inferior glenohumeral ligament    which can be seen in the clinical setting of adhesive capsulitis.             Assessment:      1. 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 Rotator cuff tendonitis, left    3. Nontraumatic tear of left supraspinatus tendon       Plan:      Due to the patient's increase in pain and continued decrease in range of motion and functional ability with the left upper extremity, I discussed the patient case with Dr. Maddie Blankenship DO and he mentioned the option of a manipulation under anesthesia of the patient's left shoulder. I proposed the idea to the patient today in office and she is ready to proceed. I spoke with the patient about the benefits and risks associated with the procedure, answered her questions and spoke to her about post-operative care and rehabilitation. The patient is aware that she will have to work very hard and go to outpatient physical therapy 5 days/week for 2 weeks after surgery. The patient voiced her understanding. The patient signed the surgical consent form in office today.   We discussed that our surgery scheduler will call her in regards to pre-operative clearance/testing, surgery date and post-operative follow up. Patient surgery will likely be on 12/1/2020 due to the fact that [facility]) outpatient physical therapy offices are closed on Thursday and Friday the week of Thanksgiving (11/26-11/27/2020). At this point the patient should continue her current pain management regimen including Tylenol, ibuprofen, Biofreeze and Norco as needed, the Norco as prescribed from the emergency department on 10/29/2020 the patient states that she is only taking one half of a pill intermittently. I discussed with the patient that us she needs to go to the ED that we recommend she comes here or to another [facility] so that she can have continuity of care and other providers can read her current notes in the chart. The patient voiced her understanding. Patient will follow up 2 weeks after her manipulation under anesthesia. The patient was instructed to call our office with any questions or concerns. Follow up:Return for 2 weeks post-op. No orders of the defined types were placed in this encounter. No orders of the defined types were placed in this encounter. This note is created with the assistance of a speech recognition program.  While intending to generate a document that actually reflects the content of the visit, the document can still have some errors including those of syntax and sound a like substitutions which may escape proof reading.   In such instances, actual meaning can be extrapolated by contextual diversion     Electronically signed by Evans Ahn PA-C on 11/11/2020 at 4:14 PM

## 2020-11-13 ENCOUNTER — TELEPHONE (OUTPATIENT)
Dept: ORTHOPEDIC SURGERY | Age: 50
End: 2020-11-13

## 2020-11-18 ENCOUNTER — APPOINTMENT (OUTPATIENT)
Dept: PHYSICAL THERAPY | Age: 50
End: 2020-11-18
Payer: MEDICARE

## 2020-11-23 RX ORDER — SODIUM CHLORIDE, SODIUM LACTATE, POTASSIUM CHLORIDE, CALCIUM CHLORIDE 600; 310; 30; 20 MG/100ML; MG/100ML; MG/100ML; MG/100ML
1000 INJECTION, SOLUTION INTRAVENOUS CONTINUOUS
Status: CANCELLED | OUTPATIENT
Start: 2020-11-23

## 2020-11-24 ENCOUNTER — HOSPITAL ENCOUNTER (OUTPATIENT)
Dept: PREADMISSION TESTING | Age: 50
Discharge: HOME | End: 2020-11-28
Payer: MEDICARE

## 2020-11-24 ENCOUNTER — HOSPITAL ENCOUNTER (OUTPATIENT)
Dept: GENERAL RADIOLOGY | Age: 50
Discharge: HOME | End: 2020-11-26
Payer: MEDICARE

## 2020-11-24 VITALS
TEMPERATURE: 97.7 F | DIASTOLIC BLOOD PRESSURE: 63 MMHG | BODY MASS INDEX: 21.71 KG/M2 | SYSTOLIC BLOOD PRESSURE: 90 MMHG | OXYGEN SATURATION: 99 % | HEIGHT: 62 IN | RESPIRATION RATE: 16 BRPM | WEIGHT: 118 LBS | HEART RATE: 103 BPM

## 2020-11-24 LAB
-: ABNORMAL
ALBUMIN SERPL-MCNC: 4.6 G/DL (ref 3.5–5.2)
ALBUMIN/GLOBULIN RATIO: 1.4 (ref 1–2.5)
ALP BLD-CCNC: 80 U/L (ref 35–104)
ALT SERPL-CCNC: 19 U/L (ref 5–33)
AMORPHOUS: ABNORMAL
ANION GAP SERPL CALCULATED.3IONS-SCNC: 11 MMOL/L (ref 9–17)
AST SERPL-CCNC: 17 U/L
BACTERIA: ABNORMAL
BILIRUB SERPL-MCNC: 0.28 MG/DL (ref 0.3–1.2)
BILIRUBIN URINE: NEGATIVE
BUN BLDV-MCNC: 18 MG/DL (ref 6–20)
BUN/CREAT BLD: ABNORMAL (ref 9–20)
CALCIUM SERPL-MCNC: 10.2 MG/DL (ref 8.6–10.4)
CASTS UA: ABNORMAL /LPF (ref 0–2)
CASTS UA: ABNORMAL /LPF (ref 0–2)
CHLORIDE BLD-SCNC: 104 MMOL/L (ref 98–107)
CO2: 24 MMOL/L (ref 20–31)
COLOR: ABNORMAL
COMMENT UA: ABNORMAL
CREAT SERPL-MCNC: 1.04 MG/DL (ref 0.5–0.9)
CRYSTALS, UA: ABNORMAL /HPF
CRYSTALS, UA: ABNORMAL /HPF
EPITHELIAL CELLS UA: ABNORMAL /HPF (ref 0–5)
GFR AFRICAN AMERICAN: >60 ML/MIN
GFR NON-AFRICAN AMERICAN: 56 ML/MIN
GFR SERPL CREATININE-BSD FRML MDRD: ABNORMAL ML/MIN/{1.73_M2}
GFR SERPL CREATININE-BSD FRML MDRD: ABNORMAL ML/MIN/{1.73_M2}
GLUCOSE BLD-MCNC: 157 MG/DL (ref 70–99)
GLUCOSE URINE: ABNORMAL
HCT VFR BLD CALC: 40.3 % (ref 36.3–47.1)
HEMOGLOBIN: 12.6 G/DL (ref 11.9–15.1)
KETONES, URINE: ABNORMAL
LEUKOCYTE ESTERASE, URINE: ABNORMAL
MCH RBC QN AUTO: 30.1 PG (ref 25.2–33.5)
MCHC RBC AUTO-ENTMCNC: 31.3 G/DL (ref 28.4–34.8)
MCV RBC AUTO: 96.4 FL (ref 82.6–102.9)
MUCUS: ABNORMAL
NITRITE, URINE: NEGATIVE
NRBC AUTOMATED: 0 PER 100 WBC
OTHER OBSERVATIONS UA: ABNORMAL
PDW BLD-RTO: 13 % (ref 11.8–14.4)
PH UA: 5 (ref 5–8)
PLATELET # BLD: 336 K/UL (ref 138–453)
PMV BLD AUTO: 11.4 FL (ref 8.1–13.5)
POTASSIUM SERPL-SCNC: 4.6 MMOL/L (ref 3.7–5.3)
PROTEIN UA: ABNORMAL
RBC # BLD: 4.18 M/UL (ref 3.95–5.11)
RBC UA: ABNORMAL /HPF (ref 0–2)
RENAL EPITHELIAL, UA: ABNORMAL /HPF
SODIUM BLD-SCNC: 139 MMOL/L (ref 135–144)
SPECIFIC GRAVITY UA: 1.03 (ref 1–1.03)
TOTAL PROTEIN: 7.9 G/DL (ref 6.4–8.3)
TRICHOMONAS: ABNORMAL
TURBIDITY: ABNORMAL
URINE HGB: NEGATIVE
UROBILINOGEN, URINE: NORMAL
WBC # BLD: 10.1 K/UL (ref 3.5–11.3)
WBC UA: ABNORMAL /HPF (ref 0–5)
YEAST: ABNORMAL

## 2020-11-24 PROCEDURE — 71046 X-RAY EXAM CHEST 2 VIEWS: CPT

## 2020-11-24 PROCEDURE — 85027 COMPLETE CBC AUTOMATED: CPT

## 2020-11-24 PROCEDURE — 36415 COLL VENOUS BLD VENIPUNCTURE: CPT

## 2020-11-24 PROCEDURE — 81001 URINALYSIS AUTO W/SCOPE: CPT

## 2020-11-24 PROCEDURE — 93005 ELECTROCARDIOGRAM TRACING: CPT

## 2020-11-24 PROCEDURE — 80053 COMPREHEN METABOLIC PANEL: CPT

## 2020-11-24 ASSESSMENT — PAIN SCALES - GENERAL: PAINLEVEL_OUTOF10: 6

## 2020-11-24 ASSESSMENT — PAIN DESCRIPTION - PAIN TYPE: TYPE: CHRONIC PAIN

## 2020-11-24 ASSESSMENT — PAIN DESCRIPTION - DESCRIPTORS: DESCRIPTORS: CONSTANT

## 2020-11-24 ASSESSMENT — PAIN DESCRIPTION - LOCATION: LOCATION: SHOULDER

## 2020-11-24 ASSESSMENT — PAIN DESCRIPTION - ORIENTATION: ORIENTATION: LEFT

## 2020-11-24 NOTE — H&P
History and Physical    Pt Name: Vangie Liang  MRN: [id number]  YOB: 1970  Date of evaluation: 11/24/2020    SUBJECTIVE:   History of Chief Complaint:    Patient complains of left \"frozen shoulder. \"  She denies any injury to the shoulder, says that it happened suddenly. She had diabetes, says that she has a \"partial muscle tear\" she thinks as well. She has been scheduled for left shoulder manipulation. 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 Past Surgical History   has a past surgical history that includes Tubal ligation. Medications  Prior to Admission medications    Medication Sig Start Date End Date Taking? Authorizing Provider   mirtazapine (REMERON) 15 MG tablet TAKE 1 TABLET BY MOUTH NIGHTLY 10/30/20  Yes Skye Reyna, DO   meloxicam (MOBIC) 15 MG tablet Take 1 tablet by mouth daily 8/3/20  Yes Helena Trivedi PA-C   insulin lispro, 1 Unit Dial, (HUMALOG KWIKPEN) 100 UNIT/ML SOPN Inject 10 Units into the skin 3 times daily (before meals) 8/2/20  Yes Skye Reyna, DO   blood glucose monitor strips Test 4 times a day & as needed for symptoms of irregular blood glucose.  7/10/20  Yes Skye Reyna DO   SITagliptin (JANUVIA) 50 MG tablet Take 1 tablet by mouth daily 7/10/20  Yes Skye Reyna DO   lisinopril (PRINIVIL;ZESTRIL) 20 MG tablet Take 1 tablet by mouth daily 7/10/20  Yes Skye Reyna DO   levothyroxine (SYNTHROID) 25 MCG tablet Take 1 tablet by mouth daily 7/10/20  Yes Skye Reyna DO   glipiZIDE (GLUCOTROL XL) 10 MG extended release tablet Take 1 tablet by mouth daily 7/10/20  Yes Skye Reyna DO   atorvastatin (LIPITOR) 40 MG tablet Take 1 tablet by mouth daily 7/10/20  Yes Tri Liang DO   aspirin 81 MG EC tablet Take 1 tablet by mouth daily 7/10/20  Yes Tri Liang DO   Multiple Vitamins-Minerals (MULTIVITAMIN ADULT) TABS Take 1 tablet by mouth daily 7/10/20  Yes Tri Liang DO   insulin glargine (LANTUS SOLOSTAR) 100 UNIT/ML injection pen Inject 40 Units into the skin nightly 7/10/20  Yes Tri Liang DO   Insulin Pen Needle ([employer] PEN NEEDLES) 31G X 6 MM MISC 1 box by Does not apply route 4 times daily 7/10/20  Yes Tri Liang DO   gabapentin (NEURONTIN) 600 MG tablet Take 1 tablet by mouth daily for 30 days. 7/10/20 11/24/20 Yes Tri Liang DO   metFORMIN (GLUCOPHAGE) 500 MG tablet Take 2 tablets by mouth 2 times daily (with meals)  Patient taking differently: Take 1,000 mg by mouth 2 times daily (with meals) Taking 1 tablets twice daily 7/10/20  Yes Tri Liang DO   Blood Glucose Monitoring Suppl (ONE TOUCH ULTRA 2) w/Device KIT 1 kit by Does not apply route daily 2/20/20  Yes Tri Liang DO   insulin aspart (NOVOLOG FLEXPEN) 100 UNIT/ML injection pen Inject 10 Units into the skin 3 times daily (before meals) 7/22/20   Tri Liang DO     Allergies  has No Known Allergies. Family History  Benign, somewhat unknown by patient. Social History   reports that she has been smoking cigarettes. She has a 17.50 pack-year smoking history. She has never used smokeless tobacco.  1/2 ppd for 35 years. reports no history of alcohol use. reports no history of drug use. Marital Status single  Occupation none    OBJECTIVE:   VITALS:  height is 5' 2\" (1.575 m) and weight is 118 lb (53.5 kg). Her temporal temperature is 97.7 °F (36.5 °C). Her blood pressure is 90/63 and her pulse is 103. Her respiration is 16 and oxygen saturation is 99%. CONSTITUTIONAL:alert & oriented x 3, no acute distress. Quiet. Present today with family member. Somewhat of a poor historian. SKIN:  Warm and dry, no rashes on exposed areas of skin.   HEAD: Normocephalic, atraumatic   EYES: PERRL. EOMs intact. EARS:  Hearing grossly WNL. NOSE:  Nares patent. No rhinorrhea   MOUTH/THROAT:  benign  NECK:supple, no lymphadenopathy  LUNGS: Clear to auscultation bilaterally, no wheezes. CARDIOVASCULAR: Heart sounds are normal.  Regular rate and rhythm without murmur. ABDOMEN: soft, non tender, non distended. Thin abdomen. EXTREMITIES: no edema bilateral lower extremities. Restricted ROM left shoulder. Testing:   EK20  Labs pending: drawn 2020   Chest XRay:  20    IMPRESSIONS:   1. Adhesive capsulitis  2.  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 PLANS:   1.  Shoulder manipulation under sedation, left    LILLIAN SUAREZ PA-C  Electronically signed 2020 at 10:06 AM

## 2020-11-24 NOTE — PROGRESS NOTES
Anesthesia Focused Assessment    STOP-BANG Sleep Apnea Questionnaire    SNORE loudly (heard through closed doors)? No  TIRED, fatigued, sleepy during daytime? Yes  OBSERVED stopping breathing during sleep? No  High blood PRESSURE being treated? Yes    BMI over 35? No  AGE over 48? Yes  NECK circumference over 16\"? No  GENDER (male)? No             Total 3  High risk 5-8  Intermediate risk 3-4  Low risk 0-2    Obstructive Sleep Apnea: denies  If YES, machine used: no     Type 1 DM:   no  T2DM:  Yes, on insulin    Coronary Artery Disease:  no  Hypertension:  yes    Active smoker:  1/2 ppd for 35 years  Drinks Alcohol:  no    Dentition: missing upper teeth, needs partial denture but does not have one yet. Defib / AICD / Pacemaker: no      Renal Failure/dialysis:  no    Patient was evaluated in PAT & anesthesia guidelines were applied. NPO guidelines, medication instructions and scheduled arrival time were reviewed with patient.     Hx of anesthesia complications:  no  Family hx of anesthesia complications:  no                                                                                                                     Anesthesia contacted:   no  Medical or cardiac clearance ordered: no    LILLIAN SUAREZ PA-C  11/24/20  8:23 AM

## 2020-11-24 NOTE — H&P (VIEW-ONLY)
7/10/20  Yes Ngozi Wallaceer, DO   aspirin 81 MG EC tablet Take 1 tablet by mouth daily 7/10/20  Yes Ngozi Madisoner, DO   Multiple Vitamins-Minerals (MULTIVITAMIN ADULT) TABS Take 1 tablet by mouth daily 7/10/20  Yes Ngozi Wallaceer, DO   insulin glargine (LANTUS SOLOSTAR) 100 UNIT/ML injection pen Inject 40 Units into the skin nightly 7/10/20  Yes Ngozi Wallaceer, DO   Insulin Pen Needle ([employer] PEN NEEDLES) 31G X 6 MM MISC 1 box by Does not apply route 4 times daily 7/10/20  Yes Ngozi Wallaceer, DO   gabapentin (NEURONTIN) 600 MG tablet Take 1 tablet by mouth daily for 30 days. 7/10/20 11/24/20 Yes Ngozi Wallaceer, DO   metFORMIN (GLUCOPHAGE) 500 MG tablet Take 2 tablets by mouth 2 times daily (with meals)  Patient taking differently: Take 1,000 mg by mouth 2 times daily (with meals) Taking 1 tablets twice daily 7/10/20  Yes Ngozi Wallaceer, DO   Blood Glucose Monitoring Suppl (ONE TOUCH ULTRA 2) w/Device KIT 1 kit by Does not apply route daily 2/20/20  Yes Ngozi Wallaceer, DO   insulin aspart (NOVOLOG FLEXPEN) 100 UNIT/ML injection pen Inject 10 Units into the skin 3 times daily (before meals) 7/22/20   Ngozi Wallaceer, DO     Allergies  has No Known Allergies. Family History  Benign, somewhat unknown by patient. Social History   reports that she has been smoking cigarettes. She has a 17.50 pack-year smoking history. She has never used smokeless tobacco.  1/2 ppd for 35 years. reports no history of alcohol use. reports no history of drug use. Marital Status single  Occupation none    OBJECTIVE:   VITALS:  height is 5' 2\" (1.575 m) and weight is 118 lb (53.5 kg). Her temporal temperature is 97.7 °F (36.5 °C). Her blood pressure is 90/63 and her pulse is 103. Her respiration is 16 and oxygen saturation is 99%. CONSTITUTIONAL:alert & oriented x 3, no acute distress. Quiet. Present today with family member. Somewhat of a poor historian. SKIN:  Warm and dry, no rashes on exposed areas of skin.   HEAD:

## 2020-11-25 LAB
EKG ATRIAL RATE: 86 BPM
EKG P AXIS: 50 DEGREES
EKG P-R INTERVAL: 140 MS
EKG Q-T INTERVAL: 372 MS
EKG QRS DURATION: 78 MS
EKG QTC CALCULATION (BAZETT): 445 MS
EKG R AXIS: 55 DEGREES
EKG T AXIS: 37 DEGREES
EKG VENTRICULAR RATE: 86 BPM

## 2020-11-25 PROCEDURE — 93010 ELECTROCARDIOGRAM REPORT: CPT | Performed by: INTERNAL MEDICINE

## 2020-11-27 ENCOUNTER — HOSPITAL ENCOUNTER (OUTPATIENT)
Age: 50
Setting detail: SPECIMEN
Discharge: HOME | End: 2020-11-27
Payer: MEDICARE

## 2020-11-27 ENCOUNTER — HOSPITAL ENCOUNTER (OUTPATIENT)
Dept: PREADMISSION TESTING | Age: 50
Setting detail: SPECIMEN
Discharge: HOME | End: 2020-12-01
Payer: MEDICARE

## 2020-11-27 PROCEDURE — U0003 INFECTIOUS AGENT DETECTION BY NUCLEIC ACID (DNA OR RNA); SEVERE ACUTE RESPIRATORY SYNDROME CORONAVIRUS 2 (SARS-COV-2) (CORONAVIRUS DISEASE [COVID-19]), AMPLIFIED PROBE TECHNIQUE, MAKING USE OF HIGH THROUGHPUT TECHNOLOGIES AS DESCRIBED BY CMS-2020-01-R: HCPCS

## 2020-11-29 LAB — SARS-COV-2, NAA: NOT DETECTED

## 2020-12-01 ENCOUNTER — HOSPITAL ENCOUNTER (OUTPATIENT)
Dept: PHYSICAL THERAPY | Age: 50
Setting detail: THERAPIES SERIES
Discharge: HOME | End: 2020-12-01
Payer: MEDICARE

## 2020-12-01 ENCOUNTER — HOSPITAL ENCOUNTER (OUTPATIENT)
Age: 50
Setting detail: OUTPATIENT SURGERY
Discharge: HOME | End: 2020-12-01
Attending: ORTHOPAEDIC SURGERY | Admitting: ORTHOPAEDIC SURGERY
Payer: MEDICARE

## 2020-12-01 ENCOUNTER — ANESTHESIA (OUTPATIENT)
Dept: OPERATING ROOM | Age: 50
End: 2020-12-01
Payer: MEDICARE

## 2020-12-01 ENCOUNTER — ANESTHESIA EVENT (OUTPATIENT)
Dept: OPERATING ROOM | Age: 50
End: 2020-12-01
Payer: MEDICARE

## 2020-12-01 VITALS
BODY MASS INDEX: 21.71 KG/M2 | SYSTOLIC BLOOD PRESSURE: 143 MMHG | TEMPERATURE: 97 F | DIASTOLIC BLOOD PRESSURE: 93 MMHG | HEART RATE: 70 BPM | RESPIRATION RATE: 18 BRPM | OXYGEN SATURATION: 99 % | HEIGHT: 62 IN | WEIGHT: 118 LBS

## 2020-12-01 VITALS — SYSTOLIC BLOOD PRESSURE: 74 MMHG | OXYGEN SATURATION: 100 % | DIASTOLIC BLOOD PRESSURE: 51 MMHG

## 2020-12-01 LAB
GLUCOSE BLD-MCNC: 229 MG/DL (ref 65–105)
GLUCOSE BLD-MCNC: 271 MG/DL (ref 65–105)

## 2020-12-01 PROCEDURE — 6360000002 HC RX W HCPCS: Performed by: ORTHOPAEDIC SURGERY

## 2020-12-01 PROCEDURE — 82947 ASSAY GLUCOSE BLOOD QUANT: CPT

## 2020-12-01 PROCEDURE — 2500000003 HC RX 250 WO HCPCS: Performed by: ANESTHESIOLOGY

## 2020-12-01 PROCEDURE — 3700000000 HC ANESTHESIA ATTENDED CARE: Performed by: ORTHOPAEDIC SURGERY

## 2020-12-01 PROCEDURE — 2500000003 HC RX 250 WO HCPCS: Performed by: NURSE ANESTHETIST, CERTIFIED REGISTERED

## 2020-12-01 PROCEDURE — 97140 MANUAL THERAPY 1/> REGIONS: CPT

## 2020-12-01 PROCEDURE — 7100000010 HC PHASE II RECOVERY - FIRST 15 MIN: Performed by: ORTHOPAEDIC SURGERY

## 2020-12-01 PROCEDURE — 6360000002 HC RX W HCPCS: Performed by: ANESTHESIOLOGY

## 2020-12-01 PROCEDURE — 2580000003 HC RX 258: Performed by: ANESTHESIOLOGY

## 2020-12-01 PROCEDURE — 64415 NJX AA&/STRD BRCH PLXS IMG: CPT | Performed by: ANESTHESIOLOGY

## 2020-12-01 PROCEDURE — 7100000011 HC PHASE II RECOVERY - ADDTL 15 MIN: Performed by: ORTHOPAEDIC SURGERY

## 2020-12-01 PROCEDURE — 2709999900 HC NON-CHARGEABLE SUPPLY: Performed by: ORTHOPAEDIC SURGERY

## 2020-12-01 PROCEDURE — 3600000002 HC SURGERY LEVEL 2 BASE: Performed by: ORTHOPAEDIC SURGERY

## 2020-12-01 PROCEDURE — 23700 MNPJ ANES SHO JT FIXJ APRATS: CPT | Performed by: ORTHOPAEDIC SURGERY

## 2020-12-01 PROCEDURE — 6360000002 HC RX W HCPCS: Performed by: NURSE ANESTHETIST, CERTIFIED REGISTERED

## 2020-12-01 PROCEDURE — 2500000003 HC RX 250 WO HCPCS: Performed by: ORTHOPAEDIC SURGERY

## 2020-12-01 RX ORDER — BUPIVACAINE HYDROCHLORIDE 5 MG/ML
30 INJECTION, SOLUTION EPIDURAL; INTRACAUDAL ONCE
Status: COMPLETED | OUTPATIENT
Start: 2020-12-01 | End: 2020-12-01

## 2020-12-01 RX ORDER — BUPIVACAINE HYDROCHLORIDE 2.5 MG/ML
INJECTION, SOLUTION EPIDURAL; INFILTRATION; INTRACAUDAL PRN
Status: DISCONTINUED | OUTPATIENT
Start: 2020-12-01 | End: 2020-12-01 | Stop reason: ALTCHOICE

## 2020-12-01 RX ORDER — METHYLPREDNISOLONE ACETATE 80 MG/ML
INJECTION, SUSPENSION INTRA-ARTICULAR; INTRALESIONAL; INTRAMUSCULAR; SOFT TISSUE PRN
Status: DISCONTINUED | OUTPATIENT
Start: 2020-12-01 | End: 2020-12-01 | Stop reason: ALTCHOICE

## 2020-12-01 RX ORDER — OXYCODONE HYDROCHLORIDE AND ACETAMINOPHEN 5; 325 MG/1; MG/1
1 TABLET ORAL EVERY 6 HOURS PRN
Qty: 28 TABLET | Refills: 0 | Status: SHIPPED | OUTPATIENT
Start: 2020-12-01 | End: 2020-12-08

## 2020-12-01 RX ORDER — BUPIVACAINE HYDROCHLORIDE 5 MG/ML
INJECTION, SOLUTION EPIDURAL; INTRACAUDAL
Status: COMPLETED | OUTPATIENT
Start: 2020-12-01 | End: 2020-12-01

## 2020-12-01 RX ORDER — PROPOFOL 10 MG/ML
INJECTION, EMULSION INTRAVENOUS PRN
Status: DISCONTINUED | OUTPATIENT
Start: 2020-12-01 | End: 2020-12-01 | Stop reason: SDUPTHER

## 2020-12-01 RX ORDER — SODIUM CHLORIDE, SODIUM LACTATE, POTASSIUM CHLORIDE, CALCIUM CHLORIDE 600; 310; 30; 20 MG/100ML; MG/100ML; MG/100ML; MG/100ML
1000 INJECTION, SOLUTION INTRAVENOUS CONTINUOUS
Status: DISCONTINUED | OUTPATIENT
Start: 2020-12-01 | End: 2020-12-01 | Stop reason: HOSPADM

## 2020-12-01 RX ORDER — MIDAZOLAM HYDROCHLORIDE 2 MG/2ML
2 INJECTION, SOLUTION INTRAMUSCULAR; INTRAVENOUS ONCE
Status: COMPLETED | OUTPATIENT
Start: 2020-12-01 | End: 2020-12-01

## 2020-12-01 RX ORDER — FENTANYL CITRATE 50 UG/ML
100 INJECTION, SOLUTION INTRAMUSCULAR; INTRAVENOUS ONCE
Status: COMPLETED | OUTPATIENT
Start: 2020-12-01 | End: 2020-12-01

## 2020-12-01 RX ORDER — LIDOCAINE HYDROCHLORIDE 10 MG/ML
INJECTION, SOLUTION EPIDURAL; INFILTRATION; INTRACAUDAL; PERINEURAL PRN
Status: DISCONTINUED | OUTPATIENT
Start: 2020-12-01 | End: 2020-12-01 | Stop reason: SDUPTHER

## 2020-12-01 RX ADMIN — LIDOCAINE HYDROCHLORIDE 50 MG: 10 INJECTION, SOLUTION EPIDURAL; INFILTRATION; INTRACAUDAL; PERINEURAL at 07:12

## 2020-12-01 RX ADMIN — PROPOFOL 20 MG: 10 INJECTION, EMULSION INTRAVENOUS at 07:15

## 2020-12-01 RX ADMIN — MIDAZOLAM HYDROCHLORIDE 2 MG: 1 INJECTION, SOLUTION INTRAMUSCULAR; INTRAVENOUS at 07:00

## 2020-12-01 RX ADMIN — BUPIVACAINE HYDROCHLORIDE 20 ML: 5 INJECTION, SOLUTION EPIDURAL; INTRACAUDAL; PERINEURAL at 07:04

## 2020-12-01 RX ADMIN — PROPOFOL 150 MG: 10 INJECTION, EMULSION INTRAVENOUS at 07:12

## 2020-12-01 RX ADMIN — SODIUM CHLORIDE, POTASSIUM CHLORIDE, SODIUM LACTATE AND CALCIUM CHLORIDE 1000 ML: 600; 310; 30; 20 INJECTION, SOLUTION INTRAVENOUS at 06:22

## 2020-12-01 RX ADMIN — Medication 20 ML: at 07:03

## 2020-12-01 RX ADMIN — FENTANYL CITRATE 100 MCG: 50 INJECTION, SOLUTION INTRAMUSCULAR; INTRAVENOUS at 07:00

## 2020-12-01 ASSESSMENT — PULMONARY FUNCTION TESTS
PIF_VALUE: 0

## 2020-12-01 ASSESSMENT — PAIN - FUNCTIONAL ASSESSMENT: PAIN_FUNCTIONAL_ASSESSMENT: 0-10

## 2020-12-01 ASSESSMENT — PAIN SCALES - GENERAL
PAINLEVEL_OUTOF10: 0
PAINLEVEL_OUTOF10: 7

## 2020-12-01 ASSESSMENT — PAIN SCALES - WONG BAKER: WONGBAKER_NUMERICALRESPONSE: 0

## 2020-12-01 NOTE — PROGRESS NOTES
[x] [facility]) [facility] &  Therapy  [address]  [phone]  F: [phone] [] [address]  [facility] [place] [facility]   [address]  P: [phone]  F: [phone] [] [address] &  Therapy  [address]  P: [phone]  F: [phone] [] [address]  P: [phone]  F: [phone] [] [address]  [facility]   [address]   [place]: [phone]  F: [phone]      Physical Therapy Progress    Date: 2020      Patient: Vangie Liang  : 1970  MRN: [id number]    Physician: Helena Trivedi PA-C                     Insurance: [employer] ( 9 remaining)   Medical Diagnosis: Rotator cuff tendonitis, left (M75.82 [ICD-10-CM]); 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 Yes  []? No   Location: Left shoulder           Pain Rating: (0-10 scale) 5/10  Pain altered Tx:  [x]? No  []? Yes  Action:  Comments: Patient seen immediately post JEANIE. Patient is tired and has very little feeling in her L upper extremity currently. Nerve block is beginning to wear off. Patient does note her pain increases to 5/10 during Manual therapy. Objective:  Manual Therapy: L shoulder PROM with distraction all planes of motion x 15 minutes. PROM: Left Shoulder Flexion 0-160, Abduction 0-120, ER 0-60, IR 0-50 degrees     Assessment:  Patient is recovering well after JEANIE and demonstrating PROM improvement in all planes of motion in her L shoulder.  Advised patient to begin ALOSONDRA exercises later today and reminded her and daughter of her PT appointment tomorrow. Will revaluate L shoulder ROM and strength at next appointment. STG: (to be met in 8 treatments)  1. ? Pain: Decrease left shoulder pain to 6/10 at rest--NOT MET  2. ? ROM: Increase left shoulder AROM flexion to 75°, abd to 75°, IR to left PSIS; Increase left PROM flexion to 90°, abd 90°, IR to 50°, ER to 35° --NOT MET  3. ? Strength: Increase flexion and abduction to 3/5--NOT MET  4. ? Function: Improve UEFI to 75% impairment--NOT MET  5. Patient to be independent with home exercise program as demonstrated by performance with correct form without cues. --MET  LTG: (to be met in 26 treatments)  1. Improve UEFI to 60% impairment--NOT MET  2. Pt will be able to reach to shoulder height without difficulty--NOT MET  3. Pt will be able to push/pull with the left UE with little difficulty --NOT MET    Treatment Plan:  [x] Therapeutic Exercise   31428  [] Iontophoresis: 4 mg/mL Dexamethasone Sodium Phosphate  mAmin  72235   [] Therapeutic Activity  03314 [] Vasopneumatic cold with compression  47196    [] Gait Training   22343 [] Ultrasound   67890   [] Neuromuscular Re-education  19842 [x] Electrical Stimulation Unattended  96088   [x] Manual Therapy  23062 [] Electrical Stimulation Attended  97619   [x] Instruction in HEP  [] Lumbar/Cervical Traction  60215   [] Aquatic Therapy   72168 [x] Cold/hotpack    [] Massage   50469      [] Dry Needling, 1 or 2 muscles  64067   [] Biofeedback, first 15 minutes   96283  [] Biofeedback, additional 15 minutes   78929 [] Dry Needling, 3 or more muscles  19521       Patient Status:     [x] Continue per initial plan of care. 5x/week for 2 weeks then reduce to 2-3x/week for 20 treatments (pending insurance approval)    Treatment Charges: Mins Units   []? Modalities:      []? Ther Exercise       [x]? Manual Therapy  15  1   []? Ther Activities       []? Aquatics       []? Vasocompression       []? Other       Total Treatment time 15  1     Time In: 0740                Time Out:  0917    Electronically signed by Dayna Blake PT on 12/1/2020 at 8:24 AM    If you have any questions or concerns, please don't hesitate to call.   Thank you for your referral.

## 2020-12-01 NOTE — ANESTHESIA POSTPROCEDURE EVALUATION
Department of Anesthesiology  Postprocedure Note    Patient: Kye Mishra  MRN: [id number]  YOB: 1970  Date of evaluation: 12/1/2020  Time:  1:50 PM     Procedure Summary     Date:  12/01/20 Room / Location:  [address]    Anesthesia Start:  [facility] Anesthesia Stop:  4842    Procedure:  SHOULDER MANIPULATION (SUPINE) (Left ) Diagnosis:  (ADHESIVE CAPSULITIS LEFT SHOULDER, DM-2)    Surgeon:  Ruth Ann Monaco DO Responsible Provider:  Jacque Cool MD    Anesthesia Type:  general, regional ASA Status:  3          Anesthesia Type: general, regional    Matteo Phase I:      Matteo Phase II: Matteo Score: 10    Last vitals: Reviewed and per EMR flowsheets.        Anesthesia Post Evaluation    Patient location during evaluation: PACU  Patient participation: complete - patient participated  Level of consciousness: awake and alert  Pain score: 0  Airway patency: patent  Nausea & Vomiting: no nausea and no vomiting  Complications: no  Cardiovascular status: hemodynamically stable  Respiratory status: room air  Hydration status: euvolemic

## 2020-12-01 NOTE — INTERVAL H&P NOTE
Pt Name: Jay Ross  MRN: [id number]  YOB: 1970  Date of evaluation: 12/1/2020    I have reviewed the patient's history and physical examination completed in pre-admission testing on 11/24/20    No changes to history or on examination today, unless noted below. Negative covid-19 screening on 11/27/20.       JIMMY HENRIQUEZ APRN-CNP  12/1/20  6:43 AM

## 2020-12-01 NOTE — OP NOTE
Operative Note      Patient: Galindo Montemayor  YOB: 1970  MRN: [id number]    Date of Procedure: 12/1/2020    Pre-Op Diagnosis: ADHESIVE CAPSULITIS LEFT SHOULDER, DM-2    Post-Op Diagnosis: Same       Procedure: Left shoulder manipulation under anesthesia, left shoulder glenohumeral injection with 80 mg DepoMedrol and 2 ml 0.25% marcaine    Surgeon(s):  Hellen Meadows DO    Assistant:   Resident: Manjit Nguyen DO    Anesthesia: Monitor Anesthesia Care    Estimated Blood Loss (mL): none    Complications: None    Specimens: None    Implants:None      Drains: None    Findings: Left shoulder adhesive capsulitis    Indications: Patient is a 51-year-old female who presents today to undergo a left shoulder manipulation under anesthesia due to her left shoulder adhesive capsulitis that has been resistant to all conservative treatments at this time. Risk benefits and alternatives to surgery were discussed with the patient which included but were not limited to recurrent stiffness, postoperative pain, fracture, damage to nearby structures, anesthesia risk, death. Patient understood these risks and wished to proceed with surgical management at this time. Detailed Description of Procedure:   Patient was taken back to the operative suite she remained on her gurney she was placed under MAC anesthesia with propofol. At this time preoperative range of motion was recorded patient was found to have about a 140 degrees of forward flexion. In 90 degrees of abduction her external rotation was to 60 degrees. At this time manipulation was performed patient was able to gain forward flexion to 180 degrees. We will able to obtain external rotation to 85 degrees in 90 degrees of abduction. During external rotation manipulation we did feel release of some of the adhesions. We then performed an intra-articular glenohumeral joint injection through a posterior portal.  Skin was prepped with Betadine swabs.   The shoulder

## 2020-12-01 NOTE — BRIEF OP NOTE
Brief Postoperative Note      Patient: Michael Morris  YOB: 1970  MRN: [id number]    Date of Procedure: 12/1/2020    Pre-Op Diagnosis: ADHESIVE CAPSULITIS LEFT SHOULDER, DM-2    Post-Op Diagnosis: Same       Procedure: Left shoulder manipulation under anesthesia, left shoulder glenohumeral injection with 80 mg DepoMedrol and 2 ml 0.25% marcaine    Surgeon(s):  Nikolas Parikh DO    Assistant:  Resident: Darian Deleon DO    Anesthesia: Monitor Anesthesia Care    Estimated Blood Loss (mL): NA    Complications: None    Specimens:   none  Implants:  None    Drains: None    Findings: Left shoulder adhesive capsulitis.      Electronically signed by Darian Deleon DO on 12/1/2020 at 7:18 AM

## 2020-12-01 NOTE — ANESTHESIA PROCEDURE NOTES
Peripheral Block    Patient location during procedure: pre-op  Start time: 12/1/2020 7:02 AM  End time: 12/1/2020 7:04 AM  Staffing  Anesthesiologist: Orquidea Martin MD  Performed: anesthesiologist   Preanesthetic Checklist  Completed: patient identified, site marked, surgical consent, pre-op evaluation, timeout performed, IV checked, risks and benefits discussed, monitors and equipment checked, anesthesia consent given, oxygen available and patient being monitored  Peripheral Block  Patient position: sitting  Prep: ChloraPrep  Patient monitoring: cardiac monitor, continuous pulse ox, frequent blood pressure checks and IV access  Block type: Brachial plexus  Laterality: left  Injection technique: single-shot  Procedures: ultrasound guided  Local infiltration: lidocaine  Infiltration strength: 1 %  Dose: 3 mL  Interscalene  Provider prep: mask and sterile gloves  Local infiltration: lidocaine  Needle  Needle gauge: 21 G  Needle length: 10 cm  Needle localization: ultrasound guidance  Test dose: negative  Assessment  Injection assessment: negative aspiration for heme, no paresthesia on injection and local visualized surrounding nerve on ultrasound  Paresthesia pain: none  Slow fractionated injection: yes  Hemodynamics: stable  Additional Notes  Immediately prior to procedure a \"time out\" was called to verify the correct patient, allergies, laterality, procedure and equipment. Time out performed with [facility] RN    Local Anesthetic: 0.5 %  Bupivacaine   Amount: 20 ml  in 5 ml increments after negative aspiration each time.         Medications Administered  Bupivacaine (MARCAINE) PF injection 0.5%, 20 mL  Reason for block: post-op pain management and at surgeon's request

## 2020-12-01 NOTE — PROGRESS NOTES
[phone] Dr Conor Harrington to the bedside, time out performed, Pt monitored, 02, Left Interscalene single shot nerve block completed using Bupivacaine, 0.5% 20 ml  pt tolerated procedure well,  Site CDI, (see charting) vss  Versed Given: 2 mg  Fentanyl  100 mcg, pt denies co pain or discomfort, family to waiting area

## 2020-12-01 NOTE — ANESTHESIA PRE PROCEDURE
Department of Anesthesiology  Preprocedure Note       Name:  Reg Woodall   Age:  48 y.o.  :  1970                                          MRN:  [id number]         Date:  2020      Surgeon: Izabela Cardozo):  Candido Melendez DO    Procedure: Procedure(s):  SHOULDER MANIPULATION (SUPINE) 3080 TABLE, NSA=INTERSCALENE PREOP BLOCK    Medications prior to admission:   Prior to Admission medications    Medication Sig Start Date End Date Taking? Authorizing Provider   mirtazapine (REMERON) 15 MG tablet TAKE 1 TABLET BY MOUTH NIGHTLY 10/30/20  Yes Kathryn Carrillo, DO   meloxicam (MOBIC) 15 MG tablet Take 1 tablet by mouth daily 8/3/20  Yes Helena Trivedi PA-C   SITagliptin (JANUVIA) 50 MG tablet Take 1 tablet by mouth daily 7/10/20  Yes Kathryn Carrillo, DO   lisinopril (PRINIVIL;ZESTRIL) 20 MG tablet Take 1 tablet by mouth daily 7/10/20  Yes Kathryn Carrillo DO   levothyroxine (SYNTHROID) 25 MCG tablet Take 1 tablet by mouth daily 7/10/20  Yes Kathryn Carrillo, DO   glipiZIDE (GLUCOTROL XL) 10 MG extended release tablet Take 1 tablet by mouth daily 7/10/20  Yes Kathryn Carrillo, DO   atorvastatin (LIPITOR) 40 MG tablet Take 1 tablet by mouth daily 7/10/20  Yes Kathryn Carrillo, DO   aspirin 81 MG EC tablet Take 1 tablet by mouth daily 7/10/20  Yes Kathryn Carrillo, DO   Multiple Vitamins-Minerals (MULTIVITAMIN ADULT) TABS Take 1 tablet by mouth daily 7/10/20  Yes Kathryn Carrillo, DO   gabapentin (NEURONTIN) 600 MG tablet Take 1 tablet by mouth daily for 30 days.  7/10/20 12/1/20 Yes Kathryn Carrillo, DO   metFORMIN (GLUCOPHAGE) 500 MG tablet Take 2 tablets by mouth 2 times daily (with meals)  Patient taking differently: Take 1,000 mg by mouth 2 times daily (with meals) Taking 1 tablets twice daily 7/10/20  Yes Kathryn Carrillo, DO   insulin lispro, 1 Unit Dial, (T[employer]Spring) 100 UNIT/ML SOPN Inject 10 Units into the skin 3 times daily (before meals) 20   Kathryn Carrillo DO   insulin aspart (NOVOLOG FLEXPEN) 100 UNIT/ML injection pen Inject 10 Units into the skin 3 times daily (before meals) 7/22/20   Gamal Better, DO   blood glucose monitor strips Test 4 times a day & as needed for symptoms of irregular blood glucose.  7/10/20   Gamal Better, DO   insulin glargine (LANTUS SOLOSTAR) 100 UNIT/ML injection pen Inject 40 Units into the skin nightly 7/10/20   Gamal Better, DO   Insulin Pen Needle ([employer] PEN NEEDLES) 31G X 6 MM MISC 1 box by Does not apply route 4 times daily 7/10/20   Gamal Better, DO   Blood Glucose Monitoring Suppl (ONE TOUCH ULTRA 2) w/Device KIT 1 kit by Does not apply route daily 2/20/20   Gamal Better, DO       Current medications:    Current Facility-Administered Medications   Medication Dose Route Frequency Provider Last Rate Last Dose    lactated ringers infusion 1,000 mL  1,000 mL Intravenous Continuous Johanne Steinberg MD 50 mL/hr at 12/01/20 0622 1,000 mL at 12/01/20 6231       Allergies:  No Known Allergies    Problem List:    Patient Active Problem List   Diagnosis Code    Essential hypertension I10    Pure hyperglyceridemia E78.1    Type 2 diabetes mellitus with hyperglycemia, with long-term current use of insulin (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 [address]     takes insulin (Dr. 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 ([facility] [address]     DVT (deep vein thrombosis) in pregnancy     Hx of blood clots     Hyperlipidemia     Hypertension     Rotator cuff tendonitis, left     Thyroid disease        Past Surgical History:        Procedure Laterality Date    TUBAL LIGATION         Social History:    Social History     Tobacco Use    Smoking status: Current Every Day Smoker     Packs/day: 0.50     Years: 35.00     Pack years: 17.50     Types: Cigarettes    Smokeless tobacco: Never Used    Tobacco comment: pt states 1 cigarettes per day   Substance Use Topics    Alcohol use: No                                Ready to quit: Not Answered  Counseling given: Not Answered  Comment: pt states 1 cigarettes per day      Vital Signs (Current):   Vitals:    12/01/20 0605   BP: (!) 142/87   Pulse: 71   Resp: 20   Temp: 96.6 °F (35.9 °C)   TempSrc: Temporal   SpO2: 100%   Weight: 118 lb (53.5 kg)   Height: 5' 2\" (1.575 m)                                              BP Readings from Last 3 Encounters:   12/01/20 (!) 142/87   11/24/20 90/63   08/10/20 139/88       NPO Status: Time of last liquid consumption: 2300                        Time of last solid consumption: 1100                        Date of last liquid consumption: 11/30/20                        Date of last solid food consumption: 11/30/20    BMI:   Wt Readings from Last 3 Encounters:   12/01/20 118 lb (53.5 kg)   11/24/20 118 lb (53.5 kg)   11/11/20 124 lb (56.2 kg)     Body mass index is 21.58 kg/m².     CBC:   Lab Results   Component Value Date    WBC 10.1 11/24/2020    RBC 4.18 11/24/2020    RBC 3.66 04/04/2012    HGB 12.6 11/24/2020    HCT 40.3 11/24/2020    MCV 96.4 11/24/2020    RDW 13.0 11/24/2020     11/24/2020     04/04/2012       CMP:   Lab Results   Component Value Date     11/24/2020    K 4.6 11/24/2020     11/24/2020    CO2 24 11/24/2020    BUN 18 11/24/2020    CREATININE 1.04 with patient.       Plan discussed with CRNA and surgical team.                  Mathieu Pinzon MD   12/1/2020

## 2020-12-02 ENCOUNTER — HOSPITAL ENCOUNTER (OUTPATIENT)
Dept: PHYSICAL THERAPY | Age: 50
Setting detail: THERAPIES SERIES
Discharge: HOME | End: 2020-12-02
Payer: MEDICARE

## 2020-12-02 PROCEDURE — 97110 THERAPEUTIC EXERCISES: CPT

## 2020-12-02 PROCEDURE — 97140 MANUAL THERAPY 1/> REGIONS: CPT

## 2020-12-02 NOTE — FLOWSHEET NOTE
[x] [address]  Outpatient Rehabilitation &  Therapy  [address]  [phone]  F: [phone] [] [address]  M[facility] with [facility]   [address]  P: [phone]  F: [phone] [] [address] &  Therapy  [address] [facility]  P: [phone]  F: [phone] [] [address]  P: [phone]  F: [phone] [] [address]  [facility]   [address]   [place]: [phone]  F: [phone]      Physical Therapy Daily Treatment Note    Date:  2020  Patient Name:  Brittany Kraus    :  1970  MRN: [id number]  Physician: Helena Trivedi PA-C                     Insurance: [place] [employer]vantage ( 9 remaining)   Medical Diagnosis: Rotator cuff tendonitis, left (M75.82 [ICD-10-CM]); 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 Pt walks into clinic with arm tucked at her side. Pt is carrying a sling. States that she was told to wear the sling for work.       Objective:  Modalities: 12/2- Declines GameReady  Precautions:  Exercises:  Exercise Reps/ Time Weight/ Level Comments   PROM and mobs 20/10 mins           Supine therabar:      Flex 10x 1lb    ER 10x 1lb    Chest press 10x 1lb          Standing therabar:      Shd IR 10x 1lb    Shd Ext 10x 1lb          Codman's 2minea Instructed pt to do several times a day                     Other:  Left shoulder PROM: Flex 130°, Abd 130°, IR 45°, ER 40°    Treatment Charges: Mins Units   []  Modalities     [x]  Ther Exercise 40 3   [x]  Manual Therapy 15 1   []  Ther Activities     []  Aquatics     []  Vasocompression     []  Other     Total Treatment time 55        Assessment: [x] Progressing toward goals. Continues to muscle guard with passive ROM. Educated the importance of moving the left arm and not holding it against her side. Also clarified with Jailyn Velarde NP and Dr. Stefany Cordero in regards to the use of her sling and was informed that she should not wear the sling. Educated the pt to be doing Codman's several times a day. [] No change. [] Other:  [x] Patient would continue to benefit from skilled physical therapy services in order to: improve left shoulder ROM, strength, and function. STG: (to be met in 8 treatments)  1. ? Pain: Decrease left shoulder pain to 4/10 at rest  2. ? ROM: Increase left shoulder AROM flexion to 75°, abd to 75°, IR to left PSIS; Increase left PROM flexion to 140°, abd 140°, IR to 50°, ER to 50°  3. ? Strength: Increase flexion and abduction to 3/5  4. ? Function: Improve UEFI to 75% impairment  5. Patient to be independent with home exercise program as demonstrated by performance with correct form without cues. LTG: (to be met in 26 treatments)  1. Improve UEFI to 60% impairment  2. Pt will be able to reach to shoulder height without difficult  3. Pt will be able to push/pull with the left UE with little difficulty     Pt. Education:  [x] Yes  [] No  [x] Reviewed Prior HEP/Ed  Method of Education: [x] Verbal  [] Demo  [] Written  Comprehension of Education:  [x] Verbalizes understanding. [x] Demonstrates understanding. [] Needs review. [] Demonstrates/verbalizes HEP/Ed previously given. Plan: [x] Continue current frequency toward long and short term goals.     [x] Specific Instructions for subsequent treatments: Continue focusing on ROM, gentle strengthening      Time In: 8:00am            Time Out: 8:58am    Electronically signed by:  Darci Mueller PT

## 2020-12-03 ENCOUNTER — HOSPITAL ENCOUNTER (OUTPATIENT)
Dept: PHYSICAL THERAPY | Age: 50
Setting detail: THERAPIES SERIES
Discharge: HOME | End: 2020-12-03
Payer: MEDICARE

## 2020-12-03 PROCEDURE — 97110 THERAPEUTIC EXERCISES: CPT

## 2020-12-03 PROCEDURE — 97140 MANUAL THERAPY 1/> REGIONS: CPT

## 2020-12-03 NOTE — FLOWSHEET NOTE
[x] [facility] &  Therapy  [address]  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 Continued pain in shoulder, primarily in posterior shoulder/arm. Objective:  Modalities: 12/3-  GameReady  Precautions:  Exercises:  Exercise Reps/ Time Weight/ Level Comments   Pulleys 3/3           PROM and mobs 20/10 mins           Supine therabar:      Flex 15x 1lb    ER 15x 1lb    Chest press 15x 1lb          Standing therabar:      Shd IR 15x 1lb    Shd Ext 15x 1lb          Codman's 2minea  Instructed pt to do several times a day  20x ea 12/3/20                     Other:    Left shoulder PROM: Flex 130°, Abd 130°, IR 45°, ER 40°    Treatment Charges: Mins Units   []  Modalities     [x]  Ther Exercise 30 2   [x]  Manual Therapy 15 1   []  Ther Activities     []  Aquatics     []  Vasocompression     []  Other     Total Treatment time 45 3       Assessment: [x] Progressing toward goals. Added pulleys at start of Tx to improve AAROM of shoulder. Patient continues to be highly guarded with pain throughout all motions.  Pain consistent in posterior deltoid and tricep. Patient notes she has some lidocaine cream left from before manipulation, and educated her she can resume using this for muscular pain and follow instructions on package for frequency. [] No change. [] Other:  [x] Patient would continue to benefit from skilled physical therapy services in order to: improve left shoulder ROM, strength, and function. STG: (to be met in 8 treatments)  1. ? Pain: Decrease left shoulder pain to 4/10 at rest  2. ? ROM: Increase left shoulder AROM flexion to 75°, abd to 75°, IR to left PSIS; Increase left PROM flexion to 140°, abd 140°, IR to 50°, ER to 50°  3. ? Strength: Increase flexion and abduction to 3/5  4. ? Function: Improve UEFI to 75% impairment  5. Patient to be independent with home exercise program as demonstrated by performance with correct form without cues. LTG: (to be met in 26 treatments)  1. Improve UEFI to 60% impairment  2. Pt will be able to reach to shoulder height without difficult  3. Pt will be able to push/pull with the left UE with little difficulty     Pt. Education:  [x] Yes  [] No  [x] Reviewed Prior HEP/Ed  Method of Education: [x] Verbal  [] Demo  [] Written  Comprehension of Education:  [x] Verbalizes understanding. [x] Demonstrates understanding. [] Needs review. [] Demonstrates/verbalizes HEP/Ed previously given. Plan: [x] Continue current frequency toward long and short term goals.     [x] Specific Instructions for subsequent treatments: Continue focusing on ROM, gentle strengthening        Time In: 0730              Time Out: 7940      Electronically signed by:  Rubina Jacobo PTA

## 2020-12-04 ENCOUNTER — HOSPITAL ENCOUNTER (OUTPATIENT)
Dept: PHYSICAL THERAPY | Age: 50
Setting detail: THERAPIES SERIES
Discharge: HOME | End: 2020-12-04
Payer: MEDICARE

## 2020-12-04 PROCEDURE — 97140 MANUAL THERAPY 1/> REGIONS: CPT

## 2020-12-04 PROCEDURE — 97110 THERAPEUTIC EXERCISES: CPT

## 2020-12-07 ENCOUNTER — HOSPITAL ENCOUNTER (OUTPATIENT)
Dept: PHYSICAL THERAPY | Age: 50
Setting detail: THERAPIES SERIES
Discharge: HOME | End: 2020-12-07
Payer: MEDICARE

## 2020-12-07 PROCEDURE — 97110 THERAPEUTIC EXERCISES: CPT

## 2020-12-07 PROCEDURE — 97140 MANUAL THERAPY 1/> REGIONS: CPT

## 2020-12-07 NOTE — FLOWSHEET NOTE
[x] [facility] [address]  [address]  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 States people at work do not understand that her arm is sore and she cannot lift as much.     Objective:  Modalities: 12/3- Declines GameReady  Precautions:  Exercises:  Exercise Reps/ Time Weight/ Level Comments   Pulleys 3/3  Flexion/abduction          Standing      Wall Slides 10x5\"  Flexion/abduction   Wall Circles 15x ea     Cane Extension 15x 1 lb    Cane IR 15x 1 lb    Cane Abduction 10x 1 lb Added 20         Julia's 2 min ea  Instructed pt to do several times a day               Supine      PROM and mobs  20mins  Scapular MOBS in sidelying, TPR to UT/levator         Cane:      Flex 15x 1 lb    Horizontal Abduction/Adduction  15x 1 lb      ER 15x 1 lb    Chest press 15x 1 lb          Other:    Left shoulder PROM: Flex 130°, Abd 130°, IR 45°, ER 40°    Treatment Charges: Mins Units   []  Modalities     [x]  Ther Exercise 34 2   [x]  Manual Therapy 20 2   []  Ther Activities     []  Aquatics     []  Vasocompression     []  Other Total Treatment time 54 3       Assessment: [x] Progressing toward goals. Pt with a decrease in muscle guarding during PROM this date, however, continues to be restricted in all motions. [] No change. [] Other:  [x] Patient would continue to benefit from skilled physical therapy services in order to: improve left shoulder ROM, strength, and function. STG: (to be met in 8 treatments)  1. ? Pain: Decrease left shoulder pain to 4/10 at rest  2. ? ROM: Increase left shoulder AROM flexion to 75°, abd to 75°, IR to left PSIS; Increase left PROM flexion to 140°, abd 140°, IR to 50°, ER to 50°  3. ? Strength: Increase flexion and abduction to 3/5  4. ? Function: Improve UEFI to 75% impairment  5. Patient to be independent with home exercise program as demonstrated by performance with correct form without cues. LTG: (to be met in 26 treatments)  1. Improve UEFI to 60% impairment  2. Pt will be able to reach to shoulder height without difficult  3. Pt will be able to push/pull with the left UE with little difficulty     Pt. Education:  [x] Yes  [] No  [x] Reviewed Prior HEP/Ed  Method of Education: [x] Verbal  [x] Demo  [] Written  Comprehension of Education:  [x] Verbalizes understanding. [x] Demonstrates understanding. [] Needs review. [] Demonstrates/verbalizes HEP/Ed previously given. Plan: [x] Continue current frequency toward long and short term goals.     [x] Specific Instructions for subsequent treatments: Continue focusing on ROM, gentle strengthening        Time In: 0800             Time Out: 4935       Electronically signed by:  Angy Carlson PT

## 2020-12-08 ENCOUNTER — HOSPITAL ENCOUNTER (OUTPATIENT)
Dept: PHYSICAL THERAPY | Age: 50
Setting detail: THERAPIES SERIES
Discharge: HOME | End: 2020-12-08
Payer: MEDICARE

## 2020-12-08 PROCEDURE — 97140 MANUAL THERAPY 1/> REGIONS: CPT

## 2020-12-08 PROCEDURE — 97110 THERAPEUTIC EXERCISES: CPT

## 2020-12-08 NOTE — FLOWSHEET NOTE
[x] [facility] &  Therapy  [address]  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 Pt states work activities continue to aggravate pain and symptoms.      Objective:  Modalities: 12/3- Declines GameReady, HP  to neck and shoulder  Precautions:  Exercises:  Exercise Reps/ Time Weight/ Level Comments   Pulleys 3/3  Flexion/abduction          Standing      Wall Slides 10x5\"  Flexion/abduction   Wall Circles 15x ea     Cane Extension 15x 1 lb    Cane IR 15x 1 lb    Cane Abduction 10x 1 lb Added 20   Cane Flexion 10x 1 lb Added     Codman's 2 min ea  Instructed pt to do several times a day               Supine      PROM and mobs  20mins  Scapular MOBS in sidelying, TPR to UT/levator  TPR to deltoids         Cane:      Flex 15x 1 lb    Horizontal Abduction/Adduction  15x 1 lb      ER 15x 1 lb    Chest press 15x 1 lb    Tricep 15x 1 lb Added    Other:    Left shoulder PROM:    Flex 130°, Abd 130°, IR 45°, ER 40°    Treatment Charges: Mins Units   [x]  Modalities - HP 10 0 [x]  Ther Exercise 30 2   [x]  Manual Therapy 15 1   []  Ther Activities     []  Aquatics     []  Vasocompression     []  Other     Total Treatment time 45 3       Assessment: [x] Progressing toward goals. Pt demonstrates decreased muscle guarding and improved PROM with verbal conversation for max distraction. Pt states her shoulder has improved stiffness but the L aspect of her shoulder is \"tight and painful\" with TTP. Pt demonstrates trigger points on middle deltoid. Pt reports heat improves pain at home so HP provided to neck and shoulder at end of session for pain management. [] No change. [] Other:  [x] Patient would continue to benefit from skilled physical therapy services in order to: improve left shoulder ROM, strength, and function. STG: (to be met in 8 treatments)  1. ? Pain: Decrease left shoulder pain to 4/10 at rest  2. ? ROM: Increase left shoulder AROM flexion to 75°, abd to 75°, IR to left PSIS; Increase left PROM flexion to 140°, abd 140°, IR to 50°, ER to 50°  3. ? Strength: Increase flexion and abduction to 3/5  4. ? Function: Improve UEFI to 75% impairment  5. Patient to be independent with home exercise program as demonstrated by performance with correct form without cues. LTG: (to be met in 26 treatments)  1. Improve UEFI to 60% impairment  2. Pt will be able to reach to shoulder height without difficult  3. Pt will be able to push/pull with the left UE with little difficulty     Pt. Education:  [x] Yes  [] No  [x] Reviewed Prior HEP/Ed  Method of Education: [x] Verbal  [x] Demo  [] Written  Comprehension of Education:  [x] Verbalizes understanding. [x] Demonstrates understanding. [] Needs review. [] Demonstrates/verbalizes HEP/Ed previously given. Plan: [x] Continue current frequency toward long and short term goals.     [x] Specific Instructions for subsequent treatments: progress PROM/AROM, strengthening exercises       Time In: 0730             Time Out: 6960      Electronically signed by:  Loki Martin, PT

## 2020-12-09 ENCOUNTER — HOSPITAL ENCOUNTER (OUTPATIENT)
Dept: PHYSICAL THERAPY | Age: 50
Setting detail: THERAPIES SERIES
Discharge: HOME | End: 2020-12-09
Payer: MEDICARE

## 2020-12-09 PROCEDURE — 97140 MANUAL THERAPY 1/> REGIONS: CPT

## 2020-12-09 PROCEDURE — 97110 THERAPEUTIC EXERCISES: CPT

## 2020-12-09 NOTE — FLOWSHEET NOTE
[x] [facility] &  Therapy  [address]  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       Objective:  Modalities: 12/3-  GameReady, HP  to neck and shoulder  Precautions:  Exercises:  Exercise Reps/ Time Weight/ Level Comments   Pulleys 3/3  Flexion/abduction          Standing      Wall Slides 10x5\"  Flexion/abduction   Wall Circles 15x ea     Cane Extension 15x 1 lb    Cane IR 15x 1 lb    Cane Abduction 10x 1 lb Added 20   Cane Flexion 10x 1 lb Added     Codman's 2 min ea  Instructed pt to do several times a day               Supine      PROM and shd mobs  15mins           Cane:      Flex 15x 1 lb    Horizontal Abduction/Adduction  15x 1 lb      ER 15x 1 lb    Chest press 15x 1 lb    Tricep 15x 1 lb Added    Other:    Left shoulder PROM:    Flex 150° (+20), Abd 110° (-10), IR 60° (+15), ER 60° (+20)    Treatment Charges: Mins Units   []  Modalities      [x]  Ther Exercise 40 3   [x]  Manual Therapy 15 1   []  Ther Activities     []  Aquatics     [] Vasocompression     []  Other     Total Treatment time 55        Assessment: [x] Progressing toward goals. Flexion, IR, and ER with significant improvements this date and are close to the post-manip measurements while patient was in post-op with the nerve block. [x] No change. Left shoulder abduction both actively and passively remains very limited and painful for the patient. [] Other:  [x] Patient would continue to benefit from skilled physical therapy services in order to: improve left shoulder ROM, strength, and function. STG: (to be met in 8 treatments)  1. ? Pain: Decrease left shoulder pain to 4/10 at rest  2. ? ROM: Increase left shoulder AROM flexion to 75°, abd to 75°, IR to left PSIS; Increase left PROM flexion to 140°, abd 140°, IR to 50°, ER to 50°  3. ? Strength: Increase flexion and abduction to 3/5  4. ? Function: Improve UEFI to 75% impairment  5. Patient to be independent with home exercise program as demonstrated by performance with correct form without cues. LTG: (to be met in 26 treatments)  1. Improve UEFI to 60% impairment  2. Pt will be able to reach to shoulder height without difficult  3. Pt will be able to push/pull with the left UE with little difficulty     Pt. Education:  [x] Yes  [] No  [x] Reviewed Prior HEP/Ed  Method of Education: [x] Verbal  [x] Demo  [] Written  Comprehension of Education:  [x] Verbalizes understanding. [x] Demonstrates understanding. [] Needs review. [] Demonstrates/verbalizes HEP/Ed previously given. Plan: [x] Continue current frequency toward long and short term goals.     [x] Specific Instructions for subsequent treatments: progress PROM/AROM, strengthening exercises       Time In: 0805             Time Out: 0900      Electronically signed by:  Deepti King, PT

## 2020-12-10 ENCOUNTER — HOSPITAL ENCOUNTER (OUTPATIENT)
Dept: PHYSICAL THERAPY | Age: 50
Setting detail: THERAPIES SERIES
Discharge: HOME | End: 2020-12-10
Payer: MEDICARE

## 2020-12-10 PROCEDURE — 97110 THERAPEUTIC EXERCISES: CPT

## 2020-12-10 PROCEDURE — 97140 MANUAL THERAPY 1/> REGIONS: CPT

## 2020-12-10 NOTE — FLOWSHEET NOTE
[facility]x] [facility]  [address]  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 Patient notes shoulder pain is slowly improving.      Objective:  Modalities: 12/3- Declines GameReady, HP  to neck and shoulder  Precautions:  Exercises:  Exercise Reps/ Time Weight/ Level Comments   Pulleys 3/3 min  Flexion/abduction          Standing      Wall Slides 15x5\"  Flexion/abduction   Wall Circles 15x ea     Cane Extension 15x 1 lb    Cane IR 15x 1 lb    Cane Abduction 15x 1 lb Added 20   Cane Flexion 15x 1 lb Added    Back against wall to avoid thoracic extension    Latanyaman's 2 min ea 3 lb Instructed pt to do several times a day  Added weight for distraction                Supine      PROM and shd mobs  15mins  Added anterior capsule stretching, PNF ROM and pec stretching         Cane:      Flex 15x 1 lb    Horizontal Abduction/Adduction  15x 1 lb      ER 15x 1 lb    Chest press 15x 1 lb    Tricep 15x 1 lb Added    Other:    Left shoulder PROM:   12/10 Flex 150° (+20), Abd 110° (-10), IR 60° (+15), ER 74°    Treatment Charges: Mins Units   []  Modalities      [x]  Ther Exercise 40 3   [x]  Manual Therapy 15 1   []  Ther Activities     []  Aquatics     []  Vasocompression     []  Other     Total Treatment time 55 4       Assessment: [x] Progressing toward goals. Increased reps with standing wall slides, cane shoulder flexion and abduction. Slight improvement in shoulder ER noted this date. [x] No change. Left shoulder abduction both actively and passively remains very limited and painful for the patient. [] Other:  [x] Patient would continue to benefit from skilled physical therapy services in order to: improve left shoulder ROM, strength, and function. STG: (to be met in 8 treatments)  1. ? Pain: Decrease left shoulder pain to 4/10 at rest  2. ? ROM: Increase left shoulder AROM flexion to 75°, abd to 75°, IR to left PSIS; Increase left PROM flexion to 140°, abd 140°, IR to 50°, ER to 50°  3. ? Strength: Increase flexion and abduction to 3/5  4. ? Function: Improve UEFI to 75% impairment  5. Patient to be independent with home exercise program as demonstrated by performance with correct form without cues. LTG: (to be met in 26 treatments)  1. Improve UEFI to 60% impairment  2. Pt will be able to reach to shoulder height without difficult  3. Pt will be able to push/pull with the left UE with little difficulty     Pt. Education:  [x] Yes  [] No  [x] Reviewed Prior HEP/Ed  Method of Education: [x] Verbal  [x] Demo  [] Written  Comprehension of Education:  [x] Verbalizes understanding. [x] Demonstrates understanding. [] Needs review. [] Demonstrates/verbalizes HEP/Ed previously given. Plan: [x] Continue current frequency toward long and short term goals.     [x] Specific Instructions for subsequent treatments: progress PROM/AROM, strengthening exercises         Time In: 0700              Time Out: 2341      Electronically signed by:  Barb Greer PTA

## 2020-12-11 ENCOUNTER — HOSPITAL ENCOUNTER (OUTPATIENT)
Dept: PHYSICAL THERAPY | Age: 50
Setting detail: THERAPIES SERIES
Discharge: HOME | End: 2020-12-11
Payer: MEDICARE

## 2020-12-11 PROCEDURE — 97140 MANUAL THERAPY 1/> REGIONS: CPT

## 2020-12-11 PROCEDURE — 97110 THERAPEUTIC EXERCISES: CPT

## 2020-12-11 NOTE — FLOWSHEET NOTE
cervical neck message to decrease tightness     Left shoulder PROM: 12/09  12/10 Flex 150° (+20), Abd 110° (-10), IR 60° (+15), ER 74°    Treatment Charges: Mins Units   []  Modalities      [x]  Ther Exercise 40 3   [x]  Manual Therapy 15 1   []  Ther Activities     []  Aquatics     []  Vasocompression     []  Other     Total Treatment time 55 4       Assessment: [x] Progressing toward goals. Pt able to progress this session with increasing weight during all weighted exercises to 2 lbs. Pt shows increased mm relaxation with verbal distraction. Pt demonstrates neck tightness with TTP, levator stretch and cervical manual massage added this date. [] No change. [] Other:  [x] Patient would continue to benefit from skilled physical therapy services in order to: improve left shoulder ROM, strength, and function. STG: (to be met in 8 treatments)  1. ? Pain: Decrease left shoulder pain to 4/10 at rest  2. ? ROM: Increase left shoulder AROM flexion to 75°, abd to 75°, IR to left PSIS; Increase left PROM flexion to 140°, abd 140°, IR to 50°, ER to 50°  3. ? Strength: Increase flexion and abduction to 3/5  4. ? Function: Improve UEFI to 75% impairment   5. Patient to be independent with home exercise program as demonstrated by performance with correct form without cues. LTG: (to be met in 26 treatments)  1. Improve UEFI to 60% impairment  2. Pt will be able to reach to shoulder height without difficult  3. Pt will be able to push/pull with the left UE with little difficulty     Pt. Education:  [x] Yes  [] No  [x] Reviewed Prior HEP/Ed  Method of Education: [x] Verbal  [x] Demo  [] Written  Comprehension of Education:  [x] Verbalizes understanding. [x] Demonstrates understanding. [] Needs review. [] Demonstrates/verbalizes HEP/Ed previously given. Plan: [x] Continue current frequency toward long and short term goals.     [x] Specific Instructions for subsequent treatments: continue to progress strength/ROM, manual with verbal distraction for max mm relaxation,          Time In: 0700              Time Out: 2553      Electronically signed by:  Margarita Nelson PT

## 2020-12-14 ENCOUNTER — HOSPITAL ENCOUNTER (OUTPATIENT)
Dept: PHYSICAL THERAPY | Age: 50
Setting detail: THERAPIES SERIES
Discharge: HOME | End: 2020-12-14
Payer: MEDICARE

## 2020-12-14 PROCEDURE — 97140 MANUAL THERAPY 1/> REGIONS: CPT

## 2020-12-14 PROCEDURE — 97110 THERAPEUTIC EXERCISES: CPT

## 2020-12-14 NOTE — FLOWSHEET NOTE
[x] [facility] [facility]  [address]  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 [address] (E11.618,M75.00 [ICD-10-CM])                           Rehab Codes: M25.512, M25.612, M62.81  Onset Date: 2019                      Next 's appt:       Visit# / total visits: 10/20 (new order for 20 visits after manip)  Cancels/No Shows: 0/0  ( for insurance--request more on Wed, )      Subjective:    Pain:  [x] Yes  [] No Location: Left shoulder     Pain Rating: (0-10 scale) see comments/10  Pain altered Tx:  [x] No  [] Yes  Action:  Comments:  States she is not having pain today, but states she is having some discomfort.       Objective:  Modalities: 12/3- Declines GameReady, HP  to neck and shoulder  Precautions:   Exercises:  Exercise Reps/ Time Weight/ Level Comments   Pulleys 3/3 min  Flexion/abduction          Standing      Wall Slides 15x5\"  Flexion/abduction   Wall Circles 15x ea     Cane Extension 15x 2 lb    Cane IR 15x 2 lb    Cane Abduction 15x 2 lb Added 20,    Cane Flexion 15x 2 lb Added    Back against wall to avoid thoracic extension,    Codman's 2 min ea 3 lb Instructed pt to do several times a day  Added weight for distraction    Levator stretch 3x20\"  Added          Supine      PROM and shd mobs  15mins  anterior capsule stretching, PNF ROM and pec stretching,         Cane:      Flex 15x 2 lb    Horizontal Abduction/Adduction  15x 2 lb      ER 15x 2 lb    Chest press 15x 2 lb    Tricep 15x 2 lb Added    Other:  progressed all weighted exercise from 1 to 2 lbs, added L cervical neck message to decrease tightness     Left shoulder PROM: 12/09  12/10 Flex 150° (+20), Abd 110° (-10), IR 60° (+15), ER 74°    Treatment Charges: Mins Units   []  Modalities      [x]  Ther Exercise 43 3   [x]  Manual Therapy 10 1   []  Ther Activities     []  Aquatics     []  Vasocompression     []  Other     Total Treatment time 53 4       Assessment: [x] Progressing toward goals. Pt progressing well with ROM and tolerance to the stretches/exercises. In the past, the pt has frequently needed to take breaks and rub her shoulder due to pain, however, did not require any rest breaks this date due to pain. [] No change. [] Other:  [x] Patient would continue to benefit from skilled physical therapy services in order to: improve left shoulder ROM, strength, and function. STG: (to be met in 8 treatments)  1. ? Pain: Decrease left shoulder pain to 4/10 at rest  2. ? ROM: Increase left shoulder AROM flexion to 75°, abd to 75°, IR to left PSIS; Increase left PROM flexion to 140°, abd 140°, IR to 50°, ER to 50°  3. ? Strength: Increase flexion and abduction to 3/5  4. ? Function: Improve UEFI to 75% impairment   5. Patient to be independent with home exercise program as demonstrated by performance with correct form without cues. LTG: (to be met in 26 treatments)  1. Improve UEFI to 60% impairment  2. Pt will be able to reach to shoulder height without difficult  3. Pt will be able to push/pull with the left UE with little difficulty     Pt. Education:  [x] Yes  [] No  [x] Reviewed Prior HEP/Ed  Method of Education: [x] Verbal  [x] Demo  [] Written  Comprehension of Education:  [x] Verbalizes understanding. [x] Demonstrates understanding. [] Needs review. [] Demonstrates/verbalizes HEP/Ed previously given. Plan: [x] Continue current frequency toward long and short term goals.     [x] Specific Instructions for subsequent treatments: continue to progress strength/ROM, manual with verbal distraction for max mm relaxation,          Time In: 0800              Time Out: 6325      Electronically signed by:  Angy Carlson, PT

## 2020-12-16 ENCOUNTER — TELEPHONE (OUTPATIENT)
Dept: FAMILY MEDICINE CLINIC | Age: 50
End: 2020-12-16

## 2020-12-16 ENCOUNTER — HOSPITAL ENCOUNTER (OUTPATIENT)
Dept: PHYSICAL THERAPY | Age: 50
Setting detail: THERAPIES SERIES
Discharge: HOME | End: 2020-12-16
Payer: MEDICARE

## 2020-12-16 PROCEDURE — 97110 THERAPEUTIC EXERCISES: CPT

## 2020-12-16 NOTE — FLOWSHEET NOTE
[x] [facility] [address]  [address]  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   Needs to leave today at 8:50am.       Objective:  Modalities: 12/3- Declines GameReady, HP  to neck and shoulder  Precautions:   Exercises:  Exercise Reps/ Time Weight/ Level Comments   Pulleys 3/3 min  Flexion/abduction          Standing      Wall Slides 15x5\"  Flexion/abduction   Wall Circles 15x ea     Cane Extension 15x 2 lb    Cane IR 15x 2 lb    Cane Abduction 15x 2 lb Added 20,    Cane Flexion 15x 2 lb Added    Back against wall to avoid thoracic extension,    Codman's 2 min ea 3 lb Instructed pt to do several times a day  Added weight for distraction    Levator stretch   Added          Supine      PROM and shd mobs  15mins  anterior capsule stretching, PNF ROM and pec stretching,         Cane:      Flex 15x 2 lb    Horizontal Abduction/Adduction  15x 2 lb      ER 15x 2 lb    Chest press 15x 2 lb    Tricep 15x 2 lb Added    Other:  progressed all weighted exercise from 1 to 2 lbs, added L cervical neck message to decrease tightness     Left shoulder PROM:   12/16 Flex 155° (+5), Abd 138° (+28), IR 70° (+10), ER 70°; AROM flex 140°, abd 130°, IR lower lumbar spine    Treatment Charges: Mins Units   []  Modalities      [x]  Ther Exercise 43 3   [x]  Manual Therapy 5 0   []  Ther Activities     []  Aquatics     []  Vasocompression     []  Other     Total Treatment time 48 3       Assessment: [x] Progressing toward goals. See progress note from this date. [] Other:  [x] Patient would continue to benefit from skilled physical therapy services in order to: improve left shoulder ROM, strength, and function. STG: (to be met in 8 treatments)  1. ? Pain: Decrease left shoulder pain to 4/10 at rest--MET  2. ? ROM: Increase left shoulder AROM flexion to 75°, abd to 75°, IR to left PSIS; Increase left PROM flexion to 140°, abd 140°, IR to 50°, ER to 50°--MET  3. ? Strength: Increase flexion and abduction to 3/5--MET  4. ? Function: Improve UEFI to 75% impairment--Not filled out on 12/16 due to pt time constraint   5. Patient to be independent with home exercise program as demonstrated by performance with correct form without cues. --MET    LTG: (to be met in 26 treatments)  1. Improve UEFI to 60% impairment  2. Pt will be able to reach to shoulder height without difficult  3. Pt will be able to push/pull with the left UE with little difficulty     Pt. Education:  [x] Yes  [] No  [x] Reviewed Prior HEP/Ed  Method of Education: [x] Verbal  [x] Demo  [] Written  Comprehension of Education:  [x] Verbalizes understanding. [x] Demonstrates understanding. [] Needs review. [] Demonstrates/verbalizes HEP/Ed previously given. Plan: [x] Continue current frequency toward long and short term goals.     [x] Specific Instructions for subsequent treatments: continue to progress strength/ROM, manual with verbal distraction for max mm relaxation,          Time In: 0802              Time Out: 8903      Electronically signed by:  Candice Whitney Black, PT

## 2020-12-16 NOTE — PROGRESS NOTES
[x] [facility]) - [facility] &  Therapy  [address]  [phone]  F: [phone] [] [address]  [facility]   [address]  P: [phone]  F: [phone] [] [address] &  Therapy  [address]  P: [phone]  F: [phone] [] [address]  P: [phone]  F: [phone] [] [address]  [facility]   [address]   [place]: [phone]  F: [phone]      Physical Therapy Progress Note    Date: 2020      Patient: Jhonathan Gan  : 1970  MRN: [id number]    Physician: Helena Trivedi PA-C                        Insurance: [place] Advantage  Medical Diagnosis: Rotator cuff tendonitis, left (M75.82 [ICD-10-CM]); 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 Yes  []? No   Location: Left shoulder             Pain Rating: (0-10 scale) 1/10  Pain altered Tx:  []? No  []? Yes  Action:  Comments:  Pain is minimal this morning, rates 1/10.   Needs to leave today at 8:50am.     Objective:  Test Measurements: Left shoulder PROM: Flex 155° (+5), Abd 138° (+28), IR 70° (+10), ER 70°; AROM flex 140°, abd 130°, IR lower lumbar spine   **Pt with a significant amount of pain during passive stretching    Assessment:  STG: (to be met in 8 treatments)  1. ? Pain: Decrease left shoulder pain to 4/10 at rest--MET  2. ? ROM: Increase left shoulder AROM flexion to 75°, abd to 75°, IR to left PSIS; Increase left PROM flexion to 140°, abd 140°, IR to 50°, ER to 50°--MET  3. ? Strength: Increase flexion and abduction to 3/5--MET  4. ? Function: Improve UEFI to 75% impairment--Not filled out on 12/16 due to pt time constraint   5. Patient to be independent with home exercise program as demonstrated by performance with correct form without cues. --MET     LTG: (to be met in 20 treatments)  1. Improve UEFI to 60% impairment   2. Pt will be able to reach to shoulder height without difficult  3. Pt will be able to push/pull with the left UE with little difficulty     Treatment Plan:  [x] Therapeutic Exercise   69274  [] Iontophoresis: 4 mg/mL Dexamethasone Sodium Phosphate  mAmin  53091   [] Therapeutic Activity  57658 [] Vasopneumatic cold with compression  50846    [] Gait Training   03420 [] Ultrasound   54826   [] Neuromuscular Re-education  78971 [] Electrical Stimulation Unattended  93941   [x] Manual Therapy  24424 [] Electrical Stimulation Attended  97578   [x] Instruction in HEP  [] Lumbar/Cervical Traction  86785   [] Aquatic Therapy   30905 [x] Cold/hotpack    [] Massage   94485      [] Dry Needling, 1 or 2 muscles  54866   [] Biofeedback, first 15 minutes   05434  [] Biofeedback, additional 15 minutes   52179 [] Dry Needling, 3 or more muscles  15035       Patient Status:     [x] Continue per initial plan of care (pending insurance authorization, pt has currently had 29/30 physical therapy visits this year, request for more was submitted to her insurance). [] Additional visits necessary. [] Other:       Electronically signed by Berta Du PT on 12/16/2020 at 12:15 PM      If you have any questions or concerns, please don't hesitate to call.   Thank you for your referral.    Physician Signature:________________________________Date:__________________  By signing above or cosigning this note, I have reviewed this plan of care and certify a need for medically necessary rehabilitation services.      *PLEASE SIGN ABOVE AND FAX BACK ALL PAGES*

## 2020-12-17 NOTE — PRE-CERTIFICATION NOTE
[x] [facility]) - [facility] &  Therapy  [address]  [phone]  F: [phone] [] [address] [facility]  [facility]   [address]  P: [phone]  F: [phone] [] [place]  [address] [facility]  P: [phone]  F: [phone] [] [address]  [facility]   [address]   [place]: [phone]  F: [phone]  [x] [facility]   Outpatient Occupational Therapy  [address]: [phone]  F: [phone]          Therapy Pre-certification Note      12/17/2020    Vangie Liang  1970   [id number]      Insurance approval was received for Physical Therapy from [place] on 12/17/2020. Approval was received for 3 visits, from 12/17/2020 to 12/31/2020. Authorization number [id number]    Patient was already scheduled.       Electronically signed by Kalani Montero PTA on 12/17/2020 at 2:46 PM

## 2020-12-18 ENCOUNTER — OFFICE VISIT (OUTPATIENT)
Dept: ORTHOPEDIC SURGERY | Age: 50
End: 2020-12-18

## 2020-12-18 VITALS — HEIGHT: 62 IN | WEIGHT: 118 LBS | BODY MASS INDEX: 21.71 KG/M2

## 2020-12-18 PROCEDURE — 99024 POSTOP FOLLOW-UP VISIT: CPT | Performed by: ORTHOPAEDIC SURGERY

## 2020-12-18 ASSESSMENT — ENCOUNTER SYMPTOMS
CONSTIPATION: 0
COUGH: 0
DIARRHEA: 0
NAUSEA: 0

## 2020-12-18 NOTE — PROGRESS NOTES
[facility]  [address]  Dept: [phone]  Dept Fax: [phone]        Postoperative follow-up note    Subjective:   Yumiko Chance is a 48y.o. year old female who presents to our office today for postoperative followup regarding her   1. Adhesive capsulitis of left shoulder    . Chief Complaint   Patient presents with    Shoulder Pain     left shoulder PO lisette today; DOS 12/1/220     Yumiko Chance  is a 48y.o. year old female who presents to our office today for postoperative follow up after having undergone Left shoulder JEANIE with a 80mg depo-medrol injection on 12/1/2020. The patient denies fevers, chills, nausea, vomiting, diarrhea. The patient has started physical therapy. Patient states she is in therapy now 2-3 days a week. Patient states she is doing really well. She is very happy results thus far. Patient has minimal pain. She states her ROM is a lot better. Review of Systems   Constitutional: Negative for chills and fever. Respiratory: Negative for cough. Gastrointestinal: Negative for constipation, diarrhea and nausea. Musculoskeletal: Positive for arthralgias (left shoulder). Negative for gait problem, joint swelling and myalgias. Neurological: Negative for dizziness, weakness and numbness. I have reviewed the CC, HPI, ROS, PMH, FHX, Social History, and if not present in this note, I have reviewed in the patient's chart. I agree with the documentation provided by other staff and have reviewed their documentation prior to providing my signature indicating agreement. Objective :   General: Yumiko Chance is a 48 y.o. female who is alert and oriented and sitting comfortably in our office. Ortho Exam  MS:   Left shoulder F=120, CK=610. No instability left shoulder is noted. Motor, sensory, vascular examination left upper extremity is grossly intact without focal deficits. Neuro: alert.  oriented  Eyes: Extra-ocular muscles intact  Mouth: Oral mucosa moist. No perioral lesions  Pulm: Respirations unlabored and regular. Skin: warm, well perfused  Psych:   Patient has good fund of knowledge and displays understanging of exam, diagnosis, and plan. Assessment:      1. Adhesive capsulitis of left shoulder         Plan:      Patient is doing really well at this time post left shoulder JEANIE. Patient is to continue her physical therapy and home exercise program. She can follow up as needed. Call the office with any questions or concerns. Follow up: Return if symptoms worsen or fail to improve. No orders of the defined types were placed in this encounter. No orders of the defined types were placed in this encounter. Noni Mccray RN am scribing for and in the presence of Dr. Med David  12/18/2020 4:57 PM      I have reviewed and made changes accordingly to the work scribed by Radha Collins RN. The documentation accurately reflects work and decisions made by me. I have also reviewed documentation completed by clinical staff.     Med David DO, [facility]  12/18/2020 4:58 PM    This note is created with the assistance of a speech recognition program.  While intending to generate a document that actually reflects the content of the visit, the document can still have some errors including those of syntax and sound a like substitutions which may escape proof reading.  In such instances, actual meaning can be extrapolated by contextual diversion      Electronically signed by Ariel Garcia DO, FAOAO on 12/18/2020 at 4:57 PM

## 2020-12-21 ENCOUNTER — HOSPITAL ENCOUNTER (OUTPATIENT)
Dept: PHYSICAL THERAPY | Age: 50
Setting detail: THERAPIES SERIES
Discharge: HOME | End: 2020-12-21
Payer: MEDICARE

## 2020-12-21 NOTE — FLOWSHEET NOTE
[x] [facility]) [facility]  [address]    [phone]  F: [phone]   [] [address]  [facility]   [address]  P: [phone]  F: [phone]  [] [place]  [address]  P: [phone]  F: [phone] [] [address]  P: [phone]  F: [phone]  [] [address]  [facility]   [address]   [place]: [phone]  F: [phone]   [] [place] [address] [facility] [address]  [place]: [phone]   F: [phone]     Physical Therapy Cancel/No Show note    Date: 2020  Patient: Stephy Flores  : 1970  MRN: [id number]    Cancels/No Shows to date:     For today's appointment patient:    [x]  Cancelled    [] Rescheduled appointment    [] No-show     Reason given by patient:    []  Patient ill    []  Conflicting appointment    [] No transportation      [x] Conflict with work    [] No reason given    [] Weather related    [] COVID-19    [] Other:      Comments:  States she got called into work today and Wednesday, therefore, has to cancel all week.       [x] Next appointment was confirmed    Electronically signed by: Araceli Landeros PT

## 2020-12-23 ENCOUNTER — APPOINTMENT (OUTPATIENT)
Dept: PHYSICAL THERAPY | Age: 50
End: 2020-12-23
Payer: MEDICARE

## 2020-12-30 ENCOUNTER — HOSPITAL ENCOUNTER (OUTPATIENT)
Dept: PHYSICAL THERAPY | Age: 50
Setting detail: THERAPIES SERIES
Discharge: HOME | End: 2020-12-30
Payer: MEDICARE

## 2020-12-30 PROCEDURE — 97110 THERAPEUTIC EXERCISES: CPT

## 2020-12-30 NOTE — DISCHARGE SUMMARY
[x] [facility]) [facility]  [facility]rapy  [address]  [phone]  F: [phone] [] [address]  [place] [address]   [address]  P: [phone]  F: [phone] [] [place]  [address]  P: [phone]  F: [phone] [] [address]  [address] 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 [address] (E11.618,M75.00 [ICD-10-CM])                           Rehab Codes: M25.512, M25.612, M62.81  Onset Date: 2019                                       Next 's appt:       Visit# / total visits:  (new order for 20 visits after manip)  Cancels/No Shows: 0/0  ( for insurance--request more on Wed, )        Subjective:  Pain:  [x]? Yes  []? No   Location: Left shoulder             Pain Rating: (0-10 scale) 1/10  Pain altered Tx:  []? No  []? Yes  Action:  Comments:  Pain is mild. States she just got back this morning at 4am from driving 12 hours home from [place]. States she has a lot going on in her personal life with her job and her kids. Pt would like to finish formal therapy this date and will continue independently with HEP. .     Objective:  Refer to progress note from . Assessment:  STG: (to be met in 8 treatments)  1. ? Pain: Decrease left shoulder pain to 4/10 at rest--MET  2. ? ROM: Increase left shoulder AROM flexion to 75°, abd to 75°, IR to left PSIS;  Increase left PROM flexion to 140°, abd 140°, IR to 50°, ER to 50°--MET  3. ? Strength: Increase flexion and abduction to 3/5--MET  4. ? Function: Improve UEFI to 75% impairment--Not filled out on 12/16 due to pt time constraint   5. Patient to be independent with home exercise program as demonstrated by performance with correct form without cues. --MET     LTG: (to be met in 26 treatments)  1. Improve UEFI to 60% impairment  2. Pt will be able to reach to shoulder height without difficult--Progress made  3. Pt will be able to push/pull with the left UE with little difficulty --Progress made    Treatment to Date:  [x] Therapeutic Exercise    [] Modalities:  [] Therapeutic Activity    [] Ultrasound  [x] Electrical Stimulation  [] Gait Training     [] Massage       [] Lumbar/Cervical Traction  [] Neuromuscular Re-education [x] Cold/hotpack [] Iontophoresis: 4 mg/mL  [x] Instruction in Home Exercise Program                     Dexamethasone Sodium  [x] Manual Therapy             Phosphate 40-80 mAmin  [] Aquatic Therapy                   [x] Vasocompression/    [] Other:             Game Ready    Discharge Status:     [x] Pt to continue exercise/home instructions independently. [] Therapy interrupted due to:    [] Pt has 2 or more no shows/cancels, is discontinued per our policy. [x] Other: Pt requested to finish formal therapy secondary to a lot going on her personal life, pt's shoulder is also doing better. Pt instructed to continue with HEP which she states she will. Pt also told to follow up with ortho clinic if necessary in the future. Electronically signed by Rosamaria Pool PT on 12/30/2020 at 10:35 AM      If you have any questions or concerns, please don't hesitate to call.   Thank you for your referral.

## 2020-12-30 NOTE — FLOWSHEET NOTE
[x] [address]  [address]  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 States she just got back this morning at 4am from driving 12 hours home from [place]. States she has a lot going on in her personal life with her job and her kids. Pt would like to finish formal therapy this date and will continue independently with HEP.         Objective:  Modalities: 12/3- Declines GameReady, HP  to neck and shoulder  Precautions:   Exercises:  Exercise Reps/ Time Weight/ Level Comments   UBE 3/3min L2.0 Fwd/Backward   Pulleys 3/3 min  Flexion/abduction          Standing      Wall Slides 15x5\"  Flexion/abduction   Wall Circles 15x ea     Cane Extension 15x 2 lb    Cane IR 15x 2 lb    Cane Abduction 15x 2 lb    Cane Flexion 15x 2 lb Back against wall to avoid thoracic extension,    Codman's  3 lb    Levator stretch            Supine      PROM and shd mobs  15mins  anterior capsule stretching, PNF ROM and pec stretching,         Cane:      Flex 15x 2 lb    Horizontal Abduction/Adduction  15x 2 lb      ER 15x 2 lb    Chest press 15x 2 lb    Tricep 15x 2lb          SL ER 15x 2lb          Prone:      Rows 15x 2lb    Lats 15x 2lb                Other:     Left shoulder PROM:   12/16 Flex 155° (+5), Abd 138° (+28), IR 70° (+10), ER 70°; AROM flex 140°, abd 130°, IR lower lumbar spine    Treatment Charges: Mins Units   []  Modalities      [x]  Ther Exercise 45 3   []  Manual Therapy     []  Ther Activities     []  Aquatics     []  Vasocompression     []  Other     Total Treatment time 45        Assessment: [x] Progressing toward goals. See discharge note. Added sidelying ER and prone PRE's this date with good tolerance, no increase in pain. [x] Other: Instructed pt to add back in the corner stretch to her HEP and to keep working on her HEP at home. Pt is to follow up with ortho clinic if symptoms worsen. [x] Patient would continue to benefit from skilled physical therapy services in order to: improve left shoulder ROM, strength, and function. STG: (to be met in 8 treatments)  1. ? Pain: Decrease left shoulder pain to 4/10 at rest--MET  2. ? ROM: Increase left shoulder AROM flexion to 75°, abd to 75°, IR to left PSIS; Increase left PROM flexion to 140°, abd 140°, IR to 50°, ER to 50°--MET  3. ? Strength: Increase flexion and abduction to 3/5--MET  4. ? Function: Improve UEFI to 75% impairment--Not filled out on 12/16 due to pt time constraint   5. Patient to be independent with home exercise program as demonstrated by performance with correct form without cues. --MET    LTG: (to be met in 26 treatments)  1. Improve UEFI to 60% impairment  2. Pt will be able to reach to shoulder height without difficult--Progress made  3. Pt will be able to push/pull with the left UE with little difficulty --Progress made    Pt. Education:  [x] Yes  [] No  [x] Reviewed Prior HEP/Ed  Method of Education: [x] Verbal  [x] Demo  [] Written  Comprehension of Education:  [x] Verbalizes understanding. [x] Demonstrates understanding. [] Needs review.   [] Demonstrates/verbalizes HEP/Ed previously given. Plan: [x] Continue current frequency toward long and short term goals.     [x] Specific Instructions for subsequent treatments: continue to progress strength/ROM, manual with verbal distraction for max mm relaxation,          Time In: 9602              Time Out: 5576      Electronically signed by:  Humaira Schwarz PT

## 2021-01-11 ENCOUNTER — HOSPITAL ENCOUNTER (OUTPATIENT)
Age: 51
Setting detail: SPECIMEN
Discharge: HOME | End: 2021-01-11
Payer: MEDICARE

## 2021-01-11 ENCOUNTER — OFFICE VISIT (OUTPATIENT)
Dept: FAMILY MEDICINE CLINIC | Age: 51
End: 2021-01-11
Payer: MEDICARE

## 2021-01-11 VITALS
TEMPERATURE: 97.7 F | DIASTOLIC BLOOD PRESSURE: 79 MMHG | WEIGHT: 116 LBS | BODY MASS INDEX: 21.35 KG/M2 | HEIGHT: 62 IN | SYSTOLIC BLOOD PRESSURE: 125 MMHG | HEART RATE: 78 BPM

## 2021-01-11 DIAGNOSIS — E03.9 ACQUIRED HYPOTHYROIDISM: ICD-10-CM

## 2021-01-11 DIAGNOSIS — E78.1 PURE HYPERGLYCERIDEMIA: ICD-10-CM

## 2021-01-11 DIAGNOSIS — Z79.4 TYPE 2 DIABETES MELLITUS WITH HYPERGLYCEMIA, WITH LONG-TERM CURRENT USE OF INSULIN (HCC): ICD-10-CM

## 2021-01-11 DIAGNOSIS — Z23 NEED FOR PROPHYLACTIC VACCINATION AND INOCULATION AGAINST VARICELLA: ICD-10-CM

## 2021-01-11 DIAGNOSIS — I10 ESSENTIAL HYPERTENSION: ICD-10-CM

## 2021-01-11 DIAGNOSIS — Z12.11 SCREEN FOR COLON CANCER: ICD-10-CM

## 2021-01-11 DIAGNOSIS — M75.82 ROTATOR CUFF TENDONITIS, LEFT: ICD-10-CM

## 2021-01-11 DIAGNOSIS — E11.42 DIABETIC POLYNEUROPATHY ASSOCIATED WITH TYPE 2 DIABETES MELLITUS (HCC): ICD-10-CM

## 2021-01-11 DIAGNOSIS — M75.00 DIABETIC FROZEN SHOULDER ASSOCIATED WITH TYPE 2 DIABETES MELLITUS (HCC): ICD-10-CM

## 2021-01-11 DIAGNOSIS — F33.1 MODERATE EPISODE OF RECURRENT MAJOR DEPRESSIVE DISORDER (HCC): ICD-10-CM

## 2021-01-11 DIAGNOSIS — E11.618 DIABETIC FROZEN SHOULDER ASSOCIATED WITH TYPE 2 DIABETES MELLITUS (HCC): ICD-10-CM

## 2021-01-11 DIAGNOSIS — E11.65 TYPE 2 DIABETES MELLITUS WITH HYPERGLYCEMIA, WITH LONG-TERM CURRENT USE OF INSULIN (HCC): ICD-10-CM

## 2021-01-11 LAB
HBA1C MFR BLD: 11.8 %
THYROXINE, FREE: 1.3 NG/DL (ref 0.93–1.7)
TSH SERPL DL<=0.05 MIU/L-ACNC: 1.14 MIU/L (ref 0.3–5)

## 2021-01-11 PROCEDURE — 99211 OFF/OP EST MAY X REQ PHY/QHP: CPT | Performed by: FAMILY MEDICINE

## 2021-01-11 PROCEDURE — G8482 FLU IMMUNIZE ORDER/ADMIN: HCPCS | Performed by: FAMILY MEDICINE

## 2021-01-11 PROCEDURE — G8427 DOCREV CUR MEDS BY ELIG CLIN: HCPCS | Performed by: FAMILY MEDICINE

## 2021-01-11 PROCEDURE — G8420 CALC BMI NORM PARAMETERS: HCPCS | Performed by: FAMILY MEDICINE

## 2021-01-11 PROCEDURE — 4004F PT TOBACCO SCREEN RCVD TLK: CPT | Performed by: FAMILY MEDICINE

## 2021-01-11 PROCEDURE — 90686 IIV4 VACC NO PRSV 0.5 ML IM: CPT | Performed by: FAMILY MEDICINE

## 2021-01-11 PROCEDURE — 3017F COLORECTAL CA SCREEN DOC REV: CPT | Performed by: FAMILY MEDICINE

## 2021-01-11 PROCEDURE — 2022F DILAT RTA XM EVC RTNOPTHY: CPT | Performed by: FAMILY MEDICINE

## 2021-01-11 PROCEDURE — 83036 HEMOGLOBIN GLYCOSYLATED A1C: CPT | Performed by: FAMILY MEDICINE

## 2021-01-11 PROCEDURE — 3046F HEMOGLOBIN A1C LEVEL >9.0%: CPT | Performed by: FAMILY MEDICINE

## 2021-01-11 PROCEDURE — 99214 OFFICE O/P EST MOD 30 MIN: CPT | Performed by: FAMILY MEDICINE

## 2021-01-11 PROCEDURE — 90746 HEPB VACCINE 3 DOSE ADULT IM: CPT | Performed by: FAMILY MEDICINE

## 2021-01-11 RX ORDER — GLUCOSAMINE HCL/CHONDROITIN SU 500-400 MG
CAPSULE ORAL
Qty: 200 STRIP | Refills: 5 | Status: SHIPPED | OUTPATIENT
Start: 2021-01-11 | End: 2022-01-31

## 2021-01-11 RX ORDER — ERGOCALCIFEROL (VITAMIN D2) 1250 MCG
CAPSULE ORAL
COMMUNITY
End: 2021-01-11 | Stop reason: SDUPTHER

## 2021-01-11 RX ORDER — ISOPROPYL ALCOHOL 0.7 ML/1
SWAB TOPICAL
Status: CANCELLED | OUTPATIENT
Start: 2021-01-11

## 2021-01-11 RX ORDER — ELECTROLYTES/DEXTROSE
1 SOLUTION, ORAL ORAL DAILY
Qty: 30 TABLET | Refills: 5 | Status: SHIPPED | OUTPATIENT
Start: 2021-01-11 | End: 2021-06-29

## 2021-01-11 RX ORDER — ISOPROPYL ALCOHOL 0.7 ML/1
SWAB TOPICAL
COMMUNITY

## 2021-01-11 RX ORDER — MIRTAZAPINE 15 MG/1
15 TABLET, FILM COATED ORAL NIGHTLY
Qty: 30 TABLET | Refills: 3 | Status: CANCELLED | OUTPATIENT
Start: 2021-01-11

## 2021-01-11 RX ORDER — INSULIN GLARGINE 100 [IU]/ML
40 INJECTION, SOLUTION SUBCUTANEOUS NIGHTLY
Qty: 5 PEN | Refills: 5 | Status: SHIPPED | OUTPATIENT
Start: 2021-01-11 | End: 2021-08-23

## 2021-01-11 RX ORDER — MELOXICAM 15 MG/1
15 TABLET ORAL DAILY
Qty: 30 TABLET | Refills: 3 | Status: CANCELLED | OUTPATIENT
Start: 2021-01-11

## 2021-01-11 RX ORDER — LEVOTHYROXINE SODIUM 0.03 MG/1
25 TABLET ORAL DAILY
Qty: 30 TABLET | Refills: 5 | Status: SHIPPED | OUTPATIENT
Start: 2021-01-11 | End: 2021-06-29

## 2021-01-11 RX ORDER — ACETAMINOPHEN AND CODEINE PHOSPHATE 300; 30 MG/1; MG/1
TABLET ORAL
Status: CANCELLED | OUTPATIENT
Start: 2021-01-11

## 2021-01-11 RX ORDER — ATORVASTATIN CALCIUM 40 MG/1
40 TABLET, FILM COATED ORAL DAILY
Qty: 30 TABLET | Refills: 5 | Status: SHIPPED | OUTPATIENT
Start: 2021-01-11 | End: 2021-06-29

## 2021-01-11 RX ORDER — INSULIN ASPART 100 [IU]/ML
10 INJECTION, SOLUTION INTRAVENOUS; SUBCUTANEOUS
Qty: 5 PEN | Refills: 3 | Status: SHIPPED | OUTPATIENT
Start: 2021-01-11 | End: 2021-01-20

## 2021-01-11 RX ORDER — ASPIRIN 81 MG/1
81 TABLET ORAL DAILY
Qty: 30 TABLET | Refills: 5 | Status: SHIPPED | OUTPATIENT
Start: 2021-01-11 | End: 2021-06-29

## 2021-01-11 RX ORDER — LISINOPRIL 20 MG/1
20 TABLET ORAL DAILY
Qty: 30 TABLET | Refills: 5 | Status: SHIPPED | OUTPATIENT
Start: 2021-01-11 | End: 2021-06-29

## 2021-01-11 RX ORDER — INSULIN LISPRO 100 [IU]/ML
10 INJECTION, SOLUTION INTRAVENOUS; SUBCUTANEOUS
Qty: 5 PEN | Refills: 3 | Status: SHIPPED | OUTPATIENT
Start: 2021-01-11 | End: 2021-01-20 | Stop reason: SDUPTHER

## 2021-01-11 RX ORDER — ERGOCALCIFEROL (VITAMIN D2) 1250 MCG
CAPSULE ORAL
Qty: 4 CAPSULE | Refills: 2 | Status: SHIPPED | OUTPATIENT
Start: 2021-01-11 | End: 2021-03-29

## 2021-01-11 RX ORDER — GLIPIZIDE 10 MG/1
10 TABLET, FILM COATED, EXTENDED RELEASE ORAL DAILY
Qty: 30 TABLET | Refills: 5 | Status: SHIPPED | OUTPATIENT
Start: 2021-01-11 | End: 2021-06-29

## 2021-01-11 RX ORDER — GABAPENTIN 600 MG/1
600 TABLET ORAL DAILY
Qty: 30 TABLET | Refills: 5 | Status: SHIPPED | OUTPATIENT
Start: 2021-01-11 | End: 2021-06-29

## 2021-01-11 RX ORDER — ACETAMINOPHEN AND CODEINE PHOSPHATE 300; 30 MG/1; MG/1
TABLET ORAL
Status: ON HOLD | COMMUNITY
End: 2021-10-06

## 2021-01-11 ASSESSMENT — PATIENT HEALTH QUESTIONNAIRE - PHQ9: SUM OF ALL RESPONSES TO PHQ9 QUESTIONS 1 & 2: 4

## 2021-01-11 NOTE — PATIENT INSTRUCTIONS
Thank you for letting us take care of you today. We hope all your questions were addressed. If a question was overlooked or something else comes to mind after you return home, please contact a member of your Care Team listed below. Your Care Team at [place] [address] is Team #5  Messi Washington MD (Faculty)  Manjit Stanford MD (Resident)  Margarito Jacome MD (Resident)  Ryland Cortez MD (Resident)  TORI Winslow ,JAMES BURRIS, JAMES Bcuk ([address] [facility] office)  Severo Sensor [facility] office)  Rosalinda Hernandez, [address] (Clinical Practice Manager)  Justina Hardy [facility] (Clinical Pharmacist)       Office phone number: [phone]    If you need to get in right away due to illness, please be advised we have \"Same Day\" appointments available Monday-Friday. Please call us at [phone] option #3 to schedule your \"Same Day\" appointment. Patient Education        Influenza (Flu) Vaccine (Inactivated or Recombinant): What You Need to Know  Why get vaccinated? Influenza vaccine can prevent influenza (flu). Flu is a contagious disease that spreads around the [place] every year, usually between October and May. Anyone can get the flu, but it is more dangerous for some people. Infants and young children, people 72years of age and older, pregnant women, and people with certain health conditions or a weakened immune system are at greatest risk of flu complications. Pneumonia, bronchitis, sinus infections and ear infections are examples of flu-related complications. If you have a medical condition, such as heart disease, cancer or diabetes, flu can make it worse. Flu can cause fever and chills, sore throat, muscle aches, fatigue, cough, headache, and runny or stuffy nose. Some people may have vomiting and diarrhea, though this is more common in children than adults. Each year, thousands of people in the [facility] die from flu, and many more are hospitalized.  Flu vaccine prevents millions of illnesses and flu-related visits to the doctor each year. Influenza vaccine  CDC recommends everyone 10months of age and older get vaccinated every flu season. Children 6 months through 6years of age may need 2 doses during a single flu season. Everyone else needs only 1 dose each flu season. It takes about 2 weeks for protection to develop after vaccination. There are many flu viruses, and they are always changing. Each year a new flu vaccine is made to protect against three or four viruses that are likely to cause disease in the upcoming flu season. Even when the vaccine doesn't exactly match these viruses, it may still provide some protection. Influenza vaccine does not cause flu. Influenza vaccine may be given at the same time as other vaccines. Talk with your health care provider  Tell your vaccine provider if the person getting the vaccine:  · Has had an allergic reaction after a previous dose of influenza vaccine, or has any severe, life-threatening allergies. · Has ever had Guillain-Barré Syndrome (also called GBS). In some cases, your health care provider may decide to postpone influenza vaccination to a future visit. People with minor illnesses, such as a cold, may be vaccinated. People who are moderately or severely ill should usually wait until they recover before getting influenza vaccine. Your health care provider can give you more information. Risks of a vaccine reaction  · Soreness, redness, and swelling where shot is given, fever, muscle aches, and headache can happen after influenza vaccine. · There may be a very small increased risk of Guillain-Barré Syndrome (GBS) after inactivated influenza vaccine (the flu shot). Dahiana Randle children who get the flu shot along with pneumococcal vaccine (PCV13), and/or DTaP vaccine at the same time might be slightly more likely to have a seizure caused by fever. Tell your health care provider if a child who is getting flu vaccine has ever had a seizure.   People sometimes faint after medical procedures, including vaccination. Tell your provider if you feel dizzy or have vision changes or ringing in the ears. As with any medicine, there is a very remote chance of a vaccine causing a severe allergic reaction, other serious injury, or death. What if there is a serious problem? An allergic reaction could occur after the vaccinated person leaves the clinic. If you see signs of a severe allergic reaction (hives, swelling of the face and throat, difficulty breathing, a fast heartbeat, dizziness, or weakness), call 9-1-1 and get the person to the nearest hospital.  For other signs that concern you, call your health care provider. Adverse reactions should be reported to the Vaccine Adverse Event Reporting System (VAERS). Your health care provider will usually file this report, or you can do it yourself. Visit the VAERS website at www.vaers. hhs.gov or call 3-738.256.6069. VAERS is only for reporting reactions, and VAERS staff do not give medical advice. The National Vaccine Injury Compensation Program  The National Vaccine Injury Compensation Program (VICP) is a federal program that was created to compensate people who may have been injured by certain vaccines. Visit the VICP website at www.hrsa.gov/vaccinecompensation or call 7-844.716.5875 to learn about the program and about filing a claim. There is a time limit to file a claim for compensation. How can I learn more? · Ask your healthcare provider. · Call your local or state health department. · Contact the Centers for Disease Control and Prevention (CDC):  ? Call 7-526.315.2780 (1-800-CDC-INFO) or  ? Visit CDC's website at www.cdc.gov/flu  Vaccine Information Statement (Interim)  Inactivated Influenza Vaccine  8/15/2019  42 UQuang Chapa 419JI-55  Department of Health and Human Services  Centers for Disease Control and Prevention  Many Vaccine Information Statements are available in [place] and other languages.  See www.immunize.org/vis. Muchas hojas de información sobre vacunas están disponibles en español y en otros idiomas. Visite www.immunize.org/vis. Care instructions adapted under license by [facility] ([facility]). If you have questions about a medical condition or this instruction, always ask your healthcare professional. [place] [address] any warranty or liability for your use of this information. Patient Education        hepatitis B adult vaccine  Pronunciation:  HEP a MALORIE tis B a DULT VAX een  Brand:  Engerix-B, Heplisav-B, Recombivax HB Adult, Recombivax HB Dialysis Formulation  What is the most important information I should know about this vaccine? You should not receive hepatitis B vaccine if you are allergic to yeast.  This vaccine will not protect against hepatitis B if you are already infected with the virus, even if you do not yet show symptoms. What is hepatitis B vaccine? Hepatitis B is a serious disease caused by a virus. Hepatitis causes inflammation of the liver, vomiting, and jaundice (yellowing of the skin or eyes). Hepatitis can lead to liver cancer, cirrhosis, or death. Hepatitis B is spread through blood or bodily fluids, sexual contact, and by sharing items such as a razor, toothbrush, or IV drug needle with an infected person. Hepatitis B can also be passed to a baby during childbirth when the mother is infected. The hepatitis B adult vaccine is used to help prevent this disease in adults. The dialysis form of this vaccine is for adults receiving dialysis. This vaccine helps your body develop immunity to hepatitis B, but it will not treat an active infection you already have. Vaccination with hepatitis B adult vaccine is recommended for all adults who are at risk of getting hepatitis B.  Risk factors include: living with someone infected with hepatitis B virus; having more than one sex partner; men who have sex with men; having sexual contact with infected people; having hepatitis C, chronic liver disease, kidney disease, diabetes, HIV or AIDS; being on dialysis; using intravenous (IV) drugs; living or working in a facility for developmentally disabled people; working in healthcare or public safety and being exposed to blood or body fluids; living or working in a correctional facility; being a victim of sexual abuse or assault; and traveling to areas where hepatitis B is common. Like any vaccine, the hepatitis B vaccine may not provide protection from disease in every person. What should I discuss with my healthcare provider before receiving this vaccine? Hepatitis B vaccine will not protect against infection with hepatitis A, C, and E, or other viruses that affect the liver. It may also not protect against hepatitis B if you are already infected with the virus, even if you do not yet show symptoms. You should not receive this vaccine if you have ever had a life-threatening allergic reaction to any vaccine containing hepatitis B, or if you are allergic to yeast.  If you have any of these other conditions, your vaccine may need to be postponed or not given at all:  · multiple sclerosis;  · kidney disease (or if you are on dialysis);  · a bleeding or blood clotting disorder such as hemophilia or easy bruising;  · weak immune system (caused by disease or by using certain medicine);  · an allergy to latex; or  · a neurologic disorder or disease affecting the brain (or if this was a reaction to a previous vaccine). You can still receive a vaccine if you have a minor cold. If you have a more severe illness with a fever or any type of infection, your doctor may recommend waiting until you get better before you receive this vaccine. It is not known whether this vaccine will harm an unborn baby. However, if you are at a high risk for infection with hepatitis B during pregnancy, your doctor should determine whether you need this vaccine.   If you are pregnant, your name may be listed on a pregnancy registry to track the effects of this vaccine on the baby. It may not be safe to breastfeed while receiving this medicine. Ask your doctor about any risk. How is this vaccine given? This vaccine is given as an injection (shot) into a muscle. A healthcare provider will give you this injection. The hepatitis B vaccine is given in a series of 2 to 4 shots. The booster shots are sometimes given 1 month and 6 months after the first shot. If you have a high risk of hepatitis B infection, you may be given an additional booster 1 to 2 months after the third shot. Your individual booster schedule may be different from these guidelines. Follow your doctor's instructions or the schedule recommended by the health department of the state you live in. What happens if I miss a dose? Contact your doctor if you will miss a booster dose or if you get behind schedule. The next dose should be given as soon as possible. There is no need to start over. Be sure to receive all recommended doses of this vaccine or you may not be fully protected against disease. What happens if I overdose? An overdose of this vaccine is unlikely to occur. What should I avoid before or after receiving this vaccine? Follow your doctor's instructions about any restrictions on food, beverages, or activity. What are the possible side effects of this vaccine? Get emergency medical help if you have signs of an allergic reaction (hives, difficult breathing, swelling in your face or throat) or a severe skin reaction (fever, sore throat, burning eyes, skin pain, red or purple skin rash with blistering and peeling). You should not receive a booster vaccine if you had a life-threatening allergic reaction after the first shot. Keep track of any and all side effects you have after receiving this vaccine. When you receive a booster dose, you will need to tell the doctor if the previous shot caused any side effects.   Becoming infected with therapy. [facility]'s drug information is an informational resource designed to assist licensed healthcare practitioners in caring for their patients and/or to serve consumers viewing this service as a supplement to, and not a substitute for, the expertise, skill, knowledge and judgment of healthcare practitioners. The absence of a warning for a given drug or drug combination in no way should be construed to indicate that the drug or drug combination is safe, effective or appropriate for any given patient. [facility] does not assume any responsibility for any aspect of healthcare administered with the aid of information [facility] provides. The information contained herein is not intended to cover all possible uses, directions, precautions, warnings, drug interactions, allergic reactions, or adverse effects. If you have questions about the drugs you are taking, check with your doctor, nurse or pharmacist.  Copyright 2031-4652 167  Clive: 7.01. Revision date: 4/1/2020. Care instructions adapted under license by [facility] ([facility]). If you have questions about a medical condition or this instruction, always ask your healthcare professional. [address] any warranty or liability for your use of this information.

## 2021-01-11 NOTE — PROGRESS NOTES
into the skin 3 times daily (before meals) Yes Cookie Solano DO   glipiZIDE (GLUCOTROL XL) 10 MG extended release tablet Take 1 tablet by mouth daily Yes Cookie Solano DO   gabapentin (NEURONTIN) 600 MG tablet Take 1 tablet by mouth daily for 30 days. Yes Cookie Solano DO   aspirin 81 MG EC tablet Take 1 tablet by mouth daily Yes Cookie Solano DO   atorvastatin (LIPITOR) 40 MG tablet Take 1 tablet by mouth daily Yes Cookie Solano DO   blood glucose monitor strips Test 4 times a day & as needed for symptoms of irregular blood glucose. Yes Cookie Solano DO   insulin lispro, 1 Unit Dial, (HUMALOG KWIKPEN) 100 UNIT/ML SOPN Inject 10 Units into the skin 3 times daily (before meals) Yes Cookie Solano DO   ergocalciferol (ERGOCALCIFEROL) 1.25 MG (91542 UT) capsule ergocalciferol (vitamin D2) 1,250 mcg (50,000 unit) capsule Yes Cookie Solano DO   Insulin Pen Needle 32G X 4 MM MISC 1 each by Does not apply route 4 times daily Yes Cookie Solano DO   meloxicam (MOBIC) 15 MG tablet Take 1 tablet by mouth daily Yes Helena Trivedi PA-C   Blood Glucose Monitoring Suppl (ONE TOUCH ULTRA 2) w/Device KIT 1 kit by Does not apply route daily Yes Cookie Solano DO      No Known Allergies  Social History     Tobacco Use    Smoking status: Current Every Day Smoker     Packs/day: 0.50     Years: 35.00     Pack years: 17.50     Types: Cigarettes    Smokeless tobacco: Never Used    Tobacco comment: pt states 1 cigarettes per day   Substance Use Topics    Alcohol use: No        Vitals:    01/11/21 1343   BP: 125/79  Comment: machine   Site: Left Upper Arm   Position: Sitting   Cuff Size: Medium Adult   Pulse: 78   Temp: 97.7 °F (36.5 °C)   TempSrc: Temporal   Weight: 116 lb (52.6 kg)   Height: 5' 2\" (1.575 m)     Estimated body mass index is 21.22 kg/m² as calculated from the following:    Height as of this encounter: 5' 2\" (1.575 m). Weight as of this encounter: 116 lb (52.6 kg).     Physical Exam  Constitutional:       Appearance: Normal appearance. Cardiovascular:      Rate and Rhythm: Normal rate and regular rhythm. Pulmonary:      Effort: Pulmonary effort is normal.      Breath sounds: Normal breath sounds. Neurological:      Mental Status: She is alert and oriented to person, place, and time. Psychiatric:         Mood and Affect: Mood normal.         Behavior: Behavior normal.         ASSESSMENT/PLAN:     Diagnosis Orders   1. Type 2 diabetes mellitus with hyperglycemia, with long-term current use of insulin ([facility])  POCT glycosylated hemoglobin (Hb A1C)    SITagliptin (JANUVIA) 50 MG tablet    insulin aspart (NOVOLOG FLEXPEN) 100 UNIT/ML injection pen    glipiZIDE (GLUCOTROL XL) 10 MG extended release tablet    blood glucose monitor strips    insulin lispro, 1 Unit Dial, (HUMALOG KWIKPEN) 100 UNIT/ML SOPN   2. Moderate episode of recurrent major depressive disorder ([facility]     3. Acquired hypothyroidism  levothyroxine (SYNTHROID) 25 MCG tablet    T4, Free    TSH   4. Rotator cuff tendonitis, left     5. Need for prophylactic vaccination and inoculation against varicella  zoster recombinant adjuvanted vaccine (SHINGRIX) 50 MCG/0.5ML SUSR injection   6. 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 Essential hypertension  lisinopril (PRINIVIL;ZESTRIL) 20 MG tablet    aspirin 81 MG EC tablet   8. Diabetic polyneuropathy associated with type 2 diabetes mellitus (HCC)  gabapentin (NEURONTIN) 600 MG tablet   9. Pure hyperglyceridemia  atorvastatin (LIPITOR) 40 MG tablet   10. Screen for colon cancer  Cologuard (For External Results Only)     Return in about 3 months (around 4/11/2021), or recheck medical problems. Requested Prescriptions     Signed Prescriptions Disp Refills    zoster recombinant adjuvanted vaccine (SHINGRIX) 50 MCG/0.5ML SUSR injection 1 each 1     Sig: Inject 0.5 mLs into the muscle once for 1 dose 50 MCG IM then repeat 2-6 months.     SITagliptin (JANUVIA) 50 MG tablet 30 tablet 5     Sig: Take 1 tablet by mouth daily    Multiple Vitamins-Minerals (MULTIVITAMIN ADULT) TABS 30 tablet 5     Sig: Take 1 tablet by mouth daily    metFORMIN (GLUCOPHAGE) 500 MG tablet 360 tablet 1     Sig: Take 2 tablets by mouth 2 times daily (with meals)    levothyroxine (SYNTHROID) 25 MCG tablet 30 tablet 5     Sig: Take 1 tablet by mouth daily    lisinopril (PRINIVIL;ZESTRIL) 20 MG tablet 30 tablet 5     Sig: Take 1 tablet by mouth daily    insulin glargine (LANTUS SOLOSTAR) 100 UNIT/ML injection pen 5 pen 5     Sig: Inject 40 Units into the skin nightly    insulin aspart (NOVOLOG FLEXPEN) 100 UNIT/ML injection pen 5 pen 3     Sig: Inject 10 Units into the skin 3 times daily (before meals)    glipiZIDE (GLUCOTROL XL) 10 MG extended release tablet 30 tablet 5     Sig: Take 1 tablet by mouth daily    gabapentin (NEURONTIN) 600 MG tablet 30 tablet 5     Sig: Take 1 tablet by mouth daily for 30 days.  aspirin 81 MG EC tablet 30 tablet 5     Sig: Take 1 tablet by mouth daily    atorvastatin (LIPITOR) 40 MG tablet 30 tablet 5     Sig: Take 1 tablet by mouth daily    blood glucose monitor strips 200 strip 5     Sig: Test 4 times a day & as needed for symptoms of irregular blood glucose.  insulin lispro, 1 Unit Dial, (HUMALOG KWIKPEN) 100 UNIT/ML SOPN 5 pen 3     Sig: Inject 10 Units into the skin 3 times daily (before meals)    ergocalciferol (ERGOCALCIFEROL) 1.25 MG (17064 UT) capsule 4 capsule 2     Sig: ergocalciferol (vitamin D2) 1,250 mcg (50,000 unit) capsule    Insulin Pen Needle 32G X 4 MM MISC 200 each 5     Si each by Does not apply route 4 times daily   advised to recheck with endocrine to discuss insulin pump  T4 and TSH ordered  See orders  Watch diet, discussed changing antidepressant , will wait for labs.   Discussed stressors, states that things should improve soon, not interested in counseling  An electronic signature was used to authenticate this note.    --Rani Goldberg DO on1/14/2021 at 12:10 PM

## 2021-01-11 NOTE — PROGRESS NOTES
Vaccine Information Sheet, \"Influenza - Inactivated\"  given to Lizbeth Butler, or parent/legal guardian of  Lizbeth Butler and verbalized understanding. Patient responses:    Have you ever had a reaction to a flu vaccine? No  Do you have any current illness? No  Have you ever had Guillian Sandwich Syndrome? No  Do you have a serious allergy to any of the following: Neomycin, Polymyxin, Thimerosal, eggs or egg products? No    Flu vaccine given per order. Please see immunization tab. Risks and benefits explained. Current VIS given. Visit Information    Have you changed or started any medications since your last visit including any over-the-counter medicines, vitamins, or herbal medicines? no   Have you stopped taking any of your medications? Is so, why? -  no  Are you having any side effects from any of your medications? - no    Have you seen any other physician or provider since your last visit?  no   Have you had any other diagnostic tests since your last visit?  no   Have you been seen in the emergency room and/or had an admission in a hospital since we last saw you?  yes - [facility]   Have you had your routine dental cleaning in the past 6 months?  no     Do you have an active MyChart account? If no, what is the barrier?   Yes    Patient Care Team:  Cait Chandra DO as PCP - General (Family Medicine)  Cait Chandra DO as PCP - [facility] Provider    Medical History Review  Past Medical, Family, and Social History reviewed and does not contribute to the patient presenting condition    Health Maintenance   Topic Date Due    Hepatitis C screen  1970    Diabetic retinal exam  09/25/1980    HIV screen  09/25/1985    Hepatitis B vaccine (2 of 3 - Risk 3-dose series) 01/30/2020    Flu vaccine (1) 09/01/2020    Shingles Vaccine (1 of 2) 09/25/2020    Colon cancer screen colonoscopy  09/25/2020    A1C test (Diabetic or Prediabetic)  01/07/2021    Diabetic microalbuminuria test  01/31/2021

## 2021-01-13 ENCOUNTER — TELEPHONE (OUTPATIENT)
Dept: FAMILY MEDICINE CLINIC | Age: 51
End: 2021-01-13

## 2021-01-14 ASSESSMENT — ENCOUNTER SYMPTOMS
SHORTNESS OF BREATH: 0
COUGH: 0

## 2021-01-20 DIAGNOSIS — Z79.4 TYPE 2 DIABETES MELLITUS WITH HYPERGLYCEMIA, WITH LONG-TERM CURRENT USE OF INSULIN (HCC): ICD-10-CM

## 2021-01-20 DIAGNOSIS — E11.65 TYPE 2 DIABETES MELLITUS WITH HYPERGLYCEMIA, WITH LONG-TERM CURRENT USE OF INSULIN (HCC): ICD-10-CM

## 2021-01-20 RX ORDER — INSULIN LISPRO 100 [IU]/ML
10 INJECTION, SOLUTION INTRAVENOUS; SUBCUTANEOUS
Qty: 5 PEN | Refills: 3 | Status: SHIPPED | OUTPATIENT
Start: 2021-01-20 | End: 2021-07-26

## 2021-01-25 ENCOUNTER — TELEPHONE (OUTPATIENT)
Dept: FAMILY MEDICINE CLINIC | Age: 51
End: 2021-01-25

## 2021-01-25 DIAGNOSIS — Z12.11 SCREEN FOR COLON CANCER: ICD-10-CM

## 2021-01-25 NOTE — TELEPHONE ENCOUNTER
Your PA request has been denied for Novolog. Additional information will be provided in the denial communication.

## 2021-01-25 NOTE — TELEPHONE ENCOUNTER
PA request for Basaglar and Novolog     PA processed and submitted to pt insurance, waiting for response in regards to coverage

## 2021-02-15 ENCOUNTER — HOSPITAL ENCOUNTER (OUTPATIENT)
Age: 51
Setting detail: SPECIMEN
Discharge: HOME | End: 2021-02-15
Payer: MEDICARE

## 2021-02-15 ENCOUNTER — OFFICE VISIT (OUTPATIENT)
Dept: FAMILY MEDICINE CLINIC | Age: 51
End: 2021-02-15
Payer: MEDICARE

## 2021-02-15 VITALS
HEIGHT: 62 IN | DIASTOLIC BLOOD PRESSURE: 78 MMHG | WEIGHT: 113 LBS | SYSTOLIC BLOOD PRESSURE: 134 MMHG | HEART RATE: 104 BPM | BODY MASS INDEX: 20.8 KG/M2

## 2021-02-15 DIAGNOSIS — Z79.4 TYPE 2 DIABETES MELLITUS WITH HYPERGLYCEMIA, WITH LONG-TERM CURRENT USE OF INSULIN (HCC): ICD-10-CM

## 2021-02-15 DIAGNOSIS — E11.65 TYPE 2 DIABETES MELLITUS WITH HYPERGLYCEMIA, WITH LONG-TERM CURRENT USE OF INSULIN (HCC): ICD-10-CM

## 2021-02-15 DIAGNOSIS — Z11.4 ENCOUNTER FOR SCREENING FOR HIV: ICD-10-CM

## 2021-02-15 DIAGNOSIS — E11.10 DIABETIC KETOACIDOSIS WITHOUT COMA ASSOCIATED WITH TYPE 2 DIABETES MELLITUS (HCC): ICD-10-CM

## 2021-02-15 DIAGNOSIS — M75.00 DIABETIC FROZEN SHOULDER ASSOCIATED WITH TYPE 2 DIABETES MELLITUS (HCC): Primary | ICD-10-CM

## 2021-02-15 DIAGNOSIS — Z11.59 NEED FOR HEPATITIS C SCREENING TEST: ICD-10-CM

## 2021-02-15 DIAGNOSIS — E11.618 DIABETIC FROZEN SHOULDER ASSOCIATED WITH TYPE 2 DIABETES MELLITUS (HCC): Primary | ICD-10-CM

## 2021-02-15 LAB
CHOLESTEROL/HDL RATIO: 3
CHOLESTEROL: 171 MG/DL
CREATININE URINE: 21.8 MG/DL (ref 28–217)
HDLC SERPL-MCNC: 57 MG/DL
HEPATITIS C ANTIBODY: NONREACTIVE
HIV AG/AB: NONREACTIVE
LDL CHOLESTEROL DIRECT: 64 MG/DL
LDL CHOLESTEROL: ABNORMAL MG/DL (ref 0–130)
MICROALBUMIN/CREAT 24H UR: <12 MG/L
MICROALBUMIN/CREAT UR-RTO: ABNORMAL MCG/MG CREAT
TRIGL SERPL-MCNC: 454 MG/DL
VLDLC SERPL CALC-MCNC: ABNORMAL MG/DL (ref 1–30)

## 2021-02-15 PROCEDURE — 99214 OFFICE O/P EST MOD 30 MIN: CPT | Performed by: FAMILY MEDICINE

## 2021-02-15 PROCEDURE — G8420 CALC BMI NORM PARAMETERS: HCPCS | Performed by: FAMILY MEDICINE

## 2021-02-15 PROCEDURE — G8427 DOCREV CUR MEDS BY ELIG CLIN: HCPCS | Performed by: FAMILY MEDICINE

## 2021-02-15 PROCEDURE — G8482 FLU IMMUNIZE ORDER/ADMIN: HCPCS | Performed by: FAMILY MEDICINE

## 2021-02-15 PROCEDURE — 4004F PT TOBACCO SCREEN RCVD TLK: CPT | Performed by: FAMILY MEDICINE

## 2021-02-15 PROCEDURE — 2022F DILAT RTA XM EVC RTNOPTHY: CPT | Performed by: FAMILY MEDICINE

## 2021-02-15 PROCEDURE — 3046F HEMOGLOBIN A1C LEVEL >9.0%: CPT | Performed by: FAMILY MEDICINE

## 2021-02-15 PROCEDURE — 99211 OFF/OP EST MAY X REQ PHY/QHP: CPT | Performed by: FAMILY MEDICINE

## 2021-02-15 PROCEDURE — 3017F COLORECTAL CA SCREEN DOC REV: CPT | Performed by: FAMILY MEDICINE

## 2021-02-15 ASSESSMENT — ENCOUNTER SYMPTOMS
SHORTNESS OF BREATH: 0
COUGH: 0

## 2021-02-15 NOTE — PROGRESS NOTES
2/15/2021     Ricki Bermudez (:  1970) is a 48 y.o. female, here for evaluation of the following medical concerns:  Chief Complaint   Patient presents with    Check-Up     HPI   Shoulder still improving, doing home exercises after her manipulation under ansethesia  Sugars run 200's and 300's, states compliant with medication  Interested in insulin pump but has not made appt with endocrinologist  Stressors remain  Losing weight, afraid to eat because of glucose readings. Had eye exam at [facility] eye. Review of Systems   Respiratory: Negative for cough and shortness of breath. Cardiovascular: Negative for chest pain, palpitations and leg swelling. Endocrine: Negative for polydipsia, polyphagia and polyuria. Musculoskeletal: Positive for arthralgias (left shoulder). Prior to Visit Medications    Medication Sig Taking? Authorizing Provider   insulin lispro, 1 Unit Dial, (HUMALOG KWIKPEN) 100 UNIT/ML SOPN Inject 10 Units into the skin 3 times daily (before meals) Yes Mauricio Zhang, DO   SITagliptin (JANUVIA) 50 MG tablet Take 1 tablet by mouth daily Yes Mauricio Zhang, DO   Multiple Vitamins-Minerals (MULTIVITAMIN ADULT) TABS Take 1 tablet by mouth daily Yes Mauricio Zhang, DO   metFORMIN (GLUCOPHAGE) 500 MG tablet Take 2 tablets by mouth 2 times daily (with meals) Yes Mauricio Zhang, DO   levothyroxine (SYNTHROID) 25 MCG tablet Take 1 tablet by mouth daily Yes Mauricio Zhang, DO   lisinopril (PRINIVIL;ZESTRIL) 20 MG tablet Take 1 tablet by mouth daily Yes Mauricio Zhang, DO   insulin glargine (LANTUS SOLOSTAR) 100 UNIT/ML injection pen Inject 40 Units into the skin nightly Yes Mauricio Zhang, DO   glipiZIDE (GLUCOTROL XL) 10 MG extended release tablet Take 1 tablet by mouth daily Yes Mauricio Zhang, DO   gabapentin (NEURONTIN) 600 MG tablet Take 1 tablet by mouth daily for 30 days.  Yes Mauricio Zhnag, DO   aspirin 81 MG EC tablet Take 1 tablet by mouth daily Yes Mauricio Zhang, DO   atorvastatin (LIPITOR) 40 MG tablet Take 1 tablet by mouth daily Yes Brody Earing, DO   meloxicam (MOBIC) 15 MG tablet Take 1 tablet by mouth daily Yes Helena Trivedi PA-C   acetaminophen-codeine (TYLENOL #3) 300-30 MG per tablet acetaminophen 300 mg-codeine 30 mg tablet  Historical Provider, MD   Alcohol Swabs (ALCOHOL WIPES) 70 % PADS Alcohol Pads  Historical Provider, MD   blood glucose monitor strips Test 4 times a day & as needed for symptoms of irregular blood glucose. Brody Earing, DO   ergocalciferol (ERGOCALCIFEROL) 1.25 MG (73300 UT) capsule ergocalciferol (vitamin D2) 1,250 mcg (50,000 unit) capsule  Brody Earing, DO   Insulin Pen Needle 32G X 4 MM MISC 1 each by Does not apply route 4 times daily  Brody Earing, DO   Blood Glucose Monitoring Suppl (ONE TOUCH ULTRA 2) w/Device KIT 1 kit by Does not apply route daily  Brody Earing, DO      No Known Allergies  Social History     Tobacco Use    Smoking status: Current Every Day Smoker     Packs/day: 0.50     Years: 35.00     Pack years: 17.50     Types: Cigarettes    Smokeless tobacco: Never Used    Tobacco comment: pt states 1 cigarettes per day   Substance Use Topics    Alcohol use: No        Vitals:    02/15/21 0930   BP: 134/78   Site: Right Upper Arm   Position: Sitting   Cuff Size: Medium Adult   Pulse: 104   Weight: 113 lb (51.3 kg)   Height: 5' 2\" (1.575 m)     Estimated body mass index is 20.67 kg/m² as calculated from the following:    Height as of this encounter: 5' 2\" (1.575 m). Weight as of this encounter: 113 lb (51.3 kg). Physical Exam  Constitutional:       Appearance: Normal appearance. Pulmonary:      Effort: Pulmonary effort is normal.   Neurological:      General: No focal deficit present. Mental Status: She is alert and oriented to person, place, and time. Psychiatric:         Mood and Affect: Mood normal.         Behavior: Behavior normal.         ASSESSMENT/PLAN:     Diagnosis Orders   1.  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 Encounter for screening for HIV  HIV Screen   3. Need for hepatitis C screening test  Hepatitis C Antibody   4. Type 2 diabetes mellitus with hyperglycemia, with long-term current use of insulin (HCC)  Microalbumin, Ur    Lipid Panel   5. Diabetic ketoacidosis without coma associated with type 2 diabetes mellitus ([facility] [address]  No recent episode     Return in about 1 month (around 3/15/2021). Increase lantus to 43 units hs for 3 days and continue to increase 3 units every 3 days. Stop when in the 100's or symtomatic. Call if any concerns. See lab orders, reviewed recent labs, requesting eye exam results. Questions answered, able to teach back instructions    Requested Prescriptions      No prescriptions requested or ordered in this encounter     An electronic signature was used to authenticate this note.     --Jacinto Mcnulty DO on2/15/2021 at 10:34 AM

## 2021-02-15 NOTE — PROGRESS NOTES
Diabetic visit information    BP Readings from Last 3 Encounters:   01/11/21 125/79   12/01/20 (!) 143/93   12/01/20 (!) 74/51       Hemoglobin A1C (%)   Date Value   01/11/2021 11.8   10/07/2020 12.5 (H)   07/10/2020 12.4     Microalb/Crt. Ratio (mcg/mg creat)   Date Value   01/31/2020 CANNOT BE CALCULATED     LDL Cholesterol (mg/dL)   Date Value   01/31/2020 31               Have you changed or started any medications since your last visit including any over-the-counter medicines, vitamins, or herbal medicines? no   Have you stopped taking any of your medications? Is so, why? -  no  Are you having any side effects from any of your medications? - no    Have you seen any other physician or provider since your last visit?  no   Have you had any other diagnostic tests since your last visit? yes - labs,    Have you been seen in the emergency room and/or had an admission in a hospital since we last saw you?  no     Have you had your annual diabetic retinal (eye) exam? No   (ensure copy of exam is in the chart)    Have you had your routine dental cleaning in the past 6 months? no    Do you have an active MyChart account? If not, what are your barriers? Yes    Patient Care Team:  Miguel Scherer DO as PCP - General (Family Medicine)  Miguel Scherer DO as PCP - [facility] Provider    Medical history Review  Past Medical, Family, and Social History reviewed and does contribute to the patient presenting condition.     Health Maintenance   Topic Date Due    Hepatitis C screen  1970    Diabetic retinal exam  09/25/1980    HIV screen  09/25/1985    COVID-19 Vaccine (1 of 2) 09/25/1986    Shingles Vaccine (1 of 2) 09/25/2020    Diabetic microalbuminuria test  01/31/2021    Lipid screen  01/31/2021    A1C test (Diabetic or Prediabetic)  04/11/2021    Hepatitis B vaccine (3 of 3 - Risk 3-dose series) 05/11/2021    Diabetic foot exam  07/10/2021    Potassium monitoring  11/24/2021    Creatinine monitoring 11/24/2021    TSH testing  01/11/2022    Breast cancer screen  10/16/2022    Colon cancer screen fecal DNA test (Cologuard)  01/19/2024    Cervical cancer screen  08/10/2025    DTaP/Tdap/Td vaccine (2 - Td) 12/15/2025    Flu vaccine  Completed    Pneumococcal 0-64 years Vaccine  Completed    Hepatitis A vaccine  Aged Out    Hib vaccine  Aged Out    Meningococcal (ACWY) vaccine  Aged Out

## 2021-03-19 ENCOUNTER — OFFICE VISIT (OUTPATIENT)
Dept: FAMILY MEDICINE CLINIC | Age: 51
End: 2021-03-19
Payer: MEDICARE

## 2021-03-19 VITALS
BODY MASS INDEX: 23.48 KG/M2 | TEMPERATURE: 96.7 F | DIASTOLIC BLOOD PRESSURE: 77 MMHG | SYSTOLIC BLOOD PRESSURE: 117 MMHG | HEART RATE: 87 BPM | HEIGHT: 62 IN | WEIGHT: 127.6 LBS

## 2021-03-19 DIAGNOSIS — Z79.4 TYPE 2 DIABETES MELLITUS WITH HYPERGLYCEMIA, WITH LONG-TERM CURRENT USE OF INSULIN (HCC): Primary | ICD-10-CM

## 2021-03-19 DIAGNOSIS — E11.65 TYPE 2 DIABETES MELLITUS WITH HYPERGLYCEMIA, WITH LONG-TERM CURRENT USE OF INSULIN (HCC): Primary | ICD-10-CM

## 2021-03-19 LAB — HBA1C MFR BLD: 10.9 %

## 2021-03-19 PROCEDURE — 4004F PT TOBACCO SCREEN RCVD TLK: CPT | Performed by: FAMILY MEDICINE

## 2021-03-19 PROCEDURE — 99213 OFFICE O/P EST LOW 20 MIN: CPT | Performed by: FAMILY MEDICINE

## 2021-03-19 PROCEDURE — 3017F COLORECTAL CA SCREEN DOC REV: CPT | Performed by: FAMILY MEDICINE

## 2021-03-19 PROCEDURE — 3046F HEMOGLOBIN A1C LEVEL >9.0%: CPT | Performed by: FAMILY MEDICINE

## 2021-03-19 PROCEDURE — 99211 OFF/OP EST MAY X REQ PHY/QHP: CPT | Performed by: FAMILY MEDICINE

## 2021-03-19 PROCEDURE — G8420 CALC BMI NORM PARAMETERS: HCPCS | Performed by: FAMILY MEDICINE

## 2021-03-19 PROCEDURE — G8482 FLU IMMUNIZE ORDER/ADMIN: HCPCS | Performed by: FAMILY MEDICINE

## 2021-03-19 PROCEDURE — 83036 HEMOGLOBIN GLYCOSYLATED A1C: CPT | Performed by: FAMILY MEDICINE

## 2021-03-19 PROCEDURE — G8427 DOCREV CUR MEDS BY ELIG CLIN: HCPCS | Performed by: FAMILY MEDICINE

## 2021-03-19 PROCEDURE — 2022F DILAT RTA XM EVC RTNOPTHY: CPT | Performed by: FAMILY MEDICINE

## 2021-03-19 RX ORDER — CETIRIZINE HYDROCHLORIDE 10 MG/1
10 TABLET ORAL DAILY
Qty: 30 TABLET | Refills: 0 | Status: SHIPPED | OUTPATIENT
Start: 2021-03-19 | End: 2021-04-18

## 2021-03-19 NOTE — PROGRESS NOTES
DIABETES and HYPERTENSION visit    BP Readings from Last 3 Encounters:   02/15/21 134/78   01/11/21 125/79   12/01/20 (!) 143/93        Hemoglobin A1C (%)   Date Value   01/11/2021 11.8   10/07/2020 12.5 (H)   07/10/2020 12.4     Microalb/Crt. Ratio (mcg/mg creat)   Date Value   02/15/2021 CANNOT BE CALCULATED     LDL Cholesterol (mg/dL)   Date Value   02/15/2021          HDL (mg/dL)   Date Value   02/15/2021 57     BUN (mg/dL)   Date Value   11/24/2020 18     CREATININE (mg/dL)   Date Value   11/24/2020 1.04 (H)     Glucose (mg/dL)   Date Value   11/24/2020 157 (H)   04/04/2012 127 (H)            Have you changed or started any medications since your last visit including any over-the-counter medicines, vitamins, or herbal medicines? no   Have you stopped taking any of your medications? Is so, why? -  no  Are you having any side effects from any of your medications? - no    Have you seen any other physician or provider since your last visit?  no   Have you had any other diagnostic tests since your last visit? yes - labs    Have you been seen in the emergency room and/or had an admission in a hospital since we last saw you?  no   Have you had your routine dental cleaning in the past 6 months?  no     Have you had your annual diabetic retinal (eye) exam? Yes   (ensure copy of exam is in the chart)    Do you have an active MyChart account? If no, what is the barrier?   Yes    Patient Care Team:  Demetris Gonzales DO as PCP - General (Family Medicine)  Demetris Gonzales DO as PCP - [facility]    Medical History Review  Past Medical, Family, and Social History reviewed and does contribute to the patient presenting condition    Health Maintenance   Topic Date Due    COVID-19 Vaccine (1) Never done    Shingles Vaccine (1 of 2) Never done    A1C test (Diabetic or Prediabetic)  04/11/2021    Hepatitis B vaccine (3 of 3 - Risk 3-dose series) 05/11/2021    Diabetic foot exam  07/10/2021    Potassium monitoring 11/24/2021    Creatinine monitoring  11/24/2021    TSH testing  01/11/2022    Diabetic retinal exam  02/01/2022    Diabetic microalbuminuria test  02/15/2022    Lipid screen  02/15/2022    Breast cancer screen  10/16/2022    Colon cancer screen fecal DNA test (Cologuard)  01/19/2024    Cervical cancer screen  08/10/2025    DTaP/Tdap/Td vaccine (2 - Td) 12/15/2025    Flu vaccine  Completed    Pneumococcal 0-64 years Vaccine  Completed    Hepatitis C screen  Completed    HIV screen  Completed    Hepatitis A vaccine  Aged Out    Hib vaccine  Aged Out    Meningococcal (ACWY) vaccine  Aged Out

## 2021-03-19 NOTE — PROGRESS NOTES
3/19/2021     Josephine Lyons (:  1970) is a 48 y.o. female, here for evaluation of the following medical concerns:  Chief Complaint   Patient presents with    1 Month Follow-Up    Results     lab results      HPI   Follow up DM  States glucose coming down a little, states stress is not as bad. Not sleeping well  Has appt with Dr. Danilo Rios next week wants to discus pump. A1c 10.9, down from 11.7  Clear rhinorrhea, thinks may have allergies      Review of Systems   Constitutional: Negative for fatigue and fever. Respiratory: Negative for cough and shortness of breath. Cardiovascular: Negative for chest pain. Prior to Visit Medications    Medication Sig Taking?  Authorizing Provider   cetirizine (ZYRTEC) 10 MG tablet Take 1 tablet by mouth daily Yes Emmanuel Becerra DO   insulin lispro, 1 Unit Dial, (HUMALOG KWIKPEN) 100 UNIT/ML SOPN Inject 10 Units into the skin 3 times daily (before meals) Yes Emmanuel Becerra DO   acetaminophen-codeine (TYLENOL #3) 300-30 MG per tablet acetaminophen 300 mg-codeine 30 mg tablet Yes Historical Provider, MD   Alcohol Swabs (ALCOHOL WIPES) 70 % PADS Alcohol Pads Yes Historical Provider, MD   SITagliptin (JANUVIA) 50 MG tablet Take 1 tablet by mouth daily Yes Emmanuel Becerra DO   Multiple Vitamins-Minerals (MULTIVITAMIN ADULT) TABS Take 1 tablet by mouth daily Yes Emmanuel Becerra DO   metFORMIN (GLUCOPHAGE) 500 MG tablet Take 2 tablets by mouth 2 times daily (with meals) Yes Emmanuel Becerra DO   levothyroxine (SYNTHROID) 25 MCG tablet Take 1 tablet by mouth daily Yes Emmanuel Becerra DO   lisinopril (PRINIVIL;ZESTRIL) 20 MG tablet Take 1 tablet by mouth daily Yes Emmanuel Becerra DO   insulin glargine (LANTUS SOLOSTAR) 100 UNIT/ML injection pen Inject 40 Units into the skin nightly Yes Emmanuel Becerra DO   glipiZIDE (GLUCOTROL XL) 10 MG extended release tablet Take 1 tablet by mouth daily Yes Emmanuel Becerra DO   aspirin 81 MG EC tablet Take 1 tablet by mouth daily Yes Marito Bettencourt DO   atorvastatin (LIPITOR) 40 MG tablet Take 1 tablet by mouth daily Yes Marito Bettencourt DO   blood glucose monitor strips Test 4 times a day & as needed for symptoms of irregular blood glucose. Yes Marito Bettencourt DO   ergocalciferol (ERGOCALCIFEROL) 1.25 MG (34879 UT) capsule ergocalciferol (vitamin D2) 1,250 mcg (50,000 unit) capsule Yes Marito Bettencourt DO   Insulin Pen Needle 32G X 4 MM MISC 1 each by Does not apply route 4 times daily Yes Marito Bettencourt DO   meloxicam (MOBIC) 15 MG tablet Take 1 tablet by mouth daily Yes Helena Trivedi PA-C   Blood Glucose Monitoring Suppl (ONE TOUCH ULTRA 2) w/Device KIT 1 kit by Does not apply route daily Yes Marito Bettencourt DO   gabapentin (NEURONTIN) 600 MG tablet Take 1 tablet by mouth daily for 30 days. Marito Bettencourt DO      No Known Allergies  Social History     Tobacco Use    Smoking status: Current Every Day Smoker     Packs/day: 0.50     Years: 35.00     Pack years: 17.50     Types: Cigarettes    Smokeless tobacco: Never Used    Tobacco comment: pt states 1 cigarettes per day   Substance Use Topics    Alcohol use: No        Vitals:    03/19/21 1451   BP: 117/77   Site: Left Upper Arm   Position: Sitting   Cuff Size: Medium Adult   Pulse: 87   Temp: 96.7 °F (35.9 °C)   TempSrc: Temporal   Weight: 127 lb 9.6 oz (57.9 kg)   Height: 5' 2\" (1.575 m)     Estimated body mass index is 23.34 kg/m² as calculated from the following:    Height as of this encounter: 5' 2\" (1.575 m). Weight as of this encounter: 127 lb 9.6 oz (57.9 kg). Physical Exam  Constitutional:       Appearance: Normal appearance. Neurological:      Mental Status: She is alert. Psychiatric:         Mood and Affect: Mood normal.         Behavior: Behavior normal.         ASSESSMENT/PLAN:     Diagnosis Orders   1.  Type 2 diabetes mellitus with hyperglycemia, with long-term current use of insulin (HCC)  POCT glycosylated hemoglobin (Hb A1C)     Return in about 3 months (around 6/19/2021) for follow up. labs reviewed  Start zyrtec for allergies at hs, hopefully will help with sleep. Follow up with Dr. Danilo Rios  Requested Prescriptions     Signed Prescriptions Disp Refills    cetirizine (ZYRTEC) 10 MG tablet 30 tablet 0     Sig: Take 1 tablet by mouth daily     An electronic signature was used to authenticate this note.     --Emmanuel Becerra DO on3/23/2021 at 12:11 PM

## 2021-03-23 ASSESSMENT — ENCOUNTER SYMPTOMS
SHORTNESS OF BREATH: 0
COUGH: 0

## 2021-03-29 RX ORDER — ERGOCALCIFEROL 1.25 MG/1
CAPSULE ORAL
Qty: 4 CAPSULE | Refills: 2 | Status: SHIPPED | OUTPATIENT
Start: 2021-03-29 | End: 2021-06-29

## 2021-03-29 NOTE — TELEPHONE ENCOUNTER
Vitamin D pending for refill     Health Maintenance   Topic Date Due    COVID-19 Vaccine (1) Never done    Shingles Vaccine (1 of 2) Never done    Hepatitis B vaccine (3 of 3 - Risk 3-dose series) 05/11/2021    A1C test (Diabetic or Prediabetic)  06/19/2021    Diabetic foot exam  07/10/2021    Potassium monitoring  11/24/2021    Creatinine monitoring  11/24/2021    TSH testing  01/11/2022    Diabetic retinal exam  02/01/2022    Diabetic microalbuminuria test  02/15/2022    Lipid screen  02/15/2022    Breast cancer screen  10/16/2022    Colon cancer screen fecal DNA test (Cologuard)  01/19/2024    Cervical cancer screen  08/10/2025    DTaP/Tdap/Td vaccine (2 - Td) 12/15/2025    Flu vaccine  Completed    Pneumococcal 0-64 years Vaccine  Completed    Hepatitis C screen  Completed    HIV screen  Completed    Hepatitis A vaccine  Aged Out    Hib vaccine  Aged Out    Meningococcal (ACWY) vaccine  Aged Out             (applicable per patient's age: Cancer Screenings, Depression Screening, Fall Risk Screening, Immunizations)    Hemoglobin A1C (%)   Date Value   03/19/2021 10.9   01/11/2021 11.8   10/07/2020 12.5 (H)     Microalb/Crt. Ratio (mcg/mg creat)   Date Value   02/15/2021 CANNOT BE CALCULATED     LDL Cholesterol (mg/dL)   Date Value   02/15/2021          AST (U/L)   Date Value   11/24/2020 17     ALT (U/L)   Date Value   11/24/2020 19     BUN (mg/dL)   Date Value   11/24/2020 18      (goal A1C is < 7)   (goal LDL is <100) need 30-50% reduction from baseline     BP Readings from Last 3 Encounters:   03/19/21 117/77   02/15/21 134/78   01/11/21 125/79    (goal /80)      All Future Testing planned in [facility]:      Next Visit Date:  No future appointments.          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

## 2021-04-07 ENCOUNTER — HOSPITAL ENCOUNTER (OUTPATIENT)
Age: 51
Setting detail: SPECIMEN
Discharge: HOME | End: 2021-04-07
Payer: MEDICARE

## 2021-04-07 LAB
C-PEPTIDE: <0.1 NG/ML (ref 1.1–4.4)
GLUCOSE FASTING: 109 MG/DL (ref 70–99)

## 2021-04-10 LAB — GLUTAMIC ACID DECARB AB: >250 IU/ML (ref 0–5)

## 2021-04-11 LAB — ISLET CELL ANTIBODY: NORMAL

## 2021-05-12 ENCOUNTER — TELEPHONE (OUTPATIENT)
Dept: FAMILY MEDICINE CLINIC | Age: 51
End: 2021-05-12

## 2021-05-12 NOTE — TELEPHONE ENCOUNTER
LVM for patient to contact office to [facility] DM follow up due around 6-19-21. Will need an A1C.

## 2021-06-28 DIAGNOSIS — E78.1 PURE HYPERGLYCERIDEMIA: ICD-10-CM

## 2021-06-28 DIAGNOSIS — E03.9 ACQUIRED HYPOTHYROIDISM: ICD-10-CM

## 2021-06-28 DIAGNOSIS — E11.65 TYPE 2 DIABETES MELLITUS WITH HYPERGLYCEMIA, WITH LONG-TERM CURRENT USE OF INSULIN (HCC): ICD-10-CM

## 2021-06-28 DIAGNOSIS — E11.42 DIABETIC POLYNEUROPATHY ASSOCIATED WITH TYPE 2 DIABETES MELLITUS (HCC): ICD-10-CM

## 2021-06-28 DIAGNOSIS — I10 ESSENTIAL HYPERTENSION: ICD-10-CM

## 2021-06-28 DIAGNOSIS — Z79.4 TYPE 2 DIABETES MELLITUS WITH HYPERGLYCEMIA, WITH LONG-TERM CURRENT USE OF INSULIN (HCC): ICD-10-CM

## 2021-06-29 RX ORDER — LEVOTHYROXINE SODIUM 0.03 MG/1
TABLET ORAL
Qty: 30 TABLET | Refills: 5 | Status: SHIPPED | OUTPATIENT
Start: 2021-06-29 | End: 2022-01-03

## 2021-06-29 RX ORDER — GABAPENTIN 600 MG/1
TABLET ORAL
Qty: 30 TABLET | Refills: 5 | Status: SHIPPED | OUTPATIENT
Start: 2021-06-29 | End: 2021-12-20

## 2021-06-29 RX ORDER — SITAGLIPTIN 50 MG/1
TABLET, FILM COATED ORAL
Qty: 30 TABLET | Refills: 5 | Status: SHIPPED | OUTPATIENT
Start: 2021-06-29 | End: 2021-12-20

## 2021-06-29 RX ORDER — ERGOCALCIFEROL 1.25 MG/1
CAPSULE ORAL
Qty: 4 CAPSULE | Refills: 2 | Status: SHIPPED | OUTPATIENT
Start: 2021-06-29 | End: 2021-09-28

## 2021-06-29 RX ORDER — ASPIRIN 81 MG/1
TABLET, COATED ORAL
Qty: 30 TABLET | Refills: 5 | Status: SHIPPED | OUTPATIENT
Start: 2021-06-29 | End: 2021-12-20

## 2021-06-29 RX ORDER — ATORVASTATIN CALCIUM 40 MG/1
TABLET, FILM COATED ORAL
Qty: 30 TABLET | Refills: 5 | Status: SHIPPED | OUTPATIENT
Start: 2021-06-29 | End: 2021-12-20

## 2021-06-29 RX ORDER — GLIPIZIDE 10 MG/1
TABLET, FILM COATED, EXTENDED RELEASE ORAL
Qty: 30 TABLET | Refills: 5 | Status: SHIPPED | OUTPATIENT
Start: 2021-06-29 | End: 2021-12-20

## 2021-06-29 RX ORDER — LISINOPRIL 20 MG/1
TABLET ORAL
Qty: 30 TABLET | Refills: 5 | Status: SHIPPED | OUTPATIENT
Start: 2021-06-29 | End: 2021-12-20

## 2021-06-29 RX ORDER — MULTIVITAMIN/IRON/FOLIC ACID 18MG-0.4MG
TABLET ORAL
Qty: 30 TABLET | Refills: 5 | Status: SHIPPED | OUTPATIENT
Start: 2021-06-29 | End: 2021-12-20

## 2021-07-10 LAB
AVERAGE GLUCOSE: 194
HBA1C MFR BLD: 8.4 %

## 2021-08-19 NOTE — TELEPHONE ENCOUNTER
PA has been submitted for [employer] advantage  Waiting on response back (4) rarely moist

## 2021-08-22 DIAGNOSIS — M75.82 ROTATOR CUFF TENDONITIS, LEFT: ICD-10-CM

## 2021-08-22 DIAGNOSIS — E11.618 DIABETIC FROZEN SHOULDER ASSOCIATED WITH TYPE 2 DIABETES MELLITUS (HCC): ICD-10-CM

## 2021-08-22 DIAGNOSIS — M75.00 DIABETIC FROZEN SHOULDER ASSOCIATED WITH TYPE 2 DIABETES MELLITUS (HCC): ICD-10-CM

## 2021-08-23 RX ORDER — MELOXICAM 15 MG/1
TABLET ORAL
Qty: 30 TABLET | Refills: 3 | Status: SHIPPED | OUTPATIENT
Start: 2021-08-23 | End: 2022-01-03

## 2021-08-23 RX ORDER — INSULIN GLARGINE 100 [IU]/ML
INJECTION, SOLUTION SUBCUTANEOUS
Qty: 12 ML | Refills: 5 | Status: SHIPPED | OUTPATIENT
Start: 2021-08-23 | End: 2022-03-07

## 2021-08-23 NOTE — TELEPHONE ENCOUNTER
Escribe Request for pending medication. Last Visit Date: 3/19/21  Next Visit Date:  No future appointments. Health Maintenance   Topic Date Due    COVID-19 Vaccine (1) Never done    Shingles Vaccine (1 of 2) Never done    Hepatitis B vaccine (3 of 3 - Risk 3-dose series) 05/11/2021    Diabetic foot exam  07/10/2021    Flu vaccine (1) 09/01/2021    Potassium monitoring  11/24/2021    Creatinine monitoring  11/24/2021    TSH testing  01/11/2022    Diabetic retinal exam  02/01/2022    Diabetic microalbuminuria test  02/15/2022    Lipid screen  02/15/2022    A1C test (Diabetic or Prediabetic)  07/10/2022    Breast cancer screen  10/16/2022    Colon cancer screen fecal DNA test (Cologuard)  01/19/2024    Cervical cancer screen  08/10/2025    DTaP/Tdap/Td vaccine (2 - Td or Tdap) 12/15/2025    Pneumococcal 0-64 years Vaccine (2 of 2 - PPSV23) 09/25/2035    Hepatitis C screen  Completed    HIV screen  Completed    Hepatitis A vaccine  Aged Out    Hib vaccine  Aged Out    Meningococcal (ACWY) vaccine  Aged Out       Hemoglobin A1C (%)   Date Value   07/10/2021 8.4 (H)   03/19/2021 10.9   01/11/2021 11.8             ( goal A1C is < 7)   Microalb/Crt. Ratio (mcg/mg creat)   Date Value   02/15/2021 CANNOT BE CALCULATED     LDL Cholesterol (mg/dL)   Date Value   02/15/2021            (goal LDL is <100)   AST (U/L)   Date Value   11/24/2020 17     ALT (U/L)   Date Value   11/24/2020 19     BUN (mg/dL)   Date Value   11/24/2020 18     BP Readings from Last 3 Encounters:   03/19/21 117/77   02/15/21 134/78   01/11/21 125/79          (goal 120/80)    All Future Testing planned in [facility]      Next Visit Date:  No future appointments.       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

## 2021-09-27 ENCOUNTER — OFFICE VISIT (OUTPATIENT)
Dept: FAMILY MEDICINE CLINIC | Age: 51
End: 2021-09-27
Payer: MEDICARE

## 2021-09-27 VITALS
HEART RATE: 77 BPM | DIASTOLIC BLOOD PRESSURE: 108 MMHG | HEIGHT: 62 IN | WEIGHT: 126.4 LBS | BODY MASS INDEX: 23.26 KG/M2 | SYSTOLIC BLOOD PRESSURE: 177 MMHG

## 2021-09-27 DIAGNOSIS — M75.82 ROTATOR CUFF TENDONITIS, LEFT: ICD-10-CM

## 2021-09-27 DIAGNOSIS — Z79.4 TYPE 2 DIABETES MELLITUS WITH HYPERGLYCEMIA, WITH LONG-TERM CURRENT USE OF INSULIN (HCC): Primary | ICD-10-CM

## 2021-09-27 DIAGNOSIS — E11.65 TYPE 2 DIABETES MELLITUS WITH HYPERGLYCEMIA, WITH LONG-TERM CURRENT USE OF INSULIN (HCC): Primary | ICD-10-CM

## 2021-09-27 DIAGNOSIS — G43.109 MIGRAINE WITH AURA AND WITHOUT STATUS MIGRAINOSUS, NOT INTRACTABLE: ICD-10-CM

## 2021-09-27 LAB — HBA1C MFR BLD: 9.2 %

## 2021-09-27 PROCEDURE — 99214 OFFICE O/P EST MOD 30 MIN: CPT | Performed by: FAMILY MEDICINE

## 2021-09-27 PROCEDURE — 3017F COLORECTAL CA SCREEN DOC REV: CPT | Performed by: FAMILY MEDICINE

## 2021-09-27 PROCEDURE — G8427 DOCREV CUR MEDS BY ELIG CLIN: HCPCS | Performed by: FAMILY MEDICINE

## 2021-09-27 PROCEDURE — 4004F PT TOBACCO SCREEN RCVD TLK: CPT | Performed by: FAMILY MEDICINE

## 2021-09-27 PROCEDURE — 3046F HEMOGLOBIN A1C LEVEL >9.0%: CPT | Performed by: FAMILY MEDICINE

## 2021-09-27 PROCEDURE — 83036 HEMOGLOBIN GLYCOSYLATED A1C: CPT | Performed by: FAMILY MEDICINE

## 2021-09-27 PROCEDURE — 90686 IIV4 VACC NO PRSV 0.5 ML IM: CPT | Performed by: FAMILY MEDICINE

## 2021-09-27 PROCEDURE — G8420 CALC BMI NORM PARAMETERS: HCPCS | Performed by: FAMILY MEDICINE

## 2021-09-27 PROCEDURE — 2022F DILAT RTA XM EVC RTNOPTHY: CPT | Performed by: FAMILY MEDICINE

## 2021-09-27 RX ORDER — SUMATRIPTAN 50 MG/1
50 TABLET, FILM COATED ORAL
Qty: 9 TABLET | Refills: 0 | Status: SHIPPED | OUTPATIENT
Start: 2021-09-27 | End: 2021-10-04

## 2021-09-27 NOTE — PROGRESS NOTES
Prediabetic)  07/10/2022    Breast cancer screen  10/16/2022    Colon cancer screen fecal DNA test (Cologuard)  01/19/2024    Cervical cancer screen  08/10/2025    DTaP/Tdap/Td vaccine (2 - Td or Tdap) 12/15/2025    Pneumococcal 0-64 years Vaccine (2 of 2 - PPSV23) 09/25/2035    Hepatitis C screen  Completed    HIV screen  Completed    Hepatitis A vaccine  Aged Out    Hib vaccine  Aged Out    Meningococcal (ACWY) vaccine  Aged Out

## 2021-09-27 NOTE — PATIENT INSTRUCTIONS
Thank you for letting us take care of you today. We hope all your questions were addressed. If a question was overlooked or something else comes to mind after you return home, please contact a member of your Care Team listed below. Your Care Team at [place] [id number] is Team #5  Ankita Rizzo MD (Faculty)  Alverto Luque MD (Resident)  Higinio Campos MD (Resident)  Yung Caruso MD (Resident)  Caryle Lord, MD (Resident)  Amarilys Hauser., JAMES Muñoz., RAMÓN Feldman., Olga Lidia Yuan., Susan [facility] office)  Daylene Hodgkins, [address] (Clinical Practice Manager)  Char Hare, [address] [place] [address] (Clinical Pharmacist)       Office phone number: [phone]    If you need to get in right away due to illness, please be advised we have \"Same Day\" appointments available Monday-Friday. Please call us at [phone] option #3 to schedule your \"Same Day\" appointment.

## 2021-09-27 NOTE — PROGRESS NOTES
2021     Selena Mcmillan (:  1970) is a 46 y.o. female, here for evaluation of the following medical concerns:  Chief Complaint   Patient presents with    Follow-Up from [facility]     HPI   Was admitted to [facility] for 4 days in July for migraine and loss of vision. recently seen in ED for same. Has appointment with neurology 10/8/21. No vision problems today. States her vision was totally black, told she was OK, didn't follow up with neurology. Frozen shoulder left side had resolved with manipulation under anesthesia. Doing home exercises. States pain is almost back where she started. A1c 9.2, increased but better than usual      Review of Systems   Constitutional: Negative for fatigue and fever. Respiratory: Negative for cough and shortness of breath. Cardiovascular: Negative for chest pain and palpitations. Musculoskeletal: Positive for arthralgias (left shoulder). Psychiatric/Behavioral: The patient is nervous/anxious. Prior to Visit Medications    Medication Sig Taking?  Authorizing Provider   SUMAtriptan (IMITREX) 50 MG tablet Take 1 tablet by mouth once as needed for Migraine Yes Connors Penning, DO   LANTUS SOLOSTAR 100 UNIT/ML injection pen INJECY 40 UNITS UNDER THE SKIN NIGHTLY Yes Connors Penning, DO   insulin lispro, 1 Unit Dial, 100 UNIT/ML SOPN INJECT 10 UNITS UNDER THE SKIN THREE TIMES DAILY BEFORE MEALS Yes Connors Penning, DO   atorvastatin (LIPITOR) 40 MG tablet TAKE ONE TABLET BY MOUTH ONCE DAILY Yes Connors Penning, DO   ASPIRIN LOW DOSE 81 MG EC tablet TAKE ONE TABLET BY MOUTH ONCE DAILY Yes Connors Penning, DO   glipiZIDE (GLUCOTROL XL) 10 MG extended release tablet TAKE ONE TABLET BY MOUTH ONCE DAILY Yes Connors Penning, DO   JANUVIA 50 MG tablet TAKE ONE TABLET BY MOUTH ONCE DAILY Yes Connors Penning, DO   lisinopril (PRINIVIL;ZESTRIL) 20 MG tablet TAKE ONE TABLET BY MOUTH ONCE DAILY Yes Connors Penning, DO   levothyroxine (SYNTHROID) 25 MCG tablet TAKE ONE TABLET BY MOUTH ONCE DAILY Yes Janet Garcia DO   metFORMIN (GLUCOPHAGE) 500 MG tablet TAKE 2 TABLETS BY MOUTH TWICE DAILY WITH MEALS Yes Janet Garcia DO   Alcohol Swabs (ALCOHOL WIPES) 70 % PADS Alcohol Pads Yes Historical Provider, MD   blood glucose monitor strips Test 4 times a day & as needed for symptoms of irregular blood glucose. Yes Janet Garcia DO   Insulin Pen Needle 32G X 4 MM MISC 1 each by Does not apply route 4 times daily Yes Janet Garcia DO   Blood Glucose Monitoring Suppl (ONE TOUCH ULTRA 2) w/Device KIT 1 kit by Does not apply route daily Yes Janet Garcia DO   vitamin D (ERGOCALCIFEROL) 1.25 MG (24544 UT) CAPS capsule TAKE 1 CAPSULE BY MOUTH WEEKLY  Janet Garcia DO   meloxicam (MOBIC) 15 MG tablet TAKE ONE TABLET BY MOUTH ONCE DAILY  Patient not taking: Reported on 9/27/2021  Atiya Munguia,    gabapentin (NEURONTIN) 600 MG tablet TAKE ONE TABLET BY MOUTH ONCE DAILY  Janet Garcia DO   Multiple Vitamins-Iron (TAB-A-ANCA/IRON/BETA CAROTENE) TABS TAKE ONE TABLET BY MOUTH ONCE DAILY  Janet Garcia DO   acetaminophen-codeine (TYLENOL #3) 300-30 MG per tablet acetaminophen 300 mg-codeine 30 mg tablet  Patient not taking: Reported on 9/27/2021  Historical Provider, MD      No Known Allergies  Social History     Tobacco Use    Smoking status: Current Every Day Smoker     Packs/day: 0.50     Years: 35.00     Pack years: 17.50     Types: Cigarettes    Smokeless tobacco: Never Used    Tobacco comment: pt states 1 cigarettes per day   Substance Use Topics    Alcohol use: No        Vitals:    09/27/21 1056 09/27/21 1102   BP: (!) 154/93 (!) 177/108   Site: Right Upper Arm Right Lower Arm   Position: Sitting Sitting   Cuff Size: Medium Adult Child   Pulse: 79 77   Weight: 126 lb 6.4 oz (57.3 kg)    Height: 5' 2\" (1.575 m)      Estimated body mass index is 23.12 kg/m² as calculated from the following:    Height as of this encounter: 5' 2\" (1.575 m).     Weight

## 2021-09-28 RX ORDER — ERGOCALCIFEROL 1.25 MG/1
CAPSULE ORAL
Qty: 4 CAPSULE | Refills: 2 | Status: SHIPPED | OUTPATIENT
Start: 2021-09-28

## 2021-09-28 NOTE — TELEPHONE ENCOUNTER
Vitamin D pending for refill     Health Maintenance   Topic Date Due    COVID-19 Vaccine (1) Never done    Shingles Vaccine (1 of 2) Never done    Hepatitis B vaccine (3 of 3 - Risk 3-dose series) 05/11/2021    Diabetic foot exam  07/10/2021    Potassium monitoring  11/24/2021    Creatinine monitoring  11/24/2021    A1C test (Diabetic or Prediabetic)  12/27/2021    TSH testing  01/11/2022    Diabetic retinal exam  02/01/2022    Diabetic microalbuminuria test  02/15/2022    Lipid screen  02/15/2022    Breast cancer screen  10/16/2022    Colon cancer screen fecal DNA test (Cologuard)  01/19/2024    Cervical cancer screen  08/10/2025    DTaP/Tdap/Td vaccine (2 - Td or Tdap) 12/15/2025    Pneumococcal 0-64 years Vaccine (2 of 2 - PPSV23) 09/25/2035    Flu vaccine  Completed    Hepatitis C screen  Completed    HIV screen  Completed    Hepatitis A vaccine  Aged Out    Hib vaccine  Aged Out    Meningococcal (ACWY) vaccine  Aged Out             (applicable per patient's age: Cancer Screenings, Depression Screening, Fall Risk Screening, Immunizations)    Hemoglobin A1C (%)   Date Value   09/27/2021 9.2   07/10/2021 8.4 (H)   03/19/2021 10.9     Microalb/Crt.  Ratio (mcg/mg creat)   Date Value   02/15/2021 CANNOT BE CALCULATED     LDL Cholesterol (mg/dL)   Date Value   02/15/2021          AST (U/L)   Date Value   11/24/2020 17     ALT (U/L)   Date Value   11/24/2020 19     BUN (mg/dL)   Date Value   11/24/2020 18      (goal A1C is < 7)   (goal LDL is <100) need 30-50% reduction from baseline     BP Readings from Last 3 Encounters:   09/27/21 (!) 177/108   03/19/21 117/77   02/15/21 134/78    (goal /80)      All Future Testing planned in [facility]:  Lab Frequency Next Occurrence   MRI BRAIN WO CONTRAST Once 09/27/2021       Next Visit Date:  Future Appointments   Date Time Provider Albino Barraza   12/6/2021 10:00 AM Manfred Cervantes DO [facility]            Patient Active Problem List: Essential hypertension     Pure hyperglyceridemia     Type 2 diabetes mellitus with hyperglycemia, with long-term current use of insulin (HCC)     Anxiety     DVT (deep vein thrombosis) in pregnancy     Tobacco dependency     Acquired hypothyroidism     Hyperglycemia     Diabetic ketoacidosis without coma associated with type 2 diabetes mellitus (HCC)     Diabetic peripheral neuropathy (HCC)     Hypocalcemia     Hypokalemia     Anemia, normocytic normochromic     Hypophosphatemia     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

## 2021-10-01 ASSESSMENT — ENCOUNTER SYMPTOMS
SHORTNESS OF BREATH: 0
COUGH: 0

## 2021-10-04 ENCOUNTER — HOSPITAL ENCOUNTER (INPATIENT)
Age: 51
LOS: 2 days | Discharge: LEFT BEFORE DISCHARGE | DRG: 469 | End: 2021-10-07
Attending: EMERGENCY MEDICINE | Admitting: FAMILY MEDICINE
Payer: MEDICARE

## 2021-10-04 ENCOUNTER — APPOINTMENT (OUTPATIENT)
Dept: CT IMAGING | Facility: CLINIC | Age: 51
DRG: 469 | End: 2021-10-04
Payer: MEDICARE

## 2021-10-04 ENCOUNTER — TELEPHONE (OUTPATIENT)
Dept: ORTHOPEDIC SURGERY | Age: 51
End: 2021-10-04

## 2021-10-04 DIAGNOSIS — M75.00 DIABETIC FROZEN SHOULDER ASSOCIATED WITH TYPE 2 DIABETES MELLITUS (HCC): ICD-10-CM

## 2021-10-04 DIAGNOSIS — N17.8 ACUTE RENAL FAILURE WITH OTHER SPECIFIED PATHOLOGICAL LESION IN KIDNEY (HCC): ICD-10-CM

## 2021-10-04 DIAGNOSIS — M75.82 ROTATOR CUFF TENDONITIS, LEFT: ICD-10-CM

## 2021-10-04 DIAGNOSIS — E11.618 DIABETIC FROZEN SHOULDER ASSOCIATED WITH TYPE 2 DIABETES MELLITUS (HCC): ICD-10-CM

## 2021-10-04 DIAGNOSIS — G44.039 NONINTRACTABLE PAROXYSMAL HEMICRANIA, UNSPECIFIED CHRONICITY PATTERN: Primary | ICD-10-CM

## 2021-10-04 DIAGNOSIS — M75.02 ADHESIVE CAPSULITIS OF LEFT SHOULDER: Primary | ICD-10-CM

## 2021-10-04 PROBLEM — N17.9 ACUTE RENAL FAILURE (ARF) (HCC): Status: ACTIVE | Noted: 2021-10-04

## 2021-10-04 PROBLEM — N17.9 RENAL FAILURE, ACUTE (HCC): Status: ACTIVE | Noted: 2021-10-04

## 2021-10-04 LAB
ABSOLUTE EOS #: 0.2 K/UL (ref 0–0.4)
ABSOLUTE IMMATURE GRANULOCYTE: ABNORMAL K/UL (ref 0–0.3)
ABSOLUTE LYMPH #: 2.8 K/UL (ref 1–4.8)
ABSOLUTE MONO #: 0.9 K/UL (ref 0.1–1.2)
ALBUMIN SERPL-MCNC: 4.3 G/DL (ref 3.5–5.2)
ALBUMIN/GLOBULIN RATIO: 1.2 (ref 1–2.5)
ALP BLD-CCNC: 97 U/L (ref 35–104)
ALT SERPL-CCNC: 14 U/L (ref 5–33)
ANION GAP SERPL CALCULATED.3IONS-SCNC: 11 MMOL/L (ref 9–17)
ANION GAP SERPL CALCULATED.3IONS-SCNC: 13 MMOL/L (ref 9–17)
AST SERPL-CCNC: 17 U/L
BASOPHILS # BLD: 1 % (ref 0–2)
BASOPHILS ABSOLUTE: 0.1 K/UL (ref 0–0.2)
BILIRUB SERPL-MCNC: 0.4 MG/DL (ref 0.3–1.2)
BUN BLDV-MCNC: 43 MG/DL (ref 6–20)
BUN BLDV-MCNC: 45 MG/DL (ref 6–20)
BUN/CREAT BLD: ABNORMAL (ref 9–20)
BUN/CREAT BLD: ABNORMAL (ref 9–20)
CALCIUM SERPL-MCNC: 10.1 MG/DL (ref 8.6–10.4)
CALCIUM SERPL-MCNC: 10.6 MG/DL (ref 8.6–10.4)
CHLORIDE BLD-SCNC: 104 MMOL/L (ref 98–107)
CHLORIDE BLD-SCNC: 99 MMOL/L (ref 98–107)
CO2: 22 MMOL/L (ref 20–31)
CO2: 23 MMOL/L (ref 20–31)
CREAT SERPL-MCNC: 4.3 MG/DL (ref 0.5–0.9)
CREAT SERPL-MCNC: 4.3 MG/DL (ref 0.5–0.9)
DIFFERENTIAL TYPE: ABNORMAL
EOSINOPHILS RELATIVE PERCENT: 2 % (ref 1–4)
GFR AFRICAN AMERICAN: 13 ML/MIN
GFR AFRICAN AMERICAN: 13 ML/MIN
GFR NON-AFRICAN AMERICAN: 11 ML/MIN
GFR NON-AFRICAN AMERICAN: 11 ML/MIN
GFR SERPL CREATININE-BSD FRML MDRD: ABNORMAL ML/MIN/{1.73_M2}
GLUCOSE BLD-MCNC: 107 MG/DL (ref 70–99)
GLUCOSE BLD-MCNC: 222 MG/DL (ref 70–99)
HCT VFR BLD CALC: 36.2 % (ref 36–46)
HEMOGLOBIN: 11.7 G/DL (ref 12–16)
IMMATURE GRANULOCYTES: ABNORMAL %
LYMPHOCYTES # BLD: 30 % (ref 24–44)
MCH RBC QN AUTO: 30.1 PG (ref 26–34)
MCHC RBC AUTO-ENTMCNC: 32.4 G/DL (ref 31–37)
MCV RBC AUTO: 93 FL (ref 80–100)
MONOCYTES # BLD: 10 % (ref 2–11)
NRBC AUTOMATED: ABNORMAL PER 100 WBC
PDW BLD-RTO: 13.8 % (ref 12.5–15.4)
PLATELET # BLD: 271 K/UL (ref 140–450)
PLATELET ESTIMATE: ABNORMAL
PMV BLD AUTO: 9.2 FL (ref 6–12)
POTASSIUM SERPL-SCNC: 3.8 MMOL/L (ref 3.7–5.3)
POTASSIUM SERPL-SCNC: 3.9 MMOL/L (ref 3.7–5.3)
RBC # BLD: 3.89 M/UL (ref 4–5.2)
RBC # BLD: ABNORMAL 10*6/UL
SEDIMENTATION RATE, ERYTHROCYTE: 14 MM (ref 0–30)
SEG NEUTROPHILS: 57 % (ref 36–66)
SEGMENTED NEUTROPHILS ABSOLUTE COUNT: 5.3 K/UL (ref 1.8–7.7)
SODIUM BLD-SCNC: 134 MMOL/L (ref 135–144)
SODIUM BLD-SCNC: 138 MMOL/L (ref 135–144)
TOTAL PROTEIN: 7.8 G/DL (ref 6.4–8.3)
WBC # BLD: 9.3 K/UL (ref 3.5–11)
WBC # BLD: ABNORMAL 10*3/UL

## 2021-10-04 PROCEDURE — 70450 CT HEAD/BRAIN W/O DYE: CPT

## 2021-10-04 PROCEDURE — 2580000003 HC RX 258: Performed by: EMERGENCY MEDICINE

## 2021-10-04 PROCEDURE — 80053 COMPREHEN METABOLIC PANEL: CPT

## 2021-10-04 PROCEDURE — 85652 RBC SED RATE AUTOMATED: CPT

## 2021-10-04 PROCEDURE — 96374 THER/PROPH/DIAG INJ IV PUSH: CPT

## 2021-10-04 PROCEDURE — 6360000002 HC RX W HCPCS: Performed by: EMERGENCY MEDICINE

## 2021-10-04 PROCEDURE — 99284 EMERGENCY DEPT VISIT MOD MDM: CPT

## 2021-10-04 PROCEDURE — 36415 COLL VENOUS BLD VENIPUNCTURE: CPT

## 2021-10-04 PROCEDURE — 80048 BASIC METABOLIC PNL TOTAL CA: CPT

## 2021-10-04 PROCEDURE — 85025 COMPLETE CBC W/AUTO DIFF WBC: CPT

## 2021-10-04 PROCEDURE — 96375 TX/PRO/DX INJ NEW DRUG ADDON: CPT

## 2021-10-04 RX ORDER — DIPHENHYDRAMINE HYDROCHLORIDE 50 MG/ML
25 INJECTION INTRAMUSCULAR; INTRAVENOUS ONCE
Status: COMPLETED | OUTPATIENT
Start: 2021-10-04 | End: 2021-10-04

## 2021-10-04 RX ORDER — PROCHLORPERAZINE EDISYLATE 5 MG/ML
10 INJECTION INTRAMUSCULAR; INTRAVENOUS ONCE
Status: COMPLETED | OUTPATIENT
Start: 2021-10-04 | End: 2021-10-04

## 2021-10-04 RX ORDER — 0.9 % SODIUM CHLORIDE 0.9 %
1000 INTRAVENOUS SOLUTION INTRAVENOUS ONCE
Status: COMPLETED | OUTPATIENT
Start: 2021-10-04 | End: 2021-10-04

## 2021-10-04 RX ORDER — METHYLPREDNISOLONE SODIUM SUCCINATE 125 MG/2ML
125 INJECTION, POWDER, LYOPHILIZED, FOR SOLUTION INTRAMUSCULAR; INTRAVENOUS ONCE
Status: COMPLETED | OUTPATIENT
Start: 2021-10-04 | End: 2021-10-04

## 2021-10-04 RX ADMIN — METHYLPREDNISOLONE SODIUM SUCCINATE 125 MG: 125 INJECTION, POWDER, FOR SOLUTION INTRAMUSCULAR; INTRAVENOUS at 17:31

## 2021-10-04 RX ADMIN — DIPHENHYDRAMINE HYDROCHLORIDE 25 MG: 50 INJECTION, SOLUTION INTRAMUSCULAR; INTRAVENOUS at 17:31

## 2021-10-04 RX ADMIN — SODIUM CHLORIDE 1000 ML: 9 INJECTION, SOLUTION INTRAVENOUS at 17:32

## 2021-10-04 RX ADMIN — PROCHLORPERAZINE EDISYLATE 10 MG: 5 INJECTION INTRAMUSCULAR; INTRAVENOUS at 17:32

## 2021-10-04 ASSESSMENT — PAIN DESCRIPTION - ORIENTATION: ORIENTATION: LEFT

## 2021-10-04 ASSESSMENT — PAIN SCALES - GENERAL: PAINLEVEL_OUTOF10: 7

## 2021-10-04 ASSESSMENT — PAIN DESCRIPTION - DESCRIPTORS: DESCRIPTORS: THROBBING

## 2021-10-04 ASSESSMENT — PAIN DESCRIPTION - FREQUENCY: FREQUENCY: CONTINUOUS

## 2021-10-04 ASSESSMENT — PAIN DESCRIPTION - PAIN TYPE: TYPE: ACUTE PAIN

## 2021-10-04 ASSESSMENT — PAIN DESCRIPTION - LOCATION: LOCATION: HEAD

## 2021-10-04 NOTE — ED NOTES
Dr. Janki Hernandez speaks with admission at [facility], pt accepted per Dr. Sharon Chawla, RN  10/04/21 0093

## 2021-10-04 NOTE — TELEPHONE ENCOUNTER
Patient calling to request a new order for physical therapy. She states that she called and tried to schedule but was told that her previous referral had . Please fax new order for patient to be seen at the [facility] physical therapy office.

## 2021-10-04 NOTE — ED PROVIDER NOTES
[facility]  [facility]  Phone: [address]      Pt Name: Nadege Epperson  MRN: [id number]  [place] 1970  Date of evaluation: 10/4/2021    CHIEF COMPLAINT       Chief Complaint   Patient presents with    Headache     onset after taking imitrex 2 days ago     Emesis    Dizziness    Blurred Vision       HISTORY OF PRESENT ILLNESS    Nadege Epperson is a 46 y.o. female who presents with a history of having taken Imitrex for the first time for a migraine while she was at home the patient states to days ago she started with a worsening of her migraine to the point where she is having  vomiting dizziness and some blurred vision. Does have the similar symptoms with her recurrent migraines but the medications seem to have made them worse. REVIEW OF SYSTEMS     Constitutional: No fevers or chills   HEENT: No sore throat, rhinorrhea, or earache   Eyes: No double vision does complain of blurry vision and is unable to qualify no drainage   Cardiovascular: No chest pain or tachycardia   Respiratory: No wheezing or shortness of breath no cough   Gastrointestinal: No  diarrhea, constipation, or abdominal pain see above  : No hematuria or dysuria   Musculoskeletal: No swelling or pain   Skin: No rash   Neurological: No focal neurologic complaints. 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 SURGICAL HISTORY      has a past surgical history that includes Tubal ligation; tendon manipulation (Left, 12/01/2020); and shoulder surgery (Left, 12/1/2020).     CURRENT MEDICATIONS       Previous Medications    ACETAMINOPHEN-CODEINE (TYLENOL #3) 300-30 MG PER TABLET    acetaminophen 300 mg-codeine 30 mg tablet    ALCOHOL SWABS (ALCOHOL WIPES) 70 % PADS    Alcohol Pads    ASPIRIN LOW DOSE 81 MG EC TABLET    TAKE ONE TABLET BY MOUTH ONCE DAILY    ATORVASTATIN (LIPITOR) 40 MG TABLET    TAKE ONE TABLET BY MOUTH ONCE DAILY    BLOOD GLUCOSE MONITOR STRIPS    Test 4 times a day & as needed for symptoms of irregular blood glucose. BLOOD GLUCOSE MONITORING SUPPL (ONE TOUCH ULTRA 2) W/DEVICE KIT    1 kit by Does not apply route daily    GABAPENTIN (NEURONTIN) 600 MG TABLET    TAKE ONE TABLET BY MOUTH ONCE DAILY    GLIPIZIDE (GLUCOTROL XL) 10 MG EXTENDED RELEASE TABLET    TAKE ONE TABLET BY MOUTH ONCE DAILY    INSULIN LISPRO, 1 UNIT DIAL, 100 UNIT/ML SOPN    INJECT 10 UNITS UNDER THE SKIN THREE TIMES DAILY BEFORE MEALS    INSULIN PEN NEEDLE 32G X 4 MM MISC    1 each by Does not apply route 4 times daily    JANUVIA 50 MG TABLET    TAKE ONE TABLET BY MOUTH ONCE DAILY    LANTUS SOLOSTAR 100 UNIT/ML INJECTION PEN    INJECY 40 UNITS UNDER THE SKIN NIGHTLY    LEVOTHYROXINE (SYNTHROID) 25 MCG TABLET    TAKE ONE TABLET BY MOUTH ONCE DAILY    LISINOPRIL (PRINIVIL;ZESTRIL) 20 MG TABLET    TAKE ONE TABLET BY MOUTH ONCE DAILY    MELOXICAM (MOBIC) 15 MG TABLET    TAKE ONE TABLET BY MOUTH ONCE DAILY    METFORMIN (GLUCOPHAGE) 500 MG TABLET    TAKE 2 TABLETS BY MOUTH TWICE DAILY WITH MEALS    MULTIPLE VITAMINS-IRON (TAB-A-ANCA/IRON/BETA CAROTENE) TABS    TAKE ONE TABLET BY MOUTH ONCE DAILY    VITAMIN D (ERGOCALCIFEROL) 1.25 MG (68629 UT) CAPS CAPSULE    TAKE 1 CAPSULE BY MOUTH WEEKLY       ALLERGIES     is allergic to imitrex [sumatriptan]. FAMILY HISTORY     She indicated that her mother is alive. She indicated that her father is . Family history is unknown by patient. SOCIAL HISTORY      reports that she has been smoking cigarettes. She has a 17.50 pack-year smoking history.  She has never used smokeless tobacco. She reports that she does not drink alcohol and does not use drugs. PHYSICAL EXAM       ED Triage Vitals [10/04/21 1703]   BP Temp Temp Source Pulse Resp SpO2 Height Weight   (!) 75/57 98.1 °F (36.7 °C) Oral 88 20 100 % -- 120 lb (54.4 kg)     Constitutional: Alert, oriented x3, nontoxic extremely anxious and limiting my exam.  HEENT: Extraocular muscles intact, mucus membranes moist, , Pupils equal, round, reactive to light, no nystagmus  Neck: Trachea midline   Cardiovascular: Regular rhythm and rate  murmurs   Respiratory: Clear to auscultation bilaterally no wheezes, rhonchi, rales, no respiratory distress no tachypnea no retractions no hypoxia  Gastrointestinal: Soft, nontender, nondistended, positive bowel sounds. No rebound, rigidity, or guarding. Musculoskeletal: No extremity pain or swelling   Neurologic: Moving all 4 extremities without difficulty there are no gross focal neurologic deficits   Skin: Warm and dry     DIFFERENTIAL DIAGNOSIS/ MDM:   Possible medication side effect or a migraine exacerbation. At 6:30 PM I spoke to the patient after discovering that she has had significant lab abnormalities in her BUN and creatinine. She states that she does not know that she has had any history of kidney problems she is a diabetic however.     DIAGNOSTIC RESULTS     EKG: All EKG's are interpreted by the Emergency Department Physician who either signs or Co-signs this chart in the absence of a cardiologist.        Not indicated unless otherwise documented above    LABS:  Results for orders placed or performed during the hospital encounter of 10/04/21   CBC Auto Differential   Result Value Ref Range    WBC 9.3 3.5 - 11.0 k/uL    RBC 3.89 (L) 4.0 - 5.2 m/uL    Hemoglobin 11.7 (L) 12.0 - 16.0 g/dL    Hematocrit 36.2 36 - 46 %    MCV 93.0 80 - 100 fL    MCH 30.1 26 - 34 pg    MCHC 32.4 31 - 37 g/dL    RDW 13.8 12.5 - 15.4 %    Platelets 752 618 - 553 k/uL    MPV 9.2 6.0 - 12.0 fL    NRBC Automated NOT REPORTED per 100 WBC    Differential Type NOT REPORTED     Seg Neutrophils 57 36 - 66 %    Lymphocytes 30 24 - 44 %    Monocytes 10 2 - 11 %    Eosinophils % 2 1 - 4 %    Basophils 1 0 - 2 %    Immature Granulocytes NOT REPORTED 0 %    Segs Absolute 5.30 1.8 - 7.7 k/uL    Absolute Lymph # 2.80 1.0 - 4.8 k/uL    Absolute Mono # 0.90 0.1 - 1.2 k/uL    Absolute Eos # 0.20 0.0 - 0.4 k/uL    Basophils Absolute 0.10 0.0 - 0.2 k/uL    Absolute Immature Granulocyte NOT REPORTED 0.00 - 0.30 k/uL    WBC Morphology NOT REPORTED     RBC Morphology NOT REPORTED     Platelet Estimate NOT REPORTED    Comprehensive Metabolic Panel w/ Reflex to MG   Result Value Ref Range    Glucose 222 (H) 70 - 99 mg/dL    BUN 45 (H) 6 - 20 mg/dL    CREATININE 4.30 (H) 0.50 - 0.90 mg/dL    Bun/Cre Ratio NOT REPORTED 9 - 20    Calcium 10.6 (H) 8.6 - 10.4 mg/dL    Sodium 134 (L) 135 - 144 mmol/L    Potassium 3.9 3.7 - 5.3 mmol/L    Chloride 99 98 - 107 mmol/L    CO2 22 20 - 31 mmol/L    Anion Gap 13 9 - 17 mmol/L    Alkaline Phosphatase 97 35 - 104 U/L    ALT 14 5 - 33 U/L    AST 17 <32 U/L    Total Bilirubin 0.40 0.3 - 1.2 mg/dL    Total Protein 7.8 6.4 - 8.3 g/dL    Albumin 4.3 3.5 - 5.2 g/dL    Albumin/Globulin Ratio 1.2 1.0 - 2.5    GFR Non-African American 11 (L) >60 mL/min    GFR  13 (L) >60 mL/min    GFR Comment          GFR Staging NOT REPORTED    Sedimentation Rate   Result Value Ref Range    Sed Rate 14 0 - 30 mm       Not indicated unless otherwise documented above    RADIOLOGY:   I reviewed the radiologist interpretations:    CT Head WO Contrast   Final Result   No acute intracranial abnormality.       Stable compared to prior study             Not indicated unless otherwise documented above    EMERGENCY DEPARTMENT COURSE:     The patient was given the following medications:  Orders Placed This Encounter   Medications    0.9 % sodium chloride bolus    methylPREDNISolone sodium (SOLU-MEDROL) injection 125 mg    prochlorperazine (COMPAZINE) injection 10 mg    diphenhydrAMINE (BENADRYL) injection 25 mg        Vitals:   -------------------------  BP 80/62   Pulse 88   Temp 98.1 °F (36.7 °C) (Oral)   Resp 20   Wt 54.4 kg (120 lb)   LMP 06/24/2017   SpO2 100%   BMI 21.95 kg/m²         I have reviewed the disposition diagnosis with the patient and or their family/guardian. I have answered their questions and given discharge instructions. They voiced understanding of these instructions and did not have any furtherquestions or complaints. CRITICAL CARE:    None    CONSULTS:    None    PROCEDURES:    None      OARRS Report if indicated             FINAL IMPRESSION      1. Nonintractable paroxysmal hemicrania, unspecified chronicity pattern    2. Acute renal failure with other specified pathological lesion in kidney [facility])          DISPOSITION/PLAN   DISPOSITION Decision To Transfer 10/04/2021 06:35:31 PM        CONDITION ON DISPOSITION: STABLE       PATIENT REFERRED TO:  No follow-up provider specified.     DISCHARGE MEDICATIONS:  New Prescriptions    No medications on file       (Please note that portions of thisnote were completed with a voice recognition program.  Efforts were made to edit the dictations but occasionally words are mis-transcribed.)    Christian Dunn MD,, MD  Attending Emergency Physician        Christian Dunn MD  10/07/21 1012

## 2021-10-05 LAB
-: ABNORMAL
ALBUMIN SERPL-MCNC: 3.9 G/DL (ref 3.5–5.2)
ALBUMIN/GLOBULIN RATIO: 1.2 (ref 1–2.5)
ALP BLD-CCNC: 87 U/L (ref 35–104)
ALT SERPL-CCNC: 6 U/L (ref 5–33)
AMORPHOUS: ABNORMAL
ANION GAP SERPL CALCULATED.3IONS-SCNC: 11 MMOL/L (ref 9–17)
AST SERPL-CCNC: 11 U/L
BACTERIA: ABNORMAL
BILIRUB SERPL-MCNC: 0.4 MG/DL (ref 0.3–1.2)
BILIRUBIN URINE: NEGATIVE
BUN BLDV-MCNC: 50 MG/DL (ref 6–20)
BUN/CREAT BLD: ABNORMAL (ref 9–20)
CALCIUM SERPL-MCNC: 9.7 MG/DL (ref 8.6–10.4)
CASTS UA: ABNORMAL /LPF (ref 0–2)
CASTS UA: ABNORMAL /LPF (ref 0–2)
CHLORIDE BLD-SCNC: 105 MMOL/L (ref 98–107)
CHLORIDE, UR: 42 MMOL/L
CO2: 17 MMOL/L (ref 20–31)
COLOR: YELLOW
COMMENT UA: ABNORMAL
CREAT SERPL-MCNC: 5 MG/DL (ref 0.5–0.9)
CREATININE URINE: 131 MG/DL (ref 28–217)
CRYSTALS, UA: ABNORMAL /HPF
EOSINOPHIL,URINE: NORMAL
EPITHELIAL CELLS UA: ABNORMAL /HPF (ref 0–5)
GFR AFRICAN AMERICAN: 11 ML/MIN
GFR NON-AFRICAN AMERICAN: 9 ML/MIN
GFR SERPL CREATININE-BSD FRML MDRD: ABNORMAL ML/MIN/{1.73_M2}
GFR SERPL CREATININE-BSD FRML MDRD: ABNORMAL ML/MIN/{1.73_M2}
GLUCOSE BLD-MCNC: 147 MG/DL (ref 65–105)
GLUCOSE BLD-MCNC: 240 MG/DL (ref 70–99)
GLUCOSE URINE: ABNORMAL
HCT VFR BLD CALC: 32.8 % (ref 36–46)
HEMOGLOBIN: 10.7 G/DL (ref 12–16)
INR BLD: 1
KETONES, URINE: NEGATIVE
LEUKOCYTE ESTERASE, URINE: NEGATIVE
MAGNESIUM: 1.9 MG/DL (ref 1.6–2.6)
MCH RBC QN AUTO: 30.2 PG (ref 26–34)
MCHC RBC AUTO-ENTMCNC: 32.5 G/DL (ref 31–37)
MCV RBC AUTO: 93.1 FL (ref 80–100)
MUCUS: ABNORMAL
NITRITE, URINE: NEGATIVE
NRBC AUTOMATED: ABNORMAL PER 100 WBC
OTHER OBSERVATIONS UA: ABNORMAL
PDW BLD-RTO: 13.5 % (ref 12.5–15.4)
PH UA: 5 (ref 5–8)
PLATELET # BLD: 224 K/UL (ref 140–450)
PMV BLD AUTO: 9.4 FL (ref 6–12)
POTASSIUM SERPL-SCNC: 5.2 MMOL/L (ref 3.7–5.3)
PROTEIN UA: ABNORMAL
PROTHROMBIN TIME: 10.1 SEC (ref 9.4–12.6)
RBC # BLD: 3.53 M/UL (ref 4–5.2)
RBC UA: ABNORMAL /HPF (ref 0–2)
RENAL EPITHELIAL, UA: ABNORMAL /HPF
SODIUM BLD-SCNC: 133 MMOL/L (ref 135–144)
SODIUM,UR: 37 MMOL/L
SPECIFIC GRAVITY UA: 1.01 (ref 1–1.03)
TOTAL PROTEIN, URINE: 53 MG/DL
TOTAL PROTEIN: 7.1 G/DL (ref 6.4–8.3)
TRICHOMONAS: ABNORMAL
TURBIDITY: CLEAR
URINE HGB: NEGATIVE
URINE TOTAL PROTEIN CREATININE RATIO: 0.4 (ref 0–0.2)
UROBILINOGEN, URINE: NORMAL
WBC # BLD: 8.6 K/UL (ref 3.5–11)
WBC UA: ABNORMAL /HPF (ref 0–5)
YEAST: ABNORMAL

## 2021-10-05 PROCEDURE — 84165 PROTEIN E-PHORESIS SERUM: CPT

## 2021-10-05 PROCEDURE — 83735 ASSAY OF MAGNESIUM: CPT

## 2021-10-05 PROCEDURE — 6370000000 HC RX 637 (ALT 250 FOR IP)

## 2021-10-05 PROCEDURE — 1200000000 HC SEMI PRIVATE

## 2021-10-05 PROCEDURE — 84166 PROTEIN E-PHORESIS/URINE/CSF: CPT

## 2021-10-05 PROCEDURE — 2580000003 HC RX 258: Performed by: EMERGENCY MEDICINE

## 2021-10-05 PROCEDURE — 84300 ASSAY OF URINE SODIUM: CPT

## 2021-10-05 PROCEDURE — 36415 COLL VENOUS BLD VENIPUNCTURE: CPT

## 2021-10-05 PROCEDURE — 86038 ANTINUCLEAR ANTIBODIES: CPT

## 2021-10-05 PROCEDURE — 80074 ACUTE HEPATITIS PANEL: CPT

## 2021-10-05 PROCEDURE — 80053 COMPREHEN METABOLIC PANEL: CPT

## 2021-10-05 PROCEDURE — 85610 PROTHROMBIN TIME: CPT

## 2021-10-05 PROCEDURE — 2580000003 HC RX 258: Performed by: FAMILY MEDICINE

## 2021-10-05 PROCEDURE — 87205 SMEAR GRAM STAIN: CPT

## 2021-10-05 PROCEDURE — 82595 ASSAY OF CRYOGLOBULIN: CPT

## 2021-10-05 PROCEDURE — 2500000003 HC RX 250 WO HCPCS: Performed by: FAMILY MEDICINE

## 2021-10-05 PROCEDURE — 6360000002 HC RX W HCPCS: Performed by: FAMILY MEDICINE

## 2021-10-05 PROCEDURE — 99223 1ST HOSP IP/OBS HIGH 75: CPT | Performed by: FAMILY MEDICINE

## 2021-10-05 PROCEDURE — 81001 URINALYSIS AUTO W/SCOPE: CPT

## 2021-10-05 PROCEDURE — 6370000000 HC RX 637 (ALT 250 FOR IP): Performed by: FAMILY MEDICINE

## 2021-10-05 PROCEDURE — 82436 ASSAY OF URINE CHLORIDE: CPT

## 2021-10-05 PROCEDURE — 85027 COMPLETE CBC AUTOMATED: CPT

## 2021-10-05 PROCEDURE — 84155 ASSAY OF PROTEIN SERUM: CPT

## 2021-10-05 PROCEDURE — 84156 ASSAY OF PROTEIN URINE: CPT

## 2021-10-05 PROCEDURE — 82947 ASSAY GLUCOSE BLOOD QUANT: CPT

## 2021-10-05 PROCEDURE — 86225 DNA ANTIBODY NATIVE: CPT

## 2021-10-05 PROCEDURE — 82570 ASSAY OF URINE CREATININE: CPT

## 2021-10-05 RX ORDER — SODIUM CHLORIDE 9 MG/ML
INJECTION, SOLUTION INTRAVENOUS CONTINUOUS
Status: DISCONTINUED | OUTPATIENT
Start: 2021-10-05 | End: 2021-10-05

## 2021-10-05 RX ORDER — GLIPIZIDE 10 MG/1
10 TABLET ORAL
Refills: 5 | Status: DISCONTINUED | OUTPATIENT
Start: 2021-10-06 | End: 2021-10-07 | Stop reason: HOSPADM

## 2021-10-05 RX ORDER — INSULIN GLARGINE 100 [IU]/ML
40 INJECTION, SOLUTION SUBCUTANEOUS NIGHTLY
Status: DISCONTINUED | OUTPATIENT
Start: 2021-10-05 | End: 2021-10-06

## 2021-10-05 RX ORDER — METHYLPREDNISOLONE SODIUM SUCCINATE 125 MG/2ML
60 INJECTION, POWDER, LYOPHILIZED, FOR SOLUTION INTRAMUSCULAR; INTRAVENOUS EVERY 6 HOURS
Status: DISCONTINUED | OUTPATIENT
Start: 2021-10-05 | End: 2021-10-06

## 2021-10-05 RX ORDER — GABAPENTIN 300 MG/1
600 CAPSULE ORAL DAILY
Status: DISCONTINUED | OUTPATIENT
Start: 2021-10-05 | End: 2021-10-07 | Stop reason: HOSPADM

## 2021-10-05 RX ORDER — ACETAMINOPHEN 650 MG/1
650 SUPPOSITORY RECTAL EVERY 6 HOURS PRN
Status: DISCONTINUED | OUTPATIENT
Start: 2021-10-05 | End: 2021-10-06

## 2021-10-05 RX ORDER — ONDANSETRON 2 MG/ML
4 INJECTION INTRAMUSCULAR; INTRAVENOUS EVERY 6 HOURS PRN
Status: DISCONTINUED | OUTPATIENT
Start: 2021-10-05 | End: 2021-10-07 | Stop reason: HOSPADM

## 2021-10-05 RX ORDER — ONDANSETRON 4 MG/1
4 TABLET, ORALLY DISINTEGRATING ORAL EVERY 8 HOURS PRN
Status: DISCONTINUED | OUTPATIENT
Start: 2021-10-05 | End: 2021-10-07 | Stop reason: HOSPADM

## 2021-10-05 RX ORDER — SODIUM CHLORIDE 0.9 % (FLUSH) 0.9 %
5-40 SYRINGE (ML) INJECTION PRN
Status: DISCONTINUED | OUTPATIENT
Start: 2021-10-05 | End: 2021-10-07 | Stop reason: HOSPADM

## 2021-10-05 RX ORDER — SODIUM CHLORIDE 0.9 % (FLUSH) 0.9 %
5-40 SYRINGE (ML) INJECTION EVERY 12 HOURS SCHEDULED
Status: DISCONTINUED | OUTPATIENT
Start: 2021-10-05 | End: 2021-10-07 | Stop reason: HOSPADM

## 2021-10-05 RX ORDER — HEPARIN SODIUM 5000 [USP'U]/ML
5000 INJECTION, SOLUTION INTRAVENOUS; SUBCUTANEOUS EVERY 8 HOURS SCHEDULED
Status: DISCONTINUED | OUTPATIENT
Start: 2021-10-05 | End: 2021-10-07 | Stop reason: HOSPADM

## 2021-10-05 RX ORDER — AMITRIPTYLINE HYDROCHLORIDE 50 MG/1
25 TABLET, FILM COATED ORAL NIGHTLY
Status: DISCONTINUED | OUTPATIENT
Start: 2021-10-05 | End: 2021-10-07 | Stop reason: HOSPADM

## 2021-10-05 RX ORDER — NICOTINE POLACRILEX 4 MG
15 LOZENGE BUCCAL PRN
Status: DISCONTINUED | OUTPATIENT
Start: 2021-10-05 | End: 2021-10-07 | Stop reason: HOSPADM

## 2021-10-05 RX ORDER — ATORVASTATIN CALCIUM 40 MG/1
40 TABLET, FILM COATED ORAL DAILY
Status: DISCONTINUED | OUTPATIENT
Start: 2021-10-05 | End: 2021-10-07 | Stop reason: HOSPADM

## 2021-10-05 RX ORDER — SODIUM CHLORIDE 9 MG/ML
25 INJECTION, SOLUTION INTRAVENOUS PRN
Status: DISCONTINUED | OUTPATIENT
Start: 2021-10-05 | End: 2021-10-07 | Stop reason: HOSPADM

## 2021-10-05 RX ORDER — DEXTROSE MONOHYDRATE 50 MG/ML
100 INJECTION, SOLUTION INTRAVENOUS PRN
Status: DISCONTINUED | OUTPATIENT
Start: 2021-10-05 | End: 2021-10-07 | Stop reason: HOSPADM

## 2021-10-05 RX ORDER — ACETAMINOPHEN 325 MG/1
650 TABLET ORAL EVERY 6 HOURS PRN
Status: DISCONTINUED | OUTPATIENT
Start: 2021-10-05 | End: 2021-10-06

## 2021-10-05 RX ORDER — DEXTROSE MONOHYDRATE 25 G/50ML
12.5 INJECTION, SOLUTION INTRAVENOUS PRN
Status: DISCONTINUED | OUTPATIENT
Start: 2021-10-05 | End: 2021-10-07 | Stop reason: HOSPADM

## 2021-10-05 RX ORDER — SODIUM POLYSTYRENE SULFONATE 15 G/60ML
30 SUSPENSION ORAL; RECTAL
Status: ACTIVE | OUTPATIENT
Start: 2021-10-05 | End: 2021-10-05

## 2021-10-05 RX ORDER — SODIUM POLYSTYRENE SULFONATE 15 G/60ML
15 SUSPENSION ORAL; RECTAL
Status: ACTIVE | OUTPATIENT
Start: 2021-10-05 | End: 2021-10-05

## 2021-10-05 RX ORDER — ACETAMINOPHEN 325 MG/1
TABLET ORAL
Status: COMPLETED
Start: 2021-10-05 | End: 2021-10-05

## 2021-10-05 RX ADMIN — AMITRIPTYLINE HYDROCHLORIDE 25 MG: 50 TABLET, FILM COATED ORAL at 20:38

## 2021-10-05 RX ADMIN — ATORVASTATIN CALCIUM 40 MG: 40 TABLET, FILM COATED ORAL at 20:38

## 2021-10-05 RX ADMIN — SODIUM CHLORIDE: 9 INJECTION, SOLUTION INTRAVENOUS at 17:48

## 2021-10-05 RX ADMIN — METHYLPREDNISOLONE SODIUM SUCCINATE 60 MG: 125 INJECTION, POWDER, FOR SOLUTION INTRAMUSCULAR; INTRAVENOUS at 20:38

## 2021-10-05 RX ADMIN — ACETAMINOPHEN 650 MG: 325 TABLET ORAL at 13:14

## 2021-10-05 RX ADMIN — HEPARIN SODIUM 5000 UNITS: 5000 INJECTION INTRAVENOUS; SUBCUTANEOUS at 21:32

## 2021-10-05 RX ADMIN — Medication: at 20:44

## 2021-10-05 RX ADMIN — SODIUM CHLORIDE: 9 INJECTION, SOLUTION INTRAVENOUS at 02:34

## 2021-10-05 RX ADMIN — SODIUM CHLORIDE: 9 INJECTION, SOLUTION INTRAVENOUS at 07:55

## 2021-10-05 RX ADMIN — GABAPENTIN 600 MG: 300 CAPSULE ORAL at 20:38

## 2021-10-05 RX ADMIN — ACETAMINOPHEN 650 MG: 325 TABLET ORAL at 17:49

## 2021-10-05 ASSESSMENT — ENCOUNTER SYMPTOMS
DIARRHEA: 0
COUGH: 0
BLOOD IN STOOL: 0
RHINORRHEA: 0
ABDOMINAL PAIN: 0
NAUSEA: 0
CHEST TIGHTNESS: 0
CONSTIPATION: 0
WHEEZING: 0
SHORTNESS OF BREATH: 0
VOMITING: 0

## 2021-10-05 ASSESSMENT — PAIN DESCRIPTION - DESCRIPTORS: DESCRIPTORS: DISCOMFORT;PATIENT UNABLE TO DESCRIBE

## 2021-10-05 ASSESSMENT — PAIN SCALES - GENERAL
PAINLEVEL_OUTOF10: 0
PAINLEVEL_OUTOF10: 8
PAINLEVEL_OUTOF10: 8

## 2021-10-05 ASSESSMENT — PAIN DESCRIPTION - LOCATION: LOCATION: ABDOMEN;HEAD

## 2021-10-05 ASSESSMENT — PAIN DESCRIPTION - PROGRESSION
CLINICAL_PROGRESSION: NOT CHANGED
CLINICAL_PROGRESSION: GRADUALLY WORSENING

## 2021-10-05 ASSESSMENT — PAIN DESCRIPTION - FREQUENCY: FREQUENCY: CONTINUOUS

## 2021-10-05 ASSESSMENT — PAIN DESCRIPTION - PAIN TYPE: TYPE: ACUTE PAIN

## 2021-10-05 ASSESSMENT — PAIN DESCRIPTION - ORIENTATION: ORIENTATION: LEFT;UPPER

## 2021-10-05 ASSESSMENT — PAIN DESCRIPTION - ONSET: ONSET: ON-GOING

## 2021-10-05 ASSESSMENT — PAIN - FUNCTIONAL ASSESSMENT: PAIN_FUNCTIONAL_ASSESSMENT: PREVENTS OR INTERFERES WITH MANY ACTIVE NOT PASSIVE ACTIVITIES

## 2021-10-05 NOTE — ED NOTES
Pt and family updated on plan of care continue to await bed, pt complaining of \"pressure on her diaphragm\", will check orders and address this      Arnoldo Gaitan RN  10/05/21 5402

## 2021-10-05 NOTE — ED NOTES
Report received from Joanna Cruz RN, pt resting on stretcher without acute distress, daughter at bedside      Abram Inman, [address] [facility]  10/05/21 1782

## 2021-10-05 NOTE — PLAN OF CARE
Problem: Pain:  Goal: Pain level will decrease  Description: Pain level will decrease  10/5/2021 1830 by Ivy Knapp RN  Outcome: Ongoing  10/5/2021 1830 by Ivy Knapp RN  Outcome: Ongoing  Goal: Control of acute pain  Description: Control of acute pain  10/5/2021 1830 by Ivy Knapp RN  Outcome: Ongoing  10/5/2021 1830 by Ivy Knapp RN  Outcome: Ongoing  Goal: Control of chronic pain  Description: Control of chronic pain  10/5/2021 1830 by Ivy Knapp RN  Outcome: Ongoing  10/5/2021 1830 by Ivy Knapp RN  Outcome: Ongoing     Problem: Falls - Risk of:  Goal: Will remain free from falls  Description: Will remain free from falls  10/5/2021 1830 by Ivy Knapp RN  Outcome: Ongoing  10/5/2021 1830 by Ivy Knapp RN  Outcome: Ongoing  Goal: Absence of physical injury  Description: Absence of physical injury  10/5/2021 1830 by Ivy Knapp RN  Outcome: Ongoing  10/5/2021 1830 by Ivy Knapp RN  Outcome: Ongoing     Problem: Skin Integrity:  Goal: Will show no infection signs and symptoms  Description: Will show no infection signs and symptoms  10/5/2021 1830 by Ivy Knapp RN  Outcome: Ongoing  10/5/2021 1830 by Ivy Knapp RN  Outcome: Ongoing  Goal: Absence of new skin breakdown  Description: Absence of new skin breakdown  10/5/2021 1830 by Ivy Knapp RN  Outcome: Ongoing  10/5/2021 1830 by Ivy Knapp RN  Outcome: Ongoing     Problem:  Activity:  Goal: Risk for activity intolerance will decrease  Description: Risk for activity intolerance will decrease  Outcome: Ongoing     Problem: Coping:  Goal: Ability to adjust to condition or change in health will improve  Description: Ability to adjust to condition or change in health will improve  Outcome: Ongoing     Problem: Fluid Volume:  Goal: Ability to maintain a balanced intake and output will improve  Description: Ability to maintain a balanced intake and output will improve  Outcome: Ongoing

## 2021-10-05 NOTE — ED NOTES
Jerri Cuello,  with the [employer] called wanting information concerning pt status. Information is to be relayed to pt's son/commanding officer,  who is in the [employer].   Case # [id number]   [phone] (number to call  with updates or questions)     Delvis Guo RN  10/05/21 9045

## 2021-10-05 NOTE — PROGRESS NOTES
Pt admitted to room 2007 from [address]  to room and call light/tv controls. Bed in lowest position, wheels locked, 2/4 side rails up  Call light in reach, room free of clutter, adequate lighting provided.   Electronically signed by Tk Blackman RN on 10/5/2021 at 5:33 PM

## 2021-10-05 NOTE — H&P
[facility]  Office: [address], DO, Nickolas Maradiagaroe, DO, Roxannegabriela Dry, DO, Supa Benz Blood, DO, Gricelda Judge MD, Vee Taylor MD, Lia William MD, Tabitha Velazco MD, Juanita Mansfield MD, Wen Dolan MD, Luz Elena Weiss MD, Nunu Stephen, DO, Ted Chow, DO, Dorita Ballesteros MD,  Pricila Pierson, DO, Malgorzata Doe MD, Rayna Becerra MD, Suze Parker MD, Neeraj Scherer MD, , Preston Olmstead MD, Levi Hoskins MD, Marcus Ortiz MD, Osmin Kruger, [facility], [facility], CNP, Belle Clever, CNP, Louie Alford, CNS, Deric Baeza, CNP, Jeramie Olvera, CNP, Yosef Wise, CNP, Maddy Hernandes, CNP, Kofi Gustafson, CNP, Quirino Kelley PA-C, Bettie Shi, [facility], Olive Lipoma, CNP, Yousuf Palafox, CNP, Phylbyron Dejesus, CNP, Cassia Mckee, CNP, Lupis Wooten, CNP, Tarik Cespedes, CNP, Christie Naranjo, CNP, Chris Schwartz, [address]      HISTORY AND PHYSICAL EXAMINATION            Date:   10/5/2021  Patient name:  Katina Dueñas  Date of admission:  10/4/2021  4:45 PM  MRN:   [id number]  Account:  [de-identified]  YOB: 1970  PCP:    El Burger DO  Room:   2007/2007-02  Code Status:    Full Code    Chief Complaint:     Chief Complaint   Patient presents with    Headache     onset after taking imitrex 2 days ago     Emesis    Dizziness    Blurred Vision       History Obtained From:     patient, family member -daughter, electronic medical record    History of Present Illness: The patient is a 46 y.o.  /  female who presents with Headache (onset after taking imitrex 2 days ago ), Emesis, Dizziness, and Blurred Vision   and she is admitted to the hospital for the management of  Renal failure, acute ([address] Patient presented to emergency room at [facility] with headache, blurred vision. She has known history of chronic migraine and reported acute worsening for last 3 days.   Patient reports that she has about 3-4 migraine episodes per week each lasting for more than 24 hours. Headache is left temporal, severe, associated with visual blurring  Patient has been taking Motrin, Advil as outpatient since last 6 months. She reported about 4 to 5 tablets of Advil or Motrin OTC daily. She also has been taking prescription Mobic for her other body pains. Patient has underlying history of type 2 diabetes mellitus and is on Metformin, insulin. She takes lisinopril for her high blood pressure. Patient reported nausea with multiple episodes of vomiting in last 3 to 4 days associated with her headache. Patient denied any diarrhea, fever, chills, rashes. Evaluation at [place] emergency room showed low blood pressure 103/68, BMP showed BUN 43, creatinine 4.3, glucose 107, bicarb 23, WBC 9.3, hemoglobin 11.7.  UA showed 3+ glucose with 1+ protein. CT head was negative for acute intracranial abnormality. Past Medical History:     Past Medical History:   Diagnosis Date    Adhesive capsulitis     Anxiety     Arthritis     Depression     Diabetes mellitus (HCC)     takes insulin (Dr. 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 ([facility] [address]     DVT (deep vein thrombosis) in pregnancy     Hx of blood clots     Hyperlipidemia     Hypertension     Rotator cuff tendonitis, left     Thyroid disease         Past Surgical History:     Past Surgical History:   Procedure Laterality Date    SHOULDER SURGERY Left 12/1/2020    SHOULDER MANIPULATION (SUPINE) performed by Thomas Martinez DO at [address] [place] [address],[facility] Left 12/01/2020    left shoulder manipulation    TUBAL LIGATION          Medications Prior to Admission:     Prior to Admission medications    Medication Sig Start Date End Date Taking?  Authorizing Provider   vitamin D (ERGOCALCIFEROL) 1.25 MG (98913 UT) CAPS capsule TAKE 1 CAPSULE BY MOUTH WEEKLY 9/28/21   Genoveva Henry DO   meloxicam (MOBIC) 15 MG tablet TAKE ONE TABLET BY MOUTH ONCE Chalo Galeano,         Allergies:     Imitrex [sumatriptan]    Social History:     Tobacco:    reports that she has been smoking cigarettes. She has a 17.50 pack-year smoking history. She has never used smokeless tobacco.  Alcohol:      reports no history of alcohol use. Drug Use:  reports no history of drug use. Family History:     Family History   Family history unknown: Yes       Review of Systems:     Positive and Negative as described in HPI. Review of Systems   Constitutional: Negative for appetite change, fatigue, fever and unexpected weight change. HENT: Negative for congestion, rhinorrhea and sneezing. Eyes: Negative for visual disturbance. Respiratory: Negative for cough, chest tightness, shortness of breath and wheezing. Cardiovascular: Negative for chest pain and palpitations. Gastrointestinal: Negative for abdominal pain, blood in stool, constipation, diarrhea, nausea and vomiting. Genitourinary: Negative for dysuria, enuresis, frequency and hematuria. Musculoskeletal: Negative for arthralgias and myalgias. Skin: Negative for rash. Neurological: Positive for headaches. Negative for dizziness, weakness and light-headedness. Hematological: Does not bruise/bleed easily. Psychiatric/Behavioral: Negative for dysphoric mood and sleep disturbance. Physical Exam:   BP (!) 103/59   Pulse 74   Temp 97.8 °F (36.6 °C) (Oral)   Resp 16   Ht 5' 2\" (1.575 m)   Wt 120 lb (54.4 kg)   LMP 2017   SpO2 100%   BMI 21.95 kg/m²   Temp (24hrs), Av °F (36.7 °C), Min:97.8 °F (36.6 °C), Max:98.2 °F (36.8 °C)    Recent Labs     10/05/21  1903   POCGLU 147*     No intake or output data in the 24 hours ending 10/05/21 1924  Physical Exam  Vitals and nursing note reviewed. Constitutional:       General: She is not in acute distress. Appearance: She is ill-appearing. She is not diaphoretic. HENT:      Head: Normocephalic and atraumatic.       Nose:      Right Sinus: No maxillary sinus tenderness or frontal sinus tenderness. Left Sinus: No maxillary sinus tenderness or frontal sinus tenderness. Mouth/Throat:      Pharynx: No oropharyngeal exudate. Eyes:      General: No scleral icterus. Conjunctiva/sclera: Conjunctivae normal.      Pupils: Pupils are equal, round, and reactive to light. Neck:      Thyroid: No thyromegaly. Vascular: No JVD. Cardiovascular:      Rate and Rhythm: Normal rate and regular rhythm. Pulses:           Dorsalis pedis pulses are 2+ on the right side and 2+ on the left side. Heart sounds: Normal heart sounds. No murmur heard. Pulmonary:      Effort: Pulmonary effort is normal.      Breath sounds: Normal breath sounds. No wheezing or rales. Abdominal:      Palpations: Abdomen is soft. There is no mass. Tenderness: There is no abdominal tenderness. Musculoskeletal:      Cervical back: Full passive range of motion without pain and neck supple. Lymphadenopathy:      Head:      Right side of head: No submandibular adenopathy. Left side of head: No submandibular adenopathy. Cervical: No cervical adenopathy. Skin:     General: Skin is warm. Neurological:      Mental Status: She is alert and oriented to person, place, and time. Motor: No tremor. Psychiatric:         Behavior: Behavior is cooperative.          Investigations:      Laboratory Testing:  Recent Results (from the past 24 hour(s))   Comprehensive Metabolic Panel w/ Reflex to MG    Collection Time: 10/05/21  6:23 AM   Result Value Ref Range    Glucose 240 (H) 70 - 99 mg/dL    BUN 50 (H) 6 - 20 mg/dL    CREATININE 5.00 (H) 0.50 - 0.90 mg/dL    Bun/Cre Ratio NOT REPORTED 9 - 20    Calcium 9.7 8.6 - 10.4 mg/dL    Sodium 133 (L) 135 - 144 mmol/L    Potassium 5.2 3.7 - 5.3 mmol/L    Chloride 105 98 - 107 mmol/L    CO2 17 (L) 20 - 31 mmol/L    Anion Gap 11 9 - 17 mmol/L    Alkaline Phosphatase 87 35 - 104 U/L    ALT 6 5 - 33 U/L    AST 11 <32 U/L    Total Bilirubin 0.40 0.3 - 1.2 mg/dL    Total Protein 7.1 6.4 - 8.3 g/dL    Albumin 3.9 3.5 - 5.2 g/dL    Albumin/Globulin Ratio 1.2 1.0 - 2.5    GFR Non-African American 9 (L) >60 mL/min    GFR  11 (L) >60 mL/min    GFR Comment          GFR Staging NOT REPORTED    CBC    Collection Time: 10/05/21  6:23 AM   Result Value Ref Range    WBC 8.6 3.5 - 11.0 k/uL    RBC 3.53 (L) 4.0 - 5.2 m/uL    Hemoglobin 10.7 (L) 12.0 - 16.0 g/dL    Hematocrit 32.8 (L) 36 - 46 %    MCV 93.1 80 - 100 fL    MCH 30.2 26 - 34 pg    MCHC 32.5 31 - 37 g/dL    RDW 13.5 12.5 - 15.4 %    Platelets 397 179 - 764 k/uL    MPV 9.4 6.0 - 12.0 fL    NRBC Automated NOT REPORTED per 100 WBC   Electrophoresis Protein, Serum without Reflex to Immunofixation    Collection Time: 10/05/21  6:23 AM   Result Value Ref Range    Total Protein 6.6 6.4 - 8.3 g/dL    Albumin (calculated) PENDING g/dL    Albumin % PENDING %    Alpha-1-Globulin PENDING g/dL    Alpha 1 % PENDING %    Alpha-2-Globulin PENDING g/dL    Alpha 2 % PENDING %    Beta Globulin PENDING g/dL    Beta Percent PENDING %    Gamma Globulin PENDING g/dL    Gamma Globulin % PENDING %    Total Prot. Sum PENDING g/dL    Total Prot.  Sum,% PENDING %    Protein Electrophoresis, Serum PENDING     Pathologist PENDING    Magnesium    Collection Time: 10/05/21  6:23 AM   Result Value Ref Range    Magnesium 1.9 1.6 - 2.6 mg/dL   Protime-INR    Collection Time: 10/05/21  6:23 AM   Result Value Ref Range    Protime 10.1 9.4 - 12.6 sec    INR 1.0    Urinalysis with microscopic    Collection Time: 10/05/21  6:47 AM   Result Value Ref Range    Color, UA Yellow Yellow    Turbidity UA Clear Clear    Glucose, Ur 3+ (A) NEGATIVE    Bilirubin Urine NEGATIVE NEGATIVE    Ketones, Urine NEGATIVE NEGATIVE    Specific Gravity, UA 1.015 1.005 - 1.030    Urine Hgb NEGATIVE NEGATIVE    pH, UA 5.0 5.0 - 8.0    Protein, UA 1+ (A) NEGATIVE    Urobilinogen, Urine Normal Normal    Nitrite, Urine NEGATIVE NEGATIVE    Leukocyte Esterase, Urine NEGATIVE NEGATIVE    Urinalysis Comments NOT REPORTED     -          WBC, UA 2 TO 5 0 - 5 /HPF    RBC, UA 0 TO 2 0 - 2 /HPF    Casts UA 2 TO 5 0 - 2 /LPF    Casts UA COARSELY GRANULAR 0 - 2 /LPF    Crystals, UA NOT REPORTED None /HPF    Epithelial Cells UA 2 TO 5 0 - 5 /HPF    Renal Epithelial, UA NOT REPORTED 0 /HPF    Bacteria, UA FEW (A) None    Mucus, UA 1+ (A) None    Trichomonas, UA NOT REPORTED None    Amorphous, UA NOT REPORTED None    Other Observations UA NOT REPORTED NOT REQ. Yeast, UA NOT REPORTED None   Sodium, urine, random    Collection Time: 10/05/21  6:47 AM   Result Value Ref Range    Sodium,Ur 37 mmol/L   Protein / creatinine ratio, urine    Collection Time: 10/05/21  6:47 AM   Result Value Ref Range    Total Protein, Urine 53 mg/dL    Creatinine, Ur 131.0 28.0 - 217.0 mg/dL    Urine Total Protein Creatinine Ratio 0.40 (H) 0.00 - 0.20   Protein Electrophoresis, Urine    Collection Time: 10/05/21  6:47 AM   Result Value Ref Range    Specimen Type . CLEAN CATCH URINE     Urine Total Protein 53 mg/dL    P E Interpretation, U PENDING     Pathologist PENDING    Eosinophils, Urine    Collection Time: 10/05/21  6:47 AM   Result Value Ref Range    Eosinophil, Ur NONE SEEN NONE SEEN   Chloride, Random Urine    Collection Time: 10/05/21  6:47 AM   Result Value Ref Range    Chloride, Ur 42 mmol/L   POC Glucose Fingerstick    Collection Time: 10/05/21  7:03 PM   Result Value Ref Range    POC Glucose 147 (H) 65 - 105 mg/dL       Imaging/Diagonstics:    CT Head WO Contrast    Addendum Date: 10/4/2021    ADDENDUM: There has been interval increase in paranasal sinus mucosal thickening in the left maxillary sinus when compared to the prior study there now is the presence of a mucous retention cyst suggested in the left maxillary sinus. . Previous study demonstrated air-fluid level in the right maxillary sinus.  There is no air-fluid level on today's study the right maxillary sinus is clear. Mucosal thickening is noted in the ethmoid air cells bilaterally pleural the frontal sinuses and sphenoid sinuses are clear. Mastoid air cells are clear. Impression no acute intracranial abnormality Incidental note made of chronic paranasal sinus inflammatory changes     Result Date: 10/4/2021  No acute intracranial abnormality. Stable compared to prior study        Assessment :      Primary Problem  Renal failure, acute [facility])    Active Hospital Problems    Diagnosis Date Noted    Renal failure, acute ([facility] [address] [N17.9] 10/04/2021    Acute renal failure (ARF) ([facility] [address] [N17.9] 10/04/2021    Acquired hypothyroidism [E03.9] 11/20/2018    Type 2 diabetes mellitus with hyperglycemia, with long-term current use of insulin (HCC) [E11.65, Z79.4]     Essential hypertension [I10]        Plan:     Patient status Admit as inpatient in the  Med/Surge    1. Acute renal failure likely secondary to NSAID use with ACE inhibitor and Metformin and dehydration due to vomiting. Hold lisinopril. Continue IV fluids, change normal saline to bicarb infusion. Check kidney ultrasound, MAYRA, SPEP, UPEP, acute hepatitis panel, urine eosinophil, cryoglobulin. Consult nephrology. Risk for dialysis. 2. Acute migraine with status migrainosus -normal CT head. Solu-Medrol 60 mg every 6 hours for 5 doses. Patient will need migraine prophylactic medication. Start amitriptyline. 3. Type 2 diabetes mellitus-hold Metformin. Continue insulin. 4. Essential hypertension-hold lisinopril. Consultations:   IP CONSULT TO NEPHROLOGY    Patient is admitted as inpatient status because of co-morbidities listed above, severity of signs and symptoms as outlined, requirement for current medical therapies and most importantly because of direct risk to patient if care not provided in a hospital setting.     Bethany Hernandez MD  10/5/2021    Copy sent to Dr. Janet Garcia DO    (Please note that portions of this note were completed with a voice recognition program. Efforts were made to edit the dictations but occasionally words are mis-transcribed.)

## 2021-10-05 NOTE — ED NOTES
Pt resting comfortably with family at bedside     Dolores Mutremington, [address]  10/04/21 5558

## 2021-10-05 NOTE — ED NOTES
Pt ambulated to the restroom, tolerated well. Pt states her headache is gone but she still feels off balanced or dizzy.  Clear yellow urine specimen obtained and sent to lab     Agnes Plascencia RN  10/05/21 5747

## 2021-10-05 NOTE — ED NOTES
Intake notes state \"No bed available\", pt updated on plan of care      Abram Inman RN  10/05/21 4202

## 2021-10-05 NOTE — ED NOTES
Pt has been sleeping. Arouses easily, denies complaints at this time.  Daughter at [address]  10/04/21 8635

## 2021-10-05 NOTE — ED NOTES
Pt resting on stretcher with family at bedside no distress noted, continue to monitor      Michelle Duenas RN  10/05/21 6665

## 2021-10-05 NOTE — ED NOTES
Spoke with Elizabeth Pickens at bed placement at [facility], she states that a bed will be assigned shortly after cleaning, pt and family updated     Theo Knight RN  10/05/21 3613

## 2021-10-05 NOTE — PROGRESS NOTES
Received report from [facility] at [facility] via telephone. Pt is getting ready to be transported.   Electronically signed by Ivy Knapp RN on 10/5/2021 at 4:59 PM

## 2021-10-05 NOTE — ED NOTES
Continue to await admission bed at [facility], pt updated that no beds available at present time      Elida Disla, RN  10/05/21 1037

## 2021-10-06 ENCOUNTER — APPOINTMENT (OUTPATIENT)
Dept: ULTRASOUND IMAGING | Age: 51
DRG: 469 | End: 2021-10-06
Payer: MEDICARE

## 2021-10-06 PROBLEM — E44.1 MILD MALNUTRITION (HCC): Status: ACTIVE | Noted: 2021-10-06

## 2021-10-06 LAB
ANION GAP SERPL CALCULATED.3IONS-SCNC: 12 MMOL/L (ref 9–17)
ANTI DNA DOUBLE STRANDED: 0.9 IU/ML
ANTI-NUCLEAR ANTIBODY (ANA): NEGATIVE
BUN BLDV-MCNC: 55 MG/DL (ref 6–20)
BUN/CREAT BLD: 12 (ref 9–20)
CALCIUM SERPL-MCNC: 9.4 MG/DL (ref 8.6–10.4)
CHLORIDE BLD-SCNC: 101 MMOL/L (ref 98–107)
CO2: 20 MMOL/L (ref 20–31)
COMPLEMENT C3: 111 MG/DL (ref 90–180)
COMPLEMENT C4: 31 MG/DL (ref 10–40)
CREAT SERPL-MCNC: 4.49 MG/DL (ref 0.5–0.9)
ENA ANTIBODIES SCREEN: 0.3 U/ML
FREE KAPPA/LAMBDA RATIO: 0.68 (ref 0.26–1.65)
GFR AFRICAN AMERICAN: 13 ML/MIN
GFR NON-AFRICAN AMERICAN: 10 ML/MIN
GFR SERPL CREATININE-BSD FRML MDRD: ABNORMAL ML/MIN/{1.73_M2}
GFR SERPL CREATININE-BSD FRML MDRD: ABNORMAL ML/MIN/{1.73_M2}
GLUCOSE BLD-MCNC: 207 MG/DL (ref 65–105)
GLUCOSE BLD-MCNC: 228 MG/DL (ref 70–99)
GLUCOSE BLD-MCNC: 358 MG/DL (ref 65–105)
GLUCOSE BLD-MCNC: 563 MG/DL (ref 65–105)
GLUCOSE BLD-MCNC: 571 MG/DL (ref 65–105)
GLUCOSE BLD-MCNC: 581 MG/DL (ref 65–105)
HAV IGM SER IA-ACNC: NONREACTIVE
HEPATITIS B CORE IGM ANTIBODY: NONREACTIVE
HEPATITIS B SURFACE ANTIGEN: NONREACTIVE
HEPATITIS C ANTIBODY: NONREACTIVE
KAPPA FREE LIGHT CHAINS QNT: 2.39 MG/DL (ref 0.37–1.94)
LAMBDA FREE LIGHT CHAINS QNT: 3.52 MG/DL (ref 0.57–2.63)
POTASSIUM SERPL-SCNC: 4.9 MMOL/L (ref 3.7–5.3)
SODIUM BLD-SCNC: 133 MMOL/L (ref 135–144)
URIC ACID: 5.6 MG/DL (ref 2.4–5.7)

## 2021-10-06 PROCEDURE — 80048 BASIC METABOLIC PNL TOTAL CA: CPT

## 2021-10-06 PROCEDURE — 1200000000 HC SEMI PRIVATE

## 2021-10-06 PROCEDURE — 6370000000 HC RX 637 (ALT 250 FOR IP): Performed by: FAMILY MEDICINE

## 2021-10-06 PROCEDURE — 83883 ASSAY NEPHELOMETRY NOT SPEC: CPT

## 2021-10-06 PROCEDURE — 2580000003 HC RX 258: Performed by: INTERNAL MEDICINE

## 2021-10-06 PROCEDURE — 36415 COLL VENOUS BLD VENIPUNCTURE: CPT

## 2021-10-06 PROCEDURE — 86160 COMPLEMENT ANTIGEN: CPT

## 2021-10-06 PROCEDURE — 82947 ASSAY GLUCOSE BLOOD QUANT: CPT

## 2021-10-06 PROCEDURE — 2580000003 HC RX 258: Performed by: FAMILY MEDICINE

## 2021-10-06 PROCEDURE — 2500000003 HC RX 250 WO HCPCS: Performed by: FAMILY MEDICINE

## 2021-10-06 PROCEDURE — 6360000002 HC RX W HCPCS: Performed by: FAMILY MEDICINE

## 2021-10-06 PROCEDURE — 99232 SBSQ HOSP IP/OBS MODERATE 35: CPT | Performed by: FAMILY MEDICINE

## 2021-10-06 PROCEDURE — 76770 US EXAM ABDO BACK WALL COMP: CPT

## 2021-10-06 PROCEDURE — 84550 ASSAY OF BLOOD/URIC ACID: CPT

## 2021-10-06 RX ORDER — INSULIN GLARGINE 100 [IU]/ML
60 INJECTION, SOLUTION SUBCUTANEOUS EVERY MORNING
Status: DISCONTINUED | OUTPATIENT
Start: 2021-10-07 | End: 2021-10-07

## 2021-10-06 RX ORDER — INSULIN GLARGINE 100 [IU]/ML
40 INJECTION, SOLUTION SUBCUTANEOUS EVERY MORNING
Status: DISCONTINUED | OUTPATIENT
Start: 2021-10-06 | End: 2021-10-06

## 2021-10-06 RX ORDER — IBUPROFEN 200 MG
200 TABLET ORAL EVERY 6 HOURS PRN
COMMUNITY

## 2021-10-06 RX ORDER — BUTALBITAL, ACETAMINOPHEN AND CAFFEINE 50; 325; 40 MG/1; MG/1; MG/1
1 TABLET ORAL EVERY 4 HOURS PRN
Status: DISCONTINUED | OUTPATIENT
Start: 2021-10-06 | End: 2021-10-07 | Stop reason: HOSPADM

## 2021-10-06 RX ORDER — SUMATRIPTAN 50 MG/1
50 TABLET, FILM COATED ORAL PRN
COMMUNITY

## 2021-10-06 RX ORDER — SODIUM CHLORIDE 9 MG/ML
INJECTION, SOLUTION INTRAVENOUS CONTINUOUS
Status: DISCONTINUED | OUTPATIENT
Start: 2021-10-06 | End: 2021-10-07 | Stop reason: HOSPADM

## 2021-10-06 RX ADMIN — GABAPENTIN 600 MG: 300 CAPSULE ORAL at 08:05

## 2021-10-06 RX ADMIN — METHYLPREDNISOLONE SODIUM SUCCINATE 60 MG: 125 INJECTION, POWDER, FOR SOLUTION INTRAMUSCULAR; INTRAVENOUS at 08:05

## 2021-10-06 RX ADMIN — ATORVASTATIN CALCIUM 40 MG: 40 TABLET, FILM COATED ORAL at 08:05

## 2021-10-06 RX ADMIN — INSULIN LISPRO 7 UNITS: 100 INJECTION, SOLUTION INTRAVENOUS; SUBCUTANEOUS at 21:54

## 2021-10-06 RX ADMIN — INSULIN GLARGINE 40 UNITS: 100 INJECTION, SOLUTION SUBCUTANEOUS at 11:49

## 2021-10-06 RX ADMIN — GLIPIZIDE 10 MG: 10 TABLET ORAL at 06:42

## 2021-10-06 RX ADMIN — SODIUM CHLORIDE: 9 INJECTION, SOLUTION INTRAVENOUS at 13:47

## 2021-10-06 RX ADMIN — INSULIN LISPRO 4 UNITS: 100 INJECTION, SOLUTION INTRAVENOUS; SUBCUTANEOUS at 08:05

## 2021-10-06 RX ADMIN — INSULIN LISPRO 12 UNITS: 100 INJECTION, SOLUTION INTRAVENOUS; SUBCUTANEOUS at 17:33

## 2021-10-06 RX ADMIN — METHYLPREDNISOLONE SODIUM SUCCINATE 60 MG: 125 INJECTION, POWDER, FOR SOLUTION INTRAMUSCULAR; INTRAVENOUS at 02:07

## 2021-10-06 RX ADMIN — Medication: at 08:08

## 2021-10-06 RX ADMIN — INSULIN LISPRO 12 UNITS: 100 INJECTION, SOLUTION INTRAVENOUS; SUBCUTANEOUS at 11:49

## 2021-10-06 RX ADMIN — SODIUM CHLORIDE: 9 INJECTION, SOLUTION INTRAVENOUS at 23:49

## 2021-10-06 RX ADMIN — HEPARIN SODIUM 5000 UNITS: 5000 INJECTION INTRAVENOUS; SUBCUTANEOUS at 05:54

## 2021-10-06 RX ADMIN — AMITRIPTYLINE HYDROCHLORIDE 25 MG: 50 TABLET, FILM COATED ORAL at 21:55

## 2021-10-06 ASSESSMENT — ENCOUNTER SYMPTOMS
VOMITING: 0
RHINORRHEA: 0
COUGH: 0
NAUSEA: 0
DIARRHEA: 0
CONSTIPATION: 0
WHEEZING: 0
BLOOD IN STOOL: 0
ABDOMINAL PAIN: 0
SHORTNESS OF BREATH: 0
CHEST TIGHTNESS: 0

## 2021-10-06 NOTE — PROGRESS NOTES
[facility]  Office: [address], DO, Brionna Reyna, DO, Whitleycoby Jefferson, DO, Mario Daley Blood, DO, Virginia Bradford MD, Merritt Gonzalez MD, Fariha Samuel MD, Kyrie Tavarez MD, Cassandra Parker MD, Mago Stoddard MD, Rolly Mccarthy MD, Davida Santos, DO, Malena Duncan, DO, Migue Love MD,  Yan Thomas, DO, Jay Marshall MD, Diana Pastrana MD, Brody Mccullough MD, Hair Cohn MD, , Zulay Browne MD, Dennise Gee MD, Dennis Armstrong MD, Alferd Felty, CNP, [facility], CNP, Sheri Cuello, CNP, Abena Boyd, CNS, Kyler Echeverria, CNP, Nicky Magdaleno, CNP, Jesenia Hernandez, CNP, Valentin Owusu, CNP, Seema Dominguez, [facility], Marlon Mahmood PA-C, Lianet Lyons, [facility], Woodrow Oliveros, CNP, Ricardo Hyman, CNP, Deepti Cordero, CNP, Behzad Sifuentes, CNP, Diony Modi, CNP, Cheyenne Will, CNP, Shey Walker, [facility], [facility]      Daily Progress Note     Admit Date: 10/4/2021  Bed/Room No.  2007/2007-02  Admitting Physician : Fariha Samuel MD  Code Status :Full Code  Hospital Day:  LOS: 1 day   Chief Complaint:     Chief Complaint   Patient presents with    Headache     onset after taking imitrex 2 days ago     Emesis    Dizziness    Blurred Vision     Principal Problem:    Renal failure, acute [facility])  Active Problems:    Essential hypertension    Type 2 diabetes mellitus with hyperglycemia, with long-term current use of insulin [facility])    Acquired hypothyroidism    Acute renal failure (ARF) ([facility] [address]  Resolved Problems:    * No resolved hospital problems. *    Subjective : Interval History/Significant events :  10/06/21    Patient continues to have left sided hemicranial headache. Patient denies any visual disturbance. She denies nausea, vomiting, diarrhea. She is otherwise asymptomatic. Vitals - Stable afebrile  Labs -creatinine continues to be high. Nursing notes , Consults notes reviewed. Overnight events and updates discussed with Nursing staff . WC  insulin lispro, 0-6 Units, SubCUTAneous, Nightly  amitriptyline, 25 mg, Oral, Nightly  atorvastatin, 40 mg, Oral, Daily  gabapentin, 600 mg, Oral, Daily  glipiZIDE, 10 mg, Oral, QAM AC  insulin glargine, 40 Units, SubCUTAneous, Nightly  heparin (porcine), 5,000 Units, SubCUTAneous, 3 times per day        Review of Systems   Review of Systems   Constitutional: Negative for appetite change, fatigue, fever and unexpected weight change. HENT: Negative for congestion, rhinorrhea and sneezing. Eyes: Negative for visual disturbance. Respiratory: Negative for cough, chest tightness, shortness of breath and wheezing. Cardiovascular: Negative for chest pain and palpitations. Gastrointestinal: Negative for abdominal pain, blood in stool, constipation, diarrhea, nausea and vomiting. Genitourinary: Negative for dysuria, enuresis, frequency and hematuria. Musculoskeletal: Negative for arthralgias and myalgias. Skin: Negative for rash. Neurological: Positive for headaches. Negative for dizziness, weakness and light-headedness. Hematological: Does not bruise/bleed easily. Psychiatric/Behavioral: Negative for dysphoric mood and sleep disturbance.      Objective :      Current Vitals : Temp: 97.6 °F (36.4 °C),  Pulse: 73, Resp: 16, BP: 135/77, SpO2: 99 %  Last 24 Hrs Vitals   Patient Vitals for the past 24 hrs:   BP Temp Temp src Pulse Resp SpO2 Height Weight   10/06/21 0745 135/77 97.6 °F (36.4 °C) Oral 73 16 99 %     10/06/21 0517        134 lb 9 oz (61 kg)   10/05/21 2010    72       10/05/21 1916 (!) 103/59 97.8 °F (36.6 °C) Oral 74 16 100 %     10/05/21 1749       5' 2\" (1.575 m)    10/05/21 1738 127/72 98.2 °F (36.8 °C) Oral 84 20 99 %     10/05/21 1324      99 %     10/05/21 1322 123/69   79  99 %     10/05/21 1055 117/65   87 18 99 %       Intake / output   10/05 0701 - 10/06 0700  In: 103.3 [I.V.:103.3]  Out: 550 [Urine:550]  Physical Exam:  Physical Exam  Vitals and nursing note reviewed. Constitutional:       General: She is not in acute distress. Appearance: She is not diaphoretic. HENT:      Head: Normocephalic and atraumatic. Nose:      Right Sinus: No maxillary sinus tenderness or frontal sinus tenderness. Left Sinus: No maxillary sinus tenderness or frontal sinus tenderness. Mouth/Throat:      Pharynx: No oropharyngeal exudate. Eyes:      General: No scleral icterus. Conjunctiva/sclera: Conjunctivae normal.      Pupils: Pupils are equal, round, and reactive to light. Neck:      Thyroid: No thyromegaly. Vascular: No JVD. Cardiovascular:      Rate and Rhythm: Normal rate and regular rhythm. Pulses:           Dorsalis pedis pulses are 2+ on the right side and 2+ on the left side. Heart sounds: Normal heart sounds. No murmur heard. Pulmonary:      Effort: Pulmonary effort is normal.      Breath sounds: Normal breath sounds. No wheezing or rales. Abdominal:      Palpations: Abdomen is soft. There is no mass. Tenderness: There is no abdominal tenderness. Musculoskeletal:      Cervical back: Full passive range of motion without pain and neck supple. Lymphadenopathy:      Head:      Right side of head: No submandibular adenopathy. Left side of head: No submandibular adenopathy. Cervical: No cervical adenopathy. Skin:     General: Skin is warm. Neurological:      Mental Status: She is alert and oriented to person, place, and time. Motor: No tremor. Psychiatric:         Behavior: Behavior is cooperative.            Laboratory findings:    Recent Labs     10/04/21  1726 10/05/21  0623   WBC 9.3 8.6   HGB 11.7* 10.7*   HCT 36.2 32.8*    224   SEDRATE 14  --    INR  --  1.0     Recent Labs     10/04/21  1913 10/05/21  0623 10/06/21  0655    133* 133*   K 3.8 5.2 4.9    105 101   CO2 23 17* 20   GLUCOSE 107* 240* 228*   BUN 43* 50* 55*   CREATININE 4.30* 5.00* 4.49* MG  --  1.9  --    CALCIUM 10.1 9.7 9.4     Recent Labs     10/04/21  1726 10/05/21  0623   PROT 7.8 6.6  7.1   LABALBU 4.3 3.9   AST 17 11   ALT 14 6   ALKPHOS 97 87   BILITOT 0.40 0.40          Specific Gravity, UA   Date Value Ref Range Status   10/05/2021 1.015 1.005 - 1.030 Final     Protein, UA   Date Value Ref Range Status   10/05/2021 1+ (A) NEGATIVE Final     RBC, UA   Date Value Ref Range Status   10/05/2021 0 TO 2 0 - 2 /HPF Final     Bacteria, UA   Date Value Ref Range Status   10/05/2021 FEW (A) None Final     Nitrite, Urine   Date Value Ref Range Status   10/05/2021 NEGATIVE NEGATIVE Final     WBC, UA   Date Value Ref Range Status   10/05/2021 2 TO 5 0 - 5 /HPF Final     Leukocyte Esterase, Urine   Date Value Ref Range Status   10/05/2021 NEGATIVE NEGATIVE Final       Imaging / Clinical Data :-   CT Head WO Contrast    Addendum Date: 10/4/2021    ADDENDUM: There has been interval increase in paranasal sinus mucosal thickening in the left maxillary sinus when compared to the prior study there now is the presence of a mucous retention cyst suggested in the left maxillary sinus. . Previous study demonstrated air-fluid level in the right maxillary sinus. There is no air-fluid level on today's study the right maxillary sinus is clear. Mucosal thickening is noted in the ethmoid air cells bilaterally pleural the frontal sinuses and sphenoid sinuses are clear. Mastoid air cells are clear. Impression no acute intracranial abnormality Incidental note made of chronic paranasal sinus inflammatory changes     Result Date: 10/4/2021  No acute intracranial abnormality. Stable compared to prior study        Clinical Course : unchanged  Assessment and Plan  :        1. Acute renal failure likely secondary to NSAID use with ACE inhibitor and Metformin and dehydration due to vomiting. Hold lisinopril. Continue IV fluids, change normal saline to bicarb infusion.   kidney ultrasound negative for hydronephrosis.   MAYRA negative , SPEP/  UPEP pending , acute hepatitis panel negative , urine eosinophil non seen , cryoglobulin. Plan for kidney biopsy. 2. Acute migraine with status migrainosus - continue  amitriptyline. .  Fioricet as needed. 3. Type 2 diabetes mellitus-hold Metformin. Continue insulin. 4. Essential hypertension-hold lisinopril.          Continue to monitor vitals , Intake / output ,  Cell count , HGB , Kidney function, oxygenation  as indicated . Plan and updates discussed with patient ,  answers  explained to satisfaction.    Plan discussed with Staff Joseline Strong RN     (Please note that portions of this note were completed with a voice recognition program. Efforts were made to edit the dictations but occasionally words are mis-transcribed.)      Navid Miller MD  10/6/2021

## 2021-10-06 NOTE — CONSULTS
Department of Internal Medicine  Nephrology Radha Del Angel MD   Consult Note    Reason for consultation: Management of acute kidney injury. Consulting physician: Jesika Beaver MD    History of present illness: This is a 46 y.o. female with a significant past medical history of Depression, hyperlipidemia, systemic hypertension, type 2 diabetes mellitus [diagnosed 10 years ago with complications of diabetic retinopathy], migraine headaches [patient has been taking 3 to 4 tablets of Aleve/ibuprofen every day for over 2 years] and chronic kidney disease stage III [baseline serum creatinine 2.0 mg/dL in September 2021], who presented to the hospital with nausea vomiting and unsteady gait. Symptoms started after patient was placed on Imitrex for headache recently. She has never been on dialysis and denies flank pain or gross hematuria. She was admitted to [facility] for 4 days in July 2021 for treatment of migraine and transient loss of vision. Hemoglobin A1c most recently was 9.2%. Home medications included Mobic, Aleve, lisinopril and Januvia. Blood pressure at presentation was 75/57 mmHg. Laboratory studies were remarkable for elevated BUNs/creatinine 43/4.30 mg/dL and hence nephrology consultation. Renal ultrasound showed no hydronephrosis but there were bilateral nonobstructing renal calculi and trace right perinephric free fluid.     Imitrex [sumatriptan]    Past Medical History:   Diagnosis Date    Adhesive capsulitis     Anxiety     Arthritis     Depression     Diabetes mellitus (HCC)     takes insulin (Dr. 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 ([facility] [address]     DVT (deep vein thrombosis) in pregnancy     Hx of blood clots     Hyperlipidemia     Hypertension     Rotator cuff tendonitis, left     Thyroid disease        Scheduled Meds:   sodium chloride flush  5-40 mL IntraVENous 2 times per day    methylPREDNISolone  60 mg IntraVENous Q6H  insulin lispro  0-12 Units SubCUTAneous TID WC    insulin lispro  0-6 Units SubCUTAneous Nightly    amitriptyline  25 mg Oral Nightly    atorvastatin  40 mg Oral Daily    gabapentin  600 mg Oral Daily    glipiZIDE  10 mg Oral QAM AC    insulin glargine  40 Units SubCUTAneous Nightly    heparin (porcine)  5,000 Units SubCUTAneous 3 times per day     Continuous Infusions:   sodium chloride      dextrose      sodium bicarbonate infusion 100 mL/hr at 10/06/21 0808     PRN Meds:.sodium chloride flush, sodium chloride, ondansetron **OR** ondansetron, acetaminophen **OR** acetaminophen, glucose, dextrose, glucagon (rDNA), dextrose    Family History   Family history unknown: Yes        Social History     Socioeconomic History    Marital status: Single     Spouse name: None    Number of children: None    Years of education: None    Highest education level: None   Occupational History    None   Tobacco Use    Smoking status: Current Every Day Smoker     Packs/day: 0.50     Years: 35.00     Pack years: 17.50     Types: Cigarettes    Smokeless tobacco: Never Used    Tobacco comment: pt states 1 cigarettes per day   Vaping Use    Vaping Use: Never used   Substance and Sexual Activity    Alcohol use: No    Drug use: No    Sexual activity: None   Other Topics Concern    None   Social History Narrative    None     Social Determinants of Health     Financial Resource Strain:     Difficulty of Paying Living Expenses:    Food Insecurity:     Worried About Running Out of Food in the Last Year:     Ran Out of Food in the Last Year:    Transportation Needs:     Lack of Transportation (Medical):      Lack of Transportation (Non-Medical):    Physical Activity:     Days of Exercise per Week:     Minutes of Exercise per Session:    Stress:     Feeling of Stress :    Social Connections:     Frequency of Communication with Friends and Family:     Frequency of Social Gatherings with Friends and Family:     Attends [employer] Services:     Active Member of Clubs or Organizations:     Attends Club or Organization Meetings:     Marital Status:    Intimate Partner Violence:     Fear of Current or Ex-Partner:     Emotionally Abused:     Physically Abused:     Sexually Abused:      Review of systems: CNS - no headache or dizziness; Cardiac - no chest pain; Respiratory - no shortness of breath; Gastrointestinal - +nausea,+vomiting,no diarrhea; Musculoskeletal - general body aches; Skin/Integument - no rashes. Physical Exam:    VITALS:  /62   Pulse 87   Temp 97.9 °F (36.6 °C) (Oral)   Resp 18   Ht 5' 2\" (1.575 m)   Wt 134 lb 9 oz (61 kg)   LMP 06/24/2017   SpO2 98%   BMI 24.61 kg/m²   24HR INTAKE/OUTPUT:    Intake/Output Summary (Last 24 hours) at 10/6/2021 1203  Last data filed at 10/6/2021 0944  Gross per 24 hour   Intake 103.29 ml   Output 1090 ml   Net -986.71 ml       Constitutional: alert, appears stated age and cooperative    Skin: Skin color, texture, turgor normal. No rashes or lesions    Head: Normocephalic, without obvious abnormality, atraumatic     Cardiovascular/Edema: regular rate and rhythm, S1, S2 normal, no murmur, click, rub or gallop    Respiratory: Lungs: clear to auscultation bilaterally    Abdomen: soft, non-tender; bowel sounds normal; no masses,  no organomegaly    Back: symmetric, no curvature. ROM normal. No CVA tenderness.     Extremities: edema trace to 1+    Neuro:  Grossly normal      CBC:   Recent Labs     10/04/21  1726 10/05/21  0623   WBC 9.3 8.6   HGB 11.7* 10.7*    224     BMP:    Recent Labs     10/04/21  1913 10/05/21  0623 10/06/21  0655    133* 133*   K 3.8 5.2 4.9    105 101   CO2 23 17* 20   BUN 43* 50* 55*   CREATININE 4.30* 5.00* 4.49*   GLUCOSE 107* 240* 228*       Lab Results   Component Value Date    NITRU NEGATIVE 10/05/2021    COLORU Yellow 10/05/2021    PHUR 5.0 10/05/2021    WBCUA 2 TO 5 10/05/2021    RBCUA 0 TO 2 10/05/2021    MUCUS 1+ 10/05/2021    TRICHOMONAS NOT REPORTED 10/05/2021    YEAST NOT REPORTED 10/05/2021    BACTERIA FEW 10/05/2021    SPECGRAV 1.015 10/05/2021    LEUKOCYTESUR NEGATIVE 10/05/2021    UROBILINOGEN Normal 10/05/2021    BILIRUBINUR NEGATIVE 10/05/2021    BILIRUBINUR NEGATIVE 03/29/2012    GLUCOSEU 3+ 10/05/2021    GLUCOSEU 3+ 03/29/2012    KETUA NEGATIVE 10/05/2021    AMORPHOUS NOT REPORTED 10/05/2021     Urine Sodium:     Lab Results   Component Value Date    LINDSAY 37 10/05/2021     Urine Chloride:    Lab Results   Component Value Date    CLUR 42 10/05/2021     Urine Protein:     Lab Results   Component Value Date    TPU 53 10/05/2021     Urine Creatinine:     Lab Results   Component Value Date    LABCREA 131.0 10/05/2021      IMPRESSION/RECOMMENDATIONS:      1. Acute kidney injury superimposed on chronic kidney disease stage III - most consistent with ischemic acute tubular necrosis secondary to hypotension, acute interstitial nephritis secondary to heavy NSAID use as well as component of prerenal azotemia from nausea and vomiting/poor fluid intake. 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 I have advised patient that she may need to start dialysis if renal function does not improve with hydration. Plan: IV fluid 0.9 normal saline at 100 mL/h. Discontinue and avoid further exposure to NSAIDs. We will tentatively schedule for percutaneous kidney biopsy tomorrow. Keep mean arterial pressure greater than 65 mmHg and/or systolic blood pressure greater than 100 mmHg. Urinalysis and urine microscopy. Random urine eosinophils. Random urine electrolytes. MAYRA and complements. Plasma free light chains with ratio. 2.  Hypotension - secondary to hypovolemia but will need to rule out sepsis. Panculture. Continue fluid resuscitation. Hold lisinopril. Prognosis is guarded.     Thank you very much for the courtesy of this consultation.       Oliverio Gillespie MD FACP  Attending Nephrologist  10/6/2021 8:38 AM

## 2021-10-06 NOTE — PROGRESS NOTES
Comprehensive Nutrition Assessment    Type and Reason for Visit:  Positive Nutrition Screen (Unplanned weight loss, decreased appetite. Dietitian consult needed: poor intake, poor appetite)    Nutrition Recommendations/Plan:   1. Continue ADULT DIET; Regular; 4 carb choices (60 gm/meal); Low Sodium (2 gm); Low Potassium (Less than 3000 mg/day); 60 to 80 gm  2. Monitor p.o intakes and labs    Nutrition Assessment:  Patient admission is related to acute renal failure. Patient reports nausea and vomiting for 3-4 days and experienced weakness and dehydration. Patient has lost some weight and appears to be mildly malnourished. Patient reports intermittent mild nausea but has been tolerating meals. Patient ate % at lunch today. Will monitor p.o intakes    Malnutrition Assessment:  Malnutrition Status:  Mild malnutrition    Context:  Acute Illness     Findings of the 6 clinical characteristics of malnutrition:  Energy Intake:  1 - 75% or less of estimated energy requirements for 7 or more days  Weight Loss:  No significant weight loss     Body Fat Loss:  No significant body fat loss     Muscle Mass Loss:  No significant muscle mass loss    Fluid Accumulation:  No significant fluid accumulation Extremities   Strength:  Not Performed    Estimated Daily Nutrient Needs:  Energy (kcal):  3980-9638  kcal (25-28 kcal/kg); Weight Used for Energy Requirements:  Current     Protein (g):  73-79 gm of protein (1.2-1.3 gm/kg); Weight Used for Protein Requirements:  Current          Nutrition Related Findings:  No edema. Hypoactive bowel sounds      Wounds:  None       Current Nutrition Therapies:    ADULT DIET; Regular; 4 carb choices (60 gm/meal); Low Sodium (2 gm);  Low Potassium (Less than 3000 mg/day); 60 to 80 gm  Diet NPO    Anthropometric Measures:  · Height: 5' 2\" (157.5 cm)  · Current Body Weight: 134 lb (60.8 kg)   · Ideal Body Weight: 110 lbs; % Ideal Body Weight 121.8 %   · BMI: 24.5  · BMI Categories: Normal Weight (BMI 18.5-24. 9)       Nutrition Diagnosis:   · Altered nutrition-related lab values related to renal dysfunction as evidenced by lab values    · Mild malnutrition related to inadequate protein-energy intake as evidenced by weight loss, poor intake prior to admission, intake 51-75%      Nutrition Interventions:   Food and/or Nutrient Delivery:  Continue Current Diet, Start Oral Nutrition Supplement  Nutrition Education/Counseling:  Education not indicated   Coordination of Nutrition Care:  Continue to monitor while inpatient    Goals:  PO intakes are greater than 75% at meals       Nutrition Monitoring and Evaluation:   Food/Nutrient Intake Outcomes:  Food and Nutrient Intake  Physical Signs/Symptoms Outcomes:  Biochemical Data, Fluid Status or Edema, Skin, Weight, GI Status, Nausea or Vomiting     Discharge Planning:    Continue current diet       Ara CHAPINN, RDN, LDN  Lead Clinical Dietitian  RD Office Phone [phone]

## 2021-10-06 NOTE — CARE COORDINATION
CASE MANAGEMENT NOTE:    Admission Date:  10/4/2021 Dick Cristina is a 46 y.o.  female    Admitted for : Acute renal failure (ARF) ([facility] [address] [N17.9]  Acute renal failure with other specified pathological lesion in kidney ([facility] [address] [N17.8]  Nonintractable paroxysmal hemicrania, unspecified chronicity pattern [G44.039]    Met with:  Patient, son Krista Castro [phone] and daughter in law    PCP:  [place]:  [facility]      Is patient alert and oriented at time of discussion:  Yes    Current Residence/ Living Arrangements:  at home dependent on family care             Current Services PTA:  No    Does patient go to outpatient dialysis: No  If yes, location and chair time: n/a    Is patient agreeable to VNS: No    Freedom of choice provided:  Yes    List of [address] provided: No    VNS chosen:  No    DME:  walker    Home Oxygen: No    Nebulizer: No    CPAP/BIPAP: No    Supplier: N/A    Potential Assistance Needed: No    SNF needed: No    Freedom of choice and list provided: Yes    Pharmacy:  [facility]. Does Patient want to use MEDS to BEDS? No    Is patient currently receiving oral anticoagulation therapy? NA    Is the Patient an [facility] with Readmission Risk Score greater than 14%? No  If yes, pt needs a follow up appointment made within 7 days. Family Members/Caregivers that pt would like involved in their care:    Yes    If yes, list name here:  Ron Pratt [phone] and daughter in law in addition to daughter she lives with Raymundo Coffey. [phone]    Transportation Provider:  Family             Discharge Plan:  The Plan for Transition of Care is related to the following treatment goals: planning home at discharge with daughter. Has rolling walker. Uses [facility] at [facility]. Pt to have biopsy tomorrow on [place] per pt.       The Patient and/or patient representative patient was provided with a choice of provider and agrees   with the discharge plan. [x] Yes [] No    Freedom of choice list was provided with basic dialogue that supports the patient's individualized plan of care/goals, treatment preferences and shares the quality data associated with the providers.  [] Yes [x] No                 Electronically signed by: LILI Starr, PEPE on 10/6/2021 at 2:41 PM

## 2021-10-07 VITALS
OXYGEN SATURATION: 98 % | WEIGHT: 140 LBS | HEART RATE: 72 BPM | SYSTOLIC BLOOD PRESSURE: 152 MMHG | RESPIRATION RATE: 16 BRPM | HEIGHT: 62 IN | DIASTOLIC BLOOD PRESSURE: 81 MMHG | BODY MASS INDEX: 25.76 KG/M2 | TEMPERATURE: 97.7 F

## 2021-10-07 PROBLEM — N17.0 ATN (ACUTE TUBULAR NECROSIS) (HCC): Status: ACTIVE | Noted: 2021-10-07

## 2021-10-07 PROBLEM — T38.0X5A STEROID-INDUCED HYPERGLYCEMIA: Status: ACTIVE | Noted: 2019-11-04

## 2021-10-07 LAB
ALBUMIN (CALCULATED): 4.2 G/DL (ref 3.2–5.2)
ALBUMIN PERCENT: 63 % (ref 45–65)
ALPHA 1 PERCENT: 2 % (ref 3–6)
ALPHA 2 PERCENT: 11 % (ref 6–13)
ALPHA-1-GLOBULIN: 0.2 G/DL (ref 0.1–0.4)
ALPHA-2-GLOBULIN: 0.7 G/DL (ref 0.5–0.9)
ANION GAP SERPL CALCULATED.3IONS-SCNC: 12 MMOL/L (ref 9–17)
BETA GLOBULIN: 0.6 G/DL (ref 0.5–1.1)
BETA PERCENT: 9 % (ref 11–19)
BUN BLDV-MCNC: 55 MG/DL (ref 6–20)
BUN/CREAT BLD: 23 (ref 9–20)
CALCIUM SERPL-MCNC: 9 MG/DL (ref 8.6–10.4)
CHLORIDE BLD-SCNC: 104 MMOL/L (ref 98–107)
CO2: 20 MMOL/L (ref 20–31)
CREAT SERPL-MCNC: 2.35 MG/DL (ref 0.5–0.9)
GAMMA GLOBULIN %: 14 % (ref 9–20)
GAMMA GLOBULIN: 0.9 G/DL (ref 0.5–1.5)
GFR AFRICAN AMERICAN: 26 ML/MIN
GFR NON-AFRICAN AMERICAN: 22 ML/MIN
GFR SERPL CREATININE-BSD FRML MDRD: ABNORMAL ML/MIN/{1.73_M2}
GFR SERPL CREATININE-BSD FRML MDRD: ABNORMAL ML/MIN/{1.73_M2}
GLUCOSE BLD-MCNC: 209 MG/DL (ref 65–105)
GLUCOSE BLD-MCNC: 211 MG/DL (ref 65–105)
GLUCOSE BLD-MCNC: 241 MG/DL (ref 70–99)
HOURS COLLECTED: 24 H
PATHOLOGIST: ABNORMAL
POTASSIUM SERPL-SCNC: 4.2 MMOL/L (ref 3.7–5.3)
PROTEIN 24 HOUR URINE: 195 MG/24 H
PROTEIN ELECTROPHORESIS, SERUM: ABNORMAL
PROTEIN,TOT TIMED UR: ABNORMAL MG/X H
SODIUM BLD-SCNC: 136 MMOL/L (ref 135–144)
TOTAL PROT. SUM,%: 99 % (ref 98–102)
TOTAL PROT. SUM: 6.6 G/DL (ref 6.3–8.2)
TOTAL PROTEIN: 6.6 G/DL (ref 6.4–8.3)
URINE TOTAL PROTEIN: 8 MG/DL
VOLUME: 2435 ML

## 2021-10-07 PROCEDURE — 2580000003 HC RX 258: Performed by: INTERNAL MEDICINE

## 2021-10-07 PROCEDURE — 82947 ASSAY GLUCOSE BLOOD QUANT: CPT

## 2021-10-07 PROCEDURE — 36415 COLL VENOUS BLD VENIPUNCTURE: CPT

## 2021-10-07 PROCEDURE — 80048 BASIC METABOLIC PNL TOTAL CA: CPT

## 2021-10-07 PROCEDURE — 82570 ASSAY OF URINE CREATININE: CPT

## 2021-10-07 PROCEDURE — 84156 ASSAY OF PROTEIN URINE: CPT

## 2021-10-07 PROCEDURE — 82575 CREATININE CLEARANCE TEST: CPT

## 2021-10-07 PROCEDURE — 99239 HOSP IP/OBS DSCHRG MGMT >30: CPT | Performed by: FAMILY MEDICINE

## 2021-10-07 PROCEDURE — 6370000000 HC RX 637 (ALT 250 FOR IP): Performed by: FAMILY MEDICINE

## 2021-10-07 PROCEDURE — 82043 UR ALBUMIN QUANTITATIVE: CPT

## 2021-10-07 RX ORDER — INSULIN GLARGINE 100 [IU]/ML
44 INJECTION, SOLUTION SUBCUTANEOUS EVERY MORNING
Status: DISCONTINUED | OUTPATIENT
Start: 2021-10-07 | End: 2021-10-07 | Stop reason: HOSPADM

## 2021-10-07 RX ORDER — SENNA PLUS 8.6 MG/1
1 TABLET ORAL 2 TIMES DAILY PRN
Status: DISCONTINUED | OUTPATIENT
Start: 2021-10-07 | End: 2021-10-07 | Stop reason: HOSPADM

## 2021-10-07 RX ADMIN — GABAPENTIN 600 MG: 300 CAPSULE ORAL at 09:50

## 2021-10-07 RX ADMIN — SODIUM CHLORIDE: 9 INJECTION, SOLUTION INTRAVENOUS at 10:09

## 2021-10-07 RX ADMIN — INSULIN LISPRO 6 UNITS: 100 INJECTION, SOLUTION INTRAVENOUS; SUBCUTANEOUS at 12:39

## 2021-10-07 RX ADMIN — ATORVASTATIN CALCIUM 40 MG: 40 TABLET, FILM COATED ORAL at 09:51

## 2021-10-07 RX ADMIN — INSULIN LISPRO 6 UNITS: 100 INJECTION, SOLUTION INTRAVENOUS; SUBCUTANEOUS at 09:50

## 2021-10-07 ASSESSMENT — ENCOUNTER SYMPTOMS
CHEST TIGHTNESS: 0
VOMITING: 0
BLOOD IN STOOL: 0
DIARRHEA: 0
WHEEZING: 0
COUGH: 0
NAUSEA: 0
ABDOMINAL PAIN: 0
SHORTNESS OF BREATH: 0
CONSTIPATION: 0
RHINORRHEA: 0

## 2021-10-07 ASSESSMENT — PAIN SCALES - GENERAL: PAINLEVEL_OUTOF10: 0

## 2021-10-07 NOTE — PROGRESS NOTES
Pt daughter came to desk and said her mother requested her insulin so she gave her 44 units just now. Educated the daughter that staff needs to give medications. Pt new nurse informed Zo Bergman).

## 2021-10-07 NOTE — PROGRESS NOTES
Despite writer explaining the benefits of staying, patient decided to leave AMA. Encouraged patient to follow up with her PCP as soon as possible and stay hydrated. Patient left with all belongings. Dr Nishant Allen, attending physician, notified of patient leaving AMA.

## 2021-10-07 NOTE — PROGRESS NOTES
Department of Internal Medicine  Nephrology [facility], MD   Progress noted Note    Reason for consultation: Management of acute kidney injury. Consulting physician: Owen Hauser MD    History of present illness: This is a 46 y.o. female with a significant past medical history of Depression, hyperlipidemia, systemic hypertension, type 2 diabetes mellitus [diagnosed 10 years ago with complications of diabetic retinopathy], migraine headaches [patient has been taking 3 to 4 tablets of Aleve/ibuprofen every day for over 2 years] and chronic kidney disease stage III [baseline serum creatinine 2.0 mg/dL in September 2021], who presented to the hospital with nausea vomiting and unsteady gait. Symptoms started after patient was placed on Imitrex for headache recently. She has never been on dialysis and denies flank pain or gross hematuria. She was admitted to [facility] for 4 days in July 2021 for treatment of migraine and transient loss of vision. Hemoglobin A1c most recently was 9.2%. Home medications included Mobic, Aleve, lisinopril and Januvia. Blood pressure at presentation was 75/57 mmHg. Laboratory studies were remarkable for elevated BUNs/creatinine 43/4.30 mg/dL and hence nephrology consultation. Renal ultrasound showed no hydronephrosis but there were bilateral nonobstructing renal calculi and trace right perinephric free fluid. Subjective/interval history.     Patient seen and examined patient is feeling better dizziness is improved nausea is improved patient was able to eat  Creatinine has improved to 2.3 mg/dL, good urine output  Acute kidney injury work-up so far unremarkable serologies negative serum protein electrophoresis unremarkable, urine eosinophils negative   UPC 0.40      Imitrex [sumatriptan]    Past Medical History:   Diagnosis Date    Adhesive capsulitis     Anxiety     Arthritis     Depression     Diabetes mellitus (HCC)     takes insulin (Dr. 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 (HCC)     DVT (deep vein thrombosis) in pregnancy     Hx of blood clots     Hyperlipidemia     Hypertension     Rotator cuff tendonitis, left     Thyroid disease        Scheduled Meds:   insulin glargine  60 Units SubCUTAneous QAM    insulin lispro  0-18 Units SubCUTAneous TID WC    insulin lispro  0-9 Units SubCUTAneous Nightly    sodium chloride flush  5-40 mL IntraVENous 2 times per day    amitriptyline  25 mg Oral Nightly    atorvastatin  40 mg Oral Daily    gabapentin  600 mg Oral Daily    glipiZIDE  10 mg Oral QAM AC    [Held by provider] heparin (porcine)  5,000 Units SubCUTAneous 3 times per day     Continuous Infusions:   sodium chloride 100 mL/hr at 10/06/21 2349    sodium chloride      dextrose       PRN Meds:.butalbital-acetaminophen-caffeine, sodium chloride flush, sodium chloride, ondansetron **OR** ondansetron, glucose, dextrose, glucagon (rDNA), dextrose    Family History   Family history unknown: Yes        Social History     Socioeconomic History    Marital status: Single     Spouse name: None    Number of children: None    Years of education: None    Highest education level: None   Occupational History    None   Tobacco Use    Smoking status: Current Every Day Smoker     Packs/day: 0.50     Years: 35.00     Pack years: 17.50     Types: Cigarettes    Smokeless tobacco: Never Used    Tobacco comment: pt states 1 cigarettes per day   Vaping Use    Vaping Use: Never used   Substance and Sexual Activity    Alcohol use: No    Drug use: No    Sexual activity: None   Other Topics Concern    None   Social History Narrative    None     Social Determinants of Health     Financial Resource Strain:     Difficulty of Paying Living Expenses:    Food Insecurity:     Worried About Running Out of Food in the Last Year:     Ran Out of Food in the Last Year:    Transportation Needs:     Lack of Transportation (Medical):  Lack of Transportation (Non-Medical):    Physical Activity:     Days of Exercise per Week:     Minutes of Exercise per Session:    Stress:     Feeling of Stress :    Social Connections:     Frequency of Communication with Friends and Family:     Frequency of Social Gatherings with Friends and Family:     Attends Latter-day Services:     Active Member of Clubs or Organizations:     Attends Club or Organization Meetings:     Marital Status:    Intimate Partner Violence:     Fear of Current or Ex-Partner:     Emotionally Abused:     Physically Abused:     Sexually Abused:      Review of systems: CNS - no headache or dizziness; Cardiac - no chest pain; Respiratory - no shortness of breath; Gastrointestinal - +nausea,+vomiting,no diarrhea; Musculoskeletal - general body aches; Skin/Integument - no rashes. Physical Exam:    VITALS:  /73   Pulse 64   Temp 97.9 °F (36.6 °C) (Oral)   Resp 16   Ht 5' 2\" (1.575 m)   Wt 140 lb (63.5 kg)   LMP 06/24/2017   SpO2 99%   BMI 25.61 kg/m²   24HR INTAKE/OUTPUT:      Intake/Output Summary (Last 24 hours) at 10/7/2021 0942  Last data filed at 10/7/2021 0018  Gross per 24 hour   Intake    Output 1990 ml   Net -1990 ml       Constitutional: alert, appears stated age and cooperative    Skin: Skin color, texture, turgor normal. No rashes or lesions    Head: Normocephalic, without obvious abnormality, atraumatic     Cardiovascular/Edema: regular rate and rhythm, S1, S2 normal, no murmur, click, rub or gallop    Respiratory: Lungs: clear to auscultation bilaterally    Abdomen: soft, non-tender; bowel sounds normal; no masses,  no organomegaly    Back: symmetric, no curvature. ROM normal. No CVA tenderness.     Extremities: edema trace to 1+    Neuro:  Grossly normal      CBC:   Recent Labs     10/04/21  1726 10/05/21  0623   WBC 9.3 8.6   HGB 11.7* 10.7*    224     BMP:    Recent Labs     10/05/21  0623 10/06/21  7012 10/07/21  0454   * 133* 136   K 5.2 4.9 4.2    101 104   CO2 17* 20 20   BUN 50* 55* 55*   CREATININE 5.00* 4.49* 2.35*   GLUCOSE 240* 228* 241*       Lab Results   Component Value Date    NITRU NEGATIVE 10/05/2021    COLORU Yellow 10/05/2021    PHUR 5.0 10/05/2021    WBCUA 2 TO 5 10/05/2021    RBCUA 0 TO 2 10/05/2021    MUCUS 1+ 10/05/2021    TRICHOMONAS NOT REPORTED 10/05/2021    YEAST NOT REPORTED 10/05/2021    BACTERIA FEW 10/05/2021    SPECGRAV 1.015 10/05/2021    LEUKOCYTESUR NEGATIVE 10/05/2021    UROBILINOGEN Normal 10/05/2021    BILIRUBINUR NEGATIVE 10/05/2021    BILIRUBINUR NEGATIVE 03/29/2012    GLUCOSEU 3+ 10/05/2021    GLUCOSEU 3+ 03/29/2012    KETUA NEGATIVE 10/05/2021    AMORPHOUS NOT REPORTED 10/05/2021     Urine Sodium:     Lab Results   Component Value Date    LINDSAY 37 10/05/2021     Urine Chloride:    Lab Results   Component Value Date    CLUR 42 10/05/2021     Urine Protein:     Lab Results   Component Value Date    TPU 53 10/05/2021     Urine Creatinine:     Lab Results   Component Value Date    LABCREA 131.0 10/05/2021      IMPRESSION/RECOMMENDATIONS:      1. Acute kidney injury superimposed on chronic kidney disease stage III - most consistent with ischemic acute tubular necrosis secondary to hypotension, acute interstitial nephritis secondary to heavy NSAID use as well as component of prerenal azotemia from nausea and vomiting/poor fluid intake. 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 Kidney function has improved significantly, serum creatinine improved from 4.4 to 2.3 mg/dL, nonoliguric urine output 1900 MLS yesterday in 24 hours  Serologies MAYRA C3-C4 SPEP UPEP urine eosinophils unremarkable UPC 0.40    2. Hypotension - secondary to hypovolemia but will need to rule out sepsis. Panculture. Continue fluid resuscitation. Hold lisinopril.       Plan:  Continue IV fluid 0.9 normal saline at 100 mL/h. Discontinue and avoid further exposure to NSAIDs.   No significant indication for kidney biopsy at this time and will cancel kidney biopsy  Strict I's and O's continue to monitor kidney action        Nikhil Ramirez MD   Attending Nephrologist  10/7/2021 9:42 AM

## 2021-10-07 NOTE — PLAN OF CARE
Siderails up x 2  Hourly rounding  Call light in reach  Instructed to call for assist before attempting out of bed. Remains free from falls and accidental injury at this time   Floor free from obstacles  Bed is locked and in lowest position  Adequate lighting provided  Bed alarm on, [place] star and Stay with Me signs posted    Pain level assessment complete.    Patient educated on pain scale and control interventions  PRN pain medication given per patient request  Patient instructed to call out with new onset of pain or unrelieved pain

## 2021-10-07 NOTE — PROGRESS NOTES
[facility]  Office: [address], , Author Vivienne, DO, Michelle Molina, DO, Samantha Maguire, DO, Anabell Quijano MD, Toni Castillo MD, Selene Claros MD, Carolan Schilder, MD, Sonam Estrada MD, Bekah Bender MD, Sadi Mckeon MD, Wan Wray, DO, Kamala Puckett, DO, Keshia Chang MD,  Carmela Castaneda DO, Tonya Willingham MD, Ari Traore MD, Carmenza Yun MD, Ted Rivera MD, , Homer Pimentel MD, Nati Pedraza MD, Chin Bronson MD, Ramírezbijan Concepcion, [facility], [address], [facility], Seb Goodwin, CNP, Pete Harp, CNS, Kunal Vogt, CNP, Jamie Parikh, CNP, Mina Ramos, CNP, Raeann Zabala, CNP, Weston Urena, CNP, Sal Henry PA-C, Braulio Goldberg, [facility], James Miles, CNP, Lysbeth Angelucci, CNP, Trace Jaquez, CNP, Dillon Middleton, CNP, Makenna Singh, CNP, Martín Garces, CNP, Chrissie Cruz, CNP, [facility]      Daily Progress Note     Admit Date: 10/4/2021  Bed/Room No.  2007/2007-02  Admitting Physician : Selene Claros MD  Code Status :Full Code  Hospital Day:  LOS: 2 days   Chief Complaint:     Chief Complaint   Patient presents with    Headache     onset after taking imitrex 2 days ago     Emesis    Dizziness    Blurred Vision     Principal Problem:    Renal failure, acute [facility])  Active Problems:    Essential hypertension    Type 2 diabetes mellitus with hyperglycemia, with long-term current use of insulin [facility])    Acquired hypothyroidism    Acute renal failure (ARF) ([facility] [address]    Mild malnutrition ([facility] [address]  Resolved Problems:    * No resolved hospital problems. *    Subjective : Interval History/Significant events :  10/07/21    Patient reports that her headache is better but not resolved. she is otherwise OK. She denies any nausea or vomiting. She was upset for renal biopsy was cancelled. RN manager in room explaining the reasoning . Vitals - Stable afebrile  Labs -creatinine improving. Nursing notes , Consults notes reviewed. Overnight events and updates discussed with Nursing staff . Background History:         Selena Mcmillan is 46 y.o. female  Who was admitted to the hospital on 10/4/2021 for treatment of Renal failure, acute ([facility] [address] Patient presented to emergency room at [facility] with headache, blurred vision. She has known history of chronic migraine and reported acute worsening for last 3 days. Patient reports that she has about 3-4 migraine episodes per week each lasting for more than 24 hours. Headache was left temporal, severe, associated with visual blurring  Patient has been taking Motrin, Advil as outpatient since last 6 months. She reported about 4 to 5 tablets of Advil or Motrin OTC daily. She also has been taking prescription Mobic for her other body pains. Patient has underlying history of type 2 diabetes mellitus and is on Metformin, insulin. She takes lisinopril for her high blood pressure. Patient reported nausea with multiple episodes of vomiting in last 3 to 4 days associated with her headache. Patient denied any diarrhea, fever, chills, rashes. Evaluation at [facility] emergency room showed low blood pressure 75/57 , BMP showed BUN 43, creatinine 4.3, glucose 107, bicarb 23, WBC 9.3, hemoglobin 11.7.  UA showed 3+ glucose with 1+ protein. CT head was negative for acute intracranial abnormality. PMH:  Past Medical History:   Diagnosis Date    Adhesive capsulitis     Anxiety     Arthritis     Depression     Diabetes mellitus ([facility] [address]     takes insulin (Dr. 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 Appearance: She is not diaphoretic. HENT:      Head: Normocephalic and atraumatic. Nose:      Right Sinus: No maxillary sinus tenderness or frontal sinus tenderness. Left Sinus: No maxillary sinus tenderness or frontal sinus tenderness. Mouth/Throat:      Pharynx: No oropharyngeal exudate. Eyes:      General: No scleral icterus. Conjunctiva/sclera: Conjunctivae normal.      Pupils: Pupils are equal, round, and reactive to light. Neck:      Thyroid: No thyromegaly. Vascular: No JVD. Cardiovascular:      Rate and Rhythm: Normal rate and regular rhythm. Pulses:           Dorsalis pedis pulses are 2+ on the right side and 2+ on the left side. Heart sounds: Normal heart sounds. No murmur heard. Pulmonary:      Effort: Pulmonary effort is normal.      Breath sounds: Normal breath sounds. No wheezing or rales. Abdominal:      Palpations: Abdomen is soft. There is no mass. Tenderness: There is no abdominal tenderness. Musculoskeletal:      Cervical back: Full passive range of motion without pain and neck supple. Lymphadenopathy:      Head:      Right side of head: No submandibular adenopathy. Left side of head: No submandibular adenopathy. Cervical: No cervical adenopathy. Skin:     General: Skin is warm. Neurological:      Mental Status: She is alert and oriented to person, place, and time. Motor: No tremor. Psychiatric:         Behavior: Behavior is cooperative.            Laboratory findings:    Recent Labs     10/04/21  1726 10/05/21  0623   WBC 9.3 8.6   HGB 11.7* 10.7*   HCT 36.2 32.8*    224   SEDRATE 14  --    INR  --  1.0     Recent Labs     10/05/21  0623 10/06/21  0655 10/07/21  0454   * 133* 136   K 5.2 4.9 4.2    101 104   CO2 17* 20 20   GLUCOSE 240* 228* 241*   BUN 50* 55* 55*   CREATININE 5.00* 4.49* 2.35*   MG 1.9  --   --    CALCIUM 9.7 9.4 9.0     Recent Labs     10/04/21  1726 10/05/21  0623   PROT 7.8 6.6  7.1   LABALBU 4.3 3.9   AST 17 11   ALT 14 6   ALKPHOS 97 87   BILITOT 0.40 0.40   MAYRA  --  NEGATIVE          Specific Gravity, UA   Date Value Ref Range Status   10/05/2021 1.015 1.005 - 1.030 Final     Protein, UA   Date Value Ref Range Status   10/05/2021 1+ (A) NEGATIVE Final     RBC, UA   Date Value Ref Range Status   10/05/2021 0 TO 2 0 - 2 /HPF Final     Bacteria, UA   Date Value Ref Range Status   10/05/2021 FEW (A) None Final     Nitrite, Urine   Date Value Ref Range Status   10/05/2021 NEGATIVE NEGATIVE Final     WBC, UA   Date Value Ref Range Status   10/05/2021 2 TO 5 0 - 5 /HPF Final     Leukocyte Esterase, Urine   Date Value Ref Range Status   10/05/2021 NEGATIVE NEGATIVE Final       Imaging / Clinical Data :-   CT Head WO Contrast    Addendum Date: 10/4/2021    ADDENDUM: There has been interval increase in paranasal sinus mucosal thickening in the left maxillary sinus when compared to the prior study there now is the presence of a mucous retention cyst suggested in the left maxillary sinus. . Previous study demonstrated air-fluid level in the right maxillary sinus. There is no air-fluid level on today's study the right maxillary sinus is clear. Mucosal thickening is noted in the ethmoid air cells bilaterally pleural the frontal sinuses and sphenoid sinuses are clear. Mastoid air cells are clear. Impression no acute intracranial abnormality Incidental note made of chronic paranasal sinus inflammatory changes     Result Date: 10/4/2021  No acute intracranial abnormality. Stable compared to prior study        Clinical Course : unchanged  Assessment and Plan  :        1. Acute renal failure likely secondary to NSAID use with ACE inhibitor and Metformin and dehydration due to vomiting with hypotension. lisinopril was held and patient treated with  IV fluids,  bicarb infusion.   kidney ultrasound negative for hydronephrosis.   MAYRA negative , SPEP/  UPEP pending , acute hepatitis panel negative , urine eosinophil non seen , cryoglobulin. renal biopsy was considered but not performed due to improvement in   Kidney function . Continue to monitor kidney function. 2. Acute migraine with status migrainosus - continue  amitriptyline. .  Fioricet as needed. 3. Type 2 diabetes mellitus-hold Metformin. Continue insulin. 4. Essential hypertension-hold lisinopril.      Discussed with patient about plan . Explained in detail and showed her Graph of improvement in Creatinine. She was satisfied at this time and reported that all her questions were answered. Kimmy Clemens RN manager in the room   I was called by RN later in the day that patient left AMA. Plan and updates discussed with patient and son present in the room and family does not have any questions at this time  ,  answers  explained to satisfaction.    Plan discussed with Staff ADRI  RN     (Please note that portions of this note were completed with a voice recognition program. Efforts were made to edit the dictations but occasionally words are mis-transcribed.)      Johnnie Mortensen MD  10/7/2021

## 2021-10-07 NOTE — FLOWSHEET NOTE
Patient was sitting up on bed as writer entered the room (family members were present, 2). Patient and family members engaged in conversation. Patient stated she should be going home soon, but when asked how she was feeling, patient shared she was unclear of her illness and medical condition and not understanding the terminology makes it worse (patient became tearful). Writer noticed patient was upset and frustrated (she maintained calmness) as she expressed her confusion. Patient shared she is scheduled to see another physician whom she hopes will provide information in layman terms. Writer stated he will pray for clarity and understanding during this time. Writer also stated he will pray for physical healing along with inner peace. The patient was grateful for the prayers and visit. Spiritual care will follow up as needed or requested. 10/07/21 3605   Encounter Summary   Services provided to: Patient   Referral/Consult From: [address] [place] [address] Road Visiting   (10/7/2021)   Complexity of Encounter Low   Length of Encounter 15 minutes   Spiritual Assessment Completed Yes   Routine   Type Initial   Assessment Calm; Approachable;Helplessness;Coping; Sad   Intervention Active listening;Nurtured hope;Sustaining presence/ Ministry of presence; Discussed illness/injury and it's impact   Outcome Expressed gratitude;Engaged in conversation

## 2021-10-07 NOTE — PROGRESS NOTES
Patient's daughter stated that she got permission to stay the night. Writer is unsure of who. Writer explained why we typically do not allow overnight visitors. That's when she stated that she got permission from the nurse.

## 2021-10-07 NOTE — DISCHARGE SUMMARY
Headache (onset after taking imitrex 2 days ago ), Emesis, Dizziness, and Blurred Vision  Patient presented to emergency room at [facility] with headache, blurred vision. Dayana Carney has known history of chronic migraine and reported acute worsening for last 3 days.  Patient reports that she has about 3-4 migraine episodes per week each lasting for more than 24 hours.  Headache was left temporal, severe, associated with visual blurring  Patient has been taking Motrin, Advil as outpatient since last 6 months.  She reported about 4 to 5 tablets of Advil or Motrin Dorthea Fret also has been taking prescription Mobic for her other body pains.  Patient has underlying history of type 2 diabetes mellitus and is on Metformin, insulin.  She takes lisinopril for her high blood pressure.  Patient reported nausea with multiple episodes of vomiting in last 3 to 4 days associated with her headache.  Patient denied any diarrhea, fever, chills, rashes. Evaluation at [facility] emergency room showed low blood pressure 75/57 , BMP showed BUN 43, creatinine 4.3, glucose 107, bicarb 23, WBC 9.3, hemoglobin 11.7.  UA showed 3+ glucose with 1+ protein.  CT head was negative for acute intracranial abnormality. Medications including lisinopril , metformin and januvia were held and NSAIDS discontinued , US renal was negative for hydronephrosis. Lab work was unremarkable. Patient was treated with IV fluids and bicarb infusion. Work up negative . Kidney function improved and patient was recommended to continue inpatient treatment however left hospital against medical advice putting herself at high risk of worsening and morbidity and mortality. Significant therapeutic interventions:   1.  Acute renal failure likely secondary to NSAID use with ACE inhibitor and Metformin and dehydration due to vomiting with hypotension.    lisinopril was held and patient treated with  IV fluids,  bicarb infusion.   kidney ultrasound negative for hydronephrosis. MAYRA negative , SPEP/  UPEP pending , acute hepatitis panel negative , urine eosinophil non seen , cryoglobulin. renal biopsy was considered but not performed due to improvement in   Kidney function . Continue to monitor kidney function. 2. Acute migraine with status migrainosus - continue  amitriptyline. .  Fioricet as needed. 3. Type 2 diabetes mellitus-hold Metformin.  Continue insulin. 4. Essential hypertension- lisinopril on hold.      Significant Diagnostic Studies:   Labs / Micro:/Radiology  Recent Labs     10/05/21  0623 10/04/21  1726   WBC 8.6 9.3   HGB 10.7* 11.7*   HCT 32.8* 36.2   MCV 93.1 93.0    271     Labs Renal Latest Ref Rng & Units 10/7/2021 10/6/2021 10/5/2021 10/4/2021 10/4/2021   BUN 6 - 20 mg/dL 55(H) 55(H) 50(H) 43(H) 45(H)   Cr 0.50 - 0.90 mg/dL 2.35(H) 4.49(H) 5.00(H) 4.30(H) 4.30(H)   K 3.7 - 5.3 mmol/L 4.2 4.9 5.2 3.8 3.9   Na 135 - 144 mmol/L 136 133(L) 133(L) 138 134(L)     Lab Results   Component Value Date    ALT 6 10/05/2021    AST 11 10/05/2021    ALKPHOS 87 10/05/2021    BILITOT 0.40 10/05/2021     Lab Results   Component Value Date    TSH 1.14 01/11/2021     Lab Results   Component Value Date    HEPAIGM NONREACTIVE 10/05/2021    HEPBIGM NONREACTIVE 10/05/2021    HEPCAB NONREACTIVE 10/05/2021     Lab Results   Component Value Date    COLORU Yellow 10/05/2021    NITRU NEGATIVE 10/05/2021    GLUCOSEU 3+ 10/05/2021    GLUCOSEU 3+ 03/29/2012    KETUA NEGATIVE 10/05/2021    UROBILINOGEN Normal 10/05/2021    BILIRUBINUR NEGATIVE 10/05/2021    BILIRUBINUR NEGATIVE 03/29/2012     Lab Results   Component Value Date    LABA1C 9.2 09/27/2021     Lab Results   Component Value Date     10/07/2020     Lab Results   Component Value Date    INR 1.0 10/05/2021    INR 1.0 11/05/2019    INR 1.0 11/15/2018    PROTIME 10.1 10/05/2021    PROTIME 10.8 11/05/2019    PROTIME 10.8 11/15/2018       CT Head WO Contrast    Addendum Date: 10/4/2021    ADDENDUM: There has been interval increase in paranasal sinus mucosal thickening in the left maxillary sinus when compared to the prior study there now is the presence of a mucous retention cyst suggested in the left maxillary sinus. . Previous study demonstrated air-fluid level in the right maxillary sinus. There is no air-fluid level on today's study the right maxillary sinus is clear. Mucosal thickening is noted in the ethmoid air cells bilaterally pleural the frontal sinuses and sphenoid sinuses are clear. Mastoid air cells are clear. Impression no acute intracranial abnormality Incidental note made of chronic paranasal sinus inflammatory changes     Result Date: 10/4/2021  No acute intracranial abnormality. Stable compared to prior study     US RETROPERITONEAL COMPLETE    Result Date: 10/6/2021  *No hydronephrosis. *Apparent small nonobstructing calculi. *Trace right perinephric free fluid. *Unremarkable sonographic appearance of the bladder. Consultations:    Consults:     Final Specialist Recommendations/Findings:   IP CONSULT TO NEPHROLOGY      The patient was seen and examined on day of discharge and this discharge summary is in conjunction with any daily progress note from day of discharge. Discharge plan:     Disposition: AMA    Physician Follow Up: return to ED / follow PCP. Requiring Further Evaluation/Follow Up POST HOSPITALIZATION/Incidental Findings:     Diet:     Activity:     Instructions to Patient: return to ED / Follow with PCP    Discharge Medications:      Medication List      ASK your doctor about these medications    Alcohol Wipes 70 % Pads     Aspirin Low Dose 81 MG EC tablet  Generic drug: aspirin  TAKE ONE TABLET BY MOUTH ONCE DAILY     atorvastatin 40 MG tablet  Commonly known as: LIPITOR  TAKE ONE TABLET BY MOUTH ONCE DAILY     blood glucose test strips  Test 4 times a day & as needed for symptoms of irregular blood glucose.      gabapentin 600 MG tablet  Commonly known as: NEURONTIN  TAKE ONE TABLET BY MOUTH ONCE DAILY     glipiZIDE 10 MG extended release tablet  Commonly known as: GLUCOTROL XL  TAKE ONE TABLET BY MOUTH ONCE DAILY     ibuprofen 200 MG tablet  Commonly known as: ADVIL;MOTRIN     Imitrex 50 MG tablet  Generic drug: SUMAtriptan  Ask about: Which instructions should I use? insulin lispro (1 Unit Dial) 100 UNIT/ML Sopn  INJECT 10 UNITS UNDER THE SKIN THREE TIMES DAILY BEFORE MEALS     Insulin Pen Needle 32G X 4 MM Misc  1 each by Does not apply route 4 times daily     Januvia 50 MG tablet  Generic drug: SITagliptin  TAKE ONE TABLET BY MOUTH ONCE DAILY     Lantus SoloStar 100 UNIT/ML injection pen  Generic drug: insulin glargine  INJECY 40 UNITS UNDER THE SKIN NIGHTLY     levothyroxine 25 MCG tablet  Commonly known as: SYNTHROID  TAKE ONE TABLET BY MOUTH ONCE DAILY     lisinopril 20 MG tablet  Commonly known as: PRINIVIL;ZESTRIL  TAKE ONE TABLET BY MOUTH ONCE DAILY     meloxicam 15 MG tablet  Commonly known as: MOBIC  TAKE ONE TABLET BY MOUTH ONCE DAILY     metFORMIN 500 MG tablet  Commonly known as: GLUCOPHAGE  TAKE 2 TABLETS BY MOUTH TWICE DAILY WITH MEALS     ONE TOUCH ULTRA 2 w/Device Kit  1 kit by Does not apply route daily     Tab-A-Clemente/Iron/Beta Carotene Tabs  TAKE ONE TABLET BY MOUTH ONCE DAILY     vitamin D 1.25 MG (06167 UT) Caps capsule  Commonly known as: ERGOCALCIFEROL  TAKE 1 CAPSULE BY MOUTH WEEKLY            Time Spent on discharge is  33 mins in patient examination, evaluation, counseling as well as medication reconciliation, prescriptions for required medications, discharge plan and follow up. Electronically signed by   Tobias Jimenez MD  10/7/2021        Thank you Dr. Stefany Olvera DO for the opportunity to be involved in this patient's care.

## 2021-10-08 LAB
CREAT SERPL-MCNC: 2.35 MG/DL (ref 0.5–0.9)
CREATININE CLEARANCE: 44.9 ML/MIN/BSA (ref 71–151)
CREATININE URINE: 59.3 MG/DL (ref 28–217)
LENGTH OF COLLECTION: 24 H
P E INTERPRETATION, U: NORMAL
PATHOLOGIST: NORMAL
PATIENT HEIGHT: 158 CM
PATIENT WEIGHT: 63.5 KG
SPECIMEN TYPE: NORMAL
URINE TOTAL PROTEIN: 53 MG/DL
VOLUME: 2435 ML

## 2021-10-09 LAB
CREATININE URINE: 60.2 MG/DL (ref 28–217)
MICROALBUMIN EXCRETION RATE: 20 MCG/MIN
MICROALBUMIN UR-MCNC: <12 MG/L
MICROALBUMIN/CREAT UR-RTO: NORMAL MG/G
MINS. SPEC. COLLECT: 1440 MIN
TIMED URINE MICROALBUMIN INTERP: NORMAL
VOLUME: 2435 ML

## 2021-10-10 LAB — CRYOGLOBULIN: 0 MG/DL (ref 0–10)

## 2021-12-20 DIAGNOSIS — E78.1 PURE HYPERGLYCERIDEMIA: ICD-10-CM

## 2021-12-20 DIAGNOSIS — Z79.4 TYPE 2 DIABETES MELLITUS WITH HYPERGLYCEMIA, WITH LONG-TERM CURRENT USE OF INSULIN (HCC): ICD-10-CM

## 2021-12-20 DIAGNOSIS — E11.65 TYPE 2 DIABETES MELLITUS WITH HYPERGLYCEMIA, WITH LONG-TERM CURRENT USE OF INSULIN (HCC): ICD-10-CM

## 2021-12-20 DIAGNOSIS — I10 ESSENTIAL HYPERTENSION: ICD-10-CM

## 2021-12-20 DIAGNOSIS — E11.42 DIABETIC POLYNEUROPATHY ASSOCIATED WITH TYPE 2 DIABETES MELLITUS (HCC): ICD-10-CM

## 2021-12-20 RX ORDER — MULTIVITAMIN/IRON/FOLIC ACID 18MG-0.4MG
TABLET ORAL
Qty: 30 TABLET | Refills: 5 | Status: SHIPPED | OUTPATIENT
Start: 2021-12-20 | End: 2022-09-19

## 2021-12-20 RX ORDER — ATORVASTATIN CALCIUM 40 MG/1
TABLET, FILM COATED ORAL
Qty: 30 TABLET | Refills: 5 | Status: SHIPPED | OUTPATIENT
Start: 2021-12-20 | End: 2022-07-18

## 2021-12-20 RX ORDER — LISINOPRIL 20 MG/1
TABLET ORAL
Qty: 30 TABLET | Refills: 5 | Status: SHIPPED | OUTPATIENT
Start: 2021-12-20 | End: 2022-07-18

## 2021-12-20 RX ORDER — GABAPENTIN 600 MG/1
TABLET ORAL
Qty: 30 TABLET | Refills: 5 | Status: SHIPPED | OUTPATIENT
Start: 2021-12-20 | End: 2022-01-19

## 2021-12-20 RX ORDER — ASPIRIN 81 MG/1
TABLET, DELAYED RELEASE ORAL
Qty: 30 TABLET | Refills: 5 | Status: SHIPPED | OUTPATIENT
Start: 2021-12-20 | End: 2022-09-19

## 2021-12-20 RX ORDER — GLIPIZIDE 10 MG/1
TABLET, FILM COATED, EXTENDED RELEASE ORAL
Qty: 30 TABLET | Refills: 5 | Status: SHIPPED | OUTPATIENT
Start: 2021-12-20 | End: 2022-07-18

## 2021-12-20 RX ORDER — SITAGLIPTIN 50 MG/1
TABLET, FILM COATED ORAL
Qty: 30 TABLET | Refills: 5 | Status: SHIPPED | OUTPATIENT
Start: 2021-12-20 | End: 2022-07-18

## 2021-12-20 NOTE — TELEPHONE ENCOUNTER
Medications pending for refilll     Health Maintenance   Topic Date Due    Shingles Vaccine (1 of 2) Never done    Hepatitis B vaccine (3 of 3 - Risk 3-dose series) 05/11/2021    COVID-19 Vaccine (2 - Moderna 3-dose series) 07/06/2021    Diabetic foot exam  07/10/2021    TSH testing  01/11/2022    Diabetic retinal exam  02/01/2022    Lipid screen  02/15/2022    A1C test (Diabetic or Prediabetic)  03/03/2022    Diabetic microalbuminuria test  10/07/2022    Potassium monitoring  10/07/2022    Creatinine monitoring  10/07/2022    Breast cancer screen  10/16/2022    Colon cancer screen fecal DNA test (Cologuard)  01/19/2024    Cervical cancer screen  08/10/2025    DTaP/Tdap/Td vaccine (2 - Td or Tdap) 12/15/2025    Pneumococcal 0-64 years Vaccine (2 of 2 - PPSV23) 09/25/2035    Flu vaccine  Completed    Hepatitis C screen  Completed    HIV screen  Completed    Hepatitis A vaccine  Aged Out    Hib vaccine  Aged Out    Meningococcal (ACWY) vaccine  Aged Out             (applicable per patient's age: Cancer Screenings, Depression Screening, Fall Risk Screening, Immunizations)    Hemoglobin A1C (%)   Date Value   09/27/2021 9.2   07/10/2021 8.4 (H)   03/19/2021 10.9     Microalb/Crt.  Ratio (mcg/mg creat)   Date Value   02/15/2021 CANNOT BE CALCULATED     LDL Cholesterol (mg/dL)   Date Value   02/15/2021          AST (U/L)   Date Value   10/05/2021 11     ALT (U/L)   Date Value   10/05/2021 6     BUN (mg/dL)   Date Value   10/07/2021 55 (H)      (goal A1C is < 7)   (goal LDL is <100) need 30-50% reduction from baseline     BP Readings from Last 3 Encounters:   10/07/21 (!) 152/81   09/27/21 (!) 177/108   03/19/21 117/77    (goal /80)      All Future Testing planned in [facility]:  Lab Frequency Next Occurrence   MRI BRAIN WO CONTRAST Once 09/27/2022       Next Visit Date:  Future Appointments   Date Time Provider Albino Barraza   1/3/2022  8:30 AM S[place]hwinder Fields Patient Active Problem List:     Essential hypertension     Pure hyperglyceridemia     Type 2 diabetes mellitus with hyperglycemia, with long-term current use of insulin (HCC)     Anxiety     DVT (deep vein thrombosis) in pregnancy     Tobacco dependency     Acquired hypothyroidism     Steroid-induced hyperglycemia     Diabetic ketoacidosis without coma associated with type 2 diabetes mellitus (HCC)     Diabetic peripheral neuropathy (HCC)     Hypocalcemia     Hypokalemia     Anemia, normocytic normochromic     Hypophosphatemia     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

## 2022-01-01 DIAGNOSIS — M75.00 DIABETIC FROZEN SHOULDER ASSOCIATED WITH TYPE 2 DIABETES MELLITUS (HCC): ICD-10-CM

## 2022-01-01 DIAGNOSIS — E03.9 ACQUIRED HYPOTHYROIDISM: ICD-10-CM

## 2022-01-01 DIAGNOSIS — E11.618 DIABETIC FROZEN SHOULDER ASSOCIATED WITH TYPE 2 DIABETES MELLITUS (HCC): ICD-10-CM

## 2022-01-01 DIAGNOSIS — M75.82 ROTATOR CUFF TENDONITIS, LEFT: ICD-10-CM

## 2022-01-03 RX ORDER — LEVOTHYROXINE SODIUM 0.03 MG/1
TABLET ORAL
Qty: 30 TABLET | Refills: 5 | Status: SHIPPED | OUTPATIENT
Start: 2022-01-03

## 2022-01-03 RX ORDER — MELOXICAM 15 MG/1
TABLET ORAL
Qty: 30 TABLET | Refills: 3 | Status: SHIPPED | OUTPATIENT
Start: 2022-01-03

## 2022-01-03 NOTE — TELEPHONE ENCOUNTER
Last visit: 9/27/21   Last Med refill: 11/24/21  Does patient have enough medication for 72 hours: Yes    Next Visit Date:  No future appointments. Health Maintenance   Topic Date Due    Shingles Vaccine (1 of 2) Never done    Hepatitis B vaccine (3 of 3 - Risk 3-dose series) 05/11/2021    COVID-19 Vaccine (2 - Moderna 3-dose series) 07/06/2021    Diabetic foot exam  07/10/2021    Depression Monitoring  01/11/2022    TSH testing  01/11/2022    Diabetic retinal exam  02/01/2022    Lipid screen  02/15/2022    A1C test (Diabetic or Prediabetic)  03/03/2022    Diabetic microalbuminuria test  10/07/2022    Potassium monitoring  10/07/2022    Creatinine monitoring  10/07/2022    Breast cancer screen  10/16/2022    Colon cancer screen fecal DNA test (Cologuard)  01/19/2024    Cervical cancer screen  08/10/2025    DTaP/Tdap/Td vaccine (2 - Td or Tdap) 12/15/2025    Pneumococcal 0-64 years Vaccine (2 of 2 - PPSV23) 09/25/2035    Flu vaccine  Completed    Hepatitis C screen  Completed    HIV screen  Completed    Hepatitis A vaccine  Aged Out    Hib vaccine  Aged Out    Meningococcal (ACWY) vaccine  Aged Out       Hemoglobin A1C (%)   Date Value   09/27/2021 9.2   07/10/2021 8.4 (H)   03/19/2021 10.9             ( goal A1C is < 7)   Microalb/Crt.  Ratio (mcg/mg creat)   Date Value   02/15/2021 CANNOT BE CALCULATED     LDL Cholesterol (mg/dL)   Date Value   02/15/2021        01/31/2020 31       (goal LDL is <100)   AST (U/L)   Date Value   10/05/2021 11     ALT (U/L)   Date Value   10/05/2021 6     BUN (mg/dL)   Date Value   10/07/2021 55 (H)     BP Readings from Last 3 Encounters:   10/07/21 (!) 152/81   09/27/21 (!) 177/108   03/19/21 117/77          (goal 120/80)    All Future Testing planned in [facility]  Lab Frequency Next Occurrence   MRI BRAIN WO CONTRAST Once 09/27/2022               Patient Active Problem List:     Essential hypertension     Pure hyperglyceridemia     Type 2 diabetes mellitus with hyperglycemia, with long-term current use of insulin (HCC)     Anxiety     DVT (deep vein thrombosis) in pregnancy     Tobacco dependency     Acquired hypothyroidism     Steroid-induced hyperglycemia     Diabetic ketoacidosis without coma associated with type 2 diabetes mellitus (HCC)     Diabetic peripheral neuropathy (HCC)     Hypocalcemia     Hypokalemia     Anemia, normocytic normochromic     Hypophosphatemia     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

## 2022-01-30 DIAGNOSIS — E11.65 TYPE 2 DIABETES MELLITUS WITH HYPERGLYCEMIA, WITH LONG-TERM CURRENT USE OF INSULIN (HCC): ICD-10-CM

## 2022-01-30 DIAGNOSIS — Z79.4 TYPE 2 DIABETES MELLITUS WITH HYPERGLYCEMIA, WITH LONG-TERM CURRENT USE OF INSULIN (HCC): ICD-10-CM

## 2022-01-31 RX ORDER — INSULIN LISPRO 100 [IU]/ML
INJECTION, SOLUTION INTRAVENOUS; SUBCUTANEOUS
Qty: 3 PEN | Refills: 5 | Status: SHIPPED | OUTPATIENT
Start: 2022-01-31 | End: 2022-08-15

## 2022-01-31 RX ORDER — CALCIUM CITRATE/VITAMIN D3 200MG-6.25
TABLET ORAL
Qty: 200 STRIP | Refills: 5 | Status: SHIPPED | OUTPATIENT
Start: 2022-01-31

## 2022-01-31 NOTE — TELEPHONE ENCOUNTER
Please address the medication refill and close the encounter. If I can be of assistance, please route to the applicable pool. Thank you. Last visit: 9-27-21  Last Med refill: 11/14/2018 lancets    11/24/21 insulin lispro  Does patient have enough medication for 72 hours: No:     Next Visit Date:  No future appointments. Health Maintenance   Topic Date Due    Depression Monitoring  Never done    Shingles Vaccine (1 of 2) Never done    Hepatitis B vaccine (3 of 3 - Risk 3-dose series) 05/11/2021    COVID-19 Vaccine (2 - Moderna 3-dose series) 07/06/2021    Diabetic foot exam  07/10/2021    TSH testing  01/11/2022    Diabetic retinal exam  02/01/2022    Lipid screen  02/15/2022    A1C test (Diabetic or Prediabetic)  03/03/2022    Diabetic microalbuminuria test  10/07/2022    Potassium monitoring  10/07/2022    Creatinine monitoring  10/07/2022    Breast cancer screen  10/16/2022    Colon cancer screen fecal DNA test (Cologuard)  01/19/2024    Cervical cancer screen  08/10/2025    DTaP/Tdap/Td vaccine (2 - Td or Tdap) 12/15/2025    Pneumococcal 0-64 years Vaccine (2 of 2 - PPSV23) 09/25/2035    Flu vaccine  Completed    Hepatitis C screen  Completed    HIV screen  Completed    Hepatitis A vaccine  Aged Out    Hib vaccine  Aged Out    Meningococcal (ACWY) vaccine  Aged Out       Hemoglobin A1C (%)   Date Value   09/27/2021 9.2   07/10/2021 8.4 (H)   03/19/2021 10.9             ( goal A1C is < 7)   Microalb/Crt.  Ratio (mcg/mg creat)   Date Value   02/15/2021 CANNOT BE CALCULATED     LDL Cholesterol (mg/dL)   Date Value   02/15/2021        01/31/2020 31       (goal LDL is <100)   AST (U/L)   Date Value   10/05/2021 11     ALT (U/L)   Date Value   10/05/2021 6     BUN (mg/dL)   Date Value   10/07/2021 55 (H)     BP Readings from Last 3 Encounters:   10/07/21 (!) 152/81   09/27/21 (!) 177/108   03/19/21 117/77          (goal 120/80)    [facility] Next Occurrence   MRI BRAIN WO CONTRAST Once 09/27/2022               Patient Active Problem List:     Essential hypertension     Pure hyperglyceridemia     Type 2 diabetes mellitus with hyperglycemia, with long-term current use of insulin (HCC)     Anxiety     DVT (deep vein thrombosis) in pregnancy     Tobacco dependency     Acquired hypothyroidism     Steroid-induced hyperglycemia     Diabetic ketoacidosis without coma associated with type 2 diabetes mellitus (HCC)     Diabetic peripheral neuropathy (HCC)     Hypocalcemia     Hypokalemia     Anemia, normocytic normochromic     Hypophosphatemia     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

## 2022-02-02 DIAGNOSIS — Z79.4 TYPE 2 DIABETES MELLITUS WITH HYPERGLYCEMIA, WITH LONG-TERM CURRENT USE OF INSULIN (HCC): Primary | ICD-10-CM

## 2022-02-02 DIAGNOSIS — E11.65 TYPE 2 DIABETES MELLITUS WITH HYPERGLYCEMIA, WITH LONG-TERM CURRENT USE OF INSULIN (HCC): Primary | ICD-10-CM

## 2022-03-07 RX ORDER — INSULIN GLARGINE 100 [IU]/ML
44 INJECTION, SOLUTION SUBCUTANEOUS EVERY MORNING
Qty: 5 PEN | Refills: 5 | Status: SHIPPED | OUTPATIENT
Start: 2022-03-07 | End: 2022-10-26

## 2022-03-07 NOTE — TELEPHONE ENCOUNTER
Frequency Next Occurrence   MRI BRAIN WO CONTRAST Once 09/27/2022   Hemoglobin A1C Once 02/02/2023               Patient Active Problem List:     Essential hypertension     Pure hyperglyceridemia     Type 2 diabetes mellitus with hyperglycemia, with long-term current use of insulin (HCC)     Anxiety     DVT (deep vein thrombosis) in pregnancy     Tobacco dependency     Acquired hypothyroidism     Steroid-induced hyperglycemia     Diabetic ketoacidosis without coma associated with type 2 diabetes mellitus (HCC)     Diabetic peripheral neuropathy (HCC)     Hypocalcemia     Hypokalemia     Anemia, normocytic normochromic     Hypophosphatemia     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

## 2022-03-09 DIAGNOSIS — Z79.4 TYPE 2 DIABETES MELLITUS WITH HYPERGLYCEMIA, WITH LONG-TERM CURRENT USE OF INSULIN (HCC): Primary | ICD-10-CM

## 2022-03-09 DIAGNOSIS — E11.65 TYPE 2 DIABETES MELLITUS WITH HYPERGLYCEMIA, WITH LONG-TERM CURRENT USE OF INSULIN (HCC): Primary | ICD-10-CM

## 2022-04-25 ENCOUNTER — TELEPHONE (OUTPATIENT)
Dept: FAMILY MEDICINE CLINIC | Age: 52
End: 2022-04-25

## 2022-05-18 ENCOUNTER — TELEPHONE (OUTPATIENT)
Dept: FAMILY MEDICINE CLINIC | Age: 52
End: 2022-05-18

## 2022-05-18 DIAGNOSIS — E11.618 DIABETIC FROZEN SHOULDER ASSOCIATED WITH TYPE 2 DIABETES MELLITUS (HCC): ICD-10-CM

## 2022-05-18 DIAGNOSIS — M75.00 DIABETIC FROZEN SHOULDER ASSOCIATED WITH TYPE 2 DIABETES MELLITUS (HCC): ICD-10-CM

## 2022-05-18 DIAGNOSIS — M75.82 ROTATOR CUFF TENDONITIS, LEFT: ICD-10-CM

## 2022-05-18 NOTE — TELEPHONE ENCOUNTER
Left message for patient to call office, patient needs an A1C at appointment and possible candidate for CGM

## 2022-05-19 RX ORDER — MELOXICAM 15 MG/1
TABLET ORAL
Qty: 30 TABLET | Refills: 3 | OUTPATIENT
Start: 2022-05-19

## 2022-05-19 NOTE — TELEPHONE ENCOUNTER
Adhesive capsulitis of left shoulder    Last office visit on 12/18/2022. Patient needs an appointment for any further refills. Called patient and she declined an appointment [place]. Patient understands no medications can be refilled without a visit.

## 2022-05-21 DIAGNOSIS — M75.00 DIABETIC FROZEN SHOULDER ASSOCIATED WITH TYPE 2 DIABETES MELLITUS (HCC): ICD-10-CM

## 2022-05-21 DIAGNOSIS — M75.82 ROTATOR CUFF TENDONITIS, LEFT: ICD-10-CM

## 2022-05-21 DIAGNOSIS — E11.618 DIABETIC FROZEN SHOULDER ASSOCIATED WITH TYPE 2 DIABETES MELLITUS (HCC): ICD-10-CM

## 2022-05-23 RX ORDER — MELOXICAM 15 MG/1
TABLET ORAL
Qty: 30 TABLET | Refills: 3 | OUTPATIENT
Start: 2022-05-23

## 2022-07-12 NOTE — PROGRESS NOTES
Transitions of Care Pharmacy Service   Medication Review    The patient's list of current home medications has been reviewed. Source(s) of information: patient, Epic, Surescripts refill report, Charlotteian Barajasadrienne 10 REQUESTED  Medications that need to be addressed by a physician/nurse practitioner:    Medication Action Requested   Lantus 40 units AM     Home dose is 44 units AM instead -- please adjust as appropriate         Please feel free to call me with any questions about this encounter. Thank you.     Aman Jones, [facility]   Transitions of Care Pharmacy Service  Phone:  [phone]  Fax: [phone]      Electronically signed by Aman Jones [facility] on 10/6/2021 at 2:26 PM           Medications Prior to Admission:   SUMAtriptan (IMITREX) 50 MG tablet, Take 50 mg by mouth as needed for Migraine  ibuprofen (ADVIL;MOTRIN) 200 MG tablet, Take 200 mg by mouth every 6 hours as needed for Pain  vitamin D (ERGOCALCIFEROL) 1.25 MG (99495 UT) CAPS capsule, TAKE 1 CAPSULE BY MOUTH WEEKLY (Patient taking differently: Indications: Mondays TAKE 1 CAPSULE BY MOUTH WEEKLY)  meloxicam (MOBIC) 15 MG tablet, TAKE ONE TABLET BY MOUTH ONCE DAILY  LANTUS SOLOSTAR 100 UNIT/ML injection pen, Inject 44 Units into the skin every morning   insulin lispro, 1 Unit Dial, 100 UNIT/ML SOPN, INJECT 10 UNITS UNDER THE SKIN THREE TIMES DAILY BEFORE MEALS  atorvastatin (LIPITOR) 40 MG tablet, TAKE ONE TABLET BY MOUTH ONCE DAILY  ASPIRIN LOW DOSE 81 MG EC tablet, TAKE ONE TABLET BY MOUTH ONCE DAILY  gabapentin (NEURONTIN) 600 MG tablet, TAKE ONE TABLET BY MOUTH ONCE DAILY  glipiZIDE (GLUCOTROL XL) 10 MG extended release tablet, TAKE ONE TABLET BY MOUTH ONCE DAILY  JANUVIA 50 MG tablet, TAKE ONE TABLET BY MOUTH ONCE DAILY  Multiple Vitamins-Iron (TAB-A-ANCA/IRON/BETA CAROTENE) TABS, TAKE ONE TABLET BY MOUTH ONCE DAILY  lisinopril (PRINIVIL;ZESTRIL) 20 MG tablet, TAKE ONE TABLET BY MOUTH ONCE DAILY  levothyroxine (SYNTHROID) 25 MCG tablet, TAKE ONE TABLET BY MOUTH ONCE DAILY  metFORMIN (GLUCOPHAGE) 500 MG tablet, TAKE 2 TABLETS BY MOUTH TWICE DAILY WITH MEALS none

## 2022-07-15 DIAGNOSIS — E11.65 TYPE 2 DIABETES MELLITUS WITH HYPERGLYCEMIA, WITH LONG-TERM CURRENT USE OF INSULIN (HCC): ICD-10-CM

## 2022-07-15 DIAGNOSIS — Z79.4 TYPE 2 DIABETES MELLITUS WITH HYPERGLYCEMIA, WITH LONG-TERM CURRENT USE OF INSULIN (HCC): ICD-10-CM

## 2022-07-15 DIAGNOSIS — E78.1 PURE HYPERGLYCERIDEMIA: ICD-10-CM

## 2022-07-15 DIAGNOSIS — I10 ESSENTIAL HYPERTENSION: ICD-10-CM

## 2022-07-18 RX ORDER — LISINOPRIL 20 MG/1
TABLET ORAL
Qty: 30 TABLET | Refills: 5 | Status: SHIPPED | OUTPATIENT
Start: 2022-07-18

## 2022-07-18 RX ORDER — ATORVASTATIN CALCIUM 40 MG/1
TABLET, FILM COATED ORAL
Qty: 30 TABLET | Refills: 5 | Status: SHIPPED | OUTPATIENT
Start: 2022-07-18

## 2022-07-18 RX ORDER — SITAGLIPTIN 50 MG/1
TABLET, FILM COATED ORAL
Qty: 30 TABLET | Refills: 5 | Status: SHIPPED | OUTPATIENT
Start: 2022-07-18

## 2022-07-18 RX ORDER — GLIPIZIDE 10 MG/1
TABLET, FILM COATED, EXTENDED RELEASE ORAL
Qty: 30 TABLET | Refills: 5 | Status: SHIPPED | OUTPATIENT
Start: 2022-07-18

## 2022-07-18 NOTE — TELEPHONE ENCOUNTER
E-scribe request for med refills. Please review and e-scribe if applicable. Last Visit Date:  9/27/21  Next Visit Date:  Visit date not found    Hemoglobin A1C (%)   Date Value   09/27/2021 9.2   07/10/2021 8.4 (H)   03/19/2021 10.9             ( goal A1C is < 7)   Microalb/Crt.  Ratio (mcg/mg creat)   Date Value   02/15/2021 CANNOT BE CALCULATED     LDL Cholesterol (mg/dL)   Date Value   02/15/2021            (goal LDL is <100)   AST (U/L)   Date Value   10/05/2021 11     ALT (U/L)   Date Value   10/05/2021 6     BUN (mg/dL)   Date Value   10/07/2021 55 (H)     BP Readings from Last 3 Encounters:   10/07/21 (!) 152/81   09/27/21 (!) 177/108   03/19/21 117/77          (goal 120/80)        Patient Active Problem List:     Essential hypertension     Pure hyperglyceridemia     Type 2 diabetes mellitus with hyperglycemia, with long-term current use of insulin (HCC)     Anxiety     DVT (deep vein thrombosis) in pregnancy     Tobacco dependency     Acquired hypothyroidism     Steroid-induced hyperglycemia     Diabetic ketoacidosis without coma associated with type 2 diabetes mellitus (HCC)     Diabetic peripheral neuropathy (HCC)     Hypocalcemia     Hypokalemia     Anemia, normocytic normochromic     Hypophosphatemia     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

## 2022-08-14 DIAGNOSIS — Z79.4 TYPE 2 DIABETES MELLITUS WITH HYPERGLYCEMIA, WITH LONG-TERM CURRENT USE OF INSULIN (HCC): ICD-10-CM

## 2022-08-14 DIAGNOSIS — E11.65 TYPE 2 DIABETES MELLITUS WITH HYPERGLYCEMIA, WITH LONG-TERM CURRENT USE OF INSULIN (HCC): ICD-10-CM

## 2022-08-15 RX ORDER — INSULIN LISPRO 100 [IU]/ML
INJECTION, SOLUTION INTRAVENOUS; SUBCUTANEOUS
Qty: 9 ML | Refills: 0 | Status: SHIPPED | OUTPATIENT
Start: 2022-08-15 | End: 2022-09-07

## 2022-08-15 NOTE — TELEPHONE ENCOUNTER
E-scribe request for med refill. Please review and e-scribe if applicable. Last Visit Date:  9/27/21  Next Visit Date:  Visit date not found    Hemoglobin A1C (%)   Date Value   09/27/2021 9.2   07/10/2021 8.4 (H)   03/19/2021 10.9             ( goal A1C is < 7)   Microalb/Crt.  Ratio (mcg/mg creat)   Date Value   02/15/2021 CANNOT BE CALCULATED     LDL Cholesterol (mg/dL)   Date Value   02/15/2021            (goal LDL is <100)   AST (U/L)   Date Value   10/05/2021 11     ALT (U/L)   Date Value   10/05/2021 6     BUN (mg/dL)   Date Value   10/07/2021 55 (H)     BP Readings from Last 3 Encounters:   10/07/21 (!) 152/81   09/27/21 (!) 177/108   03/19/21 117/77          (goal 120/80)        Patient Active Problem List:     Essential hypertension     Pure hyperglyceridemia     Type 2 diabetes mellitus with hyperglycemia, with long-term current use of insulin (HCC)     Anxiety     DVT (deep vein thrombosis) in pregnancy     Tobacco dependency     Acquired hypothyroidism     Steroid-induced hyperglycemia     Diabetic ketoacidosis without coma associated with type 2 diabetes mellitus (HCC)     Diabetic peripheral neuropathy (HCC)     Hypocalcemia     Hypokalemia     Anemia, normocytic normochromic     Hypophosphatemia     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
Please call patient to schedule an appointment.
Quality 130: Documentation Of Current Medications In The Medical Record: Current Medications Documented
Detail Level: Detailed

## 2022-09-07 DIAGNOSIS — E11.65 TYPE 2 DIABETES MELLITUS WITH HYPERGLYCEMIA, WITH LONG-TERM CURRENT USE OF INSULIN (HCC): ICD-10-CM

## 2022-09-07 DIAGNOSIS — Z79.4 TYPE 2 DIABETES MELLITUS WITH HYPERGLYCEMIA, WITH LONG-TERM CURRENT USE OF INSULIN (HCC): ICD-10-CM

## 2022-09-07 RX ORDER — INSULIN LISPRO 100 [IU]/ML
INJECTION, SOLUTION INTRAVENOUS; SUBCUTANEOUS
Qty: 9 ML | Refills: 0 | Status: SHIPPED | OUTPATIENT
Start: 2022-09-07 | End: 2022-10-26

## 2022-09-07 NOTE — TELEPHONE ENCOUNTER
Last visit: 9/27/2021  Last Med refill: 8/15/22  Does patient have enough medication for 72 hours: Yes    Next Visit Date:  No future appointments. Health Maintenance   Topic Date Due    Shingles vaccine (1 of 2) Never done    Hepatitis B vaccine (3 of 3 - Risk 3-dose series) 05/11/2021    COVID-19 Vaccine (2 - Moderna series) 07/06/2021    Diabetic foot exam  07/10/2021    Depression Monitoring  01/11/2022    A1C test (Diabetic or Prediabetic)  03/03/2022    Lipids  07/10/2022    Flu vaccine (1) 09/01/2022    Diabetic microalbuminuria test  10/07/2022    Breast cancer screen  10/16/2022    Diabetic retinal exam  04/29/2023    Colorectal Cancer Screen  01/19/2024    Cervical cancer screen  08/10/2025    DTaP/Tdap/Td vaccine (2 - Td or Tdap) 12/15/2025    Pneumococcal 0-64 years Vaccine (3 - PPSV23 or PCV20) 09/25/2035    Hepatitis C screen  Completed    HIV screen  Completed    Hepatitis A vaccine  Aged Out    Hib vaccine  Aged Out    Meningococcal (ACWY) vaccine  Aged Out       Hemoglobin A1C (%)   Date Value   09/27/2021 9.2   07/10/2021 8.4 (H)   03/19/2021 10.9             ( goal A1C is < 7)   Microalb/Crt.  Ratio (mcg/mg creat)   Date Value   02/15/2021 CANNOT BE CALCULATED     LDL Cholesterol (mg/dL)   Date Value   02/15/2021        01/31/2020 31       (goal LDL is <100)   AST (U/L)   Date Value   10/05/2021 11     ALT (U/L)   Date Value   10/05/2021 6     BUN (mg/dL)   Date Value   10/07/2021 55 (H)     BP Readings from Last 3 Encounters:   10/07/21 (!) 152/81   09/27/21 (!) 177/108   03/19/21 117/77          (goal 120/80)    All Future Testing planned in [facility]  Lab Frequency Next Occurrence   MRI BRAIN WO CONTRAST Once 09/27/2022   Hemoglobin A1C Once 03/09/2023               Patient Active Problem List:     Essential hypertension     Pure hyperglyceridemia     Type 2 diabetes mellitus with hyperglycemia, with long-term current use of insulin (HCC)     Anxiety     DVT (deep vein thrombosis) in pregnancy     Tobacco dependency     Acquired hypothyroidism     Steroid-induced hyperglycemia     Diabetic ketoacidosis without coma associated with type 2 diabetes mellitus (HCC)     Diabetic peripheral neuropathy (HCC)     Hypocalcemia     Hypokalemia     Anemia, normocytic normochromic     Hypophosphatemia     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

## 2022-09-19 DIAGNOSIS — I10 ESSENTIAL HYPERTENSION: ICD-10-CM

## 2022-09-19 RX ORDER — ASPIRIN 81 MG/1
TABLET, DELAYED RELEASE ORAL
Qty: 30 TABLET | Refills: 5 | Status: SHIPPED | OUTPATIENT
Start: 2022-09-19

## 2022-09-19 RX ORDER — MULTIVITAMIN/IRON/FOLIC ACID 18MG-0.4MG
TABLET ORAL
Qty: 30 TABLET | Refills: 5 | Status: SHIPPED | OUTPATIENT
Start: 2022-09-19 | End: 2022-10-19

## 2022-09-19 NOTE — TELEPHONE ENCOUNTER
Last visit: 9/27/2021  Last Med refill: 5/2022  Does patient have enough medication for 72 hours: No:     Next Visit Date:  No future appointments. Health Maintenance   Topic Date Due    Shingles vaccine (1 of 2) Never done    Hepatitis B vaccine (3 of 3 - Risk 3-dose series) 05/11/2021    COVID-19 Vaccine (2 - Moderna series) 07/06/2021    Diabetic foot exam  07/10/2021    Depression Monitoring  01/11/2022    A1C test (Diabetic or Prediabetic)  03/03/2022    Lipids  07/10/2022    Flu vaccine (1) 09/01/2022    Diabetic microalbuminuria test  10/07/2022    Breast cancer screen  10/16/2022    Diabetic retinal exam  04/29/2023    Colorectal Cancer Screen  01/19/2024    Cervical cancer screen  08/10/2025    DTaP/Tdap/Td vaccine (2 - Td or Tdap) 12/15/2025    Pneumococcal 0-64 years Vaccine (3 - PPSV23 or PCV20) 09/25/2035    Hepatitis C screen  Completed    HIV screen  Completed    Hepatitis A vaccine  Aged Out    Hib vaccine  Aged Out    Meningococcal (ACWY) vaccine  Aged Out       Hemoglobin A1C (%)   Date Value   09/27/2021 9.2   07/10/2021 8.4 (H)   03/19/2021 10.9             ( goal A1C is < 7)   Microalb/Crt.  Ratio (mcg/mg creat)   Date Value   02/15/2021 CANNOT BE CALCULATED     LDL Cholesterol (mg/dL)   Date Value   02/15/2021        01/31/2020 31       (goal LDL is <100)   AST (U/L)   Date Value   10/05/2021 11     ALT (U/L)   Date Value   10/05/2021 6     BUN (mg/dL)   Date Value   10/07/2021 55 (H)     BP Readings from Last 3 Encounters:   10/07/21 (!) 152/81   09/27/21 (!) 177/108   03/19/21 117/77          (goal 120/80)    All Future Testing planned in [facility]  Lab Frequency Next Occurrence   MRI BRAIN WO CONTRAST Once 09/27/2022   Hemoglobin A1C Once 03/09/2023               Patient Active Problem List:     Essential hypertension     Pure hyperglyceridemia     Type 2 diabetes mellitus with hyperglycemia, with long-term current use of insulin (HCC)     Anxiety     DVT (deep vein thrombosis) in pregnancy     Tobacco dependency     Acquired hypothyroidism     Steroid-induced hyperglycemia     Diabetic ketoacidosis without coma associated with type 2 diabetes mellitus (HCC)     Diabetic peripheral neuropathy (HCC)     Hypocalcemia     Hypokalemia     Anemia, normocytic normochromic     Hypophosphatemia     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

## 2022-10-26 DIAGNOSIS — E11.65 TYPE 2 DIABETES MELLITUS WITH HYPERGLYCEMIA, WITH LONG-TERM CURRENT USE OF INSULIN (HCC): ICD-10-CM

## 2022-10-26 DIAGNOSIS — Z79.4 TYPE 2 DIABETES MELLITUS WITH HYPERGLYCEMIA, WITH LONG-TERM CURRENT USE OF INSULIN (HCC): ICD-10-CM

## 2022-10-26 RX ORDER — INSULIN GLARGINE 100 [IU]/ML
INJECTION, SOLUTION SUBCUTANEOUS
Qty: 12 ML | Refills: 5 | Status: SHIPPED | OUTPATIENT
Start: 2022-10-26

## 2022-10-26 RX ORDER — INSULIN LISPRO 100 [IU]/ML
INJECTION, SOLUTION INTRAVENOUS; SUBCUTANEOUS
Qty: 9 ML | Refills: 0 | Status: SHIPPED | OUTPATIENT
Start: 2022-10-26

## 2022-10-26 NOTE — TELEPHONE ENCOUNTER
Last visit:   Last Med refill:   Does patient have enough medication for 72 hours: No:     Next Visit Date:  No future appointments. Health Maintenance   Topic Date Due    Shingles vaccine (1 of 2) Never done    Hepatitis B vaccine (3 of 3 - Risk 3-dose series) 05/11/2021    COVID-19 Vaccine (2 - Moderna series) 07/06/2021    Diabetic foot exam  07/10/2021    Depression Monitoring  01/11/2022    Lipids  07/10/2022    Flu vaccine (1) 08/01/2022    Diabetic microalbuminuria test  10/07/2022    Breast cancer screen  10/16/2022    A1C test (Diabetic or Prediabetic)  12/03/2022    Diabetic retinal exam  04/29/2023    Colorectal Cancer Screen  01/19/2024    Cervical cancer screen  08/10/2025    DTaP/Tdap/Td vaccine (2 - Td or Tdap) 12/15/2025    Pneumococcal 0-64 years Vaccine (3 - PPSV23 if available, else PCV20) 09/25/2035    Hepatitis C screen  Completed    HIV screen  Completed    Hepatitis A vaccine  Aged Out    Hib vaccine  Aged Out    Meningococcal (ACWY) vaccine  Aged Out       Hemoglobin A1C (%)   Date Value   09/27/2021 9.2   07/10/2021 8.4 (H)   03/19/2021 10.9             ( goal A1C is < 7)   Microalb/Crt.  Ratio (mcg/mg creat)   Date Value   02/15/2021 CANNOT BE CALCULATED     LDL Cholesterol (mg/dL)   Date Value   02/15/2021        01/31/2020 31       (goal LDL is <100)   AST (U/L)   Date Value   10/05/2021 11     ALT (U/L)   Date Value   10/05/2021 6     BUN (mg/dL)   Date Value   10/07/2021 55 (H)     BP Readings from Last 3 Encounters:   10/07/21 (!) 152/81   09/27/21 (!) 177/108   03/19/21 117/77          (goal 120/80)    All Future Testing planned in [facility]  Lab Frequency Next Occurrence   Hemoglobin A1C Once 03/09/2023               Patient Active Problem List:     Essential hypertension     Pure hyperglyceridemia     Type 2 diabetes mellitus with hyperglycemia, with long-term current use of insulin (HCC)     Anxiety     DVT (deep vein thrombosis) in pregnancy     Tobacco dependency     Acquired hypothyroidism     Steroid-induced hyperglycemia     Diabetic ketoacidosis without coma associated with type 2 diabetes mellitus (HCC)     Diabetic peripheral neuropathy (HCC)     Hypocalcemia     Hypokalemia     Anemia, normocytic normochromic     Hypophosphatemia     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 If I can be of assistance, please route to the applicable pool. Thank you.

## 2022-10-26 NOTE — TELEPHONE ENCOUNTER
Last visit:   Last Med refill:   Does patient have enough medication for 72 hours: No:     Next Visit Date:  No future appointments. Health Maintenance   Topic Date Due    Shingles vaccine (1 of 2) Never done    Hepatitis B vaccine (3 of 3 - Risk 3-dose series) 05/11/2021    COVID-19 Vaccine (2 - Moderna series) 07/06/2021    Diabetic foot exam  07/10/2021    Depression Monitoring  01/11/2022    Lipids  07/10/2022    Flu vaccine (1) 08/01/2022    Diabetic microalbuminuria test  10/07/2022    Breast cancer screen  10/16/2022    A1C test (Diabetic or Prediabetic)  12/03/2022    Diabetic retinal exam  04/29/2023    Colorectal Cancer Screen  01/19/2024    Cervical cancer screen  08/10/2025    DTaP/Tdap/Td vaccine (2 - Td or Tdap) 12/15/2025    Pneumococcal 0-64 years Vaccine (3 - PPSV23 if available, else PCV20) 09/25/2035    Hepatitis C screen  Completed    HIV screen  Completed    Hepatitis A vaccine  Aged Out    Hib vaccine  Aged Out    Meningococcal (ACWY) vaccine  Aged Out       Hemoglobin A1C (%)   Date Value   09/27/2021 9.2   07/10/2021 8.4 (H)   03/19/2021 10.9             ( goal A1C is < 7)   Microalb/Crt.  Ratio (mcg/mg creat)   Date Value   02/15/2021 CANNOT BE CALCULATED     LDL Cholesterol (mg/dL)   Date Value   02/15/2021        01/31/2020 31       (goal LDL is <100)   AST (U/L)   Date Value   10/05/2021 11     ALT (U/L)   Date Value   10/05/2021 6     BUN (mg/dL)   Date Value   10/07/2021 55 (H)     BP Readings from Last 3 Encounters:   10/07/21 (!) 152/81   09/27/21 (!) 177/108   03/19/21 117/77          (goal 120/80)    All Future Testing planned in [facility]  Lab Frequency Next Occurrence   Hemoglobin A1C Once 03/09/2023               Patient Active Problem List:     Essential hypertension     Pure hyperglyceridemia     Type 2 diabetes mellitus with hyperglycemia, with long-term current use of insulin (HCC)     Anxiety     DVT (deep vein thrombosis) in pregnancy     Tobacco dependency     Acquired hypothyroidism     Steroid-induced hyperglycemia     Diabetic ketoacidosis without coma associated with type 2 diabetes mellitus (HCC)     Diabetic peripheral neuropathy (HCC)     Hypocalcemia     Hypokalemia     Anemia, normocytic normochromic     Hypophosphatemia     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 If I can be of assistance, please route to the applicable pool. Thank you.

## 2022-11-07 ENCOUNTER — TELEPHONE (OUTPATIENT)
Dept: FAMILY MEDICINE CLINIC | Age: 52
End: 2022-11-07

## 2022-11-12 NOTE — ED NOTES
Pt resting with family at bedside denies further needs or complaints at present time      Giovany Cunningham, RN  10/05/21 2292 [facility]

## 2022-11-28 DIAGNOSIS — Z79.4 TYPE 2 DIABETES MELLITUS WITH HYPERGLYCEMIA, WITH LONG-TERM CURRENT USE OF INSULIN (HCC): ICD-10-CM

## 2022-11-28 DIAGNOSIS — E11.65 TYPE 2 DIABETES MELLITUS WITH HYPERGLYCEMIA, WITH LONG-TERM CURRENT USE OF INSULIN (HCC): ICD-10-CM

## 2022-11-29 RX ORDER — INSULIN LISPRO 100 [IU]/ML
INJECTION, SOLUTION INTRAVENOUS; SUBCUTANEOUS
Qty: 9 ML | Refills: 0 | OUTPATIENT
Start: 2022-11-29

## 2022-12-07 DIAGNOSIS — E11.65 TYPE 2 DIABETES MELLITUS WITH HYPERGLYCEMIA, WITH LONG-TERM CURRENT USE OF INSULIN (HCC): ICD-10-CM

## 2022-12-07 DIAGNOSIS — Z79.4 TYPE 2 DIABETES MELLITUS WITH HYPERGLYCEMIA, WITH LONG-TERM CURRENT USE OF INSULIN (HCC): ICD-10-CM

## 2022-12-07 RX ORDER — INSULIN LISPRO 100 [IU]/ML
INJECTION, SOLUTION INTRAVENOUS; SUBCUTANEOUS
Qty: 9 ML | Refills: 0 | OUTPATIENT
Start: 2022-12-07

## 2023-01-07 NOTE — TELEPHONE ENCOUNTER
Subjective   Patient ID: Gema is a 5 year old male.    Chief Complaint   Patient presents with   • Fever   • URI   • Cough     Been going on since  jan 1     on and off       5-year-old male presents with a history of fever, nasal congestion and cough of 6 days duration, Started while child was in [place], had a grandparent who had URI symptoms.  T-max was initially 102 Fahrenheit, child had no fever for the past 2 days till today.        Past Medical History:   Diagnosis Date   • No known problems        MEDICATIONS:  No current outpatient medications on file.     No current facility-administered medications for this visit.       ALLERGIES:  ALLERGIES:  No Known Allergies    PAST SURGICAL HISTORY:  Past Surgical History:   Procedure Laterality Date   • No past surgeries         FAMILY HISTORY:  Family History   Problem Relation Age of Onset   • Thyroid Mother    • Patient is unaware of any medical problems Father    • Patient is unaware of any medical problems Sister    • Diabetes Maternal Grandmother    • Hypertension Paternal Grandmother    • Migraine Paternal Grandmother    • Diabetes Paternal Grandfather    • Hypertension Paternal Grandfather        SOCIAL HISTORY:  Social History     Tobacco Use   • Smoking status: Never   • Smokeless tobacco: Never         Patient's medications, allergies, past medical, surgical, and social history  were reviewed and updated as appropriate.    Review of Systems   Constitutional: Positive for fatigue and fever.   HENT: Positive for congestion.    Eyes: Negative.    Respiratory: Positive for cough.    Cardiovascular: Negative.    Gastrointestinal: Negative.    Endocrine: Negative.    Genitourinary: Negative.    Musculoskeletal: Negative.    Skin: Negative.    Neurological: Negative.    Hematological: Negative.    All other systems reviewed and are negative.      Objective   Physical Exam  Vitals and nursing note reviewed.   Constitutional:       Appearance: He is well-developed.  Writer left message on recorder to call office for appointment. Needs  Diabetes follow-up appointment.   HENT:      Head: Normocephalic and atraumatic.      Right Ear: Tympanic membrane normal. Tympanic membrane is not erythematous or bulging.      Left Ear: Tympanic membrane normal. Tympanic membrane is not erythematous or bulging.      Nose: Congestion present.      Mouth/Throat:      Pharynx: Posterior oropharyngeal erythema present. No oropharyngeal exudate.      Neck: Normal range of motion and neck supple.   Eyes:      Extraocular Movements: Extraocular movements intact.      Conjunctiva/sclera: Conjunctivae normal.      Pupils: Pupils are equal, round, and reactive to light.   Cardiovascular:      Rate and Rhythm: Normal rate and regular rhythm.      Pulses: Normal pulses.      Heart sounds: Normal heart sounds.   Pulmonary:      Effort: Pulmonary effort is normal.      Breath sounds: Normal breath sounds.   Abdominal:      General: Bowel sounds are normal.      Palpations: Abdomen is soft.   Musculoskeletal:         General: Normal range of motion.   Skin:     General: Skin is warm.      Capillary Refill: Capillary refill takes less than 2 seconds.   Neurological:      General: No focal deficit present.      Mental Status: He is alert and oriented for age.       Visit Vitals  BP (!) 119/77 (BP Location: RUE - Right upper extremity, Patient Position: Sitting, Cuff Size: Pediatric)   Pulse (!) 138   Temp 100 °F (37.8 °C) (Oral)   Resp 24   Wt 20.2 kg (44 lb 8.5 oz)   SpO2 100%       No results found.          Assessment     Problem List Items Addressed This Visit    None  Visit Diagnoses     Fever and chills    -  Primary    Relevant Orders    POCT INFLUENZA A/B (Completed)    POCT RAPID STREP A (Completed)    POCT INFLUENZA A/B    COVID/FLU/RSV PANEL    STREPTOCOCCUS GROUP A (STREPTOCOCCUS PYOGENES), BACTERIAL CULTURE    Acute URI        Relevant Orders    POCT RAPID STREP A (Completed)    POCT INFLUENZA A/B    COVID/FLU/RSV PANEL    STREPTOCOCCUS GROUP A (STREPTOCOCCUS PYOGENES), BACTERIAL CULTURE            No image results found.       Results for orders placed or performed in visit on 01/07/23   POCT INFLUENZA A/B   Result Value Ref Range    Rapid Influenza A Ag Negative Negative, Indeterminate    Rapid Influenza B Ag Negative Negative, Indeterminate    Internal Procedural Controls Acceptable Yes     TEST LOT NUMBER [id number]     TEST LOT EXPIRATION DATE 10/21/24    POCT RAPID STREP A   Result Value Ref Range    GRP A STREP Negative Negative    Internal Procedural Controls Acceptable Yes     TEST LOT NUMBER [id number]     TEST LOT EXPIRATION DATE 12,312,023         Diagnosis:    (R50.9) Fever and chills  (primary encounter diagnosis)  Plan: POCT INFLUENZA A/B, POCT RAPID STREP A, POCT         INFLUENZA A/B, COVID/FLU/RSV PANEL,         STREPTOCOCCUS GROUP A (STREPTOCOCCUS PYOGENES),        BACTERIAL CULTURE, COVID/FLU/RSV PANEL    (J06.9) Acute URI  Plan: POCT RAPID STREP A, POCT INFLUENZA A/B,         COVID/FLU/RSV PANEL, STREPTOCOCCUS GROUP A         (STREPTOCOCCUS PYOGENES), BACTERIAL CULTURE,         COVID/FLU/RSV PANEL      New Prescriptions    No medications on file       Follow-up Information     Follow up With Specialties Details Why Contact Info    Nenita Cagle MD Pediatrics Follow up in 3 day(s)  [address] [facility]  2ND FLOOR  [place] [id number] [phone]            This is a 5 year old year-old male who presents with fever, URI symptoms, rapid flu and strep are negative, strep cultures, RSV/COVID/influenza PCR pending.  Continue conservative management.      Instructions provided as documented in the AVS.    There are no Patient Instructions on file for this visit.    FOLLOW UP  Please follow up with you PCP Nenita Cagle MD or proceed to ER if symptoms persist or worsen.

## 2023-01-22 DIAGNOSIS — Z79.4 TYPE 2 DIABETES MELLITUS WITH HYPERGLYCEMIA, WITH LONG-TERM CURRENT USE OF INSULIN (HCC): ICD-10-CM

## 2023-01-22 DIAGNOSIS — E11.65 TYPE 2 DIABETES MELLITUS WITH HYPERGLYCEMIA, WITH LONG-TERM CURRENT USE OF INSULIN (HCC): ICD-10-CM

## 2023-01-22 DIAGNOSIS — I10 ESSENTIAL HYPERTENSION: ICD-10-CM

## 2023-01-22 DIAGNOSIS — E78.1 PURE HYPERGLYCERIDEMIA: ICD-10-CM

## 2023-01-23 RX ORDER — SITAGLIPTIN 50 MG/1
TABLET, FILM COATED ORAL
Qty: 30 TABLET | Refills: 5 | Status: SHIPPED | OUTPATIENT
Start: 2023-01-23

## 2023-01-23 RX ORDER — LISINOPRIL 20 MG/1
TABLET ORAL
Qty: 30 TABLET | Refills: 5 | Status: SHIPPED | OUTPATIENT
Start: 2023-01-23

## 2023-01-23 RX ORDER — ATORVASTATIN CALCIUM 40 MG/1
TABLET, FILM COATED ORAL
Qty: 30 TABLET | Refills: 5 | Status: SHIPPED | OUTPATIENT
Start: 2023-01-23

## 2023-01-23 RX ORDER — GLIPIZIDE 10 MG/1
TABLET, FILM COATED, EXTENDED RELEASE ORAL
Qty: 30 TABLET | Refills: 5 | Status: SHIPPED | OUTPATIENT
Start: 2023-01-23

## 2023-01-23 NOTE — TELEPHONE ENCOUNTER
E-scribe request for med refills. Please review and e-scribe if applicable. Writer called patient to schedule a follow up, Writer had to LVM. Last Visit Date:  9/27/21  Next Visit Date:  Visit date not found    Hemoglobin A1C (%)   Date Value   09/27/2021 9.2   07/10/2021 8.4 (H)   03/19/2021 10.9             ( goal A1C is < 7)   Microalb/Crt.  Ratio (mcg/mg creat)   Date Value   02/15/2021 CANNOT BE CALCULATED     LDL Cholesterol (mg/dL)   Date Value   02/15/2021            (goal LDL is <100)   AST (U/L)   Date Value   10/05/2021 11     ALT (U/L)   Date Value   10/05/2021 6     BUN (mg/dL)   Date Value   10/07/2021 55 (H)     BP Readings from Last 3 Encounters:   10/07/21 (!) 152/81   09/27/21 (!) 177/108   03/19/21 117/77          (goal 120/80)        Patient Active Problem List:     Essential hypertension     Pure hyperglyceridemia     Type 2 diabetes mellitus with hyperglycemia, with long-term current use of insulin (HCC)     Anxiety     DVT (deep vein thrombosis) in pregnancy     Tobacco dependency     Acquired hypothyroidism     Steroid-induced hyperglycemia     Diabetic ketoacidosis without coma associated with type 2 diabetes mellitus (HCC)     Diabetic peripheral neuropathy (HCC)     Hypocalcemia     Hypokalemia     Anemia, normocytic normochromic     Hypophosphatemia     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

## 2023-05-25 DIAGNOSIS — I10 ESSENTIAL HYPERTENSION: ICD-10-CM

## 2023-05-25 RX ORDER — BENZONATATE 100 MG/1
CAPSULE, LIQUID FILLED ORAL
Qty: 30 TABLET | Refills: 5 | OUTPATIENT
Start: 2023-05-25 | End: 2023-06-24

## 2023-05-25 RX ORDER — ASPIRIN 81 MG/1
TABLET, DELAYED RELEASE ORAL
Qty: 30 TABLET | Refills: 5 | OUTPATIENT
Start: 2023-05-25

## 2023-05-30 RX ORDER — INSULIN GLARGINE 100 [IU]/ML
INJECTION, SOLUTION SUBCUTANEOUS
Qty: 12 ML | Refills: 5 | Status: SHIPPED | OUTPATIENT
Start: 2023-05-30

## 2023-05-30 NOTE — TELEPHONE ENCOUNTER
E-scribe request for med refills. Please review and e-scribe if applicable. Last Visit Date:  9/27/21  Next Visit Date:  Visit date not found    Hemoglobin A1C (%)   Date Value   09/27/2021 9.2   07/10/2021 8.4 (H)   03/19/2021 10.9             ( goal A1C is < 7)   Microalb/Crt.  Ratio (mcg/mg creat)   Date Value   02/15/2021 CANNOT BE CALCULATED     LDL Cholesterol (mg/dL)   Date Value   02/15/2021            (goal LDL is <100)   AST (U/L)   Date Value   10/05/2021 11     ALT (U/L)   Date Value   10/05/2021 6     BUN (mg/dL)   Date Value   10/07/2021 55 (H)     BP Readings from Last 3 Encounters:   10/07/21 (!) 152/81   09/27/21 (!) 177/108   03/19/21 117/77          (goal 120/80)        Patient Active Problem List:     Essential hypertension     Pure hyperglyceridemia     Type 2 diabetes mellitus with hyperglycemia, with long-term current use of insulin (HCC)     Anxiety     DVT (deep vein thrombosis) in pregnancy     Tobacco dependency     Acquired hypothyroidism     Steroid-induced hyperglycemia     Diabetic ketoacidosis without coma associated with type 2 diabetes mellitus (HCC)     Diabetic peripheral neuropathy (HCC)     Hypocalcemia     Hypokalemia     Anemia, normocytic normochromic     Hypophosphatemia     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

## 2023-06-23 DIAGNOSIS — I10 ESSENTIAL HYPERTENSION: ICD-10-CM

## 2023-06-23 RX ORDER — BENZONATATE 100 MG/1
CAPSULE, LIQUID FILLED ORAL
Qty: 30 TABLET | Refills: 5 | OUTPATIENT
Start: 2023-06-23 | End: 2023-07-23

## 2023-06-23 RX ORDER — ASPIRIN 81 MG/1
TABLET, DELAYED RELEASE ORAL
Qty: 30 TABLET | Refills: 5 | OUTPATIENT
Start: 2023-06-23

## 2023-07-20 DIAGNOSIS — E11.65 TYPE 2 DIABETES MELLITUS WITH HYPERGLYCEMIA, WITH LONG-TERM CURRENT USE OF INSULIN (HCC): ICD-10-CM

## 2023-07-20 DIAGNOSIS — I10 ESSENTIAL HYPERTENSION: ICD-10-CM

## 2023-07-20 DIAGNOSIS — E78.1 PURE HYPERGLYCERIDEMIA: ICD-10-CM

## 2023-07-20 DIAGNOSIS — Z79.4 TYPE 2 DIABETES MELLITUS WITH HYPERGLYCEMIA, WITH LONG-TERM CURRENT USE OF INSULIN (HCC): ICD-10-CM

## 2023-07-20 RX ORDER — GLIPIZIDE 10 MG/1
TABLET, FILM COATED, EXTENDED RELEASE ORAL
Qty: 30 TABLET | Refills: 5 | OUTPATIENT
Start: 2023-07-20

## 2023-07-20 RX ORDER — ATORVASTATIN CALCIUM 40 MG/1
TABLET, FILM COATED ORAL
Qty: 30 TABLET | Refills: 5 | OUTPATIENT
Start: 2023-07-20

## 2023-07-20 RX ORDER — LISINOPRIL 20 MG/1
TABLET ORAL
Qty: 30 TABLET | Refills: 5 | OUTPATIENT
Start: 2023-07-20

## 2023-07-20 RX ORDER — SITAGLIPTIN 50 MG/1
TABLET, FILM COATED ORAL
Qty: 30 TABLET | Refills: 5 | OUTPATIENT
Start: 2023-07-20

## 2023-07-22 DIAGNOSIS — E11.65 TYPE 2 DIABETES MELLITUS WITH HYPERGLYCEMIA, WITH LONG-TERM CURRENT USE OF INSULIN (HCC): ICD-10-CM

## 2023-07-22 DIAGNOSIS — I10 ESSENTIAL HYPERTENSION: ICD-10-CM

## 2023-07-22 DIAGNOSIS — Z79.4 TYPE 2 DIABETES MELLITUS WITH HYPERGLYCEMIA, WITH LONG-TERM CURRENT USE OF INSULIN (HCC): ICD-10-CM

## 2023-07-22 DIAGNOSIS — E78.1 PURE HYPERGLYCERIDEMIA: ICD-10-CM

## 2023-07-24 RX ORDER — SITAGLIPTIN 50 MG/1
TABLET, FILM COATED ORAL
Qty: 30 TABLET | Refills: 5 | Status: SHIPPED | OUTPATIENT
Start: 2023-07-24

## 2023-07-24 RX ORDER — ATORVASTATIN CALCIUM 40 MG/1
TABLET, FILM COATED ORAL
Qty: 30 TABLET | Refills: 5 | Status: SHIPPED | OUTPATIENT
Start: 2023-07-24

## 2023-07-24 RX ORDER — GLIPIZIDE 10 MG/1
TABLET, FILM COATED, EXTENDED RELEASE ORAL
Qty: 30 TABLET | Refills: 5 | Status: SHIPPED | OUTPATIENT
Start: 2023-07-24

## 2023-07-24 RX ORDER — LISINOPRIL 20 MG/1
TABLET ORAL
Qty: 30 TABLET | Refills: 5 | Status: SHIPPED | OUTPATIENT
Start: 2023-07-24

## 2023-07-24 NOTE — TELEPHONE ENCOUNTER
Last visit: 09/27/21  Last Med refill: 01/23/23  Does patient have enough medication for 72 hours: No: LM for patient to call office to re establish care with office. Patient will need appointment for refills. Next Visit Date:  No future appointments.     Health Maintenance   Topic Date Due    Depression Screen  Never done    Shingles vaccine (1 of 2) Never done    Hepatitis B vaccine (3 of 3 - Risk 3-dose series) 05/11/2021    Diabetic foot exam  07/10/2021    COVID-19 Vaccine (2 - Booster for Moderna series) 08/03/2021    Lipids  02/15/2022    A1C test (Diabetic or Prediabetic)  09/27/2022    Diabetic Alb to Cr ratio (uACR) test  10/05/2022    GFR test (Diabetes, CKD 3-4, OR last GFR 15-59)  10/07/2022    Breast cancer screen  10/16/2022    Diabetic retinal exam  04/29/2023    Flu vaccine (1) 08/01/2023    Colorectal Cancer Screen  01/19/2024    Cervical cancer screen  08/10/2025    DTaP/Tdap/Td vaccine (2 - Td or Tdap) 12/15/2025    Pneumococcal 0-64 years Vaccine (3 - PPSV23 if available, else PCV20) 09/25/2035    Hepatitis C screen  Completed    HIV screen  Completed    Hepatitis A vaccine  Aged Out    Hib vaccine  Aged Out    Meningococcal (ACWY) vaccine  Aged Out       Hemoglobin A1C (%)   Date Value   09/27/2021 9.2   07/10/2021 8.4 (H)   03/19/2021 10.9             ( goal A1C is < 7)   No components found for: LABMICR  LDL Cholesterol (mg/dL)   Date Value   02/15/2021        01/31/2020 31       (goal LDL is <100)   AST (U/L)   Date Value   10/05/2021 11     ALT (U/L)   Date Value   10/05/2021 6     BUN (mg/dL)   Date Value   10/07/2021 55 (H)     BP Readings from Last 3 Encounters:   10/07/21 (!) 152/81   09/27/21 (!) 177/108   03/19/21 117/77          (goal 120/80)    All Future Testing planned in [facility]  Lab Frequency Next Occurrence               Patient Active Problem List:     Essential hypertension     Pure hyperglyceridemia     Type 2 diabetes mellitus with hyperglycemia, with long-term current

## 2023-10-16 DIAGNOSIS — Z12.11 COLON CANCER SCREENING: Primary | ICD-10-CM

## 2023-11-01 RX ORDER — INSULIN GLARGINE 100 [IU]/ML
INJECTION, SOLUTION SUBCUTANEOUS
Qty: 12 ML | Refills: 5 | Status: SHIPPED | OUTPATIENT
Start: 2023-11-01

## 2023-11-01 NOTE — TELEPHONE ENCOUNTER
Please address the medication refill and close the encounter. If I can be of assistance, please route to the applicable pool. Thank you. Last visit: 9-  Last Med refill: 5-30-23  Does patient have enough medication for 72 hours: No:     Next Visit Date:  No future appointments.     Health Maintenance   Topic Date Due    Depression Screen  Never done    Shingles vaccine (1 of 2) Never done    Hepatitis B vaccine (3 of 3 - 19+ 3-dose series) 03/08/2021    Diabetic foot exam  07/10/2021    COVID-19 Vaccine (2 - Moderna series) 08/03/2021    Lipids  02/15/2022    A1C test (Diabetic or Prediabetic)  09/27/2022    Diabetic Alb to Cr ratio (uACR) test  10/07/2022    GFR test (Diabetes, CKD 3-4, OR last GFR 15-59)  10/07/2022    Breast cancer screen  10/16/2022    Diabetic retinal exam  04/29/2023    Flu vaccine (1) 08/01/2023    Colorectal Cancer Screen  01/19/2024    Cervical cancer screen  08/10/2025    DTaP/Tdap/Td vaccine (2 - Td or Tdap) 12/15/2025    Pneumococcal 0-64 years Vaccine (3 - PPSV23 or PCV20) 09/25/2035    Hepatitis C screen  Completed    HIV screen  Completed    Hepatitis A vaccine  Aged Out    Hib vaccine  Aged Out    Meningococcal (ACWY) vaccine  Aged Out       Hemoglobin A1C (%)   Date Value   09/27/2021 9.2   07/10/2021 8.4 (H)   03/19/2021 10.9             ( goal A1C is < 7)   No components found for: \"LABMICR\"  LDL Cholesterol (mg/dL)   Date Value   02/15/2021        01/31/2020 31       (goal LDL is <100)   AST (U/L)   Date Value   10/05/2021 11     ALT (U/L)   Date Value   10/05/2021 6     BUN (mg/dL)   Date Value   10/07/2021 55 (H)     BP Readings from Last 3 Encounters:   10/07/21 (!) 152/81   09/27/21 (!) 177/108   03/19/21 117/77          (goal 120/80)    All Future Testing planned in [facility]  Lab Frequency Next Occurrence               Patient Active Problem List:     Essential hypertension     Pure hyperglyceridemia     Type 2 diabetes mellitus with hyperglycemia, with

## 2024-01-13 DIAGNOSIS — Z79.4 TYPE 2 DIABETES MELLITUS WITH HYPERGLYCEMIA, WITH LONG-TERM CURRENT USE OF INSULIN (HCC): ICD-10-CM

## 2024-01-13 DIAGNOSIS — E78.1 PURE HYPERGLYCERIDEMIA: ICD-10-CM

## 2024-01-13 DIAGNOSIS — E11.65 TYPE 2 DIABETES MELLITUS WITH HYPERGLYCEMIA, WITH LONG-TERM CURRENT USE OF INSULIN (HCC): ICD-10-CM

## 2024-01-15 RX ORDER — ATORVASTATIN CALCIUM 40 MG/1
TABLET, FILM COATED ORAL
Qty: 30 TABLET | Refills: 5 | OUTPATIENT
Start: 2024-01-15

## 2024-01-15 RX ORDER — GLIPIZIDE 10 MG/1
TABLET, FILM COATED, EXTENDED RELEASE ORAL
Qty: 30 TABLET | Refills: 5 | OUTPATIENT
Start: 2024-01-15

## 2024-01-15 RX ORDER — SITAGLIPTIN 50 MG/1
TABLET, FILM COATED ORAL
Qty: 30 TABLET | Refills: 5 | OUTPATIENT
Start: 2024-01-15

## 2024-01-28 DIAGNOSIS — I10 ESSENTIAL HYPERTENSION: ICD-10-CM

## 2024-01-29 RX ORDER — LISINOPRIL 20 MG/1
TABLET ORAL
Qty: 30 TABLET | Refills: 5 | OUTPATIENT
Start: 2024-01-29

## 2024-01-29 NOTE — TELEPHONE ENCOUNTER
Last visit: 09/27/2021  Last Med refill: 07/24/2023  Does patient have enough medication for 72 hours: No:     Next Visit Date:  No future appointments.    Health Maintenance   Topic Date Due    Depression Screen  Never done    Shingles vaccine (1 of 2) Never done    Hepatitis B vaccine (3 of 3 - 19+ 3-dose series) 03/08/2021    Diabetic foot exam  07/10/2021    Lipids  02/15/2022    A1C test (Diabetic or Prediabetic)  09/27/2022    Diabetic Alb to Cr ratio (uACR) test  10/07/2022    GFR test (Diabetes, CKD 3-4, OR last GFR 15-59)  10/07/2022    Breast cancer screen  10/16/2022    Diabetic retinal exam  04/29/2023    Flu vaccine (1) 08/01/2023    COVID-19 Vaccine (2 - 2023-24 season) 09/01/2023    Colorectal Cancer Screen  01/19/2024    Cervical cancer screen  08/10/2025    DTaP/Tdap/Td vaccine (2 - Td or Tdap) 12/15/2025    Pneumococcal 0-64 years Vaccine (3 - PPSV23 or PCV20) 09/25/2035    Hepatitis C screen  Completed    HIV screen  Completed    Hepatitis A vaccine  Aged Out    Hib vaccine  Aged Out    Polio vaccine  Aged Out    Meningococcal (ACWY) vaccine  Aged Out       Hemoglobin A1C (%)   Date Value   09/27/2021 9.2   07/10/2021 8.4 (H)   03/19/2021 10.9             ( goal A1C is < 7)   No components found for: \"LABMICR\"  LDL Cholesterol (mg/dL)   Date Value   02/15/2021        01/31/2020 31       (goal LDL is <100)   AST (U/L)   Date Value   10/05/2021 11     ALT (U/L)   Date Value   10/05/2021 6     BUN (mg/dL)   Date Value   10/07/2021 55 (H)     BP Readings from Last 3 Encounters:   10/07/21 (!) 152/81   09/27/21 (!) 177/108   03/19/21 117/77          (goal 120/80)    All Future Testing planned in [facility]  Lab Frequency Next Occurrence               Patient Active Problem List:     Essential hypertension     Pure hyperglyceridemia     Type 2 diabetes mellitus with hyperglycemia, with long-term current use of insulin (HCC)     Anxiety     DVT (deep vein thrombosis) in pregnancy     Tobacco dependency

## 2024-03-30 DIAGNOSIS — E78.1 PURE HYPERGLYCERIDEMIA: ICD-10-CM

## 2024-03-30 DIAGNOSIS — I10 ESSENTIAL HYPERTENSION: ICD-10-CM

## 2024-03-30 DIAGNOSIS — Z79.4 TYPE 2 DIABETES MELLITUS WITH HYPERGLYCEMIA, WITH LONG-TERM CURRENT USE OF INSULIN (HCC): ICD-10-CM

## 2024-03-30 DIAGNOSIS — E11.65 TYPE 2 DIABETES MELLITUS WITH HYPERGLYCEMIA, WITH LONG-TERM CURRENT USE OF INSULIN (HCC): ICD-10-CM

## 2024-04-01 RX ORDER — SITAGLIPTIN 50 MG/1
TABLET, FILM COATED ORAL
Qty: 30 TABLET | Refills: 5 | OUTPATIENT
Start: 2024-04-01

## 2024-04-01 RX ORDER — LISINOPRIL 20 MG/1
TABLET ORAL
Qty: 30 TABLET | Refills: 5 | OUTPATIENT
Start: 2024-04-01

## 2024-04-01 RX ORDER — ATORVASTATIN CALCIUM 40 MG/1
TABLET, FILM COATED ORAL
Qty: 30 TABLET | Refills: 5 | OUTPATIENT
Start: 2024-04-01

## 2024-04-25 NOTE — TELEPHONE ENCOUNTER
Writer left voicemail for patient to return call to clinic to schedule appointment.   
Writer left voicemail for patient to return call to the clinic to schedule appointment.   
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

## 2024-05-18 DIAGNOSIS — Z79.4 TYPE 2 DIABETES MELLITUS WITH HYPERGLYCEMIA, WITH LONG-TERM CURRENT USE OF INSULIN (HCC): ICD-10-CM

## 2024-05-18 DIAGNOSIS — E11.65 TYPE 2 DIABETES MELLITUS WITH HYPERGLYCEMIA, WITH LONG-TERM CURRENT USE OF INSULIN (HCC): ICD-10-CM

## 2024-05-20 RX ORDER — SITAGLIPTIN 50 MG/1
TABLET, FILM COATED ORAL
Qty: 30 TABLET | Refills: 5 | OUTPATIENT
Start: 2024-05-20

## 2024-08-23 LAB — NONINV COLON CA DNA+OCC BLD SCRN STL QL: NORMAL

## 2025-01-09 ENCOUNTER — APPOINTMENT (OUTPATIENT)
Dept: GENERAL RADIOLOGY | Facility: CLINIC | Age: 55
DRG: 420 | End: 2025-01-09
Attending: SPECIALIST
Payer: MEDICAID

## 2025-01-09 ENCOUNTER — HOSPITAL ENCOUNTER (INPATIENT)
Age: 55
LOS: 6 days | Discharge: HOME HEALTH | DRG: 420 | End: 2025-01-16
Attending: EMERGENCY MEDICINE | Admitting: INTERNAL MEDICINE
Payer: MEDICAID

## 2025-01-09 DIAGNOSIS — E78.1 PURE HYPERGLYCERIDEMIA: ICD-10-CM

## 2025-01-09 DIAGNOSIS — E11.65 TYPE 2 DIABETES MELLITUS WITH HYPERGLYCEMIA, WITH LONG-TERM CURRENT USE OF INSULIN (HCC): ICD-10-CM

## 2025-01-09 DIAGNOSIS — E10.649 HYPOGLYCEMIA UNAWARENESS IN TYPE 1 DIABETES MELLITUS (HCC): ICD-10-CM

## 2025-01-09 DIAGNOSIS — Z79.4 TYPE 2 DIABETES MELLITUS WITH HYPERGLYCEMIA, WITH LONG-TERM CURRENT USE OF INSULIN (HCC): ICD-10-CM

## 2025-01-09 DIAGNOSIS — E10.10 TYPE 1 DIABETES MELLITUS WITH KETOACIDOSIS WITHOUT COMA (HCC): Primary | ICD-10-CM

## 2025-01-09 DIAGNOSIS — E10.10 DKA, TYPE 1, NOT AT GOAL (HCC): ICD-10-CM

## 2025-01-09 DIAGNOSIS — R11.2 NAUSEA AND VOMITING, UNSPECIFIED VOMITING TYPE: ICD-10-CM

## 2025-01-09 LAB
ALBUMIN SERPL-MCNC: 4.5 G/DL (ref 3.5–5.2)
ALBUMIN/GLOB SERPL: 1.1 {RATIO}
ALP SERPL-CCNC: 142 U/L (ref 35–104)
ALT SERPL-CCNC: 35 U/L (ref 10–35)
ANION GAP SERPL CALCULATED.3IONS-SCNC: 26 MMOL/L (ref 9–16)
ANION GAP SERPL CALCULATED.3IONS-SCNC: 34 MMOL/L (ref 9–16)
AST SERPL-CCNC: 25 U/L (ref 10–35)
B-OH-BUTYR SERPL-MCNC: 10.9 MMOL/L (ref 0.02–0.27)
BACTERIA URNS QL MICRO: ABNORMAL
BASOPHILS # BLD: 0.1 K/UL (ref 0–0.2)
BASOPHILS NFR BLD: 1 % (ref 0–2)
BILIRUB DIRECT SERPL-MCNC: 0.2 MG/DL (ref 0–0.3)
BILIRUB INDIRECT SERPL-MCNC: 0.2 MG/DL (ref 0–1)
BILIRUB SERPL-MCNC: 0.4 MG/DL (ref 0–1.2)
BILIRUB UR QL STRIP: NEGATIVE
BUN SERPL-MCNC: 33 MG/DL (ref 6–20)
BUN SERPL-MCNC: 37 MG/DL (ref 6–20)
CALCIUM SERPL-MCNC: 10.7 MG/DL (ref 8.6–10.4)
CALCIUM SERPL-MCNC: 9.2 MG/DL (ref 8.6–10.4)
CHLORIDE SERPL-SCNC: 101 MMOL/L (ref 98–107)
CHLORIDE SERPL-SCNC: 81 MMOL/L (ref 98–107)
CLARITY UR: CLEAR
CO2 SERPL-SCNC: 7 MMOL/L (ref 20–31)
CO2 SERPL-SCNC: 8 MMOL/L (ref 20–31)
COLOR UR: YELLOW
CREAT SERPL-MCNC: 1.3 MG/DL (ref 0.5–0.9)
CREAT SERPL-MCNC: 1.7 MG/DL (ref 0.5–0.9)
CRITICAL ACTION: NORMAL
CRITICAL NOTIFICATION DATE/TIME: NORMAL
CRITICAL NOTIFICATION: NORMAL
CRITICAL VALUE READ BACK: YES
EOSINOPHIL # BLD: 0 K/UL (ref 0–0.4)
EOSINOPHILS RELATIVE PERCENT: 0 % (ref 1–4)
EPI CELLS #/AREA URNS HPF: ABNORMAL /HPF (ref 0–5)
ERYTHROCYTE [DISTWIDTH] IN BLOOD BY AUTOMATED COUNT: 14.5 % (ref 12.5–15.4)
GFR, ESTIMATED: 35 ML/MIN/1.73M2
GFR, ESTIMATED: 49 ML/MIN/1.73M2
GLUCOSE BLD-MCNC: 375 MG/DL (ref 65–105)
GLUCOSE SERPL-MCNC: 469 MG/DL (ref 74–99)
GLUCOSE SERPL-MCNC: 760 MG/DL (ref 74–99)
GLUCOSE SERPL-MCNC: 837 MG/DL (ref 74–99)
GLUCOSE UR STRIP-MCNC: ABNORMAL MG/DL
HCO3 VENOUS: 7.3 MMOL/L (ref 22–29)
HCT VFR BLD AUTO: 41.1 % (ref 36–46)
HGB BLD-MCNC: 12.9 G/DL (ref 12–16)
HGB UR QL STRIP.AUTO: NEGATIVE
KETONES UR STRIP-MCNC: ABNORMAL MG/DL
LEUKOCYTE ESTERASE UR QL STRIP: NEGATIVE
LIPASE SERPL-CCNC: 11 U/L (ref 13–60)
LYMPHOCYTES NFR BLD: 2.1 K/UL (ref 1–4.8)
LYMPHOCYTES RELATIVE PERCENT: 13 % (ref 24–44)
MAGNESIUM SERPL-MCNC: 2 MG/DL (ref 1.6–2.6)
MAGNESIUM SERPL-MCNC: 2 MG/DL (ref 1.6–2.6)
MCH RBC QN AUTO: 30.6 PG (ref 26–34)
MCHC RBC AUTO-ENTMCNC: 31.3 G/DL (ref 31–37)
MCV RBC AUTO: 97.9 FL (ref 80–100)
MONOCYTES NFR BLD: 0.7 K/UL (ref 0.1–1.2)
MONOCYTES NFR BLD: 4 % (ref 2–11)
NEGATIVE BASE EXCESS, VEN: 19.7 MMOL/L (ref 0–2)
NEUTROPHILS NFR BLD: 82 % (ref 36–66)
NEUTS SEG NFR BLD: 12.9 K/UL (ref 1.8–7.7)
NITRITE UR QL STRIP: NEGATIVE
O2 SAT, VEN: 96.7 % (ref 60–85)
PCO2 VENOUS: 20.8 MM HG (ref 41–51)
PH UR STRIP: 5 [PH] (ref 5–8)
PH VENOUS: 7.15 (ref 7.32–7.43)
PLATELET # BLD AUTO: 361 K/UL (ref 140–450)
PMV BLD AUTO: 9.7 FL (ref 6–12)
PO2 VENOUS: 109.3 MM HG (ref 30–50)
POTASSIUM SERPL-SCNC: 4.2 MMOL/L (ref 3.7–5.3)
POTASSIUM SERPL-SCNC: 4.9 MMOL/L (ref 3.7–5.3)
PROT SERPL-MCNC: 8.5 G/DL (ref 6.6–8.7)
PROT UR STRIP-MCNC: NEGATIVE MG/DL
RBC # BLD AUTO: 4.2 M/UL (ref 4–5.2)
RBC #/AREA URNS HPF: ABNORMAL /HPF (ref 0–2)
SODIUM SERPL-SCNC: 123 MMOL/L (ref 136–145)
SODIUM SERPL-SCNC: 134 MMOL/L (ref 136–145)
SP GR UR STRIP: 1.01 (ref 1–1.03)
TROPONIN I SERPL HS-MCNC: 11 NG/L (ref 0–14)
UROBILINOGEN UR STRIP-ACNC: NORMAL EU/DL (ref 0–1)
WBC #/AREA URNS HPF: ABNORMAL /HPF (ref 0–5)
WBC OTHER # BLD: 15.8 K/UL (ref 3.5–11)

## 2025-01-09 PROCEDURE — G0378 HOSPITAL OBSERVATION PER HR: HCPCS

## 2025-01-09 PROCEDURE — 96374 THER/PROPH/DIAG INJ IV PUSH: CPT

## 2025-01-09 PROCEDURE — 2580000003 HC RX 258: Performed by: EMERGENCY MEDICINE

## 2025-01-09 PROCEDURE — 96375 TX/PRO/DX INJ NEW DRUG ADDON: CPT

## 2025-01-09 PROCEDURE — 2580000003 HC RX 258

## 2025-01-09 PROCEDURE — 96376 TX/PRO/DX INJ SAME DRUG ADON: CPT

## 2025-01-09 PROCEDURE — G0378 HOSPITAL OBSERVATION PER HR: HCPCS | Performed by: INTERNAL MEDICINE

## 2025-01-09 PROCEDURE — 80048 BASIC METABOLIC PNL TOTAL CA: CPT

## 2025-01-09 PROCEDURE — 83735 ASSAY OF MAGNESIUM: CPT

## 2025-01-09 PROCEDURE — 36415 COLL VENOUS BLD VENIPUNCTURE: CPT

## 2025-01-09 PROCEDURE — 82010 KETONE BODYS QUAN: CPT

## 2025-01-09 PROCEDURE — 82947 ASSAY GLUCOSE BLOOD QUANT: CPT

## 2025-01-09 PROCEDURE — 6360000002 HC RX W HCPCS: Performed by: EMERGENCY MEDICINE

## 2025-01-09 PROCEDURE — 84100 ASSAY OF PHOSPHORUS: CPT

## 2025-01-09 PROCEDURE — 96361 HYDRATE IV INFUSION ADD-ON: CPT

## 2025-01-09 PROCEDURE — 99285 EMERGENCY DEPT VISIT HI MDM: CPT

## 2025-01-09 PROCEDURE — 80076 HEPATIC FUNCTION PANEL: CPT

## 2025-01-09 PROCEDURE — 6370000000 HC RX 637 (ALT 250 FOR IP): Performed by: EMERGENCY MEDICINE

## 2025-01-09 PROCEDURE — 71045 X-RAY EXAM CHEST 1 VIEW: CPT

## 2025-01-09 PROCEDURE — 82803 BLOOD GASES ANY COMBINATION: CPT

## 2025-01-09 PROCEDURE — 96365 THER/PROPH/DIAG IV INF INIT: CPT

## 2025-01-09 PROCEDURE — 96368 THER/DIAG CONCURRENT INF: CPT

## 2025-01-09 PROCEDURE — 87086 URINE CULTURE/COLONY COUNT: CPT

## 2025-01-09 PROCEDURE — 83690 ASSAY OF LIPASE: CPT

## 2025-01-09 PROCEDURE — 83036 HEMOGLOBIN GLYCOSYLATED A1C: CPT

## 2025-01-09 PROCEDURE — 84484 ASSAY OF TROPONIN QUANT: CPT

## 2025-01-09 PROCEDURE — 81001 URINALYSIS AUTO W/SCOPE: CPT

## 2025-01-09 PROCEDURE — 96366 THER/PROPH/DIAG IV INF ADDON: CPT

## 2025-01-09 PROCEDURE — 93005 ELECTROCARDIOGRAM TRACING: CPT | Performed by: EMERGENCY MEDICINE

## 2025-01-09 PROCEDURE — 85025 COMPLETE CBC W/AUTO DIFF WBC: CPT

## 2025-01-09 RX ORDER — 0.9 % SODIUM CHLORIDE 0.9 %
1000 INTRAVENOUS SOLUTION INTRAVENOUS ONCE
Status: COMPLETED | OUTPATIENT
Start: 2025-01-09 | End: 2025-01-09

## 2025-01-09 RX ORDER — ONDANSETRON 2 MG/ML
4 INJECTION INTRAMUSCULAR; INTRAVENOUS ONCE
Status: COMPLETED | OUTPATIENT
Start: 2025-01-09 | End: 2025-01-09

## 2025-01-09 RX ORDER — SODIUM CHLORIDE 9 MG/ML
INJECTION, SOLUTION INTRAVENOUS CONTINUOUS
Status: DISCONTINUED | OUTPATIENT
Start: 2025-01-09 | End: 2025-01-11

## 2025-01-09 RX ORDER — ONDANSETRON 2 MG/ML
INJECTION INTRAMUSCULAR; INTRAVENOUS
Status: COMPLETED
Start: 2025-01-09 | End: 2025-01-10

## 2025-01-09 RX ORDER — POTASSIUM CHLORIDE 7.45 MG/ML
10 INJECTION INTRAVENOUS PRN
Status: DISCONTINUED | OUTPATIENT
Start: 2025-01-09 | End: 2025-01-12

## 2025-01-09 RX ORDER — DEXTROSE MONOHYDRATE AND SODIUM CHLORIDE 5; .45 G/100ML; G/100ML
INJECTION, SOLUTION INTRAVENOUS CONTINUOUS PRN
Status: DISCONTINUED | OUTPATIENT
Start: 2025-01-09 | End: 2025-01-16 | Stop reason: HOSPADM

## 2025-01-09 RX ORDER — ONDANSETRON 2 MG/ML
4 INJECTION INTRAMUSCULAR; INTRAVENOUS ONCE
Status: COMPLETED | OUTPATIENT
Start: 2025-01-10 | End: 2025-01-10

## 2025-01-09 RX ORDER — MAGNESIUM SULFATE IN WATER 40 MG/ML
2000 INJECTION, SOLUTION INTRAVENOUS PRN
Status: DISCONTINUED | OUTPATIENT
Start: 2025-01-09 | End: 2025-01-16 | Stop reason: HOSPADM

## 2025-01-09 RX ORDER — 0.9 % SODIUM CHLORIDE 0.9 %
1000 INTRAVENOUS SOLUTION INTRAVENOUS ONCE
Status: COMPLETED | OUTPATIENT
Start: 2025-01-09 | End: 2025-01-10

## 2025-01-09 RX ADMIN — POTASSIUM CHLORIDE 10 MEQ: 7.46 INJECTION, SOLUTION INTRAVENOUS at 20:16

## 2025-01-09 RX ADMIN — SODIUM CHLORIDE: 9 INJECTION, SOLUTION INTRAVENOUS at 21:30

## 2025-01-09 RX ADMIN — ONDANSETRON 4 MG: 2 INJECTION, SOLUTION INTRAMUSCULAR; INTRAVENOUS at 19:31

## 2025-01-09 RX ADMIN — POTASSIUM CHLORIDE 10 MEQ: 7.46 INJECTION, SOLUTION INTRAVENOUS at 21:26

## 2025-01-09 RX ADMIN — ONDANSETRON 4 MG: 2 INJECTION, SOLUTION INTRAMUSCULAR; INTRAVENOUS at 18:14

## 2025-01-09 RX ADMIN — SODIUM CHLORIDE 1000 ML: 9 INJECTION, SOLUTION INTRAVENOUS at 19:19

## 2025-01-09 RX ADMIN — SODIUM CHLORIDE 1000 ML: 9 INJECTION, SOLUTION INTRAVENOUS at 18:15

## 2025-01-09 RX ADMIN — SODIUM CHLORIDE 10.8 UNITS/HR: 9 INJECTION, SOLUTION INTRAVENOUS at 19:21

## 2025-01-09 ASSESSMENT — PAIN - FUNCTIONAL ASSESSMENT
PAIN_FUNCTIONAL_ASSESSMENT: NONE - DENIES PAIN

## 2025-01-10 ENCOUNTER — APPOINTMENT (OUTPATIENT)
Dept: CT IMAGING | Age: 55
DRG: 420 | End: 2025-01-10
Payer: MEDICAID

## 2025-01-10 PROBLEM — E10.10 DKA, TYPE 1, NOT AT GOAL (HCC): Status: ACTIVE | Noted: 2025-01-10

## 2025-01-10 PROBLEM — E11.10 DKA, TYPE 2, NOT AT GOAL (HCC): Status: ACTIVE | Noted: 2025-01-10

## 2025-01-10 LAB
ALBUMIN SERPL-MCNC: 4.1 G/DL (ref 3.5–5.2)
ALBUMIN/GLOB SERPL: 1.5 {RATIO} (ref 1–2.5)
ALP SERPL-CCNC: 120 U/L (ref 35–104)
ALT SERPL-CCNC: 29 U/L (ref 10–35)
ANION GAP SERPL CALCULATED.3IONS-SCNC: 13 MMOL/L (ref 9–16)
ANION GAP SERPL CALCULATED.3IONS-SCNC: 14 MMOL/L (ref 9–16)
ANION GAP SERPL CALCULATED.3IONS-SCNC: 15 MMOL/L (ref 9–16)
ANION GAP SERPL CALCULATED.3IONS-SCNC: 16 MMOL/L (ref 9–16)
ANION GAP SERPL CALCULATED.3IONS-SCNC: 20 MMOL/L (ref 9–16)
AST SERPL-CCNC: 20 U/L (ref 10–35)
BASOPHILS # BLD: 0 K/UL (ref 0–0.2)
BASOPHILS NFR BLD: 0 %
BILIRUB SERPL-MCNC: <0.2 MG/DL (ref 0–1.2)
BUN SERPL-MCNC: 17 MG/DL (ref 6–20)
BUN SERPL-MCNC: 21 MG/DL (ref 6–20)
BUN SERPL-MCNC: 24 MG/DL (ref 6–20)
BUN SERPL-MCNC: 27 MG/DL (ref 6–20)
BUN SERPL-MCNC: 31 MG/DL (ref 6–20)
CALCIUM SERPL-MCNC: 8.1 MG/DL (ref 8.6–10.4)
CALCIUM SERPL-MCNC: 8.5 MG/DL (ref 8.6–10.4)
CALCIUM SERPL-MCNC: 8.5 MG/DL (ref 8.6–10.4)
CALCIUM SERPL-MCNC: 8.7 MG/DL (ref 8.6–10.4)
CALCIUM SERPL-MCNC: 8.8 MG/DL (ref 8.6–10.4)
CHLORIDE SERPL-SCNC: 106 MMOL/L (ref 98–107)
CHLORIDE SERPL-SCNC: 107 MMOL/L (ref 98–107)
CHLORIDE SERPL-SCNC: 107 MMOL/L (ref 98–107)
CHLORIDE SERPL-SCNC: 110 MMOL/L (ref 98–107)
CHLORIDE SERPL-SCNC: 111 MMOL/L (ref 98–107)
CK SERPL-CCNC: 65 U/L (ref 26–192)
CO2 SERPL-SCNC: 11 MMOL/L (ref 20–31)
CO2 SERPL-SCNC: 14 MMOL/L (ref 20–31)
CO2 SERPL-SCNC: 15 MMOL/L (ref 20–31)
CO2 SERPL-SCNC: 15 MMOL/L (ref 20–31)
CO2 SERPL-SCNC: 17 MMOL/L (ref 20–31)
CREAT SERPL-MCNC: 0.8 MG/DL (ref 0.5–0.9)
CREAT SERPL-MCNC: 0.9 MG/DL (ref 0.5–0.9)
CREAT SERPL-MCNC: 1 MG/DL (ref 0.5–0.9)
CREAT SERPL-MCNC: 1.1 MG/DL (ref 0.5–0.9)
CREAT SERPL-MCNC: 1.2 MG/DL (ref 0.5–0.9)
CRITICAL ACTION: NORMAL
CRITICAL NOTIFICATION DATE/TIME: NORMAL
CRITICAL NOTIFICATION: NORMAL
CRITICAL VALUE READ BACK: YES
EKG ATRIAL RATE: 97 BPM
EKG P AXIS: 72 DEGREES
EKG P-R INTERVAL: 146 MS
EKG Q-T INTERVAL: 384 MS
EKG QRS DURATION: 86 MS
EKG QTC CALCULATION (BAZETT): 487 MS
EKG R AXIS: 78 DEGREES
EKG T AXIS: 60 DEGREES
EKG VENTRICULAR RATE: 97 BPM
EOSINOPHIL # BLD: 0 K/UL (ref 0–0.4)
EOSINOPHILS RELATIVE PERCENT: 0 % (ref 1–4)
ERYTHROCYTE [DISTWIDTH] IN BLOOD BY AUTOMATED COUNT: 12.9 % (ref 11.8–14.4)
EST. AVERAGE GLUCOSE BLD GHB EST-MCNC: 292 MG/DL
GFR, ESTIMATED: 55 ML/MIN/1.73M2
GFR, ESTIMATED: 62 ML/MIN/1.73M2
GFR, ESTIMATED: 69 ML/MIN/1.73M2
GFR, ESTIMATED: 75 ML/MIN/1.73M2
GFR, ESTIMATED: 85 ML/MIN/1.73M2
GLUCOSE BLD-MCNC: 132 MG/DL (ref 65–105)
GLUCOSE BLD-MCNC: 135 MG/DL (ref 65–105)
GLUCOSE BLD-MCNC: 139 MG/DL (ref 65–105)
GLUCOSE BLD-MCNC: 143 MG/DL (ref 65–105)
GLUCOSE BLD-MCNC: 150 MG/DL (ref 65–105)
GLUCOSE BLD-MCNC: 155 MG/DL (ref 65–105)
GLUCOSE BLD-MCNC: 162 MG/DL (ref 65–105)
GLUCOSE BLD-MCNC: 166 MG/DL (ref 65–105)
GLUCOSE BLD-MCNC: 169 MG/DL (ref 65–105)
GLUCOSE BLD-MCNC: 169 MG/DL (ref 65–105)
GLUCOSE BLD-MCNC: 176 MG/DL (ref 65–105)
GLUCOSE BLD-MCNC: 190 MG/DL (ref 65–105)
GLUCOSE BLD-MCNC: 200 MG/DL (ref 65–105)
GLUCOSE BLD-MCNC: 201 MG/DL (ref 65–105)
GLUCOSE BLD-MCNC: 203 MG/DL (ref 65–105)
GLUCOSE BLD-MCNC: 206 MG/DL (ref 65–105)
GLUCOSE BLD-MCNC: 217 MG/DL (ref 65–105)
GLUCOSE BLD-MCNC: 218 MG/DL (ref 65–105)
GLUCOSE BLD-MCNC: 232 MG/DL (ref 65–105)
GLUCOSE BLD-MCNC: 234 MG/DL (ref 65–105)
GLUCOSE BLD-MCNC: 234 MG/DL (ref 65–105)
GLUCOSE BLD-MCNC: 319 MG/DL (ref 65–105)
GLUCOSE BLD-MCNC: 323 MG/DL (ref 65–105)
GLUCOSE BLD-MCNC: 341 MG/DL (ref 65–105)
GLUCOSE BLD-MCNC: >600 MG/DL (ref 65–105)
GLUCOSE BLD-MCNC: >600 MG/DL (ref 65–105)
GLUCOSE SERPL-MCNC: 127 MG/DL (ref 74–99)
GLUCOSE SERPL-MCNC: 158 MG/DL (ref 74–99)
GLUCOSE SERPL-MCNC: 246 MG/DL (ref 74–99)
GLUCOSE SERPL-MCNC: 246 MG/DL (ref 74–99)
GLUCOSE SERPL-MCNC: 255 MG/DL (ref 74–99)
HBA1C MFR BLD: 11.8 % (ref 4–6)
HCO3 VENOUS: 11 MMOL/L (ref 22–29)
HCO3 VENOUS: 14.4 MMOL/L (ref 22–29)
HCO3 VENOUS: 15.4 MMOL/L (ref 22–29)
HCO3 VENOUS: 18.2 MMOL/L (ref 22–29)
HCO3 VENOUS: 18.3 MMOL/L (ref 22–29)
HCT VFR BLD AUTO: 34 % (ref 36.3–47.1)
HGB BLD-MCNC: 11 G/DL (ref 11.9–15.1)
IMM GRANULOCYTES # BLD AUTO: 0.23 K/UL (ref 0–0.3)
IMM GRANULOCYTES NFR BLD: 1 %
LACTATE BLDV-SCNC: 1.6 MMOL/L (ref 0.5–1.9)
LACTATE BLDV-SCNC: 2 MMOL/L (ref 0.5–1.9)
LACTATE BLDV-SCNC: 2.3 MMOL/L (ref 0.5–1.9)
LIPASE SERPL-CCNC: 43 U/L (ref 13–60)
LYMPHOCYTES NFR BLD: 1.13 K/UL (ref 1–4.8)
LYMPHOCYTES RELATIVE PERCENT: 5 % (ref 24–44)
MAGNESIUM SERPL-MCNC: 1.6 MG/DL (ref 1.6–2.6)
MAGNESIUM SERPL-MCNC: 1.7 MG/DL (ref 1.6–2.6)
MAGNESIUM SERPL-MCNC: 1.8 MG/DL (ref 1.6–2.6)
MAGNESIUM SERPL-MCNC: 1.9 MG/DL (ref 1.6–2.6)
MAGNESIUM SERPL-MCNC: 1.9 MG/DL (ref 1.6–2.6)
MCH RBC QN AUTO: 31 PG (ref 25.2–33.5)
MCHC RBC AUTO-ENTMCNC: 32.4 G/DL (ref 28.4–34.8)
MCV RBC AUTO: 95.8 FL (ref 82.6–102.9)
MONOCYTES NFR BLD: 0.68 K/UL (ref 0.2–0.8)
MONOCYTES NFR BLD: 3 % (ref 1–7)
MYOGLOBIN SERPL-MCNC: 46 NG/ML (ref 25–58)
NEGATIVE BASE EXCESS, VEN: 10.9 MMOL/L (ref 0–2)
NEGATIVE BASE EXCESS, VEN: 16 MMOL/L (ref 0–2)
NEGATIVE BASE EXCESS, VEN: 7.6 MMOL/L (ref 0–2)
NEGATIVE BASE EXCESS, VEN: 8.4 MMOL/L (ref 0–2)
NEGATIVE BASE EXCESS, VEN: 9.9 MMOL/L (ref 0–2)
NEUTROPHILS NFR BLD: 91 % (ref 36–66)
NEUTS SEG NFR BLD: 20.46 K/UL (ref 1.8–7.7)
NRBC BLD-RTO: 0 PER 100 WBC
O2 SAT, VEN: 60.9 % (ref 60–85)
O2 SAT, VEN: 80.3 % (ref 60–85)
O2 SAT, VEN: 86.9 % (ref 60–85)
O2 SAT, VEN: 93.9 % (ref 60–85)
O2 SAT, VEN: 99.6 % (ref 60–85)
PCO2 VENOUS: 29.5 MM HG (ref 41–51)
PCO2 VENOUS: 29.7 MM HG (ref 41–51)
PCO2 VENOUS: 31.2 MM HG (ref 41–51)
PCO2 VENOUS: 37 MM HG (ref 41–51)
PCO2 VENOUS: 41.2 MM HG (ref 41–51)
PH VENOUS: 7.18 (ref 7.32–7.43)
PH VENOUS: 7.25 (ref 7.32–7.43)
PH VENOUS: 7.3 (ref 7.32–7.43)
PHOSPHATE SERPL-MCNC: 1.7 MG/DL (ref 2.5–4.5)
PHOSPHATE SERPL-MCNC: 1.8 MG/DL (ref 2.5–4.5)
PHOSPHATE SERPL-MCNC: 2.2 MG/DL (ref 2.5–4.5)
PHOSPHATE SERPL-MCNC: 2.2 MG/DL (ref 2.5–4.5)
PHOSPHATE SERPL-MCNC: 2.7 MG/DL (ref 2.5–4.5)
PHOSPHATE SERPL-MCNC: 2.8 MG/DL (ref 2.5–4.5)
PLATELET # BLD AUTO: 279 K/UL (ref 138–453)
PMV BLD AUTO: 11.1 FL (ref 8.1–13.5)
PO2 VENOUS: 200 MM HG (ref 30–50)
PO2 VENOUS: 34.7 MM HG (ref 30–50)
PO2 VENOUS: 54.6 MM HG (ref 30–50)
PO2 VENOUS: 57.2 MM HG (ref 30–50)
PO2 VENOUS: 81.2 MM HG (ref 30–50)
POTASSIUM SERPL-SCNC: 3.9 MMOL/L (ref 3.7–5.3)
POTASSIUM SERPL-SCNC: 4.3 MMOL/L (ref 3.7–5.3)
POTASSIUM SERPL-SCNC: 4.3 MMOL/L (ref 3.7–5.3)
POTASSIUM SERPL-SCNC: 4.5 MMOL/L (ref 3.7–5.3)
POTASSIUM SERPL-SCNC: 4.7 MMOL/L (ref 3.7–5.3)
PROT SERPL-MCNC: 6.8 G/DL (ref 6.6–8.7)
RBC # BLD AUTO: 3.55 M/UL (ref 3.95–5.11)
SODIUM SERPL-SCNC: 136 MMOL/L (ref 136–145)
SODIUM SERPL-SCNC: 136 MMOL/L (ref 136–145)
SODIUM SERPL-SCNC: 138 MMOL/L (ref 136–145)
SODIUM SERPL-SCNC: 140 MMOL/L (ref 136–145)
SODIUM SERPL-SCNC: 140 MMOL/L (ref 136–145)
TRIGL SERPL-MCNC: 167 MG/DL
WBC OTHER # BLD: 22.5 K/UL (ref 3.5–11.3)

## 2025-01-10 PROCEDURE — 82803 BLOOD GASES ANY COMBINATION: CPT

## 2025-01-10 PROCEDURE — 2580000003 HC RX 258: Performed by: NURSE PRACTITIONER

## 2025-01-10 PROCEDURE — 85025 COMPLETE CBC W/AUTO DIFF WBC: CPT

## 2025-01-10 PROCEDURE — 2500000003 HC RX 250 WO HCPCS

## 2025-01-10 PROCEDURE — 84100 ASSAY OF PHOSPHORUS: CPT

## 2025-01-10 PROCEDURE — 80048 BASIC METABOLIC PNL TOTAL CA: CPT

## 2025-01-10 PROCEDURE — 36415 COLL VENOUS BLD VENIPUNCTURE: CPT

## 2025-01-10 PROCEDURE — 83874 ASSAY OF MYOGLOBIN: CPT

## 2025-01-10 PROCEDURE — 2500000003 HC RX 250 WO HCPCS: Performed by: NURSE PRACTITIONER

## 2025-01-10 PROCEDURE — 80053 COMPREHEN METABOLIC PANEL: CPT

## 2025-01-10 PROCEDURE — 2000000000 HC ICU R&B

## 2025-01-10 PROCEDURE — 96366 THER/PROPH/DIAG IV INF ADDON: CPT

## 2025-01-10 PROCEDURE — 84478 ASSAY OF TRIGLYCERIDES: CPT

## 2025-01-10 PROCEDURE — 6360000002 HC RX W HCPCS: Performed by: NURSE PRACTITIONER

## 2025-01-10 PROCEDURE — 74176 CT ABD & PELVIS W/O CONTRAST: CPT

## 2025-01-10 PROCEDURE — 83690 ASSAY OF LIPASE: CPT

## 2025-01-10 PROCEDURE — 96372 THER/PROPH/DIAG INJ SC/IM: CPT

## 2025-01-10 PROCEDURE — 83735 ASSAY OF MAGNESIUM: CPT

## 2025-01-10 PROCEDURE — 99223 1ST HOSP IP/OBS HIGH 75: CPT | Performed by: INTERNAL MEDICINE

## 2025-01-10 PROCEDURE — 82550 ASSAY OF CK (CPK): CPT

## 2025-01-10 PROCEDURE — 2580000003 HC RX 258: Performed by: EMERGENCY MEDICINE

## 2025-01-10 PROCEDURE — 2580000003 HC RX 258

## 2025-01-10 PROCEDURE — 87040 BLOOD CULTURE FOR BACTERIA: CPT

## 2025-01-10 PROCEDURE — APPSS45 APP SPLIT SHARED TIME 31-45 MINUTES

## 2025-01-10 PROCEDURE — 82947 ASSAY GLUCOSE BLOOD QUANT: CPT

## 2025-01-10 PROCEDURE — 96375 TX/PRO/DX INJ NEW DRUG ADDON: CPT

## 2025-01-10 PROCEDURE — 71250 CT THORAX DX C-: CPT

## 2025-01-10 PROCEDURE — 96376 TX/PRO/DX INJ SAME DRUG ADON: CPT

## 2025-01-10 PROCEDURE — 6370000000 HC RX 637 (ALT 250 FOR IP): Performed by: NURSE PRACTITIONER

## 2025-01-10 PROCEDURE — 6360000002 HC RX W HCPCS

## 2025-01-10 PROCEDURE — 6360000002 HC RX W HCPCS: Performed by: INTERNAL MEDICINE

## 2025-01-10 PROCEDURE — 94761 N-INVAS EAR/PLS OXIMETRY MLT: CPT

## 2025-01-10 PROCEDURE — 83605 ASSAY OF LACTIC ACID: CPT

## 2025-01-10 RX ORDER — SERTRALINE HYDROCHLORIDE 25 MG/1
25 TABLET, FILM COATED ORAL DAILY
COMMUNITY

## 2025-01-10 RX ORDER — METOCLOPRAMIDE HYDROCHLORIDE 5 MG/ML
10 INJECTION INTRAMUSCULAR; INTRAVENOUS EVERY 6 HOURS
Status: DISCONTINUED | OUTPATIENT
Start: 2025-01-10 | End: 2025-01-10

## 2025-01-10 RX ORDER — PROCHLORPERAZINE EDISYLATE 5 MG/ML
10 INJECTION INTRAMUSCULAR; INTRAVENOUS EVERY 6 HOURS PRN
Status: DISCONTINUED | OUTPATIENT
Start: 2025-01-10 | End: 2025-01-16 | Stop reason: HOSPADM

## 2025-01-10 RX ORDER — METOCLOPRAMIDE HYDROCHLORIDE 5 MG/ML
5 INJECTION INTRAMUSCULAR; INTRAVENOUS EVERY 6 HOURS
Status: DISCONTINUED | OUTPATIENT
Start: 2025-01-10 | End: 2025-01-11

## 2025-01-10 RX ORDER — SODIUM CHLORIDE 0.9 % (FLUSH) 0.9 %
5-40 SYRINGE (ML) INJECTION PRN
Status: DISCONTINUED | OUTPATIENT
Start: 2025-01-10 | End: 2025-01-16 | Stop reason: HOSPADM

## 2025-01-10 RX ORDER — ONDANSETRON 4 MG/1
4 TABLET, ORALLY DISINTEGRATING ORAL EVERY 8 HOURS PRN
Status: DISCONTINUED | OUTPATIENT
Start: 2025-01-10 | End: 2025-01-16 | Stop reason: HOSPADM

## 2025-01-10 RX ORDER — POLYETHYLENE GLYCOL 3350 17 G/17G
17 POWDER, FOR SOLUTION ORAL DAILY PRN
Status: DISCONTINUED | OUTPATIENT
Start: 2025-01-10 | End: 2025-01-16 | Stop reason: HOSPADM

## 2025-01-10 RX ORDER — FENTANYL CITRATE 0.05 MG/ML
50 INJECTION, SOLUTION INTRAMUSCULAR; INTRAVENOUS
Status: DISCONTINUED | OUTPATIENT
Start: 2025-01-10 | End: 2025-01-11

## 2025-01-10 RX ORDER — SODIUM CHLORIDE 0.9 % (FLUSH) 0.9 %
5-40 SYRINGE (ML) INJECTION EVERY 12 HOURS SCHEDULED
Status: DISCONTINUED | OUTPATIENT
Start: 2025-01-10 | End: 2025-01-16 | Stop reason: HOSPADM

## 2025-01-10 RX ORDER — ONDANSETRON 2 MG/ML
4 INJECTION INTRAMUSCULAR; INTRAVENOUS EVERY 6 HOURS PRN
Status: DISCONTINUED | OUTPATIENT
Start: 2025-01-10 | End: 2025-01-16 | Stop reason: HOSPADM

## 2025-01-10 RX ORDER — VANCOMYCIN 1 G/200ML
25 INJECTION, SOLUTION INTRAVENOUS ONCE
Status: DISCONTINUED | OUTPATIENT
Start: 2025-01-10 | End: 2025-01-10 | Stop reason: SDUPTHER

## 2025-01-10 RX ORDER — ACETAMINOPHEN 325 MG/1
650 TABLET ORAL EVERY 6 HOURS PRN
Status: DISCONTINUED | OUTPATIENT
Start: 2025-01-10 | End: 2025-01-16 | Stop reason: HOSPADM

## 2025-01-10 RX ORDER — SODIUM CHLORIDE 9 MG/ML
INJECTION, SOLUTION INTRAVENOUS PRN
Status: DISCONTINUED | OUTPATIENT
Start: 2025-01-10 | End: 2025-01-16 | Stop reason: HOSPADM

## 2025-01-10 RX ORDER — BISACODYL 10 MG
10 SUPPOSITORY, RECTAL RECTAL DAILY PRN
Status: DISCONTINUED | OUTPATIENT
Start: 2025-01-10 | End: 2025-01-16 | Stop reason: HOSPADM

## 2025-01-10 RX ORDER — ENOXAPARIN SODIUM 100 MG/ML
30 INJECTION SUBCUTANEOUS DAILY
Status: DISCONTINUED | OUTPATIENT
Start: 2025-01-10 | End: 2025-01-12

## 2025-01-10 RX ORDER — ACETAMINOPHEN 650 MG/1
650 SUPPOSITORY RECTAL EVERY 6 HOURS PRN
Status: DISCONTINUED | OUTPATIENT
Start: 2025-01-10 | End: 2025-01-16 | Stop reason: HOSPADM

## 2025-01-10 RX ORDER — ASPIRIN 81 MG/1
81 TABLET ORAL DAILY
COMMUNITY

## 2025-01-10 RX ADMIN — FENTANYL CITRATE 50 MCG: 0.05 INJECTION, SOLUTION INTRAMUSCULAR; INTRAVENOUS at 10:15

## 2025-01-10 RX ADMIN — SODIUM BICARBONATE 50 MEQ: 84 INJECTION INTRAVENOUS at 04:07

## 2025-01-10 RX ADMIN — WATER 1000 MG: 1 INJECTION INTRAMUSCULAR; INTRAVENOUS; SUBCUTANEOUS at 01:18

## 2025-01-10 RX ADMIN — PROCHLORPERAZINE EDISYLATE 10 MG: 5 INJECTION INTRAMUSCULAR; INTRAVENOUS at 15:38

## 2025-01-10 RX ADMIN — MAGNESIUM SULFATE HEPTAHYDRATE 2000 MG: 40 INJECTION, SOLUTION INTRAVENOUS at 11:35

## 2025-01-10 RX ADMIN — ONDANSETRON 4 MG: 2 INJECTION INTRAMUSCULAR; INTRAVENOUS at 00:43

## 2025-01-10 RX ADMIN — POTASSIUM CHLORIDE 10 MEQ: 7.46 INJECTION, SOLUTION INTRAVENOUS at 04:22

## 2025-01-10 RX ADMIN — POTASSIUM CHLORIDE 10 MEQ: 7.46 INJECTION, SOLUTION INTRAVENOUS at 16:24

## 2025-01-10 RX ADMIN — POTASSIUM CHLORIDE 10 MEQ: 7.46 INJECTION, SOLUTION INTRAVENOUS at 02:32

## 2025-01-10 RX ADMIN — FENTANYL CITRATE 50 MCG: 0.05 INJECTION, SOLUTION INTRAMUSCULAR; INTRAVENOUS at 19:43

## 2025-01-10 RX ADMIN — SODIUM PHOSPHATE, MONOBASIC, MONOHYDRATE AND SODIUM PHOSPHATE, DIBASIC, ANHYDROUS 15 MMOL: 142; 276 INJECTION, SOLUTION INTRAVENOUS at 16:30

## 2025-01-10 RX ADMIN — SODIUM CHLORIDE 5.2 UNITS/HR: 9 INJECTION, SOLUTION INTRAVENOUS at 00:35

## 2025-01-10 RX ADMIN — DEXTROSE AND SODIUM CHLORIDE: 5; 450 INJECTION, SOLUTION INTRAVENOUS at 22:43

## 2025-01-10 RX ADMIN — POTASSIUM CHLORIDE 10 MEQ: 7.46 INJECTION, SOLUTION INTRAVENOUS at 11:40

## 2025-01-10 RX ADMIN — SODIUM CHLORIDE: 9 INJECTION, SOLUTION INTRAVENOUS at 00:40

## 2025-01-10 RX ADMIN — POTASSIUM CHLORIDE 10 MEQ: 7.46 INJECTION, SOLUTION INTRAVENOUS at 12:29

## 2025-01-10 RX ADMIN — METOCLOPRAMIDE HYDROCHLORIDE 10 MG: 5 INJECTION INTRAMUSCULAR; INTRAVENOUS at 05:50

## 2025-01-10 RX ADMIN — SODIUM CHLORIDE, PRESERVATIVE FREE 10 ML: 5 INJECTION INTRAVENOUS at 07:06

## 2025-01-10 RX ADMIN — METOCLOPRAMIDE HYDROCHLORIDE 10 MG: 5 INJECTION INTRAMUSCULAR; INTRAVENOUS at 12:18

## 2025-01-10 RX ADMIN — FENTANYL CITRATE 50 MCG: 0.05 INJECTION, SOLUTION INTRAMUSCULAR; INTRAVENOUS at 15:56

## 2025-01-10 RX ADMIN — POTASSIUM CHLORIDE 10 MEQ: 7.46 INJECTION, SOLUTION INTRAVENOUS at 01:31

## 2025-01-10 RX ADMIN — METOCLOPRAMIDE HYDROCHLORIDE 5 MG: 5 INJECTION INTRAMUSCULAR; INTRAVENOUS at 18:10

## 2025-01-10 RX ADMIN — POTASSIUM CHLORIDE 10 MEQ: 7.46 INJECTION, SOLUTION INTRAVENOUS at 05:51

## 2025-01-10 RX ADMIN — FENTANYL CITRATE 50 MCG: 0.05 INJECTION, SOLUTION INTRAMUSCULAR; INTRAVENOUS at 02:22

## 2025-01-10 RX ADMIN — DEXTROSE AND SODIUM CHLORIDE: 5; 450 INJECTION, SOLUTION INTRAVENOUS at 15:36

## 2025-01-10 RX ADMIN — SODIUM PHOSPHATE, MONOBASIC, MONOHYDRATE AND SODIUM PHOSPHATE, DIBASIC, ANHYDROUS 15 MMOL: 142; 276 INJECTION, SOLUTION INTRAVENOUS at 08:22

## 2025-01-10 RX ADMIN — FENTANYL CITRATE 50 MCG: 0.05 INJECTION, SOLUTION INTRAMUSCULAR; INTRAVENOUS at 07:50

## 2025-01-10 RX ADMIN — DEXTROSE AND SODIUM CHLORIDE: 5; 450 INJECTION, SOLUTION INTRAVENOUS at 01:30

## 2025-01-10 RX ADMIN — ONDANSETRON 4 MG: 2 INJECTION, SOLUTION INTRAMUSCULAR; INTRAVENOUS at 00:43

## 2025-01-10 RX ADMIN — POTASSIUM CHLORIDE 10 MEQ: 7.46 INJECTION, SOLUTION INTRAVENOUS at 09:05

## 2025-01-10 RX ADMIN — POTASSIUM CHLORIDE 10 MEQ: 7.46 INJECTION, SOLUTION INTRAVENOUS at 17:25

## 2025-01-10 RX ADMIN — POTASSIUM CHLORIDE 10 MEQ: 7.46 INJECTION, SOLUTION INTRAVENOUS at 20:46

## 2025-01-10 RX ADMIN — SODIUM CHLORIDE, PRESERVATIVE FREE 10 ML: 5 INJECTION INTRAVENOUS at 19:44

## 2025-01-10 RX ADMIN — ONDANSETRON 4 MG: 2 INJECTION, SOLUTION INTRAMUSCULAR; INTRAVENOUS at 07:03

## 2025-01-10 RX ADMIN — POTASSIUM CHLORIDE 10 MEQ: 7.46 INJECTION, SOLUTION INTRAVENOUS at 00:38

## 2025-01-10 RX ADMIN — POTASSIUM CHLORIDE 10 MEQ: 7.46 INJECTION, SOLUTION INTRAVENOUS at 22:52

## 2025-01-10 RX ADMIN — METOCLOPRAMIDE HYDROCHLORIDE 10 MG: 5 INJECTION INTRAMUSCULAR; INTRAVENOUS at 00:35

## 2025-01-10 RX ADMIN — ENOXAPARIN SODIUM 30 MG: 100 INJECTION SUBCUTANEOUS at 09:01

## 2025-01-10 RX ADMIN — DEXTROSE AND SODIUM CHLORIDE: 5; 450 INJECTION, SOLUTION INTRAVENOUS at 08:08

## 2025-01-10 RX ADMIN — SODIUM PHOSPHATE, MONOBASIC, MONOHYDRATE AND SODIUM PHOSPHATE, DIBASIC, ANHYDROUS 15 MMOL: 142; 276 INJECTION, SOLUTION INTRAVENOUS at 21:01

## 2025-01-10 RX ADMIN — SODIUM PHOSPHATE, MONOBASIC, MONOHYDRATE AND SODIUM PHOSPHATE, DIBASIC, ANHYDROUS 15 MMOL: 142; 276 INJECTION, SOLUTION INTRAVENOUS at 04:44

## 2025-01-10 RX ADMIN — VANCOMYCIN HYDROCHLORIDE 1000 MG: 1 INJECTION, POWDER, LYOPHILIZED, FOR SOLUTION INTRAVENOUS at 05:59

## 2025-01-10 RX ADMIN — POTASSIUM CHLORIDE 10 MEQ: 7.46 INJECTION, SOLUTION INTRAVENOUS at 21:47

## 2025-01-10 RX ADMIN — POTASSIUM CHLORIDE 10 MEQ: 7.46 INJECTION, SOLUTION INTRAVENOUS at 08:04

## 2025-01-10 ASSESSMENT — PAIN SCALES - GENERAL
PAINLEVEL_OUTOF10: 9
PAINLEVEL_OUTOF10: 0
PAINLEVEL_OUTOF10: 7
PAINLEVEL_OUTOF10: 3
PAINLEVEL_OUTOF10: 0
PAINLEVEL_OUTOF10: 10
PAINLEVEL_OUTOF10: 7
PAINLEVEL_OUTOF10: 7
PAINLEVEL_OUTOF10: 0
PAINLEVEL_OUTOF10: 0

## 2025-01-10 ASSESSMENT — PAIN - FUNCTIONAL ASSESSMENT
PAIN_FUNCTIONAL_ASSESSMENT: ACTIVITIES ARE NOT PREVENTED

## 2025-01-10 ASSESSMENT — PAIN DESCRIPTION - LOCATION
LOCATION: ABDOMEN

## 2025-01-10 ASSESSMENT — PAIN DESCRIPTION - ORIENTATION
ORIENTATION: MID
ORIENTATION: MID;UPPER

## 2025-01-10 ASSESSMENT — PAIN DESCRIPTION - DESCRIPTORS
DESCRIPTORS: ACHING;CRAMPING
DESCRIPTORS: CRAMPING;ACHING
DESCRIPTORS: ACHING;CRAMPING

## 2025-01-10 ASSESSMENT — PAIN DESCRIPTION - PAIN TYPE: TYPE: ACUTE PAIN

## 2025-01-10 NOTE — CONSULTS
[facility]   Pharmacy Pharmacokinetic Monitoring Service - Vancomycin     Batsheva Gonzalez is a 54 y.o. female starting on vancomycin therapy for sepsis/intra-abdominal infection. Pharmacy consulted by Karey Cordova CNP for monitoring and adjustment.    Target Concentration: Goal AUC/BOZENA 400-600 mg*hr/L    Additional Antimicrobials: rocephin    Pertinent Laboratory Values:   Wt Readings from Last 1 Encounters:   01/10/25 44.2 kg (97 lb 7.1 oz)     Temp Readings from Last 1 Encounters:   01/10/25 97.7 °F (36.5 °C) (Axillary)     Estimated Creatinine Clearance: 37 mL/min (A) (based on SCr of 1.2 mg/dL (H)).  Recent Labs     01/09/25  1815 01/09/25  2335 01/10/25  0313   CREATININE 1.7* 1.3* 1.2*   BUN 37* 33* 31*   WBC 15.8*  --  22.5*     Procalcitonin: none    Pertinent Cultures:  Culture Date Source Results   1/10/25 Blood x 2, urine Lab ordered   MRSA Nasal Swab: N/A. Non-respiratory infection.    Plan:  Dosing recommendations based on Bayesian software  Start vancomycin 1000mg q24h   Anticipated AUC of 565 and trough concentration of 14.4 at steady state  Renal labs as indicated   Vancomycin concentration ordered for 1/11/25 @ 1800   Pharmacy will continue to monitor patient and adjust therapy as indicated    Thank you for the consult,  Jose G Zimmerman RPH  1/10/2025 5:18 AM

## 2025-01-10 NOTE — PROGRESS NOTES
PT tx with mobile ICU from [facility]. Med rec done with daughter. Patient has been vomiting brown since arrival. She received 4mg of zofran at [facility], orders received for Reglan

## 2025-01-10 NOTE — CARE COORDINATION
Case Management Assessment  Initial Evaluation    Date/Time of Evaluation: 1/10/2025 12:28 PM  Assessment Completed by: Haleigh Mooney    If patient is discharged prior to next notation, then this note serves as note for discharge by case management.    Patient Name: Batsheva Gonzalez                   YOB: 1970  Diagnosis: DKA, type 1, not at goal (HCC) [E10.10]  Type 1 diabetes mellitus with ketoacidosis without coma (HCC) [E10.10]  DKA, type 2, not at goal (HCC) [E11.10]                   Date / Time: 1/9/2025  5:51 PM    Patient Admission Status: Observation   Readmission Risk (Low < 19, Mod (19-27), High > 27): No data recorded  Current PCP: Sherrell Carter MD  PCP verified by CM? Yes    Chart Reviewed: Yes      History Provided by: Patient  Patient Orientation: Alert and Oriented    Patient Cognition: Alert    Hospitalization in the last 30 days (Readmission):  No    If yes, Readmission Assessment in CM Navigator will be completed.    Advance Directives:      Code Status: Full Code   Patient's Primary Decision Maker is: Legal Next of Kin      Discharge Planning:    Patient lives with: (P) Alone Type of Home: (P) Trailer/Mobile Home  Primary Care Giver: Self  Patient Support Systems include: Children   Current Financial resources: (P) Medicaid  Current community resources:    Current services prior to admission: (P) None            Current DME:              Type of Home Care services:  (P) None    ADLS  Prior functional level: Independent in ADLs/IADLs  Current functional level: Independent in ADLs/IADLs    PT AM-PAC:   /24  OT AM-PAC:   /24    Family can provide assistance at DC: No  Would you like Case Management to discuss the discharge plan with any other family members/significant others, and if so, who? No  Plans to Return to Present Housing: Yes  Other Identified Issues/Barriers to RETURNING to current housing: medical complications, insurance denying insulin pump  Potential Assistance needed at

## 2025-01-10 NOTE — ED PROVIDER NOTES
ADDENDUM:      Care of this patient was assumed from Dr. Alejo.  The patient was seen for Altered Mental Status (PT lives by herself, daughter checks on her every day. Pt daughter states she does not know when pt took her medication. Pt NEEDS  for consultation for self care at home. )  .  The patient's initial evaluation and plan have been discussed with the prior provider who initially evaluated the patient.  Nursing Notes, Past Medical Hx, Past Surgical Hx, Social Hx, Allergies, and Family Hx were all reviewed.      Diagnostic Results       RADIOLOGY:   Non-plain film images such as CT, Ultrasound and MRI are read by the radiologist. Plain radiographic images are visualized and the radiologist interpretations are reviewed as follows:     XR CHEST PORTABLE    Result Date: 1/9/2025  EXAMINATION: ONE XRAY VIEW OF THE CHEST 1/9/2025 8:35 pm COMPARISON: November 24, 2020 HISTORY: ORDERING SYSTEM PROVIDED HISTORY: ALTERED MENTAL STATUS TECHNOLOGIST PROVIDED HISTORY: ALTERED MENTAL STATUS Reason for Exam: Altered mental status FINDINGS: The lungs are without acute focal process.  There is no effusion or pneumothorax. The cardiomediastinal silhouette is without acute process. The osseous structures are without acute process.     No acute process.      LABS:   Results for orders placed or performed during the hospital encounter of 01/09/25   BMP   Result Value Ref Range    Sodium 123 (L) 136 - 145 mmol/L    Potassium 4.9 3.7 - 5.3 mmol/L    Chloride 81 (L) 98 - 107 mmol/L    CO2 8 (LL) 20 - 31 mmol/L    Anion Gap 34 (H) 9 - 16 mmol/L    Glucose 837 (HH) 74 - 99 mg/dL    BUN 37 (H) 6 - 20 mg/dL    Creatinine 1.7 (H) 0.5 - 0.9 mg/dL    Est, Glom Filt Rate 35 (L) >60 mL/min/1.73m2    Calcium 10.7 (H) 8.6 - 10.4 mg/dL   CBC with Auto Differential   Result Value Ref Range    WBC 15.8 (H) 3.5 - 11.0 k/uL    RBC 4.20 4.0 - 5.2 m/uL    Hemoglobin 12.9 12.0 - 16.0 g/dL    Hematocrit 41.1 36 - 46 %    MCV 97.9 80 -  MEDICATIONS:  New Prescriptions    No medications on file             (Please note that portions of this note were completed with a voice recognition program.  Efforts were made to edit the dictations but occasionally words are mis-transcribed.)    Yakov Lane MD,, MD, F.A.C.E.P.  Attending Emergency Medicine Physician                 Yakov Lane MD  01/09/25 [id number]

## 2025-01-10 NOTE — ED NOTES
Purwick put into place. Complete linen change and adult brief change. Pt repositioned for comfort.

## 2025-01-10 NOTE — PROGRESS NOTES
End Of Shift Note  [facility]    Summary of shift: Pt had 3 bouts of nausea and abdominal pain today which were unresolved with zofran and compazine. The PRN fentanyl did help the pain and nausea. She remains on the insulin drip. Labs are normalizing, however her bicarb is 14. She is overall feeling better. She had 250 ml in purwick and a saturated brief.    Vitals:    Vitals:    01/10/25 1600 01/10/25 1626 01/10/25 1700 01/10/25 1800   BP: 127/71  123/76 (!) 148/76   Pulse: 84  67 63   Resp: 18 14 14 15   Temp: 98 °F (36.7 °C)      TempSrc: Oral      SpO2: 100%  99% 99%   Weight:       Height:            I&O:   Intake/Output Summary (Last 24 hours) at 1/10/2025 1828  Last data filed at 1/10/2025 1725  Gross per 24 hour   Intake 6100.48 ml   Output 250 ml   Net 5850.48 ml       Resp Status: Room air      Critical Care IV infusions:   sodium chloride      insulin Stopped (01/10/25 1809)    sodium chloride Stopped (01/10/25 0129)    dextrose 5 % and 0.45 % NaCl 150 mL/hr at 01/10/25 1536    sodium chloride Stopped (01/10/25 0047)        LDA:   Peripheral IV 01/09/25 Proximal;Right;Anterior Forearm (Active)   Number of days: 1       Peripheral IV 01/09/25 Left Antecubital (Active)   Number of days: 1       Peripheral IV 01/10/25 Distal;Posterior;Right Forearm (Active)   Number of days: 0       External Urinary Catheter (Active)   Number of days: 0

## 2025-01-10 NOTE — PROGRESS NOTES
Writer unable to complete admission database, Patient is not alert enough. Will attempt again if patient becomes more alert

## 2025-01-10 NOTE — PROGRESS NOTES
Pharmacy Medication Review    The patient's list of current home medications has been reviewed.     PHYSICIANS AND NURSE PRACTITIONERS: please note there is no Transitions of Care/Med Rec Pharmacist available to address any discrepancies on inpatient orders. It is the responsibility of the attending provider to review the updates made to the home med list and adjust inpatient orders as appropriate.        Source(s) of information:Care Everywhere, [facility] refill report and [facility] pharmacy        Based on information provided by the above source(s), I have updated the patient's home med list as described below.     Removed   glipiZIDE (GLUCOTROL XL) 10 MG extended release tablet   atorvastatin (LIPITOR) 40 MG tablet   JANUVIA 50 MG tablet   lisinopril (PRINIVIL;ZESTRIL) 20 MG tablet   Multiple Vitamins-Iron (TAB-A-ANCA/IRON/BETA CAROTENE)    meloxicam (MOBIC) 15 MG tablet   levothyroxine (SYNTHROID) 25 MCG tablet   gabapentin (NEURONTIN) 600 MG tablet   SUMAtriptan (IMITREX) 50 MG tablet   ibuprofen (ADVIL;MOTRIN) 200 MG tablet   vitamin D (ERGOCALCIFEROL) 1.25 MG (49226 UT) CAPS    metFORMIN (GLUCOPHAGE) 500 MG tablet      Added None      Adjusted   HUMALOG KWIKPEN 100 UNIT/ML SOPN  insulin glargine (BASAGLAR KWIKPEN) 100 UNIT/ML injection pen     Other notes:   None    Are any of the medications noted above considered a 'high alert' medication? YES      Is the patient on warfarin at home? No          Please feel free to call me with any questions about this encounter. Thank you.      Gianni Cody, pharmacy technician  [facility]  Phone:  [phone]      Electronically signed by Gianni Cody on 1/10/2025 at 5:46 PM   Note: co-signature by the pharmacist only acknowledges that I have performed a medication review and does not attest to an evaluation of this medication review.        Prior to Admission medications    Medication Sig   sertraline (ZOLOFT) 25 MG tablet Take 1 tablet by

## 2025-01-10 NOTE — ED PROVIDER NOTES
[facility] EM[facility]Y DEPARTMENT  EMERGENCY DEPARTMENT ENCOUNTER      Pt Name: Batsheva Gonzalez  MRN: [id number]  Birthdate 1970  Date of evaluation: 1/9/2025  Provider: Jeff Alejo MD    CHIEF COMPLAINT     Chief Complaint   Patient presents with   • Altered Mental Status     PT lives by herself, daughter checks on her every day. Pt daughter states she does not know when pt took her medication. Pt NEEDS  for consultation for self care at home.          HISTORY OF PRESENT ILLNESS   (Location/Symptom, Timing/Onset, Context/Setting,Quality, Duration, Modifying Factors, Severity)  Note limiting factors.   Batsheva Gonzalez is a 54 y.o. female who presents to the emergency department for evaluation of vomiting.  Patient's daughter provides the history.  Patient is diabetic.  Last night when her daughter checked up on her around 8 PM the patient was sleeping.  No one has been able to get a hold of the patient today via telephone or text.  Patient lives alone.  This evening when her daughter went to see her she found the patient lying on the floor and vomiting.  Patient's last episode of diabetic ketoacidosis was 1 year ago.    HPI    Nursing Notes werereviewed.    REVIEW OF SYSTEMS    (2-9 systems for level 4, 10 or more for level 5)     Review of Systems   Unable to perform ROS: Acuity of condition       Except as noted above the remainder of the review of systems was reviewed and negative.       PAST MEDICAL HISTORY     Past Medical History:   Diagnosis Date   • Adhesive capsulitis    • Anxiety    • Arthritis    • Depression    • Diabetes mellitus (HCC)     takes insulin (Dr. 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    [employer] [place] [facility] - Occupational Stress Questionnaire    • Feeling of Stress : Very much   Social Connections: Unknown (4/7/2022)    Received from [facility]    Social Connection and Isolation Panel [NHANES]    • Frequency of Communication with Friends and Family: More than three times a week    • Frequency of Social Gatherings with Friends and Family: More than three times a week    • Attends Hindu Services: Patient declined    • Active Member of Clubs or Organizations: No    • Attends Club or Organization Meetings: Never    • Marital Status:     Received from [facility]   Housing Stability: Low Risk  (10/29/2024)    [facility]    Housing Instability    • Are you worried or concerned that in the next two months you may not have stable housing that you own, rent or stay in as a part of a household?: No       SCREENINGS             PHYSICAL EXAM    (up to 7 for level 4, 8 or more for level 5)     ED Triage Vitals   BP Systolic BP Percentile Diastolic BP Percentile Temp Temp Source Pulse Respirations SpO2   01/09/25 1810 -- -- 01/09/25 1810 01/09/25 1810 01/09/25 1810 01/09/25 1810 01/09/25 1810   (!) 180/76   97.4 °F (36.3 °C) Axillary 98 22 100 %      Height Weight - Scale         -- 01/09/25 1812          47.6 kg (105 lb)             Physical Exam  Constitutional:       Appearance: She is ill-appearing.   HENT:      Head: Atraumatic.      Comments: Oral cavity is dry.  Eyes:      Extraocular Movements: Extraocular movements intact.   Cardiovascular:      Rate and Rhythm: Regular rhythm. Tachycardia present.   Pulmonary:      Effort: Pulmonary effort is normal.      Breath sounds: Normal breath sounds.   Abdominal:      Palpations: Abdomen is soft.      Tenderness: There is no

## 2025-01-10 NOTE — ED NOTES
Pt repositioned for comfort. Small amount clear yellow urine specimen obtained via purwick and sent to lab. Pt was incontinent of urine x2 prior to purwick placement

## 2025-01-11 LAB
ANION GAP SERPL CALCULATED.3IONS-SCNC: 10 MMOL/L (ref 9–16)
ANION GAP SERPL CALCULATED.3IONS-SCNC: 13 MMOL/L (ref 9–16)
BUN SERPL-MCNC: 7 MG/DL (ref 6–20)
BUN SERPL-MCNC: 9 MG/DL (ref 6–20)
CALCIUM SERPL-MCNC: 8.3 MG/DL (ref 8.6–10.4)
CALCIUM SERPL-MCNC: 8.4 MG/DL (ref 8.6–10.4)
CHLORIDE SERPL-SCNC: 108 MMOL/L (ref 98–107)
CHLORIDE SERPL-SCNC: 112 MMOL/L (ref 98–107)
CO2 SERPL-SCNC: 16 MMOL/L (ref 20–31)
CO2 SERPL-SCNC: 17 MMOL/L (ref 20–31)
CREAT SERPL-MCNC: 0.8 MG/DL (ref 0.5–0.9)
CREAT SERPL-MCNC: 0.8 MG/DL (ref 0.5–0.9)
GFR, ESTIMATED: 87 ML/MIN/1.73M2
GFR, ESTIMATED: 90 ML/MIN/1.73M2
GLUCOSE BLD-MCNC: 116 MG/DL (ref 65–105)
GLUCOSE BLD-MCNC: 120 MG/DL (ref 65–105)
GLUCOSE BLD-MCNC: 133 MG/DL (ref 65–105)
GLUCOSE BLD-MCNC: 149 MG/DL (ref 65–105)
GLUCOSE BLD-MCNC: 153 MG/DL (ref 65–105)
GLUCOSE BLD-MCNC: 155 MG/DL (ref 65–105)
GLUCOSE BLD-MCNC: 159 MG/DL (ref 65–105)
GLUCOSE BLD-MCNC: 171 MG/DL (ref 65–105)
GLUCOSE BLD-MCNC: 174 MG/DL (ref 65–105)
GLUCOSE BLD-MCNC: 175 MG/DL (ref 65–105)
GLUCOSE BLD-MCNC: 188 MG/DL (ref 65–105)
GLUCOSE BLD-MCNC: 200 MG/DL (ref 65–105)
GLUCOSE BLD-MCNC: 210 MG/DL (ref 65–105)
GLUCOSE BLD-MCNC: 223 MG/DL (ref 65–105)
GLUCOSE BLD-MCNC: 236 MG/DL (ref 65–105)
GLUCOSE SERPL-MCNC: 162 MG/DL (ref 74–99)
GLUCOSE SERPL-MCNC: 199 MG/DL (ref 74–99)
MAGNESIUM SERPL-MCNC: 1.6 MG/DL (ref 1.6–2.6)
MAGNESIUM SERPL-MCNC: 2.3 MG/DL (ref 1.6–2.6)
MICROORGANISM SPEC CULT: NO GROWTH
PHOSPHATE SERPL-MCNC: 2.1 MG/DL (ref 2.5–4.5)
PHOSPHATE SERPL-MCNC: 2.4 MG/DL (ref 2.5–4.5)
POTASSIUM SERPL-SCNC: 3.4 MMOL/L (ref 3.7–5.3)
POTASSIUM SERPL-SCNC: 3.6 MMOL/L (ref 3.7–5.3)
SODIUM SERPL-SCNC: 137 MMOL/L (ref 136–145)
SODIUM SERPL-SCNC: 138 MMOL/L (ref 136–145)
SPECIMEN DESCRIPTION: NORMAL

## 2025-01-11 PROCEDURE — 2580000003 HC RX 258

## 2025-01-11 PROCEDURE — 82947 ASSAY GLUCOSE BLOOD QUANT: CPT

## 2025-01-11 PROCEDURE — 6360000002 HC RX W HCPCS: Performed by: NURSE PRACTITIONER

## 2025-01-11 PROCEDURE — 83735 ASSAY OF MAGNESIUM: CPT

## 2025-01-11 PROCEDURE — 2500000003 HC RX 250 WO HCPCS

## 2025-01-11 PROCEDURE — 84100 ASSAY OF PHOSPHORUS: CPT

## 2025-01-11 PROCEDURE — 80048 BASIC METABOLIC PNL TOTAL CA: CPT

## 2025-01-11 PROCEDURE — 2580000003 HC RX 258: Performed by: NURSE PRACTITIONER

## 2025-01-11 PROCEDURE — 6360000002 HC RX W HCPCS: Performed by: INTERNAL MEDICINE

## 2025-01-11 PROCEDURE — 2060000000 HC ICU INTERMEDIATE R&B

## 2025-01-11 PROCEDURE — 2500000003 HC RX 250 WO HCPCS: Performed by: NURSE PRACTITIONER

## 2025-01-11 PROCEDURE — 6360000002 HC RX W HCPCS

## 2025-01-11 PROCEDURE — 99233 SBSQ HOSP IP/OBS HIGH 50: CPT | Performed by: INTERNAL MEDICINE

## 2025-01-11 PROCEDURE — 36415 COLL VENOUS BLD VENIPUNCTURE: CPT

## 2025-01-11 PROCEDURE — 6370000000 HC RX 637 (ALT 250 FOR IP): Performed by: INTERNAL MEDICINE

## 2025-01-11 RX ORDER — PANTOPRAZOLE SODIUM 40 MG/1
40 TABLET, DELAYED RELEASE ORAL
Status: DISCONTINUED | OUTPATIENT
Start: 2025-01-11 | End: 2025-01-13

## 2025-01-11 RX ORDER — ASPIRIN 81 MG/1
81 TABLET ORAL DAILY
Status: DISCONTINUED | OUTPATIENT
Start: 2025-01-11 | End: 2025-01-16 | Stop reason: HOSPADM

## 2025-01-11 RX ORDER — KETOROLAC TROMETHAMINE 30 MG/ML
30 INJECTION, SOLUTION INTRAMUSCULAR; INTRAVENOUS EVERY 6 HOURS PRN
Status: DISCONTINUED | OUTPATIENT
Start: 2025-01-11 | End: 2025-01-12

## 2025-01-11 RX ORDER — DEXTROSE MONOHYDRATE 100 MG/ML
INJECTION, SOLUTION INTRAVENOUS CONTINUOUS PRN
Status: DISCONTINUED | OUTPATIENT
Start: 2025-01-11 | End: 2025-01-16 | Stop reason: HOSPADM

## 2025-01-11 RX ORDER — METOPROLOL TARTRATE 1 MG/ML
5 INJECTION, SOLUTION INTRAVENOUS EVERY 4 HOURS PRN
Status: DISCONTINUED | OUTPATIENT
Start: 2025-01-11 | End: 2025-01-16 | Stop reason: HOSPADM

## 2025-01-11 RX ORDER — INSULIN GLARGINE 100 [IU]/ML
25 INJECTION, SOLUTION SUBCUTANEOUS DAILY
Status: DISCONTINUED | OUTPATIENT
Start: 2025-01-11 | End: 2025-01-12

## 2025-01-11 RX ORDER — INSULIN LISPRO 100 [IU]/ML
0-8 INJECTION, SOLUTION INTRAVENOUS; SUBCUTANEOUS
Status: DISCONTINUED | OUTPATIENT
Start: 2025-01-11 | End: 2025-01-16 | Stop reason: HOSPADM

## 2025-01-11 RX ORDER — SERTRALINE HYDROCHLORIDE 25 MG/1
25 TABLET, FILM COATED ORAL DAILY
Status: DISCONTINUED | OUTPATIENT
Start: 2025-01-11 | End: 2025-01-16 | Stop reason: HOSPADM

## 2025-01-11 RX ADMIN — VANCOMYCIN HYDROCHLORIDE 1000 MG: 1 INJECTION, POWDER, LYOPHILIZED, FOR SOLUTION INTRAVENOUS at 05:47

## 2025-01-11 RX ADMIN — POTASSIUM CHLORIDE 10 MEQ: 7.46 INJECTION, SOLUTION INTRAVENOUS at 12:44

## 2025-01-11 RX ADMIN — SODIUM PHOSPHATE, MONOBASIC, MONOHYDRATE AND SODIUM PHOSPHATE, DIBASIC, ANHYDROUS 15 MMOL: 142; 276 INJECTION, SOLUTION INTRAVENOUS at 12:06

## 2025-01-11 RX ADMIN — ONDANSETRON 4 MG: 2 INJECTION, SOLUTION INTRAMUSCULAR; INTRAVENOUS at 13:21

## 2025-01-11 RX ADMIN — ASPIRIN 81 MG: 81 TABLET, COATED ORAL at 14:57

## 2025-01-11 RX ADMIN — FENTANYL CITRATE 50 MCG: 0.05 INJECTION, SOLUTION INTRAMUSCULAR; INTRAVENOUS at 11:36

## 2025-01-11 RX ADMIN — POTASSIUM CHLORIDE 10 MEQ: 7.46 INJECTION, SOLUTION INTRAVENOUS at 08:29

## 2025-01-11 RX ADMIN — METOCLOPRAMIDE HYDROCHLORIDE 5 MG: 5 INJECTION INTRAMUSCULAR; INTRAVENOUS at 11:36

## 2025-01-11 RX ADMIN — SODIUM CHLORIDE, PRESERVATIVE FREE 10 ML: 5 INJECTION INTRAVENOUS at 20:44

## 2025-01-11 RX ADMIN — KETOROLAC TROMETHAMINE 30 MG: 30 INJECTION, SOLUTION INTRAMUSCULAR at 14:18

## 2025-01-11 RX ADMIN — DEXTROSE AND SODIUM CHLORIDE: 5; 450 INJECTION, SOLUTION INTRAVENOUS at 05:25

## 2025-01-11 RX ADMIN — INSULIN GLARGINE 25 UNITS: 100 INJECTION, SOLUTION SUBCUTANEOUS at 12:19

## 2025-01-11 RX ADMIN — POTASSIUM CHLORIDE 10 MEQ: 7.46 INJECTION, SOLUTION INTRAVENOUS at 09:33

## 2025-01-11 RX ADMIN — SODIUM CHLORIDE, PRESERVATIVE FREE 10 ML: 5 INJECTION INTRAVENOUS at 08:05

## 2025-01-11 RX ADMIN — ALUMINUM HYDROXIDE, MAGNESIUM HYDROXIDE, DIMETHICONE: 400; 400; 40 SUSPENSION ORAL at 20:47

## 2025-01-11 RX ADMIN — MAGNESIUM SULFATE HEPTAHYDRATE 2000 MG: 40 INJECTION, SOLUTION INTRAVENOUS at 07:27

## 2025-01-11 RX ADMIN — WATER 1000 MG: 1 INJECTION INTRAMUSCULAR; INTRAVENOUS; SUBCUTANEOUS at 00:19

## 2025-01-11 RX ADMIN — SERTRALINE 25 MG: 25 TABLET, FILM COATED ORAL at 14:57

## 2025-01-11 RX ADMIN — METOCLOPRAMIDE HYDROCHLORIDE 5 MG: 5 INJECTION INTRAMUSCULAR; INTRAVENOUS at 00:19

## 2025-01-11 RX ADMIN — PROCHLORPERAZINE EDISYLATE 10 MG: 5 INJECTION INTRAMUSCULAR; INTRAVENOUS at 10:06

## 2025-01-11 RX ADMIN — METOPROLOL TARTRATE 5 MG: 5 INJECTION INTRAVENOUS at 23:02

## 2025-01-11 RX ADMIN — METOCLOPRAMIDE HYDROCHLORIDE 5 MG: 5 INJECTION INTRAMUSCULAR; INTRAVENOUS at 05:49

## 2025-01-11 RX ADMIN — INSULIN LISPRO 2 UNITS: 100 INJECTION, SOLUTION INTRAVENOUS; SUBCUTANEOUS at 20:43

## 2025-01-11 RX ADMIN — SODIUM PHOSPHATE, MONOBASIC, MONOHYDRATE AND SODIUM PHOSPHATE, DIBASIC, ANHYDROUS 15 MMOL: 142; 276 INJECTION, SOLUTION INTRAVENOUS at 08:03

## 2025-01-11 RX ADMIN — POTASSIUM CHLORIDE 10 MEQ: 7.46 INJECTION, SOLUTION INTRAVENOUS at 11:40

## 2025-01-11 RX ADMIN — POTASSIUM CHLORIDE 10 MEQ: 7.46 INJECTION, SOLUTION INTRAVENOUS at 07:26

## 2025-01-11 RX ADMIN — POTASSIUM CHLORIDE 10 MEQ: 7.46 INJECTION, SOLUTION INTRAVENOUS at 13:46

## 2025-01-11 RX ADMIN — ENOXAPARIN SODIUM 30 MG: 100 INJECTION SUBCUTANEOUS at 09:00

## 2025-01-11 RX ADMIN — PANTOPRAZOLE SODIUM 40 MG: 40 TABLET, DELAYED RELEASE ORAL at 14:57

## 2025-01-11 ASSESSMENT — PAIN DESCRIPTION - ORIENTATION
ORIENTATION: MID;UPPER
ORIENTATION: MID;UPPER

## 2025-01-11 ASSESSMENT — PAIN - FUNCTIONAL ASSESSMENT
PAIN_FUNCTIONAL_ASSESSMENT: ACTIVITIES ARE NOT PREVENTED
PAIN_FUNCTIONAL_ASSESSMENT: ACTIVITIES ARE NOT PREVENTED

## 2025-01-11 ASSESSMENT — PAIN DESCRIPTION - DESCRIPTORS
DESCRIPTORS: ACHING;CRAMPING
DESCRIPTORS: ACHING;CRAMPING

## 2025-01-11 ASSESSMENT — PAIN DESCRIPTION - LOCATION
LOCATION: ABDOMEN
LOCATION: ABDOMEN

## 2025-01-11 ASSESSMENT — PAIN SCALES - GENERAL
PAINLEVEL_OUTOF10: 0
PAINLEVEL_OUTOF10: 7
PAINLEVEL_OUTOF10: 0
PAINLEVEL_OUTOF10: 0
PAINLEVEL_OUTOF10: 9

## 2025-01-11 NOTE — PROGRESS NOTES
End Of Shift Note  [facility]    Summary of shift: Insulin drip was stopped today at around 1400, 25 units of Lantus was given at 1220. Pt had had multiple episodes of nausea and retching today. She was given a few pills, unsure if they able to absorb due to vomiting. She has been able to walk to the bathroom with one assist towards the end of today. She was having burning mid sternal which seems to be acid reflux like. Maalox and viscus lidocaine were ordered, pt has not wanted to take yet.     Vitals:    Vitals:    01/11/25 1300 01/11/25 1409 01/11/25 1523 01/11/25 1530   BP: (!) 155/110 (!) 187/90  (!) 186/88   Pulse: 79 70  65   Resp: 23 15  18   Temp:   97.8 °F (36.6 °C)    TempSrc:   Oral    SpO2:       Weight:       Height:            I&O:   Intake/Output Summary (Last 24 hours) at 1/11/2025 1735  Last data filed at 1/11/2025 1542  Gross per 24 hour   Intake 5271.65 ml   Output 2940 ml   Net 2331.65 ml       Resp Status: Room air    Critical Care IV infusions:   dextrose      sodium chloride      dextrose 5 % and 0.45 % NaCl Stopped (01/11/25 1210)        LDA:   Peripheral IV 01/09/25 Proximal;Right;Anterior Forearm (Active)   Number of days: 1       Peripheral IV 01/09/25 Left Antecubital (Active)   Number of days: 1       Peripheral IV 01/10/25 Distal;Posterior;Right Forearm (Active)   Number of days: 1

## 2025-01-11 NOTE — PLAN OF CARE
Problem: Chronic Conditions and Co-morbidities  Goal: Patient's chronic conditions and co-morbidity symptoms are monitored and maintained or improved  1/11/2025 0520 by Jacky Lewis RN  Outcome: Progressing  1/11/2025 0519 by Jacky Lewis RN  Outcome: Progressing  Flowsheets (Taken 1/10/2025 2000)  Care Plan - Patient's Chronic Conditions and Co-Morbidity Symptoms are Monitored and Maintained or Improved:   Monitor and assess patient's chronic conditions and comorbid symptoms for stability, deterioration, or improvement   Collaborate with multidisciplinary team to address chronic and comorbid conditions and prevent exacerbation or deterioration   Update acute care plan with appropriate goals if chronic or comorbid symptoms are exacerbated and prevent overall improvement and discharge  1/10/2025 1553 by Marjorie Greco RN  Outcome: Progressing     Problem: Discharge Planning  Goal: Discharge to home or other facility with appropriate resources  1/11/2025 0520 by Jacky Lewis RN  Outcome: Progressing  1/11/2025 0519 by Jacky Lewis RN  Outcome: Progressing  Flowsheets (Taken 1/10/2025 2000)  Discharge to home or other facility with appropriate resources:   Identify barriers to discharge with patient and caregiver   Arrange for needed discharge resources and transportation as appropriate   Identify discharge learning needs (meds, wound care, etc)   Refer to discharge planning if patient needs post-hospital services based on physician order or complex needs related to functional status, cognitive ability or social support system  1/10/2025 1553 by Marjorie Greco RN  Outcome: Progressing     Problem: Skin/Tissue Integrity  Goal: Absence of new skin breakdown  Description: 1.  Monitor for areas of redness and/or skin breakdown  2.  Assess vascular access sites hourly  3.  Every 4-6 hours minimum:  Change oxygen saturation probe site  4.  Every 4-6 hours:  If on nasal continuous positive airway pressure,  respiratory therapy assess nares and determine need for appliance change or resting period.  1/11/2025 0520 by Jacky Lewis RN  Outcome: Progressing  1/11/2025 0519 by Jacky Lewis RN  Outcome: Progressing  1/10/2025 1553 by Marjorie Greco RN  Outcome: Progressing     Problem: Safety - Adult  Goal: Free from fall injury  1/11/2025 0520 by Jacky Lewis RN  Outcome: Progressing  1/11/2025 0519 by Jacky Lewis RN  Outcome: Progressing  1/10/2025 1553 by Marjorie Greco RN  Outcome: Progressing     Problem: Pain  Goal: Verbalizes/displays adequate comfort level or baseline comfort level  1/11/2025 0520 by Jacky Lewis RN  Outcome: Progressing  1/11/2025 0519 by Jacky Lewis RN  Outcome: Progressing  Flowsheets (Taken 1/10/2025 2000)  Verbalizes/displays adequate comfort level or baseline comfort level:   Encourage patient to monitor pain and request assistance   Assess pain using appropriate pain scale   Administer analgesics based on type and severity of pain and evaluate response   Implement non-pharmacological measures as appropriate and evaluate response  1/10/2025 1553 by Marjorie Greco RN  Outcome: Progressing  Flowsheets (Taken 1/10/2025 1553)  Verbalizes/displays adequate comfort level or baseline comfort level:   Encourage patient to monitor pain and request assistance   Assess pain using appropriate pain scale   Administer analgesics based on type and severity of pain and evaluate response     Problem: Metabolic/Fluid and Electrolytes - Adult  Goal: Electrolytes maintained within normal limits  Outcome: Progressing  Goal: Hemodynamic stability and optimal renal function maintained  Outcome: Progressing  Goal: Glucose maintained within prescribed range  Outcome: Progressing

## 2025-01-11 NOTE — PROGRESS NOTES
Physical Therapy  DATE: 2025    NAME: Batsheva Gonzalez  MRN: [id number]   : 1970    Patient not seen this date for Physical Therapy due to:      [] Cancel by RN or physician due to:    [] Hemodialysis    [] Critical Lab Value Level     [] Blood transfusion in progress    [] Acute or unstable cardiovascular status   _MAP < 55 or more than >115  _HR < 40 or > 130    [] Acute or unstable pulmonary status   -FiO2 > 60%   _RR < 5 or >40    _O2 sats < 85%    [] Strict Bedrest    [] Off Unit for surgery or procedure    [] Off Unit for testing       [] Pending imaging to R/O fracture    [] Refusal by Patient      [x] Other pt is nauseated and vomiting.   Increased abd pain.   Check back tomorrow      [] PT being discontinued at this time. Patient independent. No further needs.     [] PT being discontinued at this time as the patient has been transferred to hospice care. No further needs.      Jenaro Vyas, PT

## 2025-01-11 NOTE — PROGRESS NOTES
[facility]  Office: [phone]  Vidal Reza DO, Eugenio Lucio DO, Kuldeep Plummer DO, Jenaro Maguire DO, Giselle Novak MD, Merline Neely MD, Susan Mejia MD, Jodie Singleton MD,  Danny Carbajal MD, Doug Mora MD, Heath Barraza MD,  Jakob Cohen DO, Ghislaine Fermin MD, Juan Francisco Mercer MD, Luis Felipe Reza DO, Juany Mckinney MD,  Lonnie Davidson DO, Windy Duarte MD, Kerry Delgado MD, Wilda Tellez MD, Vijay Andrews MD,  Roney Roth MD, Geovany Guaman MD, Huber Hernandez MD, Nikky Thomas MD, Marko Khan MD, Nemo Salas MD, Eliazar Nance DO, Sagar Amos MD, Jakob Nation MD, Mohsin Reza, MD, Shirley Waterhouse, CNP,  Xiomy Hare CNP, Eliazar Santos, NATASHA,  Lorri Fritz, VINICIUS, Emily Blanco, CNP, Samantha Asif, CNP, Nicky Blunt, CNP, Tricia Waddell CNP, Cesia Walters, PA-C, Cheryle Suarez PA-C, Candida Chin, CNP, Vignesh Galaviz, CNP,  Karey Cordova, CNP, Denise Euceda, CNP, Cami Toribio, CNP,  Vandana Ortega, NATASHA, Alicia Velazquez, CNP         [facility]   IN-PATIENT SERVICE   [facility]    Progress Note    1/11/2025    12:04 PM    Name:   Batsheva Gonzalez  MRN:     [id number]     Acct:      [id number]   Room:   [facility] Day:  1  Admit Date:  1/9/2025  5:51 PM    PCP:   Sherrell Carter MD  Code Status:  Full Code    Subjective:     C/C:   Chief Complaint   Patient presents with    Altered Mental Status     PT lives by herself, daughter checks on her every day. Pt daughter states she does not know when pt took her medication. Pt NEEDS  for consultation for self care at home.      Interval History Status: improved.     Feels considerably better  Denies cp/sob/n/v  Has an appetite    Brief History:     This 54 yof presents with n/v/dizziness/chills and epigastric abd pain. She also had a little centrally located cp-but this was mild. Her glucoses have been running in 300s at home. They were >800 on arrival. She takes lantus

## 2025-01-11 NOTE — PLAN OF CARE
Problem: Skin/Tissue Integrity  Goal: Absence of new skin breakdown  Description: 1.  Monitor for areas of redness and/or skin breakdown  2.  Assess vascular access sites hourly  3.  Every 4-6 hours minimum:  Change oxygen saturation probe site  4.  Every 4-6 hours:  If on nasal continuous positive airway pressure, respiratory therapy assess nares and determine need for appliance change or resting period.  1/11/2025 1645 by Marjorie rGeco RN  Outcome: Progressing     Problem: Safety - Adult  Goal: Free from fall injury  1/11/2025 1645 by Marjorie Greco RN  Outcome: Progressing     Problem: Pain  Goal: Verbalizes/displays adequate comfort level or baseline comfort level  1/11/2025 1645 by Marjorie Greco RN  Outcome: Progressing  Flowsheets (Taken 1/11/2025 1645)  Verbalizes/displays adequate comfort level or baseline comfort level:   Encourage patient to monitor pain and request assistance   Assess pain using appropriate pain scale   Administer analgesics based on type and severity of pain and evaluate response     Problem: Metabolic/Fluid and Electrolytes - Adult  Goal: Electrolytes maintained within normal limits  1/11/2025 1645 by Marjorie Greco, RN  Outcome: Progressing

## 2025-01-11 NOTE — PROGRESS NOTES
End Of Shift Note  [facility]  Summary of shift: Patient had one bout of emesis at the start of shift. Patient given x1 fentanyl PRN for abdominal pain. Patient remains on insulin gtt. 1190 UOP.    Vitals:    Vitals:    01/11/25 0300 01/11/25 0400 01/11/25 0500 01/11/25 0600   BP: 117/66 130/73 (!) 153/74 (!) 152/69   Pulse: 71 68 63 59   Resp: 14 13 12 11   Temp:   98 °F (36.7 °C)    TempSrc:   Oral    SpO2: 98% 99% 99% 99%   Weight:   52.1 kg (114 lb 13.8 oz)    Height:            I&O:   Intake/Output Summary (Last 24 hours) at 1/11/2025 0612  Last data filed at 1/11/2025 0600  Gross per 24 hour   Intake 4900.48 ml   Output 1440 ml   Net 3460.48 ml       Resp Status: Room Air    Ventilator Settings:     / / /     Critical Care IV infusions:   sodium chloride      insulin 0.2 Units/hr (01/11/25 0527)    sodium chloride Stopped (01/10/25 0129)    dextrose 5 % and 0.45 % NaCl 150 mL/hr at 01/11/25 0525    sodium chloride Stopped (01/10/25 0047)        LDA:   Peripheral IV 01/09/25 Proximal;Right;Anterior Forearm (Active)   Number of days: 1       Peripheral IV 01/09/25 Left Antecubital (Active)   Number of days: 1       Peripheral IV 01/10/25 Distal;Posterior;Right Forearm (Active)   Number of days: 1       External Urinary Catheter (Active)   Number of days: 0

## 2025-01-12 LAB
ANION GAP SERPL CALCULATED.3IONS-SCNC: 15 MMOL/L (ref 9–16)
ANION GAP SERPL CALCULATED.3IONS-SCNC: 18 MMOL/L (ref 9–16)
BUN SERPL-MCNC: 5 MG/DL (ref 6–20)
BUN SERPL-MCNC: 8 MG/DL (ref 6–20)
CALCIUM SERPL-MCNC: 9.3 MG/DL (ref 8.6–10.4)
CALCIUM SERPL-MCNC: 9.7 MG/DL (ref 8.6–10.4)
CHLORIDE SERPL-SCNC: 90 MMOL/L (ref 98–107)
CHLORIDE SERPL-SCNC: 92 MMOL/L (ref 98–107)
CO2 SERPL-SCNC: 20 MMOL/L (ref 20–31)
CO2 SERPL-SCNC: 24 MMOL/L (ref 20–31)
CREAT SERPL-MCNC: 0.6 MG/DL (ref 0.5–0.9)
CREAT SERPL-MCNC: 0.7 MG/DL (ref 0.5–0.9)
GFR, ESTIMATED: >90 ML/MIN/1.73M2
GFR, ESTIMATED: >90 ML/MIN/1.73M2
GLUCOSE BLD-MCNC: 118 MG/DL (ref 65–105)
GLUCOSE BLD-MCNC: 189 MG/DL (ref 65–105)
GLUCOSE BLD-MCNC: 269 MG/DL (ref 65–105)
GLUCOSE BLD-MCNC: 99 MG/DL (ref 65–105)
GLUCOSE SERPL-MCNC: 109 MG/DL (ref 74–99)
GLUCOSE SERPL-MCNC: 228 MG/DL (ref 74–99)
MAGNESIUM SERPL-MCNC: 1.7 MG/DL (ref 1.6–2.6)
MAGNESIUM SERPL-MCNC: 1.8 MG/DL (ref 1.6–2.6)
PHOSPHATE SERPL-MCNC: 1.9 MG/DL (ref 2.5–4.5)
POTASSIUM SERPL-SCNC: 2.8 MMOL/L (ref 3.7–5.3)
POTASSIUM SERPL-SCNC: 3.1 MMOL/L (ref 3.7–5.3)
SODIUM SERPL-SCNC: 130 MMOL/L (ref 136–145)
SODIUM SERPL-SCNC: 130 MMOL/L (ref 136–145)

## 2025-01-12 PROCEDURE — 97165 OT EVAL LOW COMPLEX 30 MIN: CPT

## 2025-01-12 PROCEDURE — 80048 BASIC METABOLIC PNL TOTAL CA: CPT

## 2025-01-12 PROCEDURE — 36415 COLL VENOUS BLD VENIPUNCTURE: CPT

## 2025-01-12 PROCEDURE — 2060000000 HC ICU INTERMEDIATE R&B

## 2025-01-12 PROCEDURE — 6360000002 HC RX W HCPCS: Performed by: NURSE PRACTITIONER

## 2025-01-12 PROCEDURE — 2580000003 HC RX 258: Performed by: NURSE PRACTITIONER

## 2025-01-12 PROCEDURE — 84100 ASSAY OF PHOSPHORUS: CPT

## 2025-01-12 PROCEDURE — 2500000003 HC RX 250 WO HCPCS: Performed by: NURSE PRACTITIONER

## 2025-01-12 PROCEDURE — 99232 SBSQ HOSP IP/OBS MODERATE 35: CPT | Performed by: INTERNAL MEDICINE

## 2025-01-12 PROCEDURE — 82947 ASSAY GLUCOSE BLOOD QUANT: CPT

## 2025-01-12 PROCEDURE — 83735 ASSAY OF MAGNESIUM: CPT

## 2025-01-12 PROCEDURE — 6370000000 HC RX 637 (ALT 250 FOR IP): Performed by: INTERNAL MEDICINE

## 2025-01-12 PROCEDURE — 6360000002 HC RX W HCPCS: Performed by: INTERNAL MEDICINE

## 2025-01-12 PROCEDURE — 94761 N-INVAS EAR/PLS OXIMETRY MLT: CPT

## 2025-01-12 RX ORDER — ENOXAPARIN SODIUM 100 MG/ML
40 INJECTION SUBCUTANEOUS DAILY
Status: DISCONTINUED | OUTPATIENT
Start: 2025-01-13 | End: 2025-01-16

## 2025-01-12 RX ORDER — INSULIN GLARGINE 100 [IU]/ML
32 INJECTION, SOLUTION SUBCUTANEOUS DAILY
Status: DISCONTINUED | OUTPATIENT
Start: 2025-01-12 | End: 2025-01-14

## 2025-01-12 RX ORDER — DICYCLOMINE HYDROCHLORIDE 10 MG/1
20 CAPSULE ORAL
Status: DISCONTINUED | OUTPATIENT
Start: 2025-01-12 | End: 2025-01-16 | Stop reason: HOSPADM

## 2025-01-12 RX ORDER — MORPHINE SULFATE 2 MG/ML
2 INJECTION, SOLUTION INTRAMUSCULAR; INTRAVENOUS ONCE
Status: COMPLETED | OUTPATIENT
Start: 2025-01-12 | End: 2025-01-12

## 2025-01-12 RX ORDER — POTASSIUM CHLORIDE 7.45 MG/ML
10 INJECTION INTRAVENOUS PRN
Status: DISCONTINUED | OUTPATIENT
Start: 2025-01-12 | End: 2025-01-16 | Stop reason: HOSPADM

## 2025-01-12 RX ORDER — MORPHINE SULFATE 2 MG/ML
2 INJECTION, SOLUTION INTRAMUSCULAR; INTRAVENOUS EVERY 4 HOURS PRN
Status: COMPLETED | OUTPATIENT
Start: 2025-01-12 | End: 2025-01-13

## 2025-01-12 RX ADMIN — DICYCLOMINE HYDROCHLORIDE 20 MG: 10 CAPSULE ORAL at 20:29

## 2025-01-12 RX ADMIN — POTASSIUM CHLORIDE 10 MEQ: 7.46 INJECTION, SOLUTION INTRAVENOUS at 23:17

## 2025-01-12 RX ADMIN — SODIUM PHOSPHATE, MONOBASIC, MONOHYDRATE AND SODIUM PHOSPHATE, DIBASIC, ANHYDROUS 15 MMOL: 142; 276 INJECTION, SOLUTION INTRAVENOUS at 06:44

## 2025-01-12 RX ADMIN — POTASSIUM CHLORIDE 10 MEQ: 7.46 INJECTION, SOLUTION INTRAVENOUS at 21:12

## 2025-01-12 RX ADMIN — POTASSIUM CHLORIDE 10 MEQ: 7.46 INJECTION, SOLUTION INTRAVENOUS at 09:44

## 2025-01-12 RX ADMIN — ENOXAPARIN SODIUM 30 MG: 100 INJECTION SUBCUTANEOUS at 08:41

## 2025-01-12 RX ADMIN — POTASSIUM CHLORIDE 10 MEQ: 7.46 INJECTION, SOLUTION INTRAVENOUS at 06:42

## 2025-01-12 RX ADMIN — POTASSIUM CHLORIDE 10 MEQ: 7.46 INJECTION, SOLUTION INTRAVENOUS at 22:14

## 2025-01-12 RX ADMIN — POTASSIUM CHLORIDE 10 MEQ: 7.46 INJECTION, SOLUTION INTRAVENOUS at 17:46

## 2025-01-12 RX ADMIN — SODIUM CHLORIDE, PRESERVATIVE FREE 10 ML: 5 INJECTION INTRAVENOUS at 08:44

## 2025-01-12 RX ADMIN — INSULIN LISPRO 2 UNITS: 100 INJECTION, SOLUTION INTRAVENOUS; SUBCUTANEOUS at 12:29

## 2025-01-12 RX ADMIN — SERTRALINE 25 MG: 25 TABLET, FILM COATED ORAL at 10:00

## 2025-01-12 RX ADMIN — KETOROLAC TROMETHAMINE 30 MG: 30 INJECTION, SOLUTION INTRAMUSCULAR at 00:19

## 2025-01-12 RX ADMIN — POTASSIUM CHLORIDE 10 MEQ: 7.46 INJECTION, SOLUTION INTRAVENOUS at 20:10

## 2025-01-12 RX ADMIN — KETOROLAC TROMETHAMINE 30 MG: 30 INJECTION, SOLUTION INTRAMUSCULAR at 06:52

## 2025-01-12 RX ADMIN — POTASSIUM CHLORIDE 10 MEQ: 7.46 INJECTION, SOLUTION INTRAVENOUS at 19:06

## 2025-01-12 RX ADMIN — POTASSIUM CHLORIDE 10 MEQ: 7.46 INJECTION, SOLUTION INTRAVENOUS at 07:33

## 2025-01-12 RX ADMIN — MORPHINE SULFATE 2 MG: 2 INJECTION, SOLUTION INTRAMUSCULAR; INTRAVENOUS at 23:17

## 2025-01-12 RX ADMIN — POTASSIUM CHLORIDE 10 MEQ: 7.46 INJECTION, SOLUTION INTRAVENOUS at 08:37

## 2025-01-12 RX ADMIN — MORPHINE SULFATE 2 MG: 2 INJECTION, SOLUTION INTRAMUSCULAR; INTRAVENOUS at 07:32

## 2025-01-12 RX ADMIN — MORPHINE SULFATE 2 MG: 2 INJECTION, SOLUTION INTRAMUSCULAR; INTRAVENOUS at 16:29

## 2025-01-12 RX ADMIN — ASPIRIN 81 MG: 81 TABLET, COATED ORAL at 10:00

## 2025-01-12 RX ADMIN — INSULIN GLARGINE 32 UNITS: 100 INJECTION, SOLUTION SUBCUTANEOUS at 08:41

## 2025-01-12 RX ADMIN — DICYCLOMINE HYDROCHLORIDE 20 MG: 10 CAPSULE ORAL at 12:29

## 2025-01-12 RX ADMIN — METOPROLOL TARTRATE 5 MG: 5 INJECTION INTRAVENOUS at 03:36

## 2025-01-12 RX ADMIN — INSULIN LISPRO 4 UNITS: 100 INJECTION, SOLUTION INTRAVENOUS; SUBCUTANEOUS at 08:41

## 2025-01-12 ASSESSMENT — PAIN SCALES - GENERAL
PAINLEVEL_OUTOF10: 8
PAINLEVEL_OUTOF10: 2
PAINLEVEL_OUTOF10: 5
PAINLEVEL_OUTOF10: 7
PAINLEVEL_OUTOF10: 5
PAINLEVEL_OUTOF10: 2
PAINLEVEL_OUTOF10: 8
PAINLEVEL_OUTOF10: 7
PAINLEVEL_OUTOF10: 4
PAINLEVEL_OUTOF10: 0
PAINLEVEL_OUTOF10: 7

## 2025-01-12 ASSESSMENT — PAIN DESCRIPTION - ORIENTATION
ORIENTATION: UPPER
ORIENTATION: MID;UPPER
ORIENTATION: MID
ORIENTATION: MID;UPPER
ORIENTATION: MID;UPPER

## 2025-01-12 ASSESSMENT — PAIN DESCRIPTION - LOCATION
LOCATION: ABDOMEN

## 2025-01-12 ASSESSMENT — PAIN DESCRIPTION - DESCRIPTORS
DESCRIPTORS: OTHER (COMMENT);CRAMPING
DESCRIPTORS: SQUEEZING;PRESSURE
DESCRIPTORS: ACHING;BURNING;CRAMPING
DESCRIPTORS: PRESSURE;SQUEEZING

## 2025-01-12 ASSESSMENT — PAIN - FUNCTIONAL ASSESSMENT
PAIN_FUNCTIONAL_ASSESSMENT: PREVENTS OR INTERFERES SOME ACTIVE ACTIVITIES AND ADLS
PAIN_FUNCTIONAL_ASSESSMENT: ACTIVITIES ARE NOT PREVENTED

## 2025-01-12 NOTE — PROGRESS NOTES
Occupational Therapy  Facility/Department: [facility]  Occupational Therapy Initial Assessment    Name: Batsheva Gonzalez  : 1970  MRN: [id number]  Date of Service: 2025    RN reports patient is medically stable for therapy treatment this date.    Chart reviewed prior to treatment and patient is agreeable for therapy.  All lines intact and patient positioned comfortably at end of treatment.  All patient needs addressed prior to ending therapy session.      Discharge Recommendations:  Home with assist PRN  OT Equipment Recommendations  Equipment Needed: No     PER HPI: Batsheva Gonzalez is a 54 y.o.  /  female who presents with Altered Mental Status (PT lives by herself, daughter checks on her every day. Pt daughter states she does not know when pt took her medication. Pt NEEDS  for consultation for self care at home. )   and is admitted to the hospital for the management of DKA, type 1, not at goal (HCC).     Patient is an independently living 54-year-old female with a past medical history of Diabetes mellitus type 1 originally but then now classified as type 2 and now being re-classified as Type ONE, hypertension, hyperlipidemia, thyroid disease, history of blood clots, depression and anxiety. She has been seen by ENDO in [place]. She also had teeth removed in Dec 2024 due to infection. Due to her DM diagnosis varying between type 1 and 2, she has not been able to get insurance approval for an insulin pump. Her blood sugar has averaged around 300.      On this date () her adult daughter was concerned due to not hearing from the patient since last evening (). She checks on her daily. Today  this afternoon She went to the patient's home for a wellness check and found the patient confused, laying on the floor, and vomiting. The daughter also reports her skin \"looked yellow\". No obvious signs of deformity, but unknown if pt fell or how long she was on the floor.  The patient has                  Fe Decker, OTD, OTR/L

## 2025-01-12 NOTE — PROGRESS NOTES
[facility]  Office: [phone]  Vidal Reza DO, Eugenio Lucio DO, Kuldeep Plummer DO, Jenaro Maguire DO, Giselle Novak MD, Merline Neely MD, Susan Mejia MD, Jodie Singleton MD,  Danny Carbajal MD, Doug Mora MD, Heath Barraza MD,  Jakob Cohen DO, Ghislaine Fermin MD, Juan Francisco Mercer MD, Luis Felipe Reza DO, Juany Mckinney MD,  Lonnie Davidson DO, Widny Duarte MD, Kerry Delgado MD, Wilda Tellez MD, Vijay Andrews MD,  Roney Roth MD, Geovany Guaman MD, Huber Hernandez MD, Nikky Thomas MD, Marko Khan MD, Nemo Salas MD, Eliazar Nance DO, Sagar Amos MD, Jakob Nation MD, Mohsin Reza, MD, Shirley Waterhouse, CNP,  Xiomy Hare CNP, Eliazar Santos, NATASHA,  Lorir Fritz, VINICIUS, Emily Blanco, CNP, Samantha Asif, CNP, Nicky Blunt CNP, Tricia Waddell CNP, Cesia Walters, PA-C, Cheryle Suarez PA-C, Candida Chin, CNP, Vignesh Galaviz, CNP,  Karey Cordova, CNP, Denise Euceda, CNP, Cami Toribio, CNP,  Vandana Ortega, NATASHA, Alicia Velazquez, CNP         [facility]   IN-PATIENT SERVICE   [facility]    Progress Note    1/12/2025    11:33 AM    Name:   Batsheva Gonzalez  MRN:     [id number]     Acct:      [id number]   [facility]   [facility] Day:  2  Admit Date:  1/9/2025  5:51 PM    PCP:   Sherrell Carter MD  Code Status:  Full Code    Subjective:     C/C:   Chief Complaint   Patient presents with    Altered Mental Status     PT lives by herself, daughter checks on her every day. Pt daughter states she does not know when pt took her medication. Pt NEEDS  for consultation for self care at home.      Interval History Status: worsened.     Feels worse today  Denies cp/sob    Having nausea and some vomiting that has resulted in worsened abd pain    Brief History:     This 54 yof presents with n/v/dizziness/chills and epigastric abd pain. She also had a little centrally located cp-but this was mild. Her glucoses have been running in 300s at  > = values in this interval not displayed.     Recent Labs     01/09/25  1815 01/09/25  2032 01/10/25  0313 01/10/25  0314 01/10/25  0514 01/10/25  0607 01/11/25  1058 01/11/25  1210 01/11/25  1316 01/11/25  1612 01/11/25 2022 01/12/25  0737   LABA1C 11.8*  --   --   --   --   --   --   --   --   --   --   --    AST 25  --  20  --   --   --   --   --   --   --   --   --    ALT 35  --  29  --   --   --   --   --   --   --   --   --    ALKPHOS 142*  --  120*  --   --   --   --   --   --   --   --   --    BILITOT 0.4  --  <0.2  --   --   --   --   --   --   --   --   --    BILIDIR 0.2  --   --   --   --   --   --   --   --   --   --   --    LIPASE 11*  --   --   --  43  --   --   --   --   --   --   --    TRIG  --   --   --   --  167*  --   --   --   --   --   --   --    POCGLU  --    < >  --    < > 190*   < > 200* 149* 116* 171* 210* 269*    < > = values in this interval not displayed.     ABG:  Lab Results   Component Value Date/Time    FIO2 NOT REPORTED 11/04/2019 09:27 PM     Lab Results   Component Value Date/Time    SPECIAL NOT REPORTED 11/04/2019 08:45 PM     Lab Results   Component Value Date/Time    CULTURE NO GROWTH 2 DAYS 01/10/2025 05:13 AM    CULTURE NO GROWTH 2 DAYS 01/10/2025 05:13 AM       Radiology:  CT CHEST ABDOMEN PELVIS WO CONTRAST Additional Contrast? None    Result Date: 1/10/2025  No acute abnormality of the chest, abdomen or pelvis. No acute bowel abnormality.  Normal appendix.     XR CHEST PORTABLE    Result Date: 1/9/2025  No acute process.       Physical Examination:        General appearance:  alert, cooperative and no distress  Mental Status:  oriented to person, place and time and normal affect  Lungs:  clear to auscultation bilaterally, normal effort  Heart:  regular rate and rhythm, no murmur  Abdomen:  soft, diffusely ttp, nondistended, normal bowel sounds, no masses, hepatomegaly, splenomegaly  Extremities:  no edema, redness, tenderness in the calves  Skin:  no gross lesions,

## 2025-01-12 NOTE — PROGRESS NOTES
End Of Shift Note  [facility]  Summary of shift: Patient feeling nauseous and dry heaving during shift. Patient complained of abdominal pain and was given toradol x1 with little relief. Patient was given a warm compress which patient stated helped with her abdominal pain. Patient was up to bathroom multiple times during shift.     Vitals:    Vitals:    01/11/25 2000 01/12/25 0000 01/12/25 0400 01/12/25 0417   BP: (!) 187/93      Pulse: 81  59 63   Resp: 17   20   Temp: 98.1 °F (36.7 °C) 98 °F (36.7 °C) 98.1 °F (36.7 °C)    TempSrc: Oral Oral Oral    SpO2: 100%   100%   Weight:       Height:            I&O:   Intake/Output Summary (Last 24 hours) at 1/12/2025 0623  Last data filed at 1/11/2025 1542  Gross per 24 hour   Intake 5271.65 ml   Output 1750 ml   Net 3521.65 ml       Resp Status: Room Air    Ventilator Settings:     / / /     Critical Care IV infusions:   dextrose      sodium chloride      dextrose 5 % and 0.45 % NaCl Stopped (01/11/25 1210)        LDA:   Peripheral IV 01/09/25 Proximal;Right;Anterior Forearm (Active)   Number of days: 2       Peripheral IV 01/09/25 Left Antecubital (Active)   Number of days: 2       Peripheral IV 01/10/25 Distal;Posterior;Right Forearm (Active)   Number of days: 2

## 2025-01-12 NOTE — PLAN OF CARE
Problem: Skin/Tissue Integrity  Goal: Absence of new skin breakdown  Description: 1.  Monitor for areas of redness and/or skin breakdown  2.  Assess vascular access sites hourly  3.  Every 4-6 hours minimum:  Change oxygen saturation probe site  4.  Every 4-6 hours:  If on nasal continuous positive airway pressure, respiratory therapy assess nares and determine need for appliance change or resting period.  1/12/2025 1623 by Marjorie Greco RN  Outcome: Progressing     Problem: Safety - Adult  Goal: Free from fall injury  1/12/2025 1623 by Marjorie Greco RN  Outcome: Progressing     Problem: Pain  Goal: Verbalizes/displays adequate comfort level or baseline comfort level  1/12/2025 1623 by Marjorie Greco RN  Outcome: Progressing  Flowsheets (Taken 1/12/2025 1623)  Verbalizes/displays adequate comfort level or baseline comfort level:   Encourage patient to monitor pain and request assistance   Assess pain using appropriate pain scale   Administer analgesics based on type and severity of pain and evaluate response     Problem: Metabolic/Fluid and Electrolytes - Adult  Goal: Electrolytes maintained within normal limits  1/12/2025 1623 by Marjorie Greco, RN  Outcome: Progressing

## 2025-01-12 NOTE — PROGRESS NOTES
End Of Shift Note  [place] [facility]    Summary of shift: Pt has been nauseous and had abdominal pain. GI has been consulted and bentyl 4 time per day was ordered. Morphine was given twice. She had 40 meq potassium this morning, repeat labs this morning showed potassium of 2.8 so first 10 meq of 60 is running. Her blood sugars have been stable.    Vitals:    Vitals:    01/12/25 1200 01/12/25 1600 01/12/25 1629 01/12/25 1659   BP: (!) 159/96 (!) 130/94     Pulse: 81 99  86   Resp: 15 21 18 12   Temp: 97 °F (36.1 °C) 98 °F (36.7 °C)     TempSrc: Oral Oral     SpO2: 100% 100%     Weight:       Height:            I&O:   Intake/Output Summary (Last 24 hours) at 1/12/2025 1755  Last data filed at 1/12/2025 1754  Gross per 24 hour   Intake 664.22 ml   Output --   Net 664.22 ml       Resp Status: room air      Critical Care IV infusions:   dextrose      sodium chloride      dextrose 5 % and 0.45 % NaCl Stopped (01/11/25 1210)        LDA:   Peripheral IV 01/09/25 Proximal;Right;Anterior Forearm (Active)   Number of days: 2

## 2025-01-12 NOTE — PLAN OF CARE
Problem: Chronic Conditions and Co-morbidities  Goal: Patient's chronic conditions and co-morbidity symptoms are monitored and maintained or improved  Outcome: Progressing     Problem: Discharge Planning  Goal: Discharge to home or other facility with appropriate resources  Outcome: Progressing     Problem: Skin/Tissue Integrity  Goal: Absence of new skin breakdown  Description: 1.  Monitor for areas of redness and/or skin breakdown  2.  Assess vascular access sites hourly  3.  Every 4-6 hours minimum:  Change oxygen saturation probe site  4.  Every 4-6 hours:  If on nasal continuous positive airway pressure, respiratory therapy assess nares and determine need for appliance change or resting period.  1/12/2025 0617 by Jacky Lewis RN  Outcome: Progressing  1/11/2025 1645 by Marjorie Greco RN  Outcome: Progressing     Problem: Safety - Adult  Goal: Free from fall injury  1/12/2025 0617 by Jacky Lewis RN  Outcome: Progressing  1/11/2025 1645 by Marjorie Greco RN  Outcome: Progressing     Problem: Pain  Goal: Verbalizes/displays adequate comfort level or baseline comfort level  1/12/2025 0617 by Jacky Lewis RN  Outcome: Progressing  1/11/2025 1645 by Marjorie Greco RN  Outcome: Progressing  Flowsheets (Taken 1/11/2025 1645)  Verbalizes/displays adequate comfort level or baseline comfort level:   Encourage patient to monitor pain and request assistance   Assess pain using appropriate pain scale   Administer analgesics based on type and severity of pain and evaluate response     Problem: Metabolic/Fluid and Electrolytes - Adult  Goal: Electrolytes maintained within normal limits  1/12/2025 0617 by Jacky Lewis RN  Outcome: Progressing  1/11/2025 1645 by Marjorie Greco RN  Outcome: Progressing  Goal: Hemodynamic stability and optimal renal function maintained  Outcome: Progressing  Goal: Glucose maintained within prescribed range  Outcome: Progressing

## 2025-01-13 LAB
ANION GAP SERPL CALCULATED.3IONS-SCNC: 14 MMOL/L (ref 9–16)
BUN SERPL-MCNC: 11 MG/DL (ref 6–20)
CALCIUM SERPL-MCNC: 9.8 MG/DL (ref 8.6–10.4)
CHLORIDE SERPL-SCNC: 91 MMOL/L (ref 98–107)
CO2 SERPL-SCNC: 24 MMOL/L (ref 20–31)
CREAT SERPL-MCNC: 0.7 MG/DL (ref 0.5–0.9)
GFR, ESTIMATED: >90 ML/MIN/1.73M2
GLUCOSE BLD-MCNC: 187 MG/DL (ref 65–105)
GLUCOSE BLD-MCNC: 214 MG/DL (ref 65–105)
GLUCOSE BLD-MCNC: 61 MG/DL (ref 65–105)
GLUCOSE BLD-MCNC: 92 MG/DL (ref 65–105)
GLUCOSE SERPL-MCNC: 178 MG/DL (ref 74–99)
MAGNESIUM SERPL-MCNC: 1.9 MG/DL (ref 1.6–2.6)
PHOSPHATE SERPL-MCNC: 2 MG/DL (ref 2.5–4.5)
PHOSPHATE SERPL-MCNC: 3.7 MG/DL (ref 2.5–4.5)
POTASSIUM SERPL-SCNC: 3.7 MMOL/L (ref 3.7–5.3)
POTASSIUM SERPL-SCNC: 3.8 MMOL/L (ref 3.7–5.3)
SODIUM SERPL-SCNC: 129 MMOL/L (ref 136–145)

## 2025-01-13 PROCEDURE — 2580000003 HC RX 258: Performed by: INTERNAL MEDICINE

## 2025-01-13 PROCEDURE — 2500000003 HC RX 250 WO HCPCS: Performed by: NURSE PRACTITIONER

## 2025-01-13 PROCEDURE — 2580000003 HC RX 258: Performed by: NURSE PRACTITIONER

## 2025-01-13 PROCEDURE — 99232 SBSQ HOSP IP/OBS MODERATE 35: CPT | Performed by: INTERNAL MEDICINE

## 2025-01-13 PROCEDURE — 84100 ASSAY OF PHOSPHORUS: CPT

## 2025-01-13 PROCEDURE — 83735 ASSAY OF MAGNESIUM: CPT

## 2025-01-13 PROCEDURE — 6360000002 HC RX W HCPCS: Performed by: INTERNAL MEDICINE

## 2025-01-13 PROCEDURE — 2060000000 HC ICU INTERMEDIATE R&B

## 2025-01-13 PROCEDURE — 36415 COLL VENOUS BLD VENIPUNCTURE: CPT

## 2025-01-13 PROCEDURE — 6360000002 HC RX W HCPCS: Performed by: NURSE PRACTITIONER

## 2025-01-13 PROCEDURE — 6370000000 HC RX 637 (ALT 250 FOR IP): Performed by: NURSE PRACTITIONER

## 2025-01-13 PROCEDURE — 6370000000 HC RX 637 (ALT 250 FOR IP): Performed by: INTERNAL MEDICINE

## 2025-01-13 PROCEDURE — 80048 BASIC METABOLIC PNL TOTAL CA: CPT

## 2025-01-13 PROCEDURE — 84132 ASSAY OF SERUM POTASSIUM: CPT

## 2025-01-13 PROCEDURE — 94761 N-INVAS EAR/PLS OXIMETRY MLT: CPT

## 2025-01-13 PROCEDURE — 82947 ASSAY GLUCOSE BLOOD QUANT: CPT

## 2025-01-13 RX ADMIN — SODIUM PHOSPHATE, MONOBASIC, MONOHYDRATE AND SODIUM PHOSPHATE, DIBASIC, ANHYDROUS 15 MMOL: 142; 276 INJECTION, SOLUTION INTRAVENOUS at 07:14

## 2025-01-13 RX ADMIN — ENOXAPARIN SODIUM 40 MG: 100 INJECTION SUBCUTANEOUS at 08:39

## 2025-01-13 RX ADMIN — MORPHINE SULFATE 2 MG: 2 INJECTION, SOLUTION INTRAMUSCULAR; INTRAVENOUS at 08:51

## 2025-01-13 RX ADMIN — DICYCLOMINE HYDROCHLORIDE 20 MG: 10 CAPSULE ORAL at 12:42

## 2025-01-13 RX ADMIN — ACETAMINOPHEN 650 MG: 325 TABLET ORAL at 17:26

## 2025-01-13 RX ADMIN — DICYCLOMINE HYDROCHLORIDE 20 MG: 10 CAPSULE ORAL at 20:03

## 2025-01-13 RX ADMIN — DICYCLOMINE HYDROCHLORIDE 20 MG: 10 CAPSULE ORAL at 17:26

## 2025-01-13 RX ADMIN — PANTOPRAZOLE SODIUM 40 MG: 40 TABLET, DELAYED RELEASE ORAL at 06:59

## 2025-01-13 RX ADMIN — DICYCLOMINE HYDROCHLORIDE 20 MG: 10 CAPSULE ORAL at 06:59

## 2025-01-13 RX ADMIN — INSULIN LISPRO 2 UNITS: 100 INJECTION, SOLUTION INTRAVENOUS; SUBCUTANEOUS at 20:04

## 2025-01-13 RX ADMIN — PROCHLORPERAZINE EDISYLATE 10 MG: 5 INJECTION INTRAMUSCULAR; INTRAVENOUS at 20:04

## 2025-01-13 RX ADMIN — ASPIRIN 81 MG: 81 TABLET, COATED ORAL at 08:39

## 2025-01-13 RX ADMIN — INSULIN LISPRO 2 UNITS: 100 INJECTION, SOLUTION INTRAVENOUS; SUBCUTANEOUS at 08:39

## 2025-01-13 RX ADMIN — INSULIN GLARGINE 32 UNITS: 100 INJECTION, SOLUTION SUBCUTANEOUS at 08:39

## 2025-01-13 RX ADMIN — SERTRALINE 25 MG: 25 TABLET, FILM COATED ORAL at 08:39

## 2025-01-13 RX ADMIN — SODIUM CHLORIDE, PRESERVATIVE FREE 10 ML: 5 INJECTION INTRAVENOUS at 20:13

## 2025-01-13 RX ADMIN — SODIUM CHLORIDE, PRESERVATIVE FREE 40 MG: 5 INJECTION INTRAVENOUS at 20:04

## 2025-01-13 ASSESSMENT — PAIN SCALES - GENERAL
PAINLEVEL_OUTOF10: 8
PAINLEVEL_OUTOF10: 4
PAINLEVEL_OUTOF10: 4
PAINLEVEL_OUTOF10: 7
PAINLEVEL_OUTOF10: 4
PAINLEVEL_OUTOF10: 2

## 2025-01-13 ASSESSMENT — PAIN DESCRIPTION - DESCRIPTORS
DESCRIPTORS: CRAMPING
DESCRIPTORS: CRUSHING
DESCRIPTORS: SQUEEZING;STABBING
DESCRIPTORS: STABBING;SQUEEZING

## 2025-01-13 ASSESSMENT — PAIN - FUNCTIONAL ASSESSMENT
PAIN_FUNCTIONAL_ASSESSMENT: ACTIVITIES ARE NOT PREVENTED

## 2025-01-13 ASSESSMENT — PAIN DESCRIPTION - LOCATION
LOCATION: ABDOMEN
LOCATION: ABDOMEN
LOCATION: ABDOMEN;HEAD
LOCATION: ABDOMEN

## 2025-01-13 ASSESSMENT — PAIN DESCRIPTION - ORIENTATION
ORIENTATION: UPPER;RIGHT;LEFT
ORIENTATION: LEFT;RIGHT;UPPER
ORIENTATION: RIGHT;LEFT;UPPER
ORIENTATION: RIGHT;LEFT;MID

## 2025-01-13 NOTE — PLAN OF CARE
Aox4  RA  Ambulates independently  IV phosphorous replacement, see MAR, recheck WNL  IV morphine given for pain, see MAR  Pt c/o of nausea, RN offered zofran, pt refused  BG 61, pt given apple juice, recheck was 166  PRN tylenol given for headache per pt request, see MAR  Scheduled bentyl given for abd cramping, see MAR  Heating pad applied to abd for pain  Bed locked and in the lowest position, call light within reach, and safety maintained    Patient having pain on their abdomen and rates it a 7. Pain interventions includerelaxation techniques and medication. Patients goal for pain relief is 1. The need for pain and symptom management will be considered in the discharge planning process to ensure patients comfort.      Problem: Chronic Conditions and Co-morbidities  Goal: Patient's chronic conditions and co-morbidity symptoms are monitored and maintained or improved  1/13/2025 0740 by Joanne Schmitt RN  Outcome: Progressing     Problem: Discharge Planning  Goal: Discharge to home or other facility with appropriate resources  1/13/2025 0740 by Joanne Schmitt RN  Outcome: Progressing     Problem: Skin/Tissue Integrity  Goal: Absence of new skin breakdown  Description: 1.  Monitor for areas of redness and/or skin breakdown  2.  Assess vascular access sites hourly  3.  Every 4-6 hours minimum:  Change oxygen saturation probe site  4.  Every 4-6 hours:  If on nasal continuous positive airway pressure, respiratory therapy assess nares and determine need for appliance change or resting period.  1/13/2025 0740 by Joanne Schmitt RN  Outcome: Progressing     Problem: Safety - Adult  Goal: Free from fall injury  1/13/2025 0740 by Joanne Schmitt RN  Outcome: Progressing     Problem: Pain  Goal: Verbalizes/displays adequate comfort level or baseline comfort level  1/13/2025 0740 by Joanne Schmitt RN  Outcome: Progressing     Problem: Metabolic/Fluid and Electrolytes - Adult  Goal: Electrolytes maintained within normal

## 2025-01-13 NOTE — PROGRESS NOTES
Physical Therapy  DATE: 2025    NAME: Batsheva Gonzalez  MRN: [id number]   : 1970    Patient not seen this date for Physical Therapy due to:      [] Cancel by RN or physician due to:    [] Hemodialysis    [] Critical Lab Value Level     [] Blood transfusion in progress    [] Acute or unstable cardiovascular status   _MAP < 55 or more than >115  _HR < 40 or > 130    [] Acute or unstable pulmonary status   -FiO2 > 60%   _RR < 5 or >40    _O2 sats < 85%    [] Strict Bedrest    [] Off Unit for surgery or procedure    [] Off Unit for testing       [] Pending imaging to R/O fracture    [] Refusal by Patient      [] Other      [x] PT being discontinued at this time. Patient independent. No further needs.  MILLIE Rubio notified.     [] PT being discontinued at this time as the patient has been transferred to hospice care. No further needs.      Barbie Nix, PT

## 2025-01-13 NOTE — PROGRESS NOTES
Spiritual Health History and Assessment/Progress Note  [facility]    (P) Spiritual/Emotional Needs,  ,  ,      Name: Batsheva Gonzalez MRN: [id number]    Age: 54 y.o.     Sex: female   Language: English   Zoroastrian: None   Type 1 diabetes mellitus with ketoacidosis without coma (HCC)     Date: 1/13/2025            Total Time Calculated: (P) 10 min              Spiritual Assessment began in [facility]        Referral/Consult From: (P) Rounding   Encounter Overview/Reason: (P) Spiritual/Emotional Needs  Service Provided For: (P) Patient    Patient engaged with  speaking of long time struggles with diabetes and being back and forth diagnosed as type 1 then 2 then changing again. Patient has support of her daughter and son who are checking regularly on her. Patient welcomed prayer.    Kendra, Belief, Meaning:   Patient has beliefs or practices that help with coping during difficult times  Family/Friends No family/friends present      Importance and Influence:  Patient has no beliefs influential to healthcare decision-making identified during this visit  Family/Friends No family/friends present    Community:  Patient feels well-supported. Support system includes: Children  Family/Friends No family/friends present    Assessment and Plan of Care:     Patient Interventions include: Facilitated expression of thoughts and feelings, Explored spiritual coping/struggle/distress, Affirmed coping skills/support systems, and Facilitated life review and/ or legacy  Family/Friends Interventions include: No family/friends present    Patient Plan of Care: Spiritual Care available upon further referral  Family/Friends Plan of Care: Spiritual Care available upon further referral    Electronically signed by Chaplain Guy on 1/13/2025 at 3:28 PM

## 2025-01-13 NOTE — PLAN OF CARE
Problem: Chronic Conditions and Co-morbidities  Goal: Patient's chronic conditions and co-morbidity symptoms are monitored and maintained or improved  Outcome: Not Progressing  Flowsheets (Taken 1/12/2025 2000)  Care Plan - Patient's Chronic Conditions and Co-Morbidity Symptoms are Monitored and Maintained or Improved:   Monitor and assess patient's chronic conditions and comorbid symptoms for stability, deterioration, or improvement   Collaborate with multidisciplinary team to address chronic and comorbid conditions and prevent exacerbation or deterioration   Update acute care plan with appropriate goals if chronic or comorbid symptoms are exacerbated and prevent overall improvement and discharge     Problem: Discharge Planning  Goal: Discharge to home or other facility with appropriate resources  Outcome: Not Progressing  Flowsheets (Taken 1/12/2025 2000)  Discharge to home or other facility with appropriate resources:   Identify barriers to discharge with patient and caregiver   Arrange for needed discharge resources and transportation as appropriate   Identify discharge learning needs (meds, wound care, etc)   Refer to discharge planning if patient needs post-hospital services based on physician order or complex needs related to functional status, cognitive ability or social support system     Problem: Skin/Tissue Integrity  Goal: Absence of new skin breakdown  Description: 1.  Monitor for areas of redness and/or skin breakdown  2.  Assess vascular access sites hourly  3.  Every 4-6 hours minimum:  Change oxygen saturation probe site  4.  Every 4-6 hours:  If on nasal continuous positive airway pressure, respiratory therapy assess nares and determine need for appliance change or resting period.  Outcome: Not Progressing     Problem: Safety - Adult  Goal: Free from fall injury  Outcome: Not Progressing     Problem: Pain  Goal: Verbalizes/displays adequate comfort level or baseline comfort level  Outcome: Not

## 2025-01-13 NOTE — PROGRESS NOTES
Comprehensive Nutrition Assessment    Type and Reason for Visit:  Initial, Positive nutrition screen    Nutrition Recommendations/Plan:   Recommend continue 4CHO diet  Will modify Glucerna to Ensure Clear. It is higher in CHO but patient needs nutrients at this time  Encourage good po intakes  Monitor diet tolerance, await GI eval  RD to follow.      Malnutrition Assessment:  Malnutrition Status:  At risk for malnutrition (01/13/25 1605)        Nutrition Assessment:    Patient is a 54 year old woman who presents with T1DM with DKA without coma. She requires an insulin pump but has been having insurance challenges. She denies diet education. She states she has lost some weight, currently nauseous. Glucerna making her nauseous she said. UBW reported at 120#. Two weights on patient: 97# and 114#, unsure of which is accurate. Patient is on a 4 CHO diet with Glucern BID. Labs, meds, PMH reviewed.    Nutrition Related Findings:    A1c 11.8%, phos 2.0, Na 129 Wound Type: None       Current Nutrition Intake & Therapies:    Average Meal Intake: 1-25%  Average Supplements Intake: 0%  ADULT DIET; Regular; 4 carb choices (60 gm/meal)  ADULT ORAL NUTRITION SUPPLEMENT; Lunch, Dinner; Diabetic Oral Supplement    Anthropometric Measures:  Height: 157.5 cm (5' 2\")  Ideal Body Weight (IBW): 110 lbs (50 kg)       Current Body Weight: 51.7 kg (114 lb),   IBW.    Current BMI (kg/m2): 20.8              BMI Categories: Normal Weight (BMI 18.5-24.9)    Estimated Daily Nutrient Needs:  Energy Requirements Based On: Kcal/kg  Weight Used for Energy Requirements: Current  Energy (kcal/day): 8413-1004 (25-30)  Weight Used for Protein Requirements: Current  Protein (g/day): 50-60 (1-1.2)  Method Used for Fluid Requirements: 1 ml/kcal  Fluid (ml/day): 4553-2247 (25-30)    Nutrition Diagnosis:   Inadequate oral intake related to altered GI function, acute injury/trauma as evidenced by intake 0-25%, weight loss    Nutrition Interventions:   Food  and/or Nutrient Delivery: Continue Current Diet, Modify Oral Nutrition Supplement  Nutrition Education/Counseling: No recommendation at this time  Coordination of Nutrition Care: Continue to monitor while inpatient       Goals:  Goals: Meet at least 75% of estimated needs          Nutrition Monitoring and Evaluation:   Behavioral-Environmental Outcomes: None Identified  Food/Nutrient Intake Outcomes: Food and Nutrient Intake, Supplement Intake  Physical Signs/Symptoms Outcomes: Biochemical Data, Weight, GI Status, Meal Time Behavior    Discharge Planning:    No discharge needs at this time     Lina Curry RD  Contact: [phone]

## 2025-01-13 NOTE — CARE COORDINATION
Discharge planning    Chart reviewed. Lives in mobile home. Has no DME>     Admitted with type 1 DM and lantus increased. GI consulted. Attending notes need for insulin pump but due to her DM diagnosis varying between type I and II insurance has not approved pump. She has [place] medicaid    Therapy pending.

## 2025-01-13 NOTE — PROGRESS NOTES
[facility]  Office: [phone]  Vidal Reza DO, Eugenio Lucio DO, Kuldeep Plummer DO, Jenaro Maguire DO, Giselle Novak MD, Merline Neely MD, Susan Mejia MD, Jodie Singleton MD,  Danny Carbajal MD, Doug Mora MD, Heath Barraza MD,  Jakob Cohen DO, Ghislaine Fermin MD, Juan Francisco Mercer MD, Luis Felipe Reza DO, Juany Mckinney MD,  Lonnie Davidson DO, Windy Duarte MD, Kerry Delgado MD, Wilda Tellez MD, Vijay Andrews MD,  Roney Roth MD, Geovany Guaman MD, Huber Hernandez MD, Nikky Thomas MD, Marko Khan MD, Nemo Salas MD, Eliazar Nance DO, Sagar Amos MD, Jakob Nation MD, Mohsin Reza, MD, Shirley Waterhouse, CNP,  Xiomy Hare CNP, Eliazar Santos, NATASHA,  Lorri Fritz, VINICIUS, Emily Blanco, CNP, Samantha Asif, CNP, Nicky Blunt CNP, Tricia Waddell CNP, Cesia Walters, PA-C, Cheryle Suarez PA-C, Candida Chin, CNP, Vignesh Galaviz, CNP,  Karey Cordova, CNP, Denise Euceda, CNP, Cami Toribio, CNP,  Vandana Ortega, NATASHA, Alicia Velazquez, CNP         [facility]   IN-PATIENT SERVICE   [facility]    Progress Note    1/13/2025    9:31 AM    Name:   Batsheva Gonzalez  MRN:     [id number]     Acct:      [id number]   Room:   [address] [place] [address]  IP Day:  3  Admit Date:  1/9/2025  5:51 PM    PCP:   Sherrell Carter MD  Code Status:  Full Code    Subjective:     C/C:   Chief Complaint   Patient presents with    Altered Mental Status     PT lives by herself, daughter checks on her every day. Pt daughter states she does not know when pt took her medication. Pt NEEDS  for consultation for self care at home.      Interval History Status: not changed.     Feels about the same today  Denies cp/sob    Having nausea and abd pain    Brief History:     This 54 yof presents with n/v/dizziness/chills and epigastric abd pain. She also had a little centrally located cp-but this was mild. Her glucoses have been running in 300s at home. They were >800 on arrival. She  clots, Hyperlipidemia, Hypertension, Rotator cuff tendonitis, left, and Thyroid disease.    Social History:   reports that she has been smoking cigarettes. She has a 17.5 pack-year smoking history. She has never used smokeless tobacco. She reports that she does not drink alcohol and does not use drugs.     Family History:   Family History   Family history unknown: Yes       Vitals:  BP (!) 141/104   Pulse 99   Temp 98.4 °F (36.9 °C) (Oral)   Resp 18   Ht 1.575 m (5' 2\")   Wt 52.1 kg (114 lb 13.8 oz)   LMP 2017   SpO2 100%   BMI 21.01 kg/m²   Temp (24hrs), Av.8 °F (36.6 °C), Min:97 °F (36.1 °C), Max:98.4 °F (36.9 °C)    Recent Labs     25  1150 25  1632 25  1941 25  0820   POCGLU 189* 118* 99 187*       I/O (24Hr):    Intake/Output Summary (Last 24 hours) at 2025 0931  Last data filed at 2025 1754  Gross per 24 hour   Intake 664.22 ml   Output --   Net 664.22 ml       Labs:  Hematology:  No results for input(s): \"WBC\", \"RBC\", \"HGB\", \"HCT\", \"MCV\", \"MCH\", \"MCHC\", \"RDW\", \"PLT\", \"MPV\", \"SEDRATE\", \"CRP\", \"INR\", \"DDIMER\", \"[id number]\", \"LABABSO\" in the last 72 hours.    Invalid input(s): \"PT\"    Chemistry:  Recent Labs     25  1041 25  0355 25  1624 25  0227 25  0455    130* 130*  --  129*   K 3.6* 3.1* 2.8* 3.7 3.8   * 92* 90*  --  91*   CO2 16* 20 24  --  24   GLUCOSE 199* 228* 109*  --  178*   BUN 7 5* 8  --  11   CREATININE 0.8 0.6 0.7  --  0.7   MG 2.3 1.8 1.7  --  1.9   ANIONGAP 13 18* 15  --  14   LABGLOM 90 >90 >90  --  >90   CALCIUM 8.3* 9.3 9.7  --  9.8   PHOS 2.4* 1.9*  --   --  2.0*     Recent Labs     25  2022 25  0737 25  1150 25  1632 25  1941 25  0820   POCGLU 210* 269* 189* 118* 99 187*     ABG:  Lab Results   Component Value Date/Time    FIO2 NOT REPORTED 2019 09:27 PM     Lab Results   Component Value Date/Time    SPECIAL NOT REPORTED 2019 08:45 PM     Lab Results

## 2025-01-14 ENCOUNTER — ANESTHESIA EVENT (OUTPATIENT)
Dept: OPERATING ROOM | Age: 55
DRG: 420 | End: 2025-01-14
Payer: MEDICAID

## 2025-01-14 ENCOUNTER — APPOINTMENT (OUTPATIENT)
Dept: CT IMAGING | Age: 55
DRG: 420 | End: 2025-01-14
Payer: MEDICAID

## 2025-01-14 ENCOUNTER — ANESTHESIA (OUTPATIENT)
Dept: OPERATING ROOM | Age: 55
DRG: 420 | End: 2025-01-14
Payer: MEDICAID

## 2025-01-14 PROBLEM — E10.649 HYPOGLYCEMIA UNAWARENESS IN TYPE 1 DIABETES MELLITUS (HCC): Status: ACTIVE | Noted: 2025-01-14

## 2025-01-14 LAB
ALBUMIN SERPL-MCNC: 3.4 G/DL (ref 3.5–5.2)
ALBUMIN/GLOB SERPL: 1.5 {RATIO} (ref 1–2.5)
ALP SERPL-CCNC: 96 U/L (ref 35–104)
ALT SERPL-CCNC: 43 U/L (ref 10–35)
ANION GAP SERPL CALCULATED.3IONS-SCNC: 13 MMOL/L (ref 9–16)
AST SERPL-CCNC: 90 U/L (ref 10–35)
BASOPHILS # BLD: <0.03 K/UL (ref 0–0.2)
BASOPHILS NFR BLD: 0 % (ref 0–2)
BILIRUB DIRECT SERPL-MCNC: 0.2 MG/DL (ref 0–0.2)
BILIRUB INDIRECT SERPL-MCNC: 0.2 MG/DL
BILIRUB SERPL-MCNC: 0.5 MG/DL (ref 0–1.2)
BUN SERPL-MCNC: 11 MG/DL (ref 6–20)
CALCIUM SERPL-MCNC: 8.6 MG/DL (ref 8.6–10.4)
CHLORIDE SERPL-SCNC: 96 MMOL/L (ref 98–107)
CO2 SERPL-SCNC: 22 MMOL/L (ref 20–31)
CREAT SERPL-MCNC: 0.8 MG/DL (ref 0.5–0.9)
EOSINOPHIL # BLD: 0.05 K/UL (ref 0–0.44)
EOSINOPHILS RELATIVE PERCENT: 1 % (ref 1–4)
ERYTHROCYTE [DISTWIDTH] IN BLOOD BY AUTOMATED COUNT: 12.4 % (ref 11.8–14.4)
GFR, ESTIMATED: 87 ML/MIN/1.73M2
GLUCOSE BLD-MCNC: 125 MG/DL (ref 65–105)
GLUCOSE BLD-MCNC: 147 MG/DL (ref 65–105)
GLUCOSE BLD-MCNC: 244 MG/DL (ref 65–105)
GLUCOSE BLD-MCNC: 58 MG/DL (ref 65–105)
GLUCOSE BLD-MCNC: 70 MG/DL (ref 65–105)
GLUCOSE BLD-MCNC: 96 MG/DL (ref 65–105)
GLUCOSE BLD-MCNC: 97 MG/DL (ref 65–105)
GLUCOSE SERPL-MCNC: 190 MG/DL (ref 74–99)
HCT VFR BLD AUTO: 33.9 % (ref 36.3–47.1)
HGB BLD-MCNC: 11.8 G/DL (ref 11.9–15.1)
IMM GRANULOCYTES # BLD AUTO: 0.01 K/UL (ref 0–0.3)
IMM GRANULOCYTES NFR BLD: 0 %
LACTATE BLDV-SCNC: 2.7 MMOL/L (ref 0.5–1.9)
LACTATE BLDV-SCNC: 3.5 MMOL/L (ref 0.5–1.9)
LIPASE SERPL-CCNC: 10 U/L (ref 13–60)
LYMPHOCYTES NFR BLD: 2.28 K/UL (ref 1.1–3.7)
LYMPHOCYTES RELATIVE PERCENT: 30 % (ref 24–43)
MAGNESIUM SERPL-MCNC: 1.6 MG/DL (ref 1.6–2.6)
MCH RBC QN AUTO: 31.2 PG (ref 25.2–33.5)
MCHC RBC AUTO-ENTMCNC: 34.8 G/DL (ref 28.4–34.8)
MCV RBC AUTO: 89.7 FL (ref 82.6–102.9)
MONOCYTES NFR BLD: 0.62 K/UL (ref 0.1–1.2)
MONOCYTES NFR BLD: 8 % (ref 3–12)
NEUTROPHILS NFR BLD: 61 % (ref 36–65)
NEUTS SEG NFR BLD: 4.55 K/UL (ref 1.5–8.1)
NRBC BLD-RTO: 0 PER 100 WBC
PHOSPHATE SERPL-MCNC: 3 MG/DL (ref 2.5–4.5)
PLATELET # BLD AUTO: 250 K/UL (ref 138–453)
PMV BLD AUTO: 11.4 FL (ref 8.1–13.5)
POTASSIUM SERPL-SCNC: 2.9 MMOL/L (ref 3.7–5.3)
POTASSIUM SERPL-SCNC: 3.9 MMOL/L (ref 3.7–5.3)
POTASSIUM SERPL-SCNC: 4 MMOL/L (ref 3.7–5.3)
PROLACTIN SERPL-MCNC: 170 NG/ML (ref 4.79–23.3)
PROT SERPL-MCNC: 5.7 G/DL (ref 6.6–8.7)
RBC # BLD AUTO: 3.78 M/UL (ref 3.95–5.11)
SODIUM SERPL-SCNC: 131 MMOL/L (ref 136–145)
WBC OTHER # BLD: 7.5 K/UL (ref 3.5–11.3)

## 2025-01-14 PROCEDURE — 80076 HEPATIC FUNCTION PANEL: CPT

## 2025-01-14 PROCEDURE — 2500000003 HC RX 250 WO HCPCS: Performed by: NURSE PRACTITIONER

## 2025-01-14 PROCEDURE — 84146 ASSAY OF PROLACTIN: CPT

## 2025-01-14 PROCEDURE — 83735 ASSAY OF MAGNESIUM: CPT

## 2025-01-14 PROCEDURE — 99291 CRITICAL CARE FIRST HOUR: CPT

## 2025-01-14 PROCEDURE — 6360000002 HC RX W HCPCS: Performed by: INTERNAL MEDICINE

## 2025-01-14 PROCEDURE — 83605 ASSAY OF LACTIC ACID: CPT

## 2025-01-14 PROCEDURE — 83690 ASSAY OF LIPASE: CPT

## 2025-01-14 PROCEDURE — 80048 BASIC METABOLIC PNL TOTAL CA: CPT

## 2025-01-14 PROCEDURE — 99232 SBSQ HOSP IP/OBS MODERATE 35: CPT | Performed by: INTERNAL MEDICINE

## 2025-01-14 PROCEDURE — 6360000002 HC RX W HCPCS: Performed by: NURSE PRACTITIONER

## 2025-01-14 PROCEDURE — 36415 COLL VENOUS BLD VENIPUNCTURE: CPT

## 2025-01-14 PROCEDURE — 2580000003 HC RX 258: Performed by: INTERNAL MEDICINE

## 2025-01-14 PROCEDURE — 6370000000 HC RX 637 (ALT 250 FOR IP): Performed by: INTERNAL MEDICINE

## 2025-01-14 PROCEDURE — 70450 CT HEAD/BRAIN W/O DYE: CPT

## 2025-01-14 PROCEDURE — 84100 ASSAY OF PHOSPHORUS: CPT

## 2025-01-14 PROCEDURE — 84132 ASSAY OF SERUM POTASSIUM: CPT

## 2025-01-14 PROCEDURE — 85025 COMPLETE CBC W/AUTO DIFF WBC: CPT

## 2025-01-14 PROCEDURE — 82947 ASSAY GLUCOSE BLOOD QUANT: CPT

## 2025-01-14 PROCEDURE — 2580000003 HC RX 258

## 2025-01-14 PROCEDURE — 2060000000 HC ICU INTERMEDIATE R&B

## 2025-01-14 RX ORDER — 0.9 % SODIUM CHLORIDE 0.9 %
500 INTRAVENOUS SOLUTION INTRAVENOUS ONCE
Status: COMPLETED | OUTPATIENT
Start: 2025-01-14 | End: 2025-01-14

## 2025-01-14 RX ORDER — SODIUM CHLORIDE 9 MG/ML
INJECTION, SOLUTION INTRAVENOUS CONTINUOUS
Status: ACTIVE | OUTPATIENT
Start: 2025-01-14 | End: 2025-01-14

## 2025-01-14 RX ORDER — INSULIN GLARGINE 100 [IU]/ML
25 INJECTION, SOLUTION SUBCUTANEOUS DAILY
Status: DISCONTINUED | OUTPATIENT
Start: 2025-01-14 | End: 2025-01-16

## 2025-01-14 RX ADMIN — POTASSIUM CHLORIDE 10 MEQ: 7.46 INJECTION, SOLUTION INTRAVENOUS at 03:20

## 2025-01-14 RX ADMIN — DICYCLOMINE HYDROCHLORIDE 20 MG: 10 CAPSULE ORAL at 20:31

## 2025-01-14 RX ADMIN — SERTRALINE 25 MG: 25 TABLET, FILM COATED ORAL at 09:39

## 2025-01-14 RX ADMIN — ASPIRIN 81 MG: 81 TABLET, COATED ORAL at 09:39

## 2025-01-14 RX ADMIN — POTASSIUM CHLORIDE 10 MEQ: 7.46 INJECTION, SOLUTION INTRAVENOUS at 02:03

## 2025-01-14 RX ADMIN — SODIUM CHLORIDE, PRESERVATIVE FREE 40 MG: 5 INJECTION INTRAVENOUS at 09:38

## 2025-01-14 RX ADMIN — SODIUM CHLORIDE: 9 INJECTION, SOLUTION INTRAVENOUS at 01:06

## 2025-01-14 RX ADMIN — INSULIN LISPRO 2 UNITS: 100 INJECTION, SOLUTION INTRAVENOUS; SUBCUTANEOUS at 17:51

## 2025-01-14 RX ADMIN — DEXTROSE 125 ML: 10 SOLUTION INTRAVENOUS at 11:36

## 2025-01-14 RX ADMIN — SODIUM CHLORIDE, PRESERVATIVE FREE 10 ML: 5 INJECTION INTRAVENOUS at 20:23

## 2025-01-14 RX ADMIN — SODIUM CHLORIDE, PRESERVATIVE FREE 10 ML: 5 INJECTION INTRAVENOUS at 09:38

## 2025-01-14 RX ADMIN — POTASSIUM CHLORIDE 10 MEQ: 7.46 INJECTION, SOLUTION INTRAVENOUS at 05:35

## 2025-01-14 RX ADMIN — SODIUM CHLORIDE 500 ML: 9 INJECTION, SOLUTION INTRAVENOUS at 00:31

## 2025-01-14 RX ADMIN — POTASSIUM CHLORIDE 10 MEQ: 7.46 INJECTION, SOLUTION INTRAVENOUS at 09:39

## 2025-01-14 RX ADMIN — POTASSIUM CHLORIDE 10 MEQ: 7.46 INJECTION, SOLUTION INTRAVENOUS at 11:33

## 2025-01-14 RX ADMIN — DICYCLOMINE HYDROCHLORIDE 20 MG: 10 CAPSULE ORAL at 17:51

## 2025-01-14 RX ADMIN — POTASSIUM CHLORIDE 10 MEQ: 7.46 INJECTION, SOLUTION INTRAVENOUS at 04:22

## 2025-01-14 ASSESSMENT — PAIN DESCRIPTION - LOCATION: LOCATION: ABDOMEN

## 2025-01-14 ASSESSMENT — PAIN SCALES - GENERAL
PAINLEVEL_OUTOF10: 0
PAINLEVEL_OUTOF10: 0

## 2025-01-14 NOTE — CONSULTS
[facility]  CONSULTATION      REASON FOR CONSULT: Abdominal pain with nausea and vomiting    REQUESTING PHYSICIAN:  Jenaro Maguire DO    HISTORY OF PRESENT ILLNESS:    The patient is a 54 y.o. female who presents with mental status changes and hyperglycemia.  Patient has a long history of insulin requiring diabetes, over 25 years.  I am not aware of any definitive endorgan damage, however she has had issues with acute kidney injury, poor dentition.  She presents with DKA and was in the ICU.  For the last 3 weeks she has had increased epigastric pain with nausea and vomiting.  She does not complain of much heartburn or reflux however she is not very communicative as much of the questions were answered by her daughter at bedside.  There is no dysphagia.  No prior EGD.  He does not sound as if she uses any acid suppression at home.  No known history of delayed gastric emptying.  History of tobacco use noted.  No alcohol or drugs.  Daughter comments on a 10 pound weight loss over the last week as she is not able to eat.  She is given a regular diet, not tolerated.    Medical History:   Past Medical History:   Diagnosis Date    Adhesive capsulitis     Anxiety     Arthritis     Depression     Diabetes mellitus (HCC)     takes insulin (Dr. 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 .      PHYSICAL EXAM:    BP (!) 129/93   Pulse 95   Temp 97.7 °F (36.5 °C) (Oral)   Resp 18   Ht 1.575 m (5' 2\")   Wt 52.1 kg (114 lb 13.8 oz)   LMP 06/24/2017   SpO2 100%   BMI 21.01 kg/m²     General:  Alert and oriented x 3.  She appears acutely ill and uncomfortable cannot get in adequate position or comfort     Skin:    Rash is not appreciated.  Easy bruising is not appreciated.  There are no spider angioma on the anterior chest wall.  There are no tattoos.  There is no jaundice    HEENT:  Sclera are anicteric and conjunctiva are normal.  Hearing is adequate. Oropharynx moist without thrush.  Oral ulcers are not seen.  Neck is supple without masses.  There is no supraclavicular wasting    Heart:     Regular rate and rhythm.  There are no murmurs. PMI is nondisplaced.     Lungs:    Clear to auscultation, with no rales, wheezes, or rhonchi. Unlabored breathing pattern with adequate aeration.    Abdomen:   Bowel sounds are normal.  The abdomen is soft and non distended. There is epigastric tenderness without guarding, rebound, or rigidity. There are no masses, hepatosplenomegaly, or hernias. Peritoneal signs are not appreciated.    Extemities: There is no clubbing or edema.  Pulses are palpable.     Lab and Imaging Review   Recent Labs     01/12/25  0355 01/12/25  1624 01/13/25  0227 01/13/25  0455   * 130*  --  129*   K 3.1* 2.8* 3.7 3.8   CL 92* 90*  --  91*   CO2 20 24  --  24   BUN 5* 8  --  11   CREATININE 0.6 0.7  --  0.7   GLUCOSE 228* 109*  --  178*   CALCIUM 9.3 9.7  --  9.8   MG 1.8 1.7  --  1.9     No results for input(s): \"INR\", \"PROTIME\" in the last 72 hours.    IMPRESSION:  Patient is a 54-year-old female with a long history of poorly controlled insulin requiring diabetes.  She has had intermittent episodes of nausea, vomiting, and pain that have worsened over the last 3 weeks with subsequent weight loss.  It sounds like

## 2025-01-14 NOTE — PROGRESS NOTES
[facility]     DAILY PROGRESS NOTE      Patient:   Batsheva Gonzalez   :    1970   Date:     2025  Consultant:   Tu Fang DO FACG    Subjective:     54 y.o. female admitted 2025 with DKA, type 1, not at goal (HCC) [E10.10]  Type 1 diabetes mellitus with ketoacidosis without coma (HCC) [E10.10]  DKA, type 2, not at goal (HCC) [E11.10] and seen for nausea, vomiting, and abdominal pain.  She tells me she is feeling much better today with near resolution.  Upon review of chart she had a significant event 1 hour ago when there was mental status changes.  Workup underway.  She is scheduled for EGD today because of nausea and vomiting.    Current Medications include:   Scheduled Meds:   insulin glargine  25 Units SubCUTAneous Daily    pantoprazole (PROTONIX) 40 mg in sodium chloride (PF) 0.9 % 10 mL injection  40 mg IntraVENous Daily    [Held by provider] enoxaparin  40 mg SubCUTAneous Daily    dicyclomine  20 mg Oral 4x Daily AC & HS    insulin lispro  0-8 Units SubCUTAneous 4x Daily AC & HS    aspirin  81 mg Oral Daily    sertraline  25 mg Oral Daily    sodium chloride flush  5-40 mL IntraVENous 2 times per day     Continuous Infusions:   dextrose      sodium chloride      dextrose 5 % and 0.45 % NaCl Stopped (25 1210)     PRN Meds:.potassium chloride, glucose, dextrose bolus **OR** dextrose bolus, glucagon (rDNA), dextrose, metoprolol, sodium chloride flush, sodium chloride, ondansetron **OR** ondansetron, polyethylene glycol, bisacodyl, acetaminophen **OR** acetaminophen, prochlorperazine, magnesium sulfate, sodium phosphate 15 mmol in sodium chloride 0.9 % 250 mL IVPB, dextrose 5 % and 0.45 % NaCl    Allergies:   Allergies   Allergen Reactions    Lisinopril Anaphylaxis    Imitrex [Sumatriptan]        Objective:   Vital Signs:  Temp (24hrs), Av.1 °F (36.7 °C), Min:97.7 °F (36.5 °C), Max:98.8 °F (37.1 °C)     Systolic (24hrs), Av , Min:101 ,

## 2025-01-14 NOTE — PROGRESS NOTES
POST FALL MANAGEMENT    Batsheva Gonzalez  MEDICAL RECORD NUMBER:  [id number]  AGE: 54 y.o.   GENDER: female  : 1970  TODAYS DATE:  2025    Details     Fall Occurred: Yes    Was the Fall Witnessed:  No      Brief Review of Event: Pt found lying on bathroom floor. Pulse present and breathing. Pt slow to respond, RN sternal rubbed pt and she became more alert. Blood glucose resulted as 70. BP resulted as 104/73.Transferred pt back to bed and 500 ml bolus given as well as dextrose replacement. After fluids infusing pt became even more alert. Head to toe completed, pt had new small bruise on upper right eyebrow. Transferred pt to CT, resulted as nothing acute.         Who found the patient: PCT      Where was the patient at the time of the fall: bathroom      Patient Comments: Pt stated she did not remember falling and became apologetic stating this had happened in the past due to her sugar dropping.         Date Fall Occurred:  2025 .       Time Fall Occurred: 12:11p.m.     Assessment     Post Fall Head to Toe Assessment Completed: Yes    Post Fall Predictive Analytic Score Reviewed: Yes     Post Fall Vitals Completed: Yes    Post Fall Neuro Checks Completed: Yes    Injury Occurred(if yes, describe injury):  no           Did the Patient Experience:(Check Gustavo all that apply)    [x] Patient hit head  [] Loss of consciousness  [] Change in mental status following the fall  [] Patient is on an anticoagulant medication      CT Performed:  yes    Follow-up     Persons Notified of Fall:  (Provide names of persons notified)   [] Physician:   [x] GILBERT: Rufus Galaviz  [x] Nursing Supervisior: Chloe  [x] Manager: Lexie Jung  [] Pharmacist:  [] Family:  [] Other:      Electronically signed by Chris Mcintosh RN 2025 at 2:21 AM

## 2025-01-14 NOTE — SIGNIFICANT EVENT
[facility]  Office: [phone]  Vidal Reza DO, Eugenio Lucio DO, Kuldeep Plummer DO, Jenaro Maguire DO, Giselle Novak MD, Merline Neely MD, Susan Mejia MD, Jodie Singleton MD,  Danny Carbajal MD, Doug Mora MD, Heath Barraza MD,  Jakob Cohen DO, Ghislaine Fermin MD, Juan Francisco Mercer MD, Luis Felipe Reza DO, Juany Mckinney MD,  Lonnie Davidson DO, Windy Duarte MD, Kerry Delgado MD, Wilda Tellez MD, Vijay Andrews MD,  Roney Roth MD, Geovany Guaman MD, Huber Hernandez MD, Nikky Thomas MD, Marko Khan MD, Nemo Salas MD, Domenic Garcia DO, Alberto Stuart DO, Hermes Avilez MD,  Sagar Amos MD, Shirley Waterhouse, CNP,  Xiomy Hare, CNP, Eliazar Santos, CNP,  Lorri Fritz, DNP, Emily Blanco, CNP, Samantha Asif, CNP, Joelle Monahan CNP, Nicky Blunt, CNP, Tricia Waddell, CNP, Cesia Walters, PA-C, Cheryle Suarez, PA-C, Candida Chin, CNP, Taniya Weinberg, CNP, Cami Toribio, CNP, Rachael Santos, CNS, Bhavani Sage, CNP, Vandana Ortega, CNP, Tracy Schwab, CNP         [facility]   IN-PATIENT SERVICE   [facility]    Second Visit Note  For more detailed information please refer to the progress note of the day      1/14/2025    12:23 AM    Name:   Batsheva Gonzalez  MRN:     [id number]     Acct:      [id number]   Room:   1023/1023-01  IP Day:  4  Admit Date:  1/9/2025  5:51 PM    PCP:   Sherrell Carter MD  Code Status:  Full Code      Pt vitals were reviewed   New labs were reviewed   Patient was seen    Rapid response was initiated after patient was found down on the ground with decreased responsiveness.  Upon arrival to bedside patient found to be diaphoretic, blood glucose 70 with blood pressure soft with a systolic of 104.  During my assessment, patient able to answer questions however unable to  recall events leading up to fall.  She does share that she hit her head with right bruising to her eye.  Nursing staff report seizure history however  chart review reveals negative EEG per neurology services in March 2022 and additionally patient without history of AED therapy. Later patient additionally shared that this frequently happens at home when her blood glucose levels decrease.  Plan of care was updated as below.    Updated plan :     Check CT head without contrast to rule out acute intracranial hemorrhage  Give normal saline 500 mL bolus over 2 hours  Give dextrose 10 as per facility protocol for hypoglycemia  Check lactic, prolactin and daily labs x 1 now  Start normal saline over 8-hour infusion to avoid hyperosmolar syndrome in the setting of rapid D10 infusion  Start neurochecks every 4 hours x 5 occurrences  Decrease Lantus to 25 units daily  Should CT head returned abnormal, plan of care to be updated  Lactic returned at 3.5 subsequently potassium returned at 2.9.  Implement IV as needed replacement protocol.  Repeat lactic and potassium in 4 hours.    Due to immediate potential for life threatening deterioration in the setting of hypoglycemia, a total time spent assessing patient, reviewing chart, collaborating with interdisciplinary team and updating plan of care was 33 minutes.    TELMA Pham - CNP  1/14/2025  12:23 AM

## 2025-01-14 NOTE — PLAN OF CARE
Pt currently resting in bed A&OX4 on RA.  Sitting at EOB complaining of nausea.  Fall score assessed: low. Pt wanting to use personal shoes from home. Independent in room.    Admitted for type 1 DM with ketoacidosis w/o coma.   Dr. Fang at bedside and discussed plan. Currently CLD, to be NPO at 0500 for scope tomorrow for possible gastroparesis.   2 units humalog given tonight for BG of 214.  At 0011 pt was found on floor in bathroom by PCT. Fall documentation completed. Pt now stay with me and high fall risk signs have been placed. Bed alarm now on. Socks put on pt.  K+ resulted as 2.9, 60 mEq to be replaced.  50 mEq replaced during this shift.   Lactic elevated at 3.5 redraw resulted as 2.7.  Q4 BS check, most recent resulted as 96, pt currently in bed drinking juice.  All scheduled meds given. PRN compazine given for nausea.     Patient having pain on their abdomen and rates it a 4. Pain interventions includemedication. Patients goal for pain relief is  0 . The need for pain and symptom management will be considered in the discharge planning process to ensure patients comfort.      Standard safety measures in place. Call light within reach. Bed in locked and lowest position.     Problem: Chronic Conditions and Co-morbidities  Goal: Patient's chronic conditions and co-morbidity symptoms are monitored and maintained or improved  1/13/2025 2019 by Chris Mcintosh, RN  Outcome: Progressing     Problem: Discharge Planning  Goal: Discharge to home or other facility with appropriate resources  1/13/2025 2019 by Chris Mcintosh, RN  Outcome: Progressing     Problem: Skin/Tissue Integrity  Goal: Absence of new skin breakdown  Description: 1.  Monitor for areas of redness and/or skin breakdown  2.  Assess vascular access sites hourly  3.  Every 4-6 hours minimum:  Change oxygen saturation probe site  4.  Every 4-6 hours:  If on nasal continuous positive airway pressure, respiratory therapy assess nares and determine need for

## 2025-01-14 NOTE — PLAN OF CARE
Pt is A&Ox4 and gets up standby. Pt was educated on need for bed alarm d/t fall last night. Neurology consulted today d/t fall. Pt is on RA and NSR on monitor. BS 58, dextrose bolus given and came up to 125. AM Lantus held per attending. Pt is a Q4 sugar check. Pt has not c/o any pain. Pt was NPO with plan for EGD today, however it is to be done tomorrow as GI would like neuro clearance. Pt is to be NPO at midnight. All questions and concerns addressed at this time. Safety maintained. Bed is locked and in the lowest position with the call light in reach.     Problem: Chronic Conditions and Co-morbidities  Goal: Patient's chronic conditions and co-morbidity symptoms are monitored and maintained or improved  Outcome: Progressing     Problem: Discharge Planning  Goal: Discharge to home or other facility with appropriate resources  Outcome: Progressing     Problem: Skin/Tissue Integrity  Goal: Absence of new skin breakdown  Description: 1.  Monitor for areas of redness and/or skin breakdown  2.  Assess vascular access sites hourly  3.  Every 4-6 hours minimum:  Change oxygen saturation probe site  4.  Every 4-6 hours:  If on nasal continuous positive airway pressure, respiratory therapy assess nares and determine need for appliance change or resting period.  Outcome: Progressing     Problem: Safety - Adult  Goal: Free from fall injury  Outcome: Progressing     Problem: Pain  Goal: Verbalizes/displays adequate comfort level or baseline comfort level  Outcome: Progressing     Problem: Metabolic/Fluid and Electrolytes - Adult  Goal: Electrolytes maintained within normal limits  Outcome: Progressing  Goal: Hemodynamic stability and optimal renal function maintained  Outcome: Progressing  Goal: Glucose maintained within prescribed range  Outcome: Progressing     Problem: Nutrition Deficit:  Goal: Optimize nutritional status  Outcome: Progressing     Problem: Gastrointestinal - Adult  Goal: Minimal or absence of nausea and

## 2025-01-14 NOTE — CODE DOCUMENTATION
At beginning of Rapid Response. Patient found down on ground in bathroom, difficult to arouse. Blood glucose 70.

## 2025-01-14 NOTE — PROGRESS NOTES
[facility]  Office: [phone]  Vidal Reza DO, Eugenio Lucio DO, Kuldeep Plummer DO, Jenaro Maguire DO, Giselle Novak MD, Merline Neely MD, Susan Mejia MD, Jodie Singleton MD,  Danny Carbajal MD, Doug Mora MD, Heath Barraza MD,  Jakob Cohen DO, Ghislaine Fermin MD, Juan Francisco Mercer MD, Luis Felipe Reza DO, Juany Mckinney MD,  Lonnie Davidson DO, Windy Duarte MD, Kerry Delgado MD, Wilda Tellez MD, Vijay Andrews MD,  Roney Roth MD, Geovany Guaman MD, Huber Hernandez MD, Nikky Thomas MD, Marko Khan MD, Nemo Salas MD, Eliazar Nance DO, Sagar Amos MD, Jakob Nation MD, Mohsin Reza, MD, Shirley Waterhouse, CNP,  Xiomy Hare CNP, Eliazar Santos, CNP,  Lorri Fritz, DNP, Emily Blanco, CNP, Samantha Asif, CNP, Nicky Blunt, CNP, Tricia Waddell, CNP, Cesia Walters, PA-C, Cheryle Suarez PA-C, Candida Chin, CNP, Vignesh Galaviz, CNP,  Karey Cordova, CNP, Denise Euceda, CNP, Cami Toribio, CNP,  Rachael Santos, CNS, Joelle Monahan, CNP, Vandana Ortega, CNP,   Alicia Velazquez, CNP         [facility]   IN-PATIENT SERVICE   [facility]    Progress Note    1/14/2025    1:14 PM    Name:   Batsheva Gonzalez  MRN:     [id number]     Acct:      [id number]   Room:   [facility]/1023-[facility] Day:  4  Admit Date:  1/9/2025  5:51 PM    PCP:   Sherrell Carter MD  Code Status:  Full Code    Subjective:     C/C:   Chief Complaint   Patient presents with    Altered Mental Status     PT lives by herself, daughter checks on her every day. Pt daughter states she does not know when pt took her medication. Pt NEEDS  for consultation for self care at home.      Interval History Status: .   At night patient was found down on the ground with decreased responsiveness, blood glucose was 70 with systolic blood pressure 104, she regained her consciousness slowly, she had hit her head with  bruising to her right eye area.  CT head was done which was negative for any  bilaterally, normal effort  Heart:  regular rate and rhythm, no murmur  Abdomen:  soft, nontender, nondistended, normal bowel sounds, no masses, hepatomegaly, splenomegaly  Extremities:  no edema, redness, tenderness in the calves  Skin:  no gross lesions, rashes, induration    Assessment:        Hospital Problems             Last Modified POA    * (Principal) Type 1 diabetes mellitus with ketoacidosis without coma ([facility]) 1/10/2025 Yes    Essential hypertension 1/10/2025 Yes    Acquired hypothyroidism 1/10/2025 Yes    Diabetic peripheral neuropathy ([facility]) 1/10/2025 Yes    Acute renal failure (ARF) ([facility]) 1/10/2025 Yes    DKA, type 1, not at goal ([facility]) 1/10/2025 Yes    Hypoglycemia unawareness in type 1 diabetes mellitus ([facility]) 1/14/2025 Yes    Unwitnessed fall with transient unresponsiveness  Questionable history of seizures    Plan:        N.p.o. pending further evaluation by GI for EGD which might be postponed today  Hypoglycemia protocol, hold Lantus for today  Consult neurology for suspected seizure    Giselle Novak MD  1/14/2025  1:14 PM

## 2025-01-15 ENCOUNTER — APPOINTMENT (OUTPATIENT)
Dept: MRI IMAGING | Age: 55
DRG: 420 | End: 2025-01-15
Payer: MEDICAID

## 2025-01-15 ENCOUNTER — ANESTHESIA EVENT (OUTPATIENT)
Dept: OPERATING ROOM | Age: 55
DRG: 420 | End: 2025-01-15
Payer: MEDICAID

## 2025-01-15 ENCOUNTER — ANESTHESIA (OUTPATIENT)
Dept: OPERATING ROOM | Age: 55
DRG: 420 | End: 2025-01-15
Payer: MEDICAID

## 2025-01-15 PROBLEM — R40.4 STARING EPISODES: Status: ACTIVE | Noted: 2025-01-15

## 2025-01-15 PROBLEM — R55 EPISODE OF LOSS OF CONSCIOUSNESS: Status: ACTIVE | Noted: 2025-01-15

## 2025-01-15 PROBLEM — R41.0 EPISODIC CONFUSION: Status: ACTIVE | Noted: 2025-01-15

## 2025-01-15 LAB
BASOPHILS # BLD: 0.06 K/UL (ref 0–0.2)
BASOPHILS NFR BLD: 1 % (ref 0–2)
EOSINOPHIL # BLD: 0.18 K/UL (ref 0–0.44)
EOSINOPHILS RELATIVE PERCENT: 2 % (ref 1–4)
ERYTHROCYTE [DISTWIDTH] IN BLOOD BY AUTOMATED COUNT: 12.9 % (ref 11.8–14.4)
EST. AVERAGE GLUCOSE BLD GHB EST-MCNC: 280 MG/DL
GLUCOSE BLD-MCNC: 186 MG/DL (ref 65–105)
GLUCOSE BLD-MCNC: 274 MG/DL (ref 65–105)
GLUCOSE BLD-MCNC: 303 MG/DL (ref 65–105)
GLUCOSE BLD-MCNC: 390 MG/DL (ref 65–105)
GLUCOSE BLD-MCNC: 408 MG/DL (ref 65–105)
GLUCOSE BLD-MCNC: 42 MG/DL (ref 65–105)
GLUCOSE BLD-MCNC: 433 MG/DL (ref 65–105)
GLUCOSE BLD-MCNC: 97 MG/DL (ref 65–105)
HBA1C MFR BLD: 11.4 % (ref 4–6)
HCT VFR BLD AUTO: 28.8 % (ref 36.3–47.1)
HGB BLD-MCNC: 9.8 G/DL (ref 11.9–15.1)
IMM GRANULOCYTES # BLD AUTO: 0.03 K/UL (ref 0–0.3)
IMM GRANULOCYTES NFR BLD: 0 %
LYMPHOCYTES NFR BLD: 4.27 K/UL (ref 1.1–3.7)
LYMPHOCYTES RELATIVE PERCENT: 44 % (ref 24–43)
MCH RBC QN AUTO: 31.1 PG (ref 25.2–33.5)
MCHC RBC AUTO-ENTMCNC: 34 G/DL (ref 28.4–34.8)
MCV RBC AUTO: 91.4 FL (ref 82.6–102.9)
MICROORGANISM SPEC CULT: NORMAL
MICROORGANISM SPEC CULT: NORMAL
MONOCYTES NFR BLD: 0.74 K/UL (ref 0.1–1.2)
MONOCYTES NFR BLD: 8 % (ref 3–12)
NEUTROPHILS NFR BLD: 45 % (ref 36–65)
NEUTS SEG NFR BLD: 4.27 K/UL (ref 1.5–8.1)
NRBC BLD-RTO: 0 PER 100 WBC
PLATELET # BLD AUTO: 248 K/UL (ref 138–453)
PMV BLD AUTO: 11.1 FL (ref 8.1–13.5)
RBC # BLD AUTO: 3.15 M/UL (ref 3.95–5.11)
SERVICE CMNT-IMP: NORMAL
SERVICE CMNT-IMP: NORMAL
SPECIMEN DESCRIPTION: NORMAL
SPECIMEN DESCRIPTION: NORMAL
WBC OTHER # BLD: 9.6 K/UL (ref 3.5–11.3)

## 2025-01-15 PROCEDURE — 7100000001 HC PACU RECOVERY - ADDTL 15 MIN: Performed by: INTERNAL MEDICINE

## 2025-01-15 PROCEDURE — 82947 ASSAY GLUCOSE BLOOD QUANT: CPT

## 2025-01-15 PROCEDURE — 2580000003 HC RX 258: Performed by: INTERNAL MEDICINE

## 2025-01-15 PROCEDURE — 99232 SBSQ HOSP IP/OBS MODERATE 35: CPT | Performed by: INTERNAL MEDICINE

## 2025-01-15 PROCEDURE — 95819 EEG AWAKE AND ASLEEP: CPT | Performed by: PSYCHIATRY & NEUROLOGY

## 2025-01-15 PROCEDURE — 2060000000 HC ICU INTERMEDIATE R&B

## 2025-01-15 PROCEDURE — 7100000000 HC PACU RECOVERY - FIRST 15 MIN: Performed by: INTERNAL MEDICINE

## 2025-01-15 PROCEDURE — 2709999900 HC NON-CHARGEABLE SUPPLY: Performed by: INTERNAL MEDICINE

## 2025-01-15 PROCEDURE — 99223 1ST HOSP IP/OBS HIGH 75: CPT | Performed by: PSYCHIATRY & NEUROLOGY

## 2025-01-15 PROCEDURE — 6370000000 HC RX 637 (ALT 250 FOR IP): Performed by: INTERNAL MEDICINE

## 2025-01-15 PROCEDURE — 70553 MRI BRAIN STEM W/O & W/DYE: CPT

## 2025-01-15 PROCEDURE — 3700000001 HC ADD 15 MINUTES (ANESTHESIA): Performed by: INTERNAL MEDICINE

## 2025-01-15 PROCEDURE — 36415 COLL VENOUS BLD VENIPUNCTURE: CPT

## 2025-01-15 PROCEDURE — 3609012400 HC EGD TRANSORAL BIOPSY SINGLE/MULTIPLE: Performed by: INTERNAL MEDICINE

## 2025-01-15 PROCEDURE — 95816 EEG AWAKE AND DROWSY: CPT

## 2025-01-15 PROCEDURE — 83036 HEMOGLOBIN GLYCOSYLATED A1C: CPT

## 2025-01-15 PROCEDURE — 6360000002 HC RX W HCPCS: Performed by: INTERNAL MEDICINE

## 2025-01-15 PROCEDURE — 85025 COMPLETE CBC W/AUTO DIFF WBC: CPT

## 2025-01-15 PROCEDURE — 2580000003 HC RX 258: Performed by: NURSE ANESTHETIST, CERTIFIED REGISTERED

## 2025-01-15 PROCEDURE — 6360000002 HC RX W HCPCS: Performed by: NURSE ANESTHETIST, CERTIFIED REGISTERED

## 2025-01-15 PROCEDURE — 6360000004 HC RX CONTRAST MEDICATION: Performed by: PSYCHIATRY & NEUROLOGY

## 2025-01-15 PROCEDURE — A9579 GAD-BASE MR CONTRAST NOS,1ML: HCPCS | Performed by: PSYCHIATRY & NEUROLOGY

## 2025-01-15 PROCEDURE — 2500000003 HC RX 250 WO HCPCS: Performed by: INTERNAL MEDICINE

## 2025-01-15 PROCEDURE — 0DB78ZX EXCISION OF STOMACH, PYLORUS, VIA NATURAL OR ARTIFICIAL OPENING ENDOSCOPIC, DIAGNOSTIC: ICD-10-PCS | Performed by: INTERNAL MEDICINE

## 2025-01-15 PROCEDURE — 2500000003 HC RX 250 WO HCPCS: Performed by: NURSE PRACTITIONER

## 2025-01-15 PROCEDURE — 3700000000 HC ANESTHESIA ATTENDED CARE: Performed by: INTERNAL MEDICINE

## 2025-01-15 PROCEDURE — 88305 TISSUE EXAM BY PATHOLOGIST: CPT

## 2025-01-15 RX ORDER — PROPOFOL 10 MG/ML
INJECTION, EMULSION INTRAVENOUS
Status: DISCONTINUED | OUTPATIENT
Start: 2025-01-15 | End: 2025-01-15 | Stop reason: SDUPTHER

## 2025-01-15 RX ORDER — SODIUM CHLORIDE 9 MG/ML
INJECTION, SOLUTION INTRAVENOUS
Status: DISCONTINUED | OUTPATIENT
Start: 2025-01-15 | End: 2025-01-15 | Stop reason: SDUPTHER

## 2025-01-15 RX ORDER — LIDOCAINE HYDROCHLORIDE 20 MG/ML
INJECTION, SOLUTION EPIDURAL; INFILTRATION; INTRACAUDAL; PERINEURAL
Status: DISCONTINUED | OUTPATIENT
Start: 2025-01-15 | End: 2025-01-15 | Stop reason: SDUPTHER

## 2025-01-15 RX ADMIN — PROPOFOL 25 MG: 10 INJECTION, EMULSION INTRAVENOUS at 12:05

## 2025-01-15 RX ADMIN — PROPOFOL 75 MG: 10 INJECTION, EMULSION INTRAVENOUS at 12:03

## 2025-01-15 RX ADMIN — INSULIN LISPRO 4 UNITS: 100 INJECTION, SOLUTION INTRAVENOUS; SUBCUTANEOUS at 21:02

## 2025-01-15 RX ADMIN — SERTRALINE 25 MG: 25 TABLET, FILM COATED ORAL at 08:14

## 2025-01-15 RX ADMIN — PROPOFOL 25 MG: 10 INJECTION, EMULSION INTRAVENOUS at 12:07

## 2025-01-15 RX ADMIN — GADOTERIDOL 10 ML: 279.3 INJECTION, SOLUTION INTRAVENOUS at 15:09

## 2025-01-15 RX ADMIN — SODIUM CHLORIDE, PRESERVATIVE FREE 10 ML: 5 INJECTION INTRAVENOUS at 08:13

## 2025-01-15 RX ADMIN — INSULIN LISPRO 6 UNITS: 100 INJECTION, SOLUTION INTRAVENOUS; SUBCUTANEOUS at 08:12

## 2025-01-15 RX ADMIN — SODIUM CHLORIDE, PRESERVATIVE FREE 40 MG: 5 INJECTION INTRAVENOUS at 08:13

## 2025-01-15 RX ADMIN — ASPIRIN 81 MG: 81 TABLET, COATED ORAL at 13:18

## 2025-01-15 RX ADMIN — INSULIN LISPRO 6 UNITS: 100 INJECTION, SOLUTION INTRAVENOUS; SUBCUTANEOUS at 17:56

## 2025-01-15 RX ADMIN — DICYCLOMINE HYDROCHLORIDE 20 MG: 10 CAPSULE ORAL at 21:01

## 2025-01-15 RX ADMIN — SODIUM CHLORIDE: 9 INJECTION, SOLUTION INTRAVENOUS at 11:52

## 2025-01-15 RX ADMIN — SODIUM CHLORIDE, PRESERVATIVE FREE 10 ML: 5 INJECTION INTRAVENOUS at 20:49

## 2025-01-15 RX ADMIN — DEXTROSE 125 ML: 10 SOLUTION INTRAVENOUS at 10:44

## 2025-01-15 RX ADMIN — DICYCLOMINE HYDROCHLORIDE 20 MG: 10 CAPSULE ORAL at 13:18

## 2025-01-15 RX ADMIN — LIDOCAINE HYDROCHLORIDE 50 MG: 20 INJECTION, SOLUTION EPIDURAL; INFILTRATION; INTRACAUDAL; PERINEURAL at 12:03

## 2025-01-15 RX ADMIN — DICYCLOMINE HYDROCHLORIDE 20 MG: 10 CAPSULE ORAL at 17:56

## 2025-01-15 ASSESSMENT — ENCOUNTER SYMPTOMS: SHORTNESS OF BREATH: 0

## 2025-01-15 NOTE — PLAN OF CARE
Problem: Chronic Conditions and Co-morbidities  Goal: Patient's chronic conditions and co-morbidity symptoms are monitored and maintained or improved  1/14/2025 2237 by Betty Georges RN  Outcome: Progressing  Flowsheets (Taken 1/14/2025 2000)  Care Plan - Patient's Chronic Conditions and Co-Morbidity Symptoms are Monitored and Maintained or Improved:   Monitor and assess patient's chronic conditions and comorbid symptoms for stability, deterioration, or improvement   Collaborate with multidisciplinary team to address chronic and comorbid conditions and prevent exacerbation or deterioration   Update acute care plan with appropriate goals if chronic or comorbid symptoms are exacerbated and prevent overall improvement and discharge     Problem: Discharge Planning  Goal: Discharge to home or other facility with appropriate resources  1/14/2025 2237 by Betty Georges RN  Outcome: Progressing  Flowsheets (Taken 1/14/2025 2000)  Discharge to home or other facility with appropriate resources:   Identify barriers to discharge with patient and caregiver   Arrange for needed discharge resources and transportation as appropriate   Identify discharge learning needs (meds, wound care, etc)     Problem: Skin/Tissue Integrity  Goal: Absence of new skin breakdown  Description: 1.  Monitor for areas of redness and/or skin breakdown  2.  Assess vascular access sites hourly  3.  Every 4-6 hours minimum:  Change oxygen saturation probe site  4.  Every 4-6 hours:  If on nasal continuous positive airway pressure, respiratory therapy assess nares and determine need for appliance change or resting period.  1/14/2025 2237 by Betty Georges RN  Outcome: Progressing     Problem: Safety - Adult  Goal: Free from fall injury  1/14/2025 2237 by Betty Georges RN  Outcome: Progressing     Problem: Pain  Goal: Verbalizes/displays adequate comfort level or baseline comfort level  1/14/2025 2237 by Betty Georges RN  Outcome: Progressing     Problem:  Elvia Cano APRN CNP  [facility]             Form for Ozempic completed and in 1st floor TC basket on 2nd floor   KELL Vitale CNP         Faxed signed Prior Auth form for the Ozempic Pen injectors to [facility] PA Review Agent, [phone], right fax confirmed at 10:27 am today, 6/3/2021. Placed in TC copy basket. MA to follow up on PA response.  Mya Corbett MA  [facility]  2nd Floor  Primary Care     Metabolic/Fluid and Electrolytes - Adult  Goal: Electrolytes maintained within normal limits  1/14/2025 2237 by Betty Georges RN  Outcome: Progressing  Flowsheets (Taken 1/14/2025 2000)  Electrolytes maintained within normal limits: Monitor labs and assess patient for signs and symptoms of electrolyte imbalances     Problem: Metabolic/Fluid and Electrolytes - Adult  Goal: Hemodynamic stability and optimal renal function maintained  1/14/2025 2237 by Betty Georges RN  Outcome: Progressing     Problem: Metabolic/Fluid and Electrolytes - Adult  Goal: Glucose maintained within prescribed range  1/14/2025 2237 by Betty Georges RN  Outcome: Progressing     Problem: Nutrition Deficit:  Goal: Optimize nutritional status  1/14/2025 2237 by Betty Georges RN  Outcome: Progressing     Problem: Gastrointestinal - Adult  Goal: Minimal or absence of nausea and vomiting  1/14/2025 2237 by Betty Georges RN  Outcome: Progressing  Flowsheets (Taken 1/14/2025 2000)  Minimal or absence of nausea and vomiting: Provide nonpharmacologic comfort measures as appropriate     Problem: Gastrointestinal - Adult  Goal: Maintains or returns to baseline bowel function  1/14/2025 2237 by Betty Georges RN  Outcome: Progressing  Flowsheets (Taken 1/14/2025 2000)  Maintains or returns to baseline bowel function: Assess bowel function     Problem: Gastrointestinal - Adult  Goal: Maintains adequate nutritional intake  1/14/2025 2237 by Betty Georges RN  Outcome: Progressing

## 2025-01-15 NOTE — TELEPHONE ENCOUNTER
Last visit:   Last Med refill:   Does patient have enough medication for 72 hours: No:     Next Visit Date:  Future Appointments   Date Time Provider Albino Barraza   11/4/2020 10:00 AM Antonio Gonsalez, PT [facility]   11/9/2020 10:00 AM DO Lyudmila Henao [facility]   11/11/2020 10:00 AM Antonio Gonsalez, PT [facility]   11/18/2020 10:00 AM Antonio Gonsalez, PT [facility]   12/23/2020  1:00 PM Helena Aguirre [address] Maintenance   Topic Date Due    Diabetic retinal exam  09/25/1980    HIV screen  09/25/1985    Hepatitis B vaccine (2 of 3 - Risk 3-dose series) 01/30/2020    Flu vaccine (1) 09/01/2020    Shingles Vaccine (1 of 2) 09/25/2020    Colon cancer screen colonoscopy  09/25/2020    Potassium monitoring  11/07/2020    Creatinine monitoring  11/07/2020    A1C test (Diabetic or Prediabetic)  01/07/2021    Diabetic microalbuminuria test  01/31/2021    Lipid screen  01/31/2021    Diabetic foot exam  07/10/2021    TSH testing  07/13/2021    Breast cancer screen  10/16/2022    Cervical cancer screen  08/10/2025    DTaP/Tdap/Td vaccine (2 - Td) 12/15/2025    Pneumococcal 0-64 years Vaccine  Completed    Hepatitis A vaccine  Aged Out    Hib vaccine  Aged Out    Meningococcal (ACWY) vaccine  Aged Out       Hemoglobin A1C (%)   Date Value   10/07/2020 12.5 (H)   07/10/2020 12.4   01/02/2020 11.8             ( goal A1C is < 7)   Microalb/Crt.  Ratio (mcg/mg creat)   Date Value   01/31/2020 CANNOT BE CALCULATED     LDL Cholesterol (mg/dL)   Date Value   01/31/2020 31   03/31/2012 83       (goal LDL is <100)   AST (U/L)   Date Value   11/04/2019 18     ALT (U/L)   Date Value   11/04/2019 <5 (L)     BUN (mg/dL)   Date Value   11/07/2019 9     BP Readings from Last 3 Encounters:   08/10/20 139/88   07/23/20 122/82   07/10/20 92/71          (goal 120/80)    All Future Testing planned in [facility]  Lab Frequency Next Occurrence   HIV Screen Once 11/02/2020               Patient Active Problem List:     Essential hypertension     Pure hyperglyceridemia     Type 2 diabetes mellitus with hyperglycemia, with long-term current use of insulin (HCC)     Anxiety     DVT (deep vein thrombosis) in pregnancy     Tobacco dependency     Acquired hypothyroidism     Hyperglycemia     Diabetic ketoacidosis without coma associated with type 2 diabetes mellitus ([facility] [address]     Diabetic peripheral neuropathy (HCC)     Hypocalcemia     Hypokalemia     Anemia, normocytic normochromic     Hypophosphatemia     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 If I can be of assistance, please route to the applicable pool. Thank you. Statement Selected

## 2025-01-15 NOTE — PLAN OF CARE
Problem: Chronic Conditions and Co-morbidities  Goal: Patient's chronic conditions and co-morbidity symptoms are monitored and maintained or improved  1/15/2025 0855 by Isabel Goyal RN  Outcome: Progressing  Flowsheets (Taken 1/15/2025 0800)  Care Plan - Patient's Chronic Conditions and Co-Morbidity Symptoms are Monitored and Maintained or Improved: Monitor and assess patient's chronic conditions and comorbid symptoms for stability, deterioration, or improvement     Problem: Discharge Planning  Goal: Discharge to home or other facility with appropriate resources  1/15/2025 0855 by Isabel Goyal RN  Outcome: Progressing  Flowsheets (Taken 1/15/2025 0800)  Discharge to home or other facility with appropriate resources: Identify barriers to discharge with patient and caregiver     Problem: Skin/Tissue Integrity  Goal: Absence of new skin breakdown  Description: 1.  Monitor for areas of redness and/or skin breakdown  2.  Assess vascular access sites hourly  3.  Every 4-6 hours minimum:  Change oxygen saturation probe site  4.  Every 4-6 hours:  If on nasal continuous positive airway pressure, respiratory therapy assess nares and determine need for appliance change or resting period.  1/15/2025 0855 by Isabel Goyal RN  Outcome: Progressing     Problem: Safety - Adult  Goal: Free from fall injury  1/15/2025 0855 by Isabel Goyal RN  Outcome: Progressing  Flowsheets (Taken 1/15/2025 0800)  Free From Fall Injury: Instruct family/caregiver on patient safety     Problem: Pain  Goal: Verbalizes/displays adequate comfort level or baseline comfort level  1/15/2025 0855 by Isabel Goyal RN  Outcome: Progressing     Problem: Metabolic/Fluid and Electrolytes - Adult  Goal: Electrolytes maintained within normal limits  Recent Flowsheet Documentation  Taken 1/15/2025 0800 by Isabel Goyal RN  Electrolytes maintained within normal limits: Monitor labs and assess patient for signs and symptoms of

## 2025-01-15 NOTE — PROGRESS NOTES
Pt remained calm and cooperative tonight and on RA. Pt A&Ox4 and remained free of falls. Bed alarm remained on for safety, but pt able to ambulate well and performed stretches in the room with RN at bedside. Pt remained NPO since midnight for EGD today. No c/o abdominal pain or nausea. No insulin coverage needed tonight. Will continue with care plan.

## 2025-01-15 NOTE — PROGRESS NOTES
[facility]  Office: [phone]  Vidal Reza DO, Eugenio Lucio, DO, Kuldeep Plummer DO, Jenaro Maguire DO, Giselle Novak MD, Merline Neely MD, Susan Mejia MD, Jodie Singleton MD,  Danny Carbajal MD, Doug Mora MD, Heath Barraza MD,  Jakob Cohen DO, Ghislaine Fermin MD, Juan Francisco Mercer MD, Luis Felipe Reza DO, Juany Mckinney MD,  Lonnie Davidson DO, Windy Duarte MD, Kerry Delgado MD, Wilda Tellez MD, Vijay Andrews MD,  Roney Roth MD, Geovany Guaman MD, Huber Hernandez MD, Nikky Thomas MD, Marko Khan MD, Nemo Salas MD, Eliazar Nance DO, Sagar Amos MD, Jakob Nation MD, Mohsin Reza, MD, Shirley Waterhouse, CNP,  Xiomy Hare CNP, Eliazar Santos, CNP,  Lorri Fritz, DNP, Emily Blanco, CNP, Samantha Asif, CNP, Nicky Blunt, CNP, Tricia Waddell, CNP, Cesia Walters, PA-C, Cheryle Suarez PA-C, Candida Chin, CNP, Vignesh Galaviz, CNP,  Karey Cordova, CNP, Denise Euceda, CNP, Cami Toribio, CNP,  Rachael Santos, CNS, Joelle Monahan CNP, Vandana Ortega, CNP,   Alicia Velazquez, CNP         [facility]   IN-PATIENT SERVICE   [facility]    Progress Note    1/15/2025    12:54 PM    Name:   Batsheva Gonzalez  MRN:     [id number]     Acct:      [id number]   Room:   [facility] [place]/NONE  IP Day:  5  Admit Date:  1/9/2025  5:51 PM    PCP:   Sherrell Carter MD  Code Status:  Full Code    Subjective:     C/C:   Chief Complaint   Patient presents with    Altered Mental Status     PT lives by herself, daughter checks on her every day. Pt daughter states she does not know when pt took her medication. Pt NEEDS  for consultation for self care at home.      Interval History Status: .   Patient denies any new problem today  Blood sugars 303, dose of premeals Humalog has been adjusted, A1c came 11.4  Prolactin level was 170  Neurology evaluated, ordered MRI and EEG  GI will be doing EGD today for epigastric pain/discomfort  Brief History:     This 54 yof

## 2025-01-15 NOTE — PROCEDURES
dominant 8.5- 9 Hz, 25-35 uV symmetric, regular activity that was reactive to eye opening. Also noted normal anterior posterior gradient with low amplitude high-frequency beta activity in anterior head regions.  As the patient drowses, there are slow lateralized eye movements and bursts of diffuse slowing in theta frequencies noted.  Also noted is waxing and waning of posterior dominant rhythm.  During drowsiness, the background rhythm waxed and waned and there were periods of slowing. During stage II sleep symmetric V waves and K complexes were seen.      Activation procedures:   Hyperventilation: Not done.   Photic stimulation: Photic driving responses were noted at certain frequencies.      The EKG channel demonstrated a normal sinus rhythm.      Interpretation  This EEG was normal in wakefulness and sleep.     Clinical correlation  This EEG was normal. No focal or epileptiform abnormalities were seen.         Laila Espino MD 1/15/2025 5:38 PM

## 2025-01-15 NOTE — CONSULTS
[facility] Neurology   IN-PATIENT SERVICE      NEUROLOGY CONSULT  NOTE            Date:   1/15/2025  Patient name:  Batsheva Gonzalez  Date of admission:  1/9/2025  YOB: 1970      Chief Complaint:     Chief Complaint   Patient presents with    Altered Mental Status     PT lives by herself, daughter checks on her every day. Pt daughter states she does not know when pt took her medication. Pt NEEDS  for consultation for self care at home.        Reason for Consult:      Episode of LOC, possible seizure    History of Present Illness:     54-year-old female with past medical history of diabetes, hypertension, hyperlipidemia, migraines, who is currently admitted with DKA.  Neurology consulted for episode of loss of consciousness, possible seizure.  History obtained from patient, son over phone, and chart review.  Patient is a poor historian.    Patient was initially brought to the hospital by her daughter on 1/10/2025.  She was found by daughter confused, vomiting.  She has had similar previous presentations for DKA.  In the ED, blood glucose was over 800.  She was initially lethargic and confused.  Subsequently, her blood sugars have been better controlled.  GI has been consulted due to epigastric abdominal pain.  There are plans for possible EGD today.  Patient states that her mentation gradually improved and she was at baseline thereafter.  Early morning yesterday, rapid response was called as patient was found on the floor confused.  She was diaphoretic.  Blood glucose was 70.  Blood pressure was borderline low.  She was able to answer questions but did not know what led to the fall.  CT brain with no acute findings.  Her mentation subsequently has remained at baseline.  No recurrence of episodes.      Patient states that she has no recollection of what happened.  She was found in the restroom and does not even remember how she got there.  There was no tongue biting or incontinence.  No

## 2025-01-15 NOTE — OP NOTE
Patient: Batsheva Gonzalez  YOB: 1970  MRN: [id number]    Date of Procedure: 1/15/2025    Pre-Op Diagnosis Codes:      * Nausea and vomiting, unspecified vomiting type [R11.2]    Post-Op Diagnosis: Esophagitis.  Antral erosions       Procedure(s):  ESOPHAGOGASTRODUODENOSCOPY WITH BIOPSY    Surgeon(s):  Tu Fang DO    Assistant:   * No surgical staff found *    Anesthesia: Monitor Anesthesia Care    Estimated Blood Loss (mL): Minimal    Complications: None    Specimens:   ID Type Source Tests Collected by Time Destination   A : GASTRIC BIOPSY Tissue Gastric SURGICAL PATHOLOGY Tu Fang DO 1/15/2025 1205        Implants:  * No implants in log *      Drains:   [REMOVED] External Urinary Catheter (Removed)   Site Assessment Clean,dry & intact 01/11/25 1200   Placement Replaced 01/11/25 0600   Securement Method Other (Comment) 01/11/25 1200   Catheter Care Catheter/Wick replaced 01/11/25 0600   Perineal Care Yes 01/11/25 0600   Suction 40 mmgHg continuous 01/11/25 1200   Urine Color Yellow 01/11/25 1200   Urine Appearance Clear 01/11/25 1200   Output (mL) 800 mL 01/11/25 1141       Findings:  Infection Present At Time Of Surgery (PATOS) (choose all levels that have infection present):  No infection present  Other Findings: See below    Detailed Description of Procedure: Informed consent was obtained from the patient after explanation of the procedure including indications, description of the procedure,  benefits and possible risks and complications of the procedure, and alternatives. Questions were answered.  The patient's history was reviewed and a directed physical examination was performed prior to the procedure.    Patient was monitored throughout the procedure with pulse oximetry and periodic assessment of vital signs. Patient was sedated as noted above. With the patient in the left lateral decubitus position, the Olympus videoendoscope was placed in the patient's mouth and under direct

## 2025-01-15 NOTE — ANESTHESIA POSTPROCEDURE EVALUATION
Department of Anesthesiology  Postprocedure Note    Patient: Batsheva Gonzalez  MRN: [id number]  YOB: 1970  Date of evaluation: 1/15/2025    Procedure Summary       Date: 01/15/25 Room / Location: [address]    Anesthesia Start: 1159 Anesthesia Stop: 1219    Procedure: ESOPHAGOGASTRODUODENOSCOPY WITH BIOPSY (Esophagus) Diagnosis:       Nausea and vomiting, unspecified vomiting type      (Nausea and vomiting, unspecified vomiting type [R11.2])    Surgeons: Tu Fang DO Responsible Provider: Malaika Fraser MD    Anesthesia Type: MAC ASA Status: 3            Anesthesia Type: MAC    Matteo Phase I: Matteo Score: 8    Matteo Phase II:      Anesthesia Post Evaluation    Airway patency: patent  Cardiovascular status: hemodynamically stable  Respiratory status: acceptable    No notable events documented.

## 2025-01-15 NOTE — CARE COORDINATION
Discharge planning    Patient chart reviewed. Lives in mobile home. Has no DME>    Admitted with type 1 DM and lantus increased. GI notes EGD was held yesterday due to patient having decreased responsiveness.     Plan is for EGD today.

## 2025-01-15 NOTE — ANESTHESIA PRE PROCEDURE
Patient Active Problem List   Diagnosis Code   • Essential hypertension I10   • Pure hyperglyceridemia E78.1   • Type 2 diabetes mellitus with hyperglycemia, with long-term current use of insulin ([facility]) E11.65, Z79.4   • Anxiety F41.9   • DVT (deep vein thrombosis) in pregnancy O22.30   • Tobacco dependency F17.200   • Acquired hypothyroidism E03.9   • Steroid-induced hyperglycemia R73.9, T38.0X5A   • Diabetic ketoacidosis without coma associated with type 2 diabetes mellitus ([facility]) E11.10   • Diabetic peripheral neuropathy ([facility]) E11.42   • Hypocalcemia E83.51   • Hypokalemia E87.6   • Anemia, normocytic normochromic D64.9   • Hypophosphatemia E83.39   • 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 ([facility]) E11.618, M75.00   • Nontraumatic tear of left supraspinatus tendon M75.102   • Migraine with aura G43.109   • Rotator cuff tendonitis, left M75.82   • Acute renal failure (ARF) ([facility]) N17.9   • Mild malnutrition ([facility]) E44.1   • ATN (acute tubular necrosis) ([facility]) N17.0   • Type 1 diabetes mellitus with ketoacidosis without coma ([facility]) E10.10   • DKA, type 1, not at goal ([facility]) E10.10   • Hypoglycemia unawareness in type 1 diabetes mellitus ([facility]) E10.649   • Episode of loss of consciousness R55   • Staring episodes R40.4       Past Medical History:        Diagnosis Date   • Adhesive capsulitis    • Anxiety    • Arthritis    • Depression    • Diabetes mellitus ([facility])     takes insulin (Dr. 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 OR   • TENDON MANIPULATION Left 12/01/2020    left shoulder manipulation   • TUBAL

## 2025-01-16 VITALS
TEMPERATURE: 98.1 F | DIASTOLIC BLOOD PRESSURE: 73 MMHG | HEART RATE: 79 BPM | BODY MASS INDEX: 19.93 KG/M2 | SYSTOLIC BLOOD PRESSURE: 136 MMHG | HEIGHT: 62 IN | RESPIRATION RATE: 18 BRPM | OXYGEN SATURATION: 100 % | WEIGHT: 108.3 LBS

## 2025-01-16 LAB
BASOPHILS # BLD: 0.09 K/UL (ref 0–0.2)
BASOPHILS NFR BLD: 1 % (ref 0–2)
EOSINOPHIL # BLD: 0.23 K/UL (ref 0–0.44)
EOSINOPHILS RELATIVE PERCENT: 3 % (ref 1–4)
ERYTHROCYTE [DISTWIDTH] IN BLOOD BY AUTOMATED COUNT: 12.9 % (ref 11.8–14.4)
GLUCOSE BLD-MCNC: 299 MG/DL (ref 65–105)
GLUCOSE BLD-MCNC: 303 MG/DL (ref 65–105)
GLUCOSE BLD-MCNC: 355 MG/DL (ref 65–105)
GLUCOSE BLD-MCNC: 444 MG/DL (ref 65–105)
HCT VFR BLD AUTO: 30.6 % (ref 36.3–47.1)
HGB BLD-MCNC: 10.1 G/DL (ref 11.9–15.1)
IMM GRANULOCYTES # BLD AUTO: 0.03 K/UL (ref 0–0.3)
IMM GRANULOCYTES NFR BLD: 0 %
LYMPHOCYTES NFR BLD: 2.56 K/UL (ref 1.1–3.7)
LYMPHOCYTES RELATIVE PERCENT: 28 % (ref 24–43)
MCH RBC QN AUTO: 31.1 PG (ref 25.2–33.5)
MCHC RBC AUTO-ENTMCNC: 33 G/DL (ref 28.4–34.8)
MCV RBC AUTO: 94.2 FL (ref 82.6–102.9)
MONOCYTES NFR BLD: 0.68 K/UL (ref 0.1–1.2)
MONOCYTES NFR BLD: 8 % (ref 3–12)
NEUTROPHILS NFR BLD: 60 % (ref 36–65)
NEUTS SEG NFR BLD: 5.51 K/UL (ref 1.5–8.1)
NRBC BLD-RTO: 0 PER 100 WBC
PLATELET # BLD AUTO: 292 K/UL (ref 138–453)
PMV BLD AUTO: 11.1 FL (ref 8.1–13.5)
RBC # BLD AUTO: 3.25 M/UL (ref 3.95–5.11)
WBC OTHER # BLD: 9.1 K/UL (ref 3.5–11.3)

## 2025-01-16 PROCEDURE — 6360000002 HC RX W HCPCS: Performed by: INTERNAL MEDICINE

## 2025-01-16 PROCEDURE — 2500000003 HC RX 250 WO HCPCS: Performed by: INTERNAL MEDICINE

## 2025-01-16 PROCEDURE — 36415 COLL VENOUS BLD VENIPUNCTURE: CPT

## 2025-01-16 PROCEDURE — 99232 SBSQ HOSP IP/OBS MODERATE 35: CPT | Performed by: INTERNAL MEDICINE

## 2025-01-16 PROCEDURE — 85025 COMPLETE CBC W/AUTO DIFF WBC: CPT

## 2025-01-16 PROCEDURE — 2580000003 HC RX 258: Performed by: INTERNAL MEDICINE

## 2025-01-16 PROCEDURE — 6370000000 HC RX 637 (ALT 250 FOR IP): Performed by: INTERNAL MEDICINE

## 2025-01-16 PROCEDURE — 99233 SBSQ HOSP IP/OBS HIGH 50: CPT | Performed by: PSYCHIATRY & NEUROLOGY

## 2025-01-16 PROCEDURE — 6370000000 HC RX 637 (ALT 250 FOR IP): Performed by: NURSE PRACTITIONER

## 2025-01-16 PROCEDURE — 82947 ASSAY GLUCOSE BLOOD QUANT: CPT

## 2025-01-16 RX ORDER — ENOXAPARIN SODIUM 100 MG/ML
30 INJECTION SUBCUTANEOUS DAILY
Status: DISCONTINUED | OUTPATIENT
Start: 2025-01-17 | End: 2025-01-16 | Stop reason: HOSPADM

## 2025-01-16 RX ORDER — PANTOPRAZOLE SODIUM 40 MG/1
40 TABLET, DELAYED RELEASE ORAL
Qty: 30 TABLET | Refills: 1 | Status: SHIPPED | OUTPATIENT
Start: 2025-01-16

## 2025-01-16 RX ORDER — INSULIN LISPRO 100 [IU]/ML
6 INJECTION, SOLUTION INTRAVENOUS; SUBCUTANEOUS ONCE
Status: COMPLETED | OUTPATIENT
Start: 2025-01-16 | End: 2025-01-16

## 2025-01-16 RX ORDER — LAMOTRIGINE 25 MG/1
TABLET ORAL
Qty: 168 TABLET | Refills: 0 | Status: SHIPPED | OUTPATIENT
Start: 2025-01-16

## 2025-01-16 RX ORDER — INSULIN GLARGINE 100 [IU]/ML
20 INJECTION, SOLUTION SUBCUTANEOUS DAILY
Qty: 5 ADJUSTABLE DOSE PRE-FILLED PEN SYRINGE | Refills: 1 | Status: SHIPPED | OUTPATIENT
Start: 2025-01-16

## 2025-01-16 RX ORDER — ATORVASTATIN CALCIUM 40 MG/1
40 TABLET, FILM COATED ORAL DAILY
Qty: 30 TABLET | Refills: 5 | Status: SHIPPED | OUTPATIENT
Start: 2025-01-16

## 2025-01-16 RX ORDER — LAMOTRIGINE 100 MG/1
100 TABLET ORAL 2 TIMES DAILY
Qty: 30 TABLET | Refills: 3 | Status: SHIPPED | OUTPATIENT
Start: 2025-03-15

## 2025-01-16 RX ORDER — INSULIN GLARGINE 100 [IU]/ML
20 INJECTION, SOLUTION SUBCUTANEOUS DAILY
Status: DISCONTINUED | OUTPATIENT
Start: 2025-01-16 | End: 2025-01-16 | Stop reason: HOSPADM

## 2025-01-16 RX ADMIN — METOPROLOL TARTRATE 5 MG: 5 INJECTION INTRAVENOUS at 04:47

## 2025-01-16 RX ADMIN — SODIUM CHLORIDE, PRESERVATIVE FREE 10 ML: 5 INJECTION INTRAVENOUS at 09:06

## 2025-01-16 RX ADMIN — INSULIN GLARGINE 20 UNITS: 100 INJECTION, SOLUTION SUBCUTANEOUS at 09:25

## 2025-01-16 RX ADMIN — ENOXAPARIN SODIUM 40 MG: 100 INJECTION SUBCUTANEOUS at 08:59

## 2025-01-16 RX ADMIN — DICYCLOMINE HYDROCHLORIDE 20 MG: 10 CAPSULE ORAL at 05:14

## 2025-01-16 RX ADMIN — INSULIN LISPRO 6 UNITS: 100 INJECTION, SOLUTION INTRAVENOUS; SUBCUTANEOUS at 04:47

## 2025-01-16 RX ADMIN — INSULIN LISPRO 4 UNITS: 100 INJECTION, SOLUTION INTRAVENOUS; SUBCUTANEOUS at 08:59

## 2025-01-16 RX ADMIN — SERTRALINE 25 MG: 25 TABLET, FILM COATED ORAL at 08:58

## 2025-01-16 RX ADMIN — ASPIRIN 81 MG: 81 TABLET, COATED ORAL at 08:58

## 2025-01-16 RX ADMIN — DICYCLOMINE HYDROCHLORIDE 20 MG: 10 CAPSULE ORAL at 11:01

## 2025-01-16 RX ADMIN — SODIUM CHLORIDE, PRESERVATIVE FREE 40 MG: 5 INJECTION INTRAVENOUS at 09:00

## 2025-01-16 RX ADMIN — INSULIN LISPRO 8 UNITS: 100 INJECTION, SOLUTION INTRAVENOUS; SUBCUTANEOUS at 12:24

## 2025-01-16 NOTE — DISCHARGE SUMMARY
[facility]  Office: [phone]  Vidal Reza DO, Eugenio Lucio DO, Kuldeep Plummer DO, Jenaro Maguire DO, Giselle Novak MD, Merline Neely MD, Susan Mejia MD, Jodie Singleton MD,  Danny Carbajal MD, Doug Mora MD, Heath Barraza MD,  Jakob Cohen DO, Ghislaine Fermin MD, Juan Francisco Mercer MD, Luis Felipe Reza DO, Juany Mckinney MD,  Lonnie Davidson DO, Windy Duarte MD, Kerry Delgado MD, Wilda Tellez MD, Vijay Andrews MD,  Roney Roth MD, Geovany Guaman MD, Huebr Hernandez MD, Nikky Thomas MD, Marko Khan MD, Nemo Salas MD, Eliazar Nance DO, Sagar Amos MD, Jakob Nation MD, Mohsin Reza, MD, Shirley Waterhouse, CNP,  Xiomy Hare CNP, Eliazar Santos, CNP,  Lorri Fritz, DNP, Emily Blanco, CNP, Samantha Asif, CNP, Nicky Blunt, CNP, Tricia Waddell, CNP, Cesia Walters, PA-C, Cheryle Suarez PA-C, Candida Chin, CNP, Vignesh Galaviz, CNP,  Karey Cordova, CNP, Denise Euceda, CNP, Cami Toribio, CNP,  Rachael Santos, CNS, Joelle Monahan, CNP, Vandana Ortega, CNP,   Alicia Velazquez, CNP         [facility]   IN-PATIENT SERVICE   [facility]    Discharge Summary     Patient ID: Batsheva Gonzalez  :  1970   MRN: [id number]     ACCOUNT:  [id number]   Patient's PCP: Sherrell Carter MD  Admit Date: 2025   Discharge Date: 2025     Length of Stay: 6  Code Status:  Prior  Admitting Physician: Giselle Novak MD  Discharge Physician: Giselle Novak MD     Active Discharge Diagnoses:     Hospital Problem Lists:  Principal Problem:    Type 1 diabetes mellitus with ketoacidosis without coma (HCC)  Active Problems:    Essential hypertension    Acquired hypothyroidism    Diabetic peripheral neuropathy (HCC)    Acute renal failure (ARF) (HCC)    DKA, type 1, not at goal (HCC)    Hypoglycemia unawareness in type 1 diabetes mellitus (HCC)    Episode of loss of consciousness    Staring episodes    Episodic confusion  Resolved Problems:    * No  10/07/2021 04:54 AM    GFR NOT REPORTED 10/07/2021 04:54 AM     HFP:  No components found for: \"AP\", \"ALB\", \"PROT\", \"SGOT\", \"SGPT\", \"TBIL\", \"DBILCALC\"  CMP:    Lab Results   Component Value Date/Time    GLUCOSE 190 01/14/2025 12:54 AM    GLUCOSE 127 04/04/2012 07:04 AM     01/14/2025 12:54 AM    K 3.9 01/14/2025 01:42 PM    CL 96 01/14/2025 12:54 AM    CO2 22 01/14/2025 12:54 AM    BUN 11 01/14/2025 12:54 AM    CREATININE 0.8 01/14/2025 12:54 AM    ANIONGAP 13 01/14/2025 12:54 AM    ANIONGAP 9 11/04/2019 09:27 PM    ALKPHOS 96 01/14/2025 12:54 AM    ALT 43 01/14/2025 12:54 AM    AST 90 01/14/2025 12:54 AM    BILITOT 0.5 01/14/2025 12:54 AM    ALBUMIN 3.4 01/14/2025 12:54 AM    LABGLOM 87 01/14/2025 12:54 AM    GFRAA 26 10/07/2021 04:54 AM    GFR      10/07/2021 04:54 AM    GFR NOT REPORTED 10/07/2021 04:54 AM    CALCIUM 8.6 01/14/2025 12:54 AM     PT/INR:    Lab Results   Component Value Date/Time    PROTIME 10.1 10/05/2021 06:23 AM    PROTIME 19.4 04/04/2012 07:04 AM    INR 1.0 10/05/2021 06:23 AM     PTT: No results found for: \"APTT\"  FLP:    Lab Results   Component Value Date/Time    CHOL 171 02/15/2021 10:15 AM    TRIG 167 01/10/2025 05:14 AM    HDL 57 02/15/2021 10:15 AM     U/A:    Lab Results   Component Value Date/Time    COLORU Yellow 01/09/2025 10:00 PM    TURBIDITY Clear 01/09/2025 10:00 PM    HGBUR NEGATIVE 01/09/2025 10:00 PM    PHUR 5.0 01/09/2025 10:00 PM    PHUR 5.0 10/05/2021 06:47 AM    PROTEINU NEGATIVE 01/09/2025 10:00 PM    GLUCOSEU 2+ 01/09/2025 10:00 PM    GLUCOSEU 3+ 03/29/2012 07:16 PM    KETUA LARGE 01/09/2025 10:00 PM    BILIRUBINUR NEGATIVE 01/09/2025 10:00 PM    UROBILINOGEN Normal 01/09/2025 10:00 PM    NITRU NEGATIVE 01/09/2025 10:00 PM    LEUKOCYTESUR NEGATIVE 01/09/2025 10:00 PM     TSH:    Lab Results   Component Value Date/Time    TSH 1.14 01/11/2021 02:54 PM        Radiology:  MRI BRAIN W WO CONTRAST    Result Date: 1/15/2025  No acute intracranial abnormality.  No abnormal

## 2025-01-16 NOTE — CARE COORDINATION
Made referral to [facility].    Sagar refused.     Referral to [facility].     [facility] accepted. AVS faxed

## 2025-01-16 NOTE — PROGRESS NOTES
[facility] Neurology   IN-PATIENT SERVICE      NEUROLOGY PROGRESS  NOTE            Date:   1/16/2025  Patient name:  Batsheva Gonzalez  Date of admission:  1/9/2025  YOB: 1970      Interval History:     No acute events.  Underwent EGD. Remains neurologically stable.  No further episodes of seizures or seizure-like activity.  Anxious to go home today.  MRI brain and EEG done.  Blood sugars remain intermittently high.    History of Present Illness:     54-year-old female with past medical history of diabetes, hypertension, hyperlipidemia, migraines, who is currently admitted with DKA.  Neurology consulted for episode of loss of consciousness, possible seizure.  History obtained from patient, son over phone, and chart review.  Patient is a poor historian.     Patient was initially brought to the hospital by her daughter on 1/10/2025.  She was found by daughter confused, vomiting.  She has had similar previous presentations for DKA.  In the ED, blood glucose was over 800.  She was initially lethargic and confused.  Subsequently, her blood sugars have been better controlled.  GI has been consulted due to epigastric abdominal pain.  There are plans for possible EGD today.  Patient states that her mentation gradually improved and she was at baseline thereafter.  Early morning yesterday, rapid response was called as patient was found on the floor confused.  She was diaphoretic.  Blood glucose was 70.  Blood pressure was borderline low.  She was able to answer questions but did not know what led to the fall.  CT brain with no acute findings.  Her mentation subsequently has remained at baseline.  No recurrence of episodes.       Patient states that she has no recollection of what happened.  She was found in the restroom and does not even remember how she got there.  There was no tongue biting or incontinence.  No tonic-clonic activity witnessed.     She states that similar episodes have happened at home previously.   (-)Hunt's sign bilaterally    Gait                  Deferred             Diagnostics:      Laboratory Testing:  CBC:   Recent Labs     01/14/25  0054 01/15/25  0544 01/16/25  0440   WBC 7.5 9.6 9.1   HGB 11.8* 9.8* 10.1*    248 292     BMP:    Recent Labs     01/14/25  0054 01/14/25  0538 01/14/25  1342   *  --   --    K 2.9* 4.0 3.9   CL 96*  --   --    CO2 22  --   --    BUN 11  --   --    CREATININE 0.8  --   --    GLUCOSE 190*  --   --          Lab Results   Component Value Date    CHOL 171 02/15/2021    HDL 57 02/15/2021    TRIG 167 (H) 01/10/2025    ALT 43 (H) 01/14/2025    AST 90 (H) 01/14/2025    TSH 1.14 01/11/2021    INR 1.0 10/05/2021    GLUF 109 (H) 04/07/2021    LABA1C 11.4 (H) 01/15/2025    MG 1.6 01/14/2025    PHOS 3.0 01/14/2025       Imaging/Diagnostics:      EEG:   Interpretation  This EEG was normal in wakefulness and sleep.      Clinical correlation  This EEG was normal. No focal or epileptiform abnormalities were seen.       Results for orders placed during the hospital encounter of 01/09/25    MRI BRAIN W WO CONTRAST    Narrative  EXAMINATION:  MRI OF THE BRAIN WITHOUT AND WITH CONTRAST  1/15/2025 2:46 pm    TECHNIQUE:  Multiplanar multisequence MRI of the head/brain was performed without and  with the administration of intravenous contrast.    COMPARISON:  CT head performed 01/14/2025.    HISTORY:  ORDERING SYSTEM PROVIDED HISTORY: seizure like episodes  TECHNOLOGIST PROVIDED HISTORY:  seizure like episodes    FINDINGS:  INTRACRANIAL STRUCTURES/VENTRICLES:  The sellar and suprasellar structures,  optic chiasm, corpus callosum, pineal gland, tectum, and midline brainstem  structures are unremarkable.  The craniocervical junction is unremarkable.  There is no acute hemorrhage, mass effect, or midline shift.  There is  satisfactory overall gray-white matter differentiation.  The ventricular  structures are symmetric and unremarkable.  The infratentorial structures  including the

## 2025-01-16 NOTE — PLAN OF CARE
Problem: Chronic Conditions and Co-morbidities  Goal: Patient's chronic conditions and co-morbidity symptoms are monitored and maintained or improved  Outcome: Progressing  Flowsheets (Taken 1/15/2025 2000)  Care Plan - Patient's Chronic Conditions and Co-Morbidity Symptoms are Monitored and Maintained or Improved:   Monitor and assess patient's chronic conditions and comorbid symptoms for stability, deterioration, or improvement   Collaborate with multidisciplinary team to address chronic and comorbid conditions and prevent exacerbation or deterioration   Update acute care plan with appropriate goals if chronic or comorbid symptoms are exacerbated and prevent overall improvement and discharge     Problem: Discharge Planning  Goal: Discharge to home or other facility with appropriate resources  Outcome: Progressing  Flowsheets (Taken 1/15/2025 2000)  Discharge to home or other facility with appropriate resources:   Identify barriers to discharge with patient and caregiver   Arrange for needed discharge resources and transportation as appropriate   Identify discharge learning needs (meds, wound care, etc)     Problem: Skin/Tissue Integrity  Goal: Absence of new skin breakdown  Description: 1.  Monitor for areas of redness and/or skin breakdown  2.  Assess vascular access sites hourly  3.  Every 4-6 hours minimum:  Change oxygen saturation probe site  4.  Every 4-6 hours:  If on nasal continuous positive airway pressure, respiratory therapy assess nares and determine need for appliance change or resting period.  Outcome: Progressing     Problem: Safety - Adult  Goal: Free from fall injury  Outcome: Progressing     Problem: Pain  Goal: Verbalizes/displays adequate comfort level or baseline comfort level  Outcome: Progressing     Problem: Metabolic/Fluid and Electrolytes - Adult  Goal: Electrolytes maintained within normal limits  Outcome: Progressing  Flowsheets (Taken 1/15/2025 2000)  Electrolytes maintained within  normal limits: Monitor labs and assess patient for signs and symptoms of electrolyte imbalances  Goal: Hemodynamic stability and optimal renal function maintained  Outcome: Progressing  Goal: Glucose maintained within prescribed range  Outcome: Progressing     Problem: Nutrition Deficit:  Goal: Optimize nutritional status  Outcome: Progressing     Problem: Gastrointestinal - Adult  Goal: Minimal or absence of nausea and vomiting  Outcome: Progressing  Flowsheets (Taken 1/15/2025 2000)  Minimal or absence of nausea and vomiting: Provide nonpharmacologic comfort measures as appropriate  Goal: Maintains or returns to baseline bowel function  Outcome: Progressing  Flowsheets (Taken 1/15/2025 2000)  Maintains or returns to baseline bowel function: Assess bowel function  Goal: Maintains adequate nutritional intake  Outcome: Progressing

## 2025-01-16 NOTE — PROGRESS NOTES
Pt very anxious/upset and wanting to leave.  Spoke to Dr Novak - jatinder placed in the computer, and reviewed with the patient.  Discharge information given and explained to pt and pt daughter. Pt verbalized understanding. Pt discharged home with all documented belongings.

## 2025-01-16 NOTE — PROGRESS NOTES
Pt remained calm and cooperative tonight. Bed alarm on. Pt ambulated well with no assistive devices independently, but as a stay with me d/t recent fall a few days ago. Pt's BS was 390 tonight, but since pt has been so sensitive with insulin administration, writer reached out to NP and she ordered 4 units. BS increased tonight, though, so at 0430, writer again reached out to NP because the BS was 444. 6 units insulin ordered. PRN 5 mg IV Lopressor given at 0445 for SBP of 162. No c/o abdominal pain. Poss d/c today. Will continue with care plan.

## 2025-01-16 NOTE — PROGRESS NOTES
[facility]  Office: [phone]  Vidal Reza DO, Eugenio Lucio DO, Kuldeep Plummer DO, Jenaro Maguire DO, Giselle Novak MD, Merline Neely MD, Susan Mejia MD, Jodie Singleton MD,  Danny Carbajal MD, Doug Mora MD, Heath Barraza MD,  Jakob Cohen DO, Ghislaine Fermin MD, Juan Francisco Mercer MD, Luis Felipe Reza DO, Juany Mckinney MD,  Lonnie Davidson DO, Windy Duarte MD, Kerry Delgado MD, Wilda Tellez MD, Vijay Andrews MD,  Roney Roth MD, Geovany Guaman MD, Huber Hernandez MD, Nikky Thomas MD, Marko Khan MD, Nemo Salas MD, Eliazar Nance DO, Sagar Amos MD, Jakob Nation MD, Mohsin Reza, MD, Shirley Waterhouse, CNP,  Xiomy Hare CNP, Eliazar Santos, CNP,  Lorri Fritz, DNP, Emily Blanco, CNP, Samantha Asif, CNP, Nicky Blunt, CNP, Tricia Waddell, CNP, Cesia Walters, PA-C, Cheryle Suarez PA-C, Candida Chin, CNP, Vignesh Galaviz, CNP,  Karey Cordova, CNP, Denise Euceda, CNP, Cami Toribio, CNP,  Rachael Santos, CNS, Joelle Monahan, CNP, Vandana Ortega, CNP,   Alicia Velazquez, CNP         [facility]   IN-PATIENT SERVICE   [facility]    Progress Note    1/16/2025    12:01 PM    Name:   Batsheva Gonzalez  MRN:     [id number]     Acct:      [id number]   Room:   [facility] Day:  6  Admit Date:  1/9/2025  5:51 PM    PCP:   Sherrell Carter MD  Code Status:  Full Code    Subjective:     C/C:   Chief Complaint   Patient presents with    Altered Mental Status     PT lives by herself, daughter checks on her every day. Pt daughter states she does not know when pt took her medication. Pt NEEDS  for consultation for self care at home.      Interval History Status: .   Patient denies any new problem today  Blood sugars 355, dose of premeals Humalog has been adjusted, A1c came 11.4  EGD showed esophagitis 38 to 40 cm, 2 columns with erosions, no Dickens's esophagus, few antral erosions which have been biopsied    Prolactin level was 170  No

## 2025-01-16 NOTE — DISCHARGE INSTR - COC
Continuity of Care Form    Patient Name: Batsheva Gonzalez   :  1970  MRN:  [id number]    Admit date:  2025  Discharge date:  25    Code Status Order: Full Code   Advance Directives:   Advance Care Flowsheet Documentation             Admitting Physician:  Giselle Novak MD  PCP: Sherrell Carter MD    Discharging Nurse: Dulce Maria PINTO  Discharging Hospital Unit/Room#: 1023/1023-01  Discharging Unit Phone Number: [phone]    Emergency Contact:   Extended Emergency Contact Information  Primary Emergency Contact: Krystal Gonzalez   [facility]  Home Phone: [phone]  Relation: Child    Past Surgical History:  Past Surgical History:   Procedure Laterality Date    SHOULDER SURGERY Left 2020    SHOULDER MANIPULATION (SUPINE) performed by Elmer Ramirez DO at [facility] OR    TENDON MANIPULATION Left 2020    left shoulder manipulation    TUBAL LIGATION      UPPER GASTROINTESTINAL ENDOSCOPY N/A 1/15/2025    ESOPHAGOGASTRODUODENOSCOPY WITH BIOPSY performed by Tu Fang DO at [facility] OR       Immunization History:   Immunization History   Administered Date(s) Administered    Hep B, ENGERIX-B, (age 20y+), IM, 1mL 2020, 2021    Influenza Virus Vaccine 10/12/2015, 2016    Influenza, FLUARIX, FLULAVAL, FLUZONE (age 6 mo+) and AFLURIA, (age 3 y+), Quadv PF, 0.5mL 2019, 2021, 2021    Pneumococcal, PCV-13, PREVNAR 13, (age 6w+), IM, 0.5mL 12/15/2015    Pneumococcal, PPSV23, PNEUMOVAX 23, (age 2y+), SC/IM, 0.5mL 2020    TDaP, ADACEL (age 10y-64y), BOOSTRIX (age 10y+), IM, 0.5mL 12/15/2015       Active Problems:  Patient Active Problem List   Diagnosis Code    Essential hypertension I10    Pure hyperglyceridemia E78.1    Type 2 diabetes mellitus with hyperglycemia, with long-term current use of insulin (HCC) E11.65, Z79.4    Anxiety F41.9    DVT (deep vein thrombosis) in pregnancy O22.30    Tobacco dependency F17.200    Acquired hypothyroidism E03.9

## 2025-01-17 LAB — SURGICAL PATHOLOGY REPORT: NORMAL

## 2025-01-20 ENCOUNTER — TELEPHONE (OUTPATIENT)
Dept: FAMILY MEDICINE CLINIC | Age: 55
End: 2025-01-20

## 2025-01-20 NOTE — TELEPHONE ENCOUNTER
Care Transitions Initial Follow Up Call    Outreach made within 2 business days of discharge: Yes    Patient: Batsheva Gonzalez Patient : 1970   MRN: [id number]  Reason for Admission: Type 1 diabetes mellitus with ketoacidosis without coma   Discharge Date: 25       Spoke with: MI for patient to call office to schedule a hospital follow up    Discharge department/facility: [place]        [facility]

## 2025-01-22 NOTE — PROGRESS NOTES
Physician Progress Note      PATIENT:               REGAN OSUNA  CSN #:                  [id number]  :                       1970  ADMIT DATE:       2025 5:51 PM  DISCH DATE:        2025 1:05 PM  RESPONDING  PROVIDER #:        Giselle Novak MD          QUERY TEXT:    Pt admitted 25 (discharged 25) with type I diabetes mellitus with   ketoacidosis without coma.  Per H/P; \"She was lethargic and confused alert to self only in ED\"  Per 25 IM progress note: \"Currently patient is fully alert and oriented\"    f possible, please document in the progress notes and discharge summary if you   are evaluating and / or treating any of the following:    The medical record reflects the following:  Risk Factors: DM type I, previous hospitalizations for DKA  Clinical Indicators:   Per H/P; \"She was lethargic and confused alert to self   only in ED\";  Per 25 IM progress note: \"Currently patient is fully alert   and oriented\";  25 in ED glucose >600;  25 glucose ;  24   CT head: no acute intracranial abnormality  Treatment: IVF, insulin infusion, lab monitoring *  Options provided:  -- Metabolic encephalopathy present on admission due to hyperglycemia  -- Lethargy, confusion due to present on admission due to  -- Other - I will add my own diagnosis  -- Disagree - Not applicable / Not valid  -- Disagree - Clinically unable to determine / Unknown  -- Refer to Clinical Documentation Reviewer    PROVIDER RESPONSE TEXT:    Metabolic encephalopathy present on admission due to hyperglycemia    Query created by: Petrona Kemp on 2025 11:43 AM      Electronically signed by:  Giselle Novak MD 2025 9:04 PM

## (undated) DEVICE — SINGLE-USE BIOPSY FORCEPS: Brand: RADIAL JAW 4

## (undated) DEVICE — SYRINGE MED 10ML LUERLOCK TIP W/O SFTY DISP

## (undated) DEVICE — STAZ ENDO KIT: Brand: MEDLINE INDUSTRIES, INC.

## (undated) DEVICE — SWABSTICK MEDICATED 10% POVIDONE IOD PVP SGL ANTISEP SAT

## (undated) DEVICE — BANDAGE ADH W0.75XL3IN NAT PLAS CURAD

## (undated) DEVICE — BITEBLOCK ENDOSCP 60FR MAXI WHT POLYETH STURDY W/ VELC WVN

## (undated) DEVICE — HYPODERMIC SAFETY NEEDLE: Brand: MAGELLAN